# Patient Record
Sex: FEMALE | Race: WHITE | NOT HISPANIC OR LATINO
[De-identification: names, ages, dates, MRNs, and addresses within clinical notes are randomized per-mention and may not be internally consistent; named-entity substitution may affect disease eponyms.]

---

## 2018-02-05 ENCOUNTER — TRANSCRIPTION ENCOUNTER (OUTPATIENT)
Age: 83
End: 2018-02-05

## 2018-02-05 ENCOUNTER — EMERGENCY (EMERGENCY)
Facility: HOSPITAL | Age: 83
LOS: 1 days | Discharge: HOME | End: 2018-02-05
Attending: EMERGENCY MEDICINE | Admitting: EMERGENCY MEDICINE
Payer: MEDICARE

## 2018-02-05 VITALS
SYSTOLIC BLOOD PRESSURE: 138 MMHG | WEIGHT: 130.07 LBS | TEMPERATURE: 98 F | DIASTOLIC BLOOD PRESSURE: 68 MMHG | HEART RATE: 65 BPM | OXYGEN SATURATION: 100 % | RESPIRATION RATE: 16 BRPM

## 2018-02-05 PROCEDURE — 70450 CT HEAD/BRAIN W/O DYE: CPT

## 2018-02-05 PROCEDURE — 71111 X-RAY EXAM RIBS/CHEST4/> VWS: CPT | Mod: 26

## 2018-02-05 PROCEDURE — 71111 X-RAY EXAM RIBS/CHEST4/> VWS: CPT

## 2018-02-05 PROCEDURE — 99284 EMERGENCY DEPT VISIT MOD MDM: CPT

## 2018-02-05 PROCEDURE — 70450 CT HEAD/BRAIN W/O DYE: CPT | Mod: 26

## 2018-02-05 PROCEDURE — 99284 EMERGENCY DEPT VISIT MOD MDM: CPT | Mod: 25

## 2018-02-05 RX ORDER — ACETAMINOPHEN 500 MG
650 TABLET ORAL ONCE
Qty: 0 | Refills: 0 | Status: COMPLETED | OUTPATIENT
Start: 2018-02-05 | End: 2018-02-05

## 2018-02-05 RX ADMIN — Medication 650 MILLIGRAM(S): at 16:53

## 2018-02-05 NOTE — ED PROVIDER NOTE - CHPI ED SYMPTOMS NEG
no bruising/no tingling/no weakness/no fever/no abrasion/no back pain/no numbness/no deformity/no difficulty bearing weight/no stiffness

## 2018-02-05 NOTE — ED ADULT NURSE NOTE - PMH
DM (diabetes mellitus)    GERD (gastroesophageal reflux disease)    HLD (hyperlipidemia)    HTN (hypertension)

## 2018-02-05 NOTE — ED PROVIDER NOTE - PROGRESS NOTE DETAILS
Ct and x ray reviewed. results reviewed with pt and son at bedside. consulted melanie BEARDEN, to evaluate pt for home care, consulted PT for evaluation. pt seen by , jennifer navarro and pt. advised for home care for 2 weeks.  set up home care. pt advised to follow up with pmd. All questions answered and concerns addressed. pt verbalized understanding and agreement with plan and dx. pt advised to follow up with PMD. pt advised to return to ed for worsening symptoms including fever, cp, sob. will dc.

## 2018-02-05 NOTE — ED PROVIDER NOTE - OBJECTIVE STATEMENT
pt is a 86yo female with pmhx of htn, hld, dm BIB son, Marcio, c/o fall x yesterday. pt reports she fell yesterday and has right sided breast pain. pt reports she does not remember falling but reports she fell in the bathroom. pt son reports pt has been unsteady lately and reports he found the shower curtain down in the bathroom. he reports pt called him and advised she fell and has pain yesterday. son reports pt has been having short term memory loss as of lately and has been unsteady. he reports pt lives alone without assistance, him and his brother visit daily.    PMD: jo ann franks

## 2018-02-05 NOTE — ED PROVIDER NOTE - SKIN, MLM
Skin normal color for race, warm, dry and intact. No evidence of rash.+area of ecchymosis right mid-humerus appears old.

## 2018-02-05 NOTE — ED ADULT NURSE NOTE - OBJECTIVE STATEMENT
Pt is 85y F, came to ED s/p fall, no deformity denies loc, advised on plan of care, verbalized understanding.

## 2018-02-05 NOTE — PHYSICAL THERAPY INITIAL EVALUATION ADULT - ADDITIONAL COMMENTS
Son at bedside gives information due to patient c/o rib pain. Patient lives in a private home with 3 steps to enter. Son states he lives less than 2 miles away; so does his brother. Son states patient ambulates short distances around the home.

## 2018-05-13 ENCOUNTER — INPATIENT (INPATIENT)
Facility: HOSPITAL | Age: 83
LOS: 3 days | Discharge: NURSING, REHAB OR LONG-TERM CARE | DRG: 638 | End: 2018-05-17
Attending: FAMILY MEDICINE | Admitting: FAMILY MEDICINE
Payer: MEDICARE

## 2018-05-13 VITALS
WEIGHT: 154.98 LBS | HEIGHT: 62 IN | OXYGEN SATURATION: 98 % | HEART RATE: 58 BPM | SYSTOLIC BLOOD PRESSURE: 155 MMHG | TEMPERATURE: 97 F | RESPIRATION RATE: 18 BRPM | DIASTOLIC BLOOD PRESSURE: 84 MMHG

## 2018-05-13 DIAGNOSIS — R55 SYNCOPE AND COLLAPSE: ICD-10-CM

## 2018-05-13 DIAGNOSIS — S99.919A UNSPECIFIED INJURY OF UNSPECIFIED ANKLE, INITIAL ENCOUNTER: Chronic | ICD-10-CM

## 2018-05-13 DIAGNOSIS — Z90.49 ACQUIRED ABSENCE OF OTHER SPECIFIED PARTS OF DIGESTIVE TRACT: Chronic | ICD-10-CM

## 2018-05-13 DIAGNOSIS — R94.31 ABNORMAL ELECTROCARDIOGRAM [ECG] [EKG]: ICD-10-CM

## 2018-05-13 DIAGNOSIS — K21.9 GASTRO-ESOPHAGEAL REFLUX DISEASE WITHOUT ESOPHAGITIS: ICD-10-CM

## 2018-05-13 DIAGNOSIS — E78.5 HYPERLIPIDEMIA, UNSPECIFIED: ICD-10-CM

## 2018-05-13 DIAGNOSIS — E11.9 TYPE 2 DIABETES MELLITUS WITHOUT COMPLICATIONS: ICD-10-CM

## 2018-05-13 DIAGNOSIS — Z29.9 ENCOUNTER FOR PROPHYLACTIC MEASURES, UNSPECIFIED: ICD-10-CM

## 2018-05-13 DIAGNOSIS — I10 ESSENTIAL (PRIMARY) HYPERTENSION: ICD-10-CM

## 2018-05-13 LAB
BASOPHILS # BLD AUTO: 0.1 K/UL — SIGNIFICANT CHANGE UP (ref 0–0.2)
BASOPHILS NFR BLD AUTO: 1.2 % — SIGNIFICANT CHANGE UP (ref 0–2)
CK MB BLD-MCNC: 1.8 % — SIGNIFICANT CHANGE UP (ref 0–3.5)
CK MB BLD-MCNC: <1.2 % — SIGNIFICANT CHANGE UP (ref 0–3.5)
CK MB CFR SERPL CALC: 0.6 NG/ML — SIGNIFICANT CHANGE UP (ref 0–3.6)
CK MB CFR SERPL CALC: <0.5 NG/ML — SIGNIFICANT CHANGE UP (ref 0–3.6)
CK SERPL-CCNC: 34 U/L — SIGNIFICANT CHANGE UP (ref 26–192)
CK SERPL-CCNC: 41 U/L — SIGNIFICANT CHANGE UP (ref 26–192)
EOSINOPHIL # BLD AUTO: 0.2 K/UL — SIGNIFICANT CHANGE UP (ref 0–0.5)
EOSINOPHIL NFR BLD AUTO: 2.3 % — SIGNIFICANT CHANGE UP (ref 0–6)
HCT VFR BLD CALC: 35.9 % — SIGNIFICANT CHANGE UP (ref 34.5–45)
HGB BLD-MCNC: 12.1 G/DL — SIGNIFICANT CHANGE UP (ref 11.5–15.5)
LACTATE SERPL-SCNC: 1.8 MMOL/L — SIGNIFICANT CHANGE UP (ref 0.7–2)
LYMPHOCYTES # BLD AUTO: 1.5 K/UL — SIGNIFICANT CHANGE UP (ref 1–3.3)
LYMPHOCYTES # BLD AUTO: 17.4 % — SIGNIFICANT CHANGE UP (ref 13–44)
MCHC RBC-ENTMCNC: 28.6 PG — SIGNIFICANT CHANGE UP (ref 27–34)
MCHC RBC-ENTMCNC: 33.7 GM/DL — SIGNIFICANT CHANGE UP (ref 32–36)
MCV RBC AUTO: 85 FL — SIGNIFICANT CHANGE UP (ref 80–100)
MONOCYTES # BLD AUTO: 0.6 K/UL — SIGNIFICANT CHANGE UP (ref 0–0.9)
MONOCYTES NFR BLD AUTO: 6.5 % — SIGNIFICANT CHANGE UP (ref 1–9)
NEUTROPHILS # BLD AUTO: 6.3 K/UL — SIGNIFICANT CHANGE UP (ref 1.8–7.4)
NEUTROPHILS NFR BLD AUTO: 72.5 % — SIGNIFICANT CHANGE UP (ref 43–77)
PLATELET # BLD AUTO: 166 K/UL — SIGNIFICANT CHANGE UP (ref 150–400)
RBC # BLD: 4.23 M/UL — SIGNIFICANT CHANGE UP (ref 3.8–5.2)
RBC # FLD: 13.5 % — SIGNIFICANT CHANGE UP (ref 10.3–14.5)
TROPONIN I SERPL-MCNC: <.015 NG/ML — SIGNIFICANT CHANGE UP (ref 0.01–0.04)
TROPONIN I SERPL-MCNC: <.015 NG/ML — SIGNIFICANT CHANGE UP (ref 0.01–0.04)
WBC # BLD: 8.7 K/UL — SIGNIFICANT CHANGE UP (ref 3.8–10.5)
WBC # FLD AUTO: 8.7 K/UL — SIGNIFICANT CHANGE UP (ref 3.8–10.5)

## 2018-05-13 PROCEDURE — 99223 1ST HOSP IP/OBS HIGH 75: CPT

## 2018-05-13 PROCEDURE — 99285 EMERGENCY DEPT VISIT HI MDM: CPT

## 2018-05-13 PROCEDURE — 70450 CT HEAD/BRAIN W/O DYE: CPT | Mod: 26

## 2018-05-13 PROCEDURE — 99223 1ST HOSP IP/OBS HIGH 75: CPT | Mod: AI,GC

## 2018-05-13 PROCEDURE — 93010 ELECTROCARDIOGRAM REPORT: CPT

## 2018-05-13 PROCEDURE — 71045 X-RAY EXAM CHEST 1 VIEW: CPT | Mod: 26

## 2018-05-13 RX ORDER — CARVEDILOL PHOSPHATE 80 MG/1
6.25 CAPSULE, EXTENDED RELEASE ORAL
Qty: 0 | Refills: 0 | COMMUNITY

## 2018-05-13 RX ORDER — DONEPEZIL HYDROCHLORIDE 10 MG/1
1 TABLET, FILM COATED ORAL
Qty: 0 | Refills: 0 | COMMUNITY

## 2018-05-13 RX ORDER — DEXTROSE 50 % IN WATER 50 %
15 SYRINGE (ML) INTRAVENOUS ONCE
Qty: 0 | Refills: 0 | Status: DISCONTINUED | OUTPATIENT
Start: 2018-05-13 | End: 2018-05-17

## 2018-05-13 RX ORDER — ENOXAPARIN SODIUM 100 MG/ML
40 INJECTION SUBCUTANEOUS DAILY
Qty: 0 | Refills: 0 | Status: DISCONTINUED | OUTPATIENT
Start: 2018-05-13 | End: 2018-05-17

## 2018-05-13 RX ORDER — CARVEDILOL PHOSPHATE 80 MG/1
1 CAPSULE, EXTENDED RELEASE ORAL
Qty: 0 | Refills: 0 | COMMUNITY

## 2018-05-13 RX ORDER — VALSARTAN 80 MG/1
160 TABLET ORAL DAILY
Qty: 0 | Refills: 0 | Status: DISCONTINUED | OUTPATIENT
Start: 2018-05-13 | End: 2018-05-14

## 2018-05-13 RX ORDER — CARVEDILOL PHOSPHATE 80 MG/1
6.25 CAPSULE, EXTENDED RELEASE ORAL EVERY 12 HOURS
Qty: 0 | Refills: 0 | Status: DISCONTINUED | OUTPATIENT
Start: 2018-05-13 | End: 2018-05-17

## 2018-05-13 RX ORDER — PANTOPRAZOLE SODIUM 20 MG/1
40 TABLET, DELAYED RELEASE ORAL
Qty: 0 | Refills: 0 | Status: DISCONTINUED | OUTPATIENT
Start: 2018-05-13 | End: 2018-05-17

## 2018-05-13 RX ORDER — SODIUM CHLORIDE 9 MG/ML
1000 INJECTION, SOLUTION INTRAVENOUS
Qty: 0 | Refills: 0 | Status: DISCONTINUED | OUTPATIENT
Start: 2018-05-13 | End: 2018-05-17

## 2018-05-13 RX ORDER — ASPIRIN/CALCIUM CARB/MAGNESIUM 324 MG
81 TABLET ORAL DAILY
Qty: 0 | Refills: 0 | Status: DISCONTINUED | OUTPATIENT
Start: 2018-05-13 | End: 2018-05-17

## 2018-05-13 RX ORDER — SODIUM CHLORIDE 9 MG/ML
1000 INJECTION, SOLUTION INTRAVENOUS
Qty: 0 | Refills: 0 | Status: DISCONTINUED | OUTPATIENT
Start: 2018-05-13 | End: 2018-05-13

## 2018-05-13 RX ORDER — DEXTROSE 50 % IN WATER 50 %
25 SYRINGE (ML) INTRAVENOUS ONCE
Qty: 0 | Refills: 0 | Status: DISCONTINUED | OUTPATIENT
Start: 2018-05-13 | End: 2018-05-17

## 2018-05-13 RX ORDER — INSULIN LISPRO 100/ML
VIAL (ML) SUBCUTANEOUS
Qty: 0 | Refills: 0 | Status: DISCONTINUED | OUTPATIENT
Start: 2018-05-13 | End: 2018-05-17

## 2018-05-13 RX ORDER — DEXTROSE 50 % IN WATER 50 %
12.5 SYRINGE (ML) INTRAVENOUS ONCE
Qty: 0 | Refills: 0 | Status: DISCONTINUED | OUTPATIENT
Start: 2018-05-13 | End: 2018-05-17

## 2018-05-13 RX ORDER — ATORVASTATIN CALCIUM 80 MG/1
10 TABLET, FILM COATED ORAL AT BEDTIME
Qty: 0 | Refills: 0 | Status: DISCONTINUED | OUTPATIENT
Start: 2018-05-13 | End: 2018-05-17

## 2018-05-13 RX ORDER — GLUCAGON INJECTION, SOLUTION 0.5 MG/.1ML
1 INJECTION, SOLUTION SUBCUTANEOUS ONCE
Qty: 0 | Refills: 0 | Status: DISCONTINUED | OUTPATIENT
Start: 2018-05-13 | End: 2018-05-17

## 2018-05-13 RX ORDER — SODIUM CHLORIDE 9 MG/ML
3 INJECTION INTRAMUSCULAR; INTRAVENOUS; SUBCUTANEOUS ONCE
Qty: 0 | Refills: 0 | Status: COMPLETED | OUTPATIENT
Start: 2018-05-13 | End: 2018-05-13

## 2018-05-13 RX ADMIN — ENOXAPARIN SODIUM 40 MILLIGRAM(S): 100 INJECTION SUBCUTANEOUS at 22:52

## 2018-05-13 RX ADMIN — SODIUM CHLORIDE 200 MILLILITER(S): 9 INJECTION, SOLUTION INTRAVENOUS at 13:57

## 2018-05-13 RX ADMIN — ATORVASTATIN CALCIUM 10 MILLIGRAM(S): 80 TABLET, FILM COATED ORAL at 22:51

## 2018-05-13 RX ADMIN — SODIUM CHLORIDE 3 MILLILITER(S): 9 INJECTION INTRAMUSCULAR; INTRAVENOUS; SUBCUTANEOUS at 13:23

## 2018-05-13 RX ADMIN — CARVEDILOL PHOSPHATE 6.25 MILLIGRAM(S): 80 CAPSULE, EXTENDED RELEASE ORAL at 18:39

## 2018-05-13 NOTE — H&P ADULT - NSHPPHYSICALEXAM_GEN_ALL_CORE
Physical Exam:  General: Well developed, elderly, NAD  HEENT: NCAT, PERRLA, EOMI, b/l moist mucous membranes   Neck: Supple, nontender  Neurology: A&Ox3, nonfocal, CN II-XII grossly intact, pt able to move all 4 extremities  Respiratory: CTA B/L, No W/R/R  CV: RRR, +S1/S2, no murmurs, rubs or gallops  Abdominal: Soft, NT, ND +BSx4  Extremities: No LE edema, + peripheral pulses  MSK: no joint erythema or warmth, no joint swelling   Skin: warm, dry, normal color, no rash Physical Exam:  General: Well developed, elderly, NAD  HEENT: NCAT, PERRLA, EOMI, b/l moist mucous membranes   Neck: Supple, nontender  Neurology: A&Ox3, nonfocal, CN II-XII grossly intact, pt able to move all 4 extremities  Respiratory: CTA B/L, No W/R/R  CV: , +S1/S2, yes murmurs, sinus alise  Abdominal: Soft, NT, ND +BSx4  Extremities: No LE edema, + peripheral pulses  MSK: no joint erythema or warmth, no joint swelling   Skin: warm, dry, normal color, no rash , no open wound

## 2018-05-13 NOTE — ED PROVIDER NOTE - OBJECTIVE STATEMENT
86 yo F pw had episode at home where she became lightheaded, near syncopal. Family knew she had taken her DM meds but didn't eat. Pt was given OJ and cake and pt then woke up and came back to nl . Pt never co cp/sob/palp. No fever/chills/recent illness. no numb/ting/focal weak. No agg/allev factors. No recent illness. no other co. Pt is now asymptomatic. no acute co.  This is pts 3rd event in past 2 months, family states she lives alone and believes she may need placement in NH.

## 2018-05-13 NOTE — ED PROVIDER NOTE - CHPI ED SYMPTOMS NEG
no chills/no cough/no shortness of breath/no vomiting/no decreased eating/drinking/no diarrhea/no fever/no headache/no rash/no abdominal pain

## 2018-05-13 NOTE — ED PROVIDER NOTE - CARE PLAN
Principal Discharge DX:	Near syncope  Secondary Diagnosis:	Hypoglycemia  Secondary Diagnosis:	Abnormal EKG

## 2018-05-13 NOTE — H&P ADULT - ATTENDING COMMENTS
pt seen and examine see above - pt with recurrent syncopal episode -  syncope  work up - echo , carotid doppler , cardiac monitor  , ct head without contrast  neg also / last ct head as per family was neg  2wk ago ,  t wave flattening in ekg change from prior ekg - troponin neg  no sign of acute ischemia this time  continue asa , statin . pt is full code .  neuro dr hampton / cardio dr redmond  family aware with plan .

## 2018-05-13 NOTE — ED ADULT NURSE NOTE - OBJECTIVE STATEMENT
This a 84 y/o female received via EMS c/o low blood per family. Per family patient had an  at home where she became lightheaded, near syncopal. Family knew she had taken medication but she did not eat. Pt was given orange juice, cake and became normal . Denies any nausea, vomiting, fever, chills, diarrhea.  Respiration even unlabored lung field clear bilaterally. plan of care explained to patient will monitor support and safety provided.

## 2018-05-13 NOTE — CONSULT NOTE ADULT - SUBJECTIVE AND OBJECTIVE BOX
[facility]         Sarahi Meraz, Akil, [place], [place], Zack Allen        [phone] (office)    CHIEF COMPLAINT: Patient is a 85y old  Female who presents with a chief complaint of     HPI: 85f htn, chol, dm, gerd.  Some degree of progressive dementia, but continues to live alone. Sedentary at baseline but without sxs of angina or hf by report of her family at the bedside.  She has had several recent episodes of decreased responsiveness, including 2 in the past two weeks.  Typically, she will be sitting and will suddenly become unarousable, such that it takes some effort to wake her and she is often not herself after.  Her family believes that she is not conscious during these events.  She has been evaluated in the ER in the past for this.  On one occasion, she was felt dehydrated.  On another, she was felt to have a uti.  Today she had an episode and she was felt to possibly be hypoglycemic.  She was given something to eat/drink.  EMS found her mildly hyperglycemic.  Given these recurrent events, as well as a progressive decline in her functional status, she presents to the er for evaluation.    She has been found to have an abnormal ekg.     The patient has not memory of the event.    PAST MEDICAL & SURGICAL HISTORY:  DM (diabetes mellitus)  HLD (hyperlipidemia)  HTN (hypertension)  GERD (gastroesophageal reflux disease)  lazaro  breast bx  knee surgery    SOCIAL HISTORY: No active tobacco, alcohol or illicit drug use    FAMILY HISTORY: n/c      Outpatient medications: omeprazole 20, januvia 100, coreg 6.25 bid, lovastatin 40, aricept 5, glimepiride/metf 2.5/500 bid, asa 81, vals 160    MEDICATIONS  (STANDING):  dextrose 5% + sodium chloride 0.9%. 1000 milliLiter(s) (200 mL/Hr) IV Continuous <Continuous>    MEDICATIONS  (PRN):      Allergies    sulfa drugs (Unknown)    Intolerances        REVIEW OF SYSTEMS: unable    VITAL SIGNS:   Vital Signs Last 24 Hrs  T(C): 36.3 (13 May 2018 12:34), Max: 36.3 (13 May 2018 12:34)  T(F): 97.4 (13 May 2018 12:34), Max: 97.4 (13 May 2018 12:34)  HR: 58 (13 May 2018 12:34) (58 - 58)  BP: 155/84 (13 May 2018 12:34) (155/84 - 155/84)  BP(mean): --  RR: 18 (13 May 2018 12:34) (18 - 18)  SpO2: 98% (13 May 2018 12:34) (98% - 98%)    I&O's Summary      PHYSICAL EXAM:    Constitutional: NAD, awake and alert, well-developed  Eyes:  EOMI, no oral cyanosis, conjunctivae clear, anicteric.  Pulmonary: Non-labored, breath sounds are clear bilaterally, no wheezing, rales or rhonchi  Cardiovascular:  regular S1 and S2. 1-2/6 sys murmur.  No rubs, gallops or clicks  Gastrointestinal: Bowel Sounds present, soft, nontender.   Lymph: No peripheral edema.   Neurological: Alert, strength and sensitivity are grossly intact  Skin: No obvious lesions/rashes.   Psych:  Mood & affect appropriate . confused    LABS: All Labs Reviewed:                        12.1   8.7   )-----------( 166      ( 13 May 2018 13:27 )             35.9     13 May 2018 13:27    141    |  106    |  26     ----------------------------<  192    3.6     |  25     |  1.20     Ca    8.8        13 May 2018 13:27    TPro  6.7    /  Alb  3.3    /  TBili  0.8    /  DBili  x      /  AST  16     /  ALT  11     /  AlkPhos  34     13 May 2018 13:27      CARDIAC MARKERS ( 13 May 2018 13:27 )  <.015 ng/mL / x     / 41 U/L / x     / <0.5 ng/mL      Blood Culture:         RADIOLOGY:    EKG: nsr, nonspec twa

## 2018-05-13 NOTE — ED ADULT TRIAGE NOTE - CHIEF COMPLAINT QUOTE
as per ems "patient was about to eat lunch and became lethargic, family gave patient orange juice and cake and the patient went back to baseline". patient is diabetic. patient is at neuro baseline alert and oriented to person and place. BS as per EMS Is 234. negative slurred speech, facial droop, arm/leg drift.

## 2018-05-13 NOTE — ED ADULT NURSE NOTE - CHPI ED SYMPTOMS NEG
no decreased eating/drinking/no chills/no loss of consciousness/no fever/no headache/no dizziness/no nausea/no back pain

## 2018-05-13 NOTE — CONSULT NOTE ADULT - ASSESSMENT
85f htn, chol, dm, gerd.  Some degree of progressive dementia, but continues to live alone. Sedentary at baseline but without sxs of angina or hf by report of her family at the bedside.  She has had several recent episodes of decreased responsiveness, including 2 in the past two weeks.  Typically, she will be sitting and will suddenly become unarousable, such that it takes some effort to wake her and she is often not herself after.  Her family believes that she is not conscious during these events.  She has been evaluated in the ER in the past for this.  On one occasion, she was felt dehydrated.  On another, she was felt to have a uti.  Today she had an episode and she was felt to possibly be hypoglycemic.  She was given something to eat/drink.  EMS found her mildly hyperglycemic.  Given these recurrent events, as well as a progressive decline in her functional status, she presents to the er for evaluation.    She has been found to have an abnormal ekg.     The patient has not memory of the event.    -there is no evidence of acute ischemia.  -there is no evidence of significant arrhythmia.  -there is no evidence for meaningful  volume overload.  -is unclear what is causing these events  -cannot fully exclude an arrhythmic event  -abnormal ekg also needs to be explained, though the abnormalities are nonspecific  -suggest echo  -suggest tele monitoring  -cont bp meds, though if remains hypertensive these may need to be adjusted  -cont asa  -cont statin  -dm management per med  -overall functional and cognitive decline, may require more services or placement regardless of the outcome of the evaluation  -DVT prophylaxis  -monitor electrolytes, keep k>4, Mg>2   -will follow

## 2018-05-13 NOTE — H&P ADULT - ASSESSMENT
86 y/o F with PMH of DM, GERD, HLD, HTN, presents to ED with syncope, admitted for evaluation of syncope 86 y/o F with PMH of DM, GERD, HLD, HTN, presents to ED with syncope, admitted for evaluation of  recurrent syncope

## 2018-05-13 NOTE — ED PROVIDER NOTE - ENMT, MLM
Airway patent, Nasal mucosa clear. Mouth with normal mucosa. Throat has no vesicles, no oropharyngeal exudates and uvula is midline. Neck supple. no meningeal signs. MM Moist. Non-toxic, well appearing.

## 2018-05-13 NOTE — H&P ADULT - NSHPSOCIALHISTORY_GEN_ALL_CORE
Social History/Preventive Medicine:    Marital Status:   Occupation: Retired  Lives with: alone, but has HHA 5d/week for 2-3 hours at a time  Ambulates at home: walker    Substance Use:  Tobacco Usage:  Denies  Alcohol Usage: Denies  Illicit Drug Usage: Denies    Advanced Directives: Full Code

## 2018-05-13 NOTE — H&P ADULT - NSHPREVIEWOFSYSTEMS_GEN_ALL_CORE
Constitutional: denies fever, chills, diaphoresis   HEENT: denies blurry vision  Respiratory: denies SOB, cough, wheezing  Cardiovascular: denies CP, palpitations, edema  Gastrointestinal: denies nausea, vomiting, diarrhea, constipation, abdominal pain, melena, hematochezia   Genitourinary: denies dysuria, frequency, urgency, hematuria   Skin/Breast: denies rash  Musculoskeletal: denies joint swelling, muscle weakness  Neurologic: denies headache, weakness, dizziness, paresthesias, numbness/tingling  Psychiatric: denies feeling anxious, depressed, suicidal, homicidal thoughts  Hematology/Oncology: denies bruising, tender or enlarged lymph nodes   ROS negative except as noted above Constitutional: denies fever, chills, diaphoresis   HEENT: denies blurry vision  Respiratory: denies SOB, cough, wheezing  Cardiovascular: denies CP, palpitations, edema  Gastrointestinal: denies nausea, vomiting, diarrhea, constipation, abdominal pain, melena, hematochezia   Genitourinary: denies dysuria, frequency, urgency, hematuria   Skin/Breast: denies rash  Musculoskeletal: denies joint swelling, muscle weakness  Neurologic: denies headache, weakness, dizziness, paresthesias, numbness/tingling    ROS negative except as noted above

## 2018-05-13 NOTE — H&P ADULT - PROBLEM SELECTOR PLAN 1
- Admit to tele  - EKG sinus alise @59 with non-specific ST and T wave changes (T wave flattening noted compared to prior)  - - Admit to tele  - EKG sinus alise @59 with non-specific ST and T wave changes (T wave flattening noted compared to prior)  -echo , cardiac monitor , ct head

## 2018-05-13 NOTE — ED PROVIDER NOTE - PROGRESS NOTE DETAILS
Pt seen by Dr Austin, agree with admit, will follow. Dw Dr Lai, will see pt to admit. Pt doing well, no acute co or changes.

## 2018-05-13 NOTE — H&P ADULT - HISTORY OF PRESENT ILLNESS
84 y/o F with PMH of DM, GERD, HLD, HTN, presents to ED with syncope. Pt and daughter-in-law at bedside note that she has had 2 prior episodes of syncope in past month, for which she has been seen at [place] and previous workup was negative. Daughter-in-law notes that each episode has only lasted 1-2 mins and pt has no confusion after. Denies any falls. Daughter-in-law witnessed today's episode while pt was resting in a chair. Pt states she feels fine and denies HA, dizziness/lightheadedness, CP, SOB, N/V/C/D, hematochezia/melena, dysuria, hematuria.     In the ED pt VS were 97.4F, HR 58, /84, RR 18, SpO2 98% on RA.  Labs significant for CBC WNL, BUN 26, Glucose 192, GFR 41, Cardiac enzymes x1 WNL. CXR negative for active disease. EKG sinus alise @59 with non-specific ST and T wave changes (T wave flattening noted compared to prior EKG) 84 y/o F with PMH of DM, GERD, HLD, HTN, presents to ED with syncope. Pt and daughter-in-law at bedside note that she has had 2 prior episodes of syncope in past month, for which she has been seen at [place] and previous workup was negative. Daughter-in-law notes that each episode has only lasted 1-2 mins. Denies any falls. Daughter-in-law witnessed today's episode while pt was resting in a chair. Daughter felt pt may be hypoglycemic.  She was given something to eat/drink.  EMS found her mildly hyperglycemic. Pt states she feels fine and denies HA, dizziness/lightheadedness, CP, SOB, N/V/C/D, hematochezia/melena, dysuria, hematuria.     In the ED pt VS were 97.4F, HR 58, /84, RR 18, SpO2 98% on RA.  Labs significant for CBC WNL, BUN 26, Glucose 192, GFR 41, Cardiac enzymes x1 WNL. CXR negative for active disease. EKG sinus alise @59 with non-specific ST and T wave changes (T wave flattening noted compared to prior). Blood/urine cx ordered. Cardio Dr. Goetz consulted, saw pt in ED. 86 y/o F with PMH of DM type2 , GERD, HLD, HTN, presents to ED with syncope. Pt and daughter-in-law at bedside note that she has had 2 prior episodes of syncope in past month, for which she has been seen at [place] and previous workup was negative last ed visit may 1st . Daughter-in-law notes that each episode has only lasted 1-2 mins. Denies any falls. Daughter-in-law witnessed today's episode while pt was resting in a chair. Daughter felt pt may be hypoglycemic.  She was given something to eat/drink.  EMS found her mildly hyperglycemic. Pt states she feels fine and denies HA, dizziness/lightheadedness, CP, SOB, N/V/C/D, hematochezia/melena, dysuria, hematuria.     In the ED pt VS were 97.4F, HR 58, /84, RR 18, SpO2 98% on RA.  Labs significant for CBC WNL, BUN 26, Glucose 192, GFR 41, Cardiac enzymes x1 WNL. CXR negative for active disease. EKG sinus alise @59 with non-specific ST and T wave changes (T wave flattening noted compared to prior). Blood/urine cx ordered. Cardio Dr. Goetz consulted, saw pt in ED.

## 2018-05-14 ENCOUNTER — TRANSCRIPTION ENCOUNTER (OUTPATIENT)
Age: 83
End: 2018-05-14

## 2018-05-14 LAB
ANION GAP SERPL CALC-SCNC: 9 MMOL/L — SIGNIFICANT CHANGE UP (ref 5–17)
BASOPHILS # BLD AUTO: 0.1 K/UL — SIGNIFICANT CHANGE UP (ref 0–0.2)
BASOPHILS NFR BLD AUTO: 1.3 % — SIGNIFICANT CHANGE UP (ref 0–2)
BUN SERPL-MCNC: 22 MG/DL — SIGNIFICANT CHANGE UP (ref 7–23)
CALCIUM SERPL-MCNC: 9 MG/DL — SIGNIFICANT CHANGE UP (ref 8.5–10.1)
CHLORIDE SERPL-SCNC: 106 MMOL/L — SIGNIFICANT CHANGE UP (ref 96–108)
CK MB BLD-MCNC: 1.7 % — SIGNIFICANT CHANGE UP (ref 0–3.5)
CK MB CFR SERPL CALC: 0.6 NG/ML — SIGNIFICANT CHANGE UP (ref 0–3.6)
CK SERPL-CCNC: 36 U/L — SIGNIFICANT CHANGE UP (ref 26–192)
CO2 SERPL-SCNC: 29 MMOL/L — SIGNIFICANT CHANGE UP (ref 22–31)
CREAT SERPL-MCNC: 0.96 MG/DL — SIGNIFICANT CHANGE UP (ref 0.5–1.3)
EOSINOPHIL # BLD AUTO: 0.2 K/UL — SIGNIFICANT CHANGE UP (ref 0–0.5)
EOSINOPHIL NFR BLD AUTO: 3.2 % — SIGNIFICANT CHANGE UP (ref 0–6)
GLUCOSE SERPL-MCNC: 81 MG/DL — SIGNIFICANT CHANGE UP (ref 70–99)
HBA1C BLD-MCNC: 5.4 % — SIGNIFICANT CHANGE UP (ref 4–5.6)
HCT VFR BLD CALC: 34.8 % — SIGNIFICANT CHANGE UP (ref 34.5–45)
HGB BLD-MCNC: 11.9 G/DL — SIGNIFICANT CHANGE UP (ref 11.5–15.5)
LYMPHOCYTES # BLD AUTO: 2.5 K/UL — SIGNIFICANT CHANGE UP (ref 1–3.3)
LYMPHOCYTES # BLD AUTO: 34.7 % — SIGNIFICANT CHANGE UP (ref 13–44)
MAGNESIUM SERPL-MCNC: 1.9 MG/DL — SIGNIFICANT CHANGE UP (ref 1.6–2.6)
MCHC RBC-ENTMCNC: 28.8 PG — SIGNIFICANT CHANGE UP (ref 27–34)
MCHC RBC-ENTMCNC: 34.1 GM/DL — SIGNIFICANT CHANGE UP (ref 32–36)
MCV RBC AUTO: 84.7 FL — SIGNIFICANT CHANGE UP (ref 80–100)
MONOCYTES # BLD AUTO: 0.5 K/UL — SIGNIFICANT CHANGE UP (ref 0–0.9)
MONOCYTES NFR BLD AUTO: 7.2 % — SIGNIFICANT CHANGE UP (ref 1–9)
NEUTROPHILS # BLD AUTO: 3.9 K/UL — SIGNIFICANT CHANGE UP (ref 1.8–7.4)
NEUTROPHILS NFR BLD AUTO: 53.6 % — SIGNIFICANT CHANGE UP (ref 43–77)
PLATELET # BLD AUTO: 160 K/UL — SIGNIFICANT CHANGE UP (ref 150–400)
POTASSIUM SERPL-MCNC: 3.7 MMOL/L — SIGNIFICANT CHANGE UP (ref 3.5–5.3)
POTASSIUM SERPL-SCNC: 3.7 MMOL/L — SIGNIFICANT CHANGE UP (ref 3.5–5.3)
RBC # BLD: 4.11 M/UL — SIGNIFICANT CHANGE UP (ref 3.8–5.2)
RBC # FLD: 13 % — SIGNIFICANT CHANGE UP (ref 10.3–14.5)
SODIUM SERPL-SCNC: 144 MMOL/L — SIGNIFICANT CHANGE UP (ref 135–145)
TROPONIN I SERPL-MCNC: <.015 NG/ML — SIGNIFICANT CHANGE UP (ref 0.01–0.04)
TSH SERPL-MCNC: 0.57 UIU/ML — SIGNIFICANT CHANGE UP (ref 0.36–3.74)
WBC # BLD: 7.2 K/UL — SIGNIFICANT CHANGE UP (ref 3.8–10.5)
WBC # FLD AUTO: 7.2 K/UL — SIGNIFICANT CHANGE UP (ref 3.8–10.5)

## 2018-05-14 PROCEDURE — 99233 SBSQ HOSP IP/OBS HIGH 50: CPT

## 2018-05-14 PROCEDURE — 93306 TTE W/DOPPLER COMPLETE: CPT | Mod: 26

## 2018-05-14 PROCEDURE — 93880 EXTRACRANIAL BILAT STUDY: CPT | Mod: 26

## 2018-05-14 PROCEDURE — 99232 SBSQ HOSP IP/OBS MODERATE 35: CPT

## 2018-05-14 RX ORDER — VALSARTAN 80 MG/1
320 TABLET ORAL DAILY
Qty: 0 | Refills: 0 | Status: DISCONTINUED | OUTPATIENT
Start: 2018-05-14 | End: 2018-05-17

## 2018-05-14 RX ADMIN — Medication 650 MILLIGRAM(S): at 23:00

## 2018-05-14 RX ADMIN — VALSARTAN 320 MILLIGRAM(S): 80 TABLET ORAL at 17:13

## 2018-05-14 RX ADMIN — CARVEDILOL PHOSPHATE 6.25 MILLIGRAM(S): 80 CAPSULE, EXTENDED RELEASE ORAL at 05:44

## 2018-05-14 RX ADMIN — PANTOPRAZOLE SODIUM 40 MILLIGRAM(S): 20 TABLET, DELAYED RELEASE ORAL at 05:44

## 2018-05-14 RX ADMIN — ENOXAPARIN SODIUM 40 MILLIGRAM(S): 100 INJECTION SUBCUTANEOUS at 12:09

## 2018-05-14 RX ADMIN — VALSARTAN 160 MILLIGRAM(S): 80 TABLET ORAL at 05:44

## 2018-05-14 RX ADMIN — CARVEDILOL PHOSPHATE 6.25 MILLIGRAM(S): 80 CAPSULE, EXTENDED RELEASE ORAL at 17:18

## 2018-05-14 RX ADMIN — Medication 81 MILLIGRAM(S): at 12:09

## 2018-05-14 RX ADMIN — ATORVASTATIN CALCIUM 10 MILLIGRAM(S): 80 TABLET, FILM COATED ORAL at 23:05

## 2018-05-14 NOTE — PHYSICAL THERAPY INITIAL EVALUATION ADULT - PATIENT/FAMILY/SIGNIFICANT OTHER GOALS STATEMENT, PT EVAL
To go home Son wants her to get better and eventually plans to have pt go back home /c more assistance

## 2018-05-14 NOTE — PHYSICAL THERAPY INITIAL EVALUATION ADULT - FOLLOWS COMMANDS/ANSWERS QUESTIONS, REHAB EVAL
able to follow single-step instructions/some [place]/100% of the time 100% of the time/some [place] and confusion/able to follow single-step instructions

## 2018-05-14 NOTE — PROGRESS NOTE ADULT - ASSESSMENT
85f htn, chol, dm, gerd.  Some degree of progressive dementia, but continues to live alone. Sedentary at baseline but without sxs of angina or hf by report of her family at the bedside.  She has had several recent episodes of decreased responsiveness, including 2 in the past two weeks.  Typically, she will be sitting and will suddenly become unarousable, such that it takes some effort to wake her and she is often not herself after.  Her family believes that she is not conscious during these events.  She has been evaluated in the ER in the past for this.  On one occasion, she was felt dehydrated.  On another, she was felt to have a uti.  Today she had an episode and she was felt to possibly be hypoglycemic.  She was given something to eat/drink.  EMS found her mildly hyperglycemic.  Given these recurrent events, as well as a progressive decline in her functional status, she presents to the er for evaluation.    She has been found to have an abnormal ekg.     The patient has not memory of the event.    -there is no evidence of acute ischemia.  -there is no evidence of significant arrhythmia SB on tele overnight with no events  -there is no evidence for meaningful  volume overload.  -is unclear what is causing these events  -cannot fully exclude an arrhythmic event  -abnormal ekg also needs to be explained, though the abnormalities are nonspecific  -suggest echo  -cont tele monitoring for 24 hours then may d/c if no events  -cont Valsartan 160mg -180 would check orthostatics if negative would consider increasing BP tolerates.    -cont Coreg 6.25 mg BID SB-SR per tele and flowsheet  -cont asa  -cont statin  -dm management per med  -overall functional and cognitive decline, may require more services or placement regardless of the outcome of the evaluation  -DVT prophylaxis  -monitor electrolytes, keep k>4, Mg>2   -will follow    Krysten Hernandez NP-C  Cardiology 85f htn, chol, dm, gerd.  Some degree of progressive dementia, but continues to live alone. Sedentary at baseline but without sxs of angina or hf by report of her family at the bedside.  She has had several recent episodes of decreased responsiveness, including 2 in the past two weeks.  Typically, she will be sitting and will suddenly become unarousable, such that it takes some effort to wake her and she is often not herself after.  Her family believes that she is not conscious during these events.  She has been evaluated in the ER in the past for this.  On one occasion, she was felt dehydrated.  On another, she was felt to have a uti.  Today she had an episode and she was felt to possibly be hypoglycemic.  She was given something to eat/drink.  EMS found her mildly hyperglycemic.  Given these recurrent events, as well as a progressive decline in her functional status, she presents to the er for evaluation.    She has been found to have an abnormal ekg.     The patient has not memory of the event.    -there is no evidence of acute ischemia.  -there is no evidence of significant arrhythmia. SB on tele overnight with no events. Would ambulate and monitor for chronotropy  -there is no evidence for meaningful  volume overload.  -is unclear what is causing these events  -cannot fully exclude an arrhythmic event though so far no events  -abnormal ekg also needs to be explained, though the abnormalities are nonspecific  -suggest echo  -cont tele monitoring    -cont Valsartan 160mg -180. If orthostatics negative I would increase Valsartan to 320mg Qday.   -cont Coreg 6.25 mg BID SB-SR per tele and flowsheet  -cont asa  -cont statin  -dm management per med  -overall functional and cognitive decline, may require more services or placement regardless of the outcome of the evaluation  -DVT prophylaxis  -monitor electrolytes, keep k>4, Mg>2   -will follow    Krysten Hernandez NP-C  Cardiology

## 2018-05-14 NOTE — PHYSICAL THERAPY INITIAL EVALUATION ADULT - ADDITIONAL COMMENTS
Son at bedside gives information due to patient c/o rib pain. Patient lives in a private home with 3 steps to enter. Son states he lives less than 2 miles away; so does his brother. Son states patient ambulates short distances around the home. Son at bedside gives information due to patient history of confusion. Patient lives in a private home with 3 steps to enter. Pt does not negotiate steps inside. Pt has an aide for 1-2 hours for 3 days/wk. Son states he lives less than 2 miles away; so does his brother. Son states patient ambulates short distances around the home /c rolling walker. Per son pt has been not eating and drinking well for months. Pt has a rolling walker and SBQC (cane) and family visits during daytime. Pt is home alone part of the day.

## 2018-05-14 NOTE — DISCHARGE NOTE ADULT - CARE PLAN
Principal Discharge DX:	Near syncope  Goal:	resolution  Assessment and plan of treatment:	You had a fall at home. You were evaluated by a Cardiologist and Neurologist during your stay. There was no Neurological cause found. There was no structural defect in your heart that could have caused this episode. However your blood pressure was uncontrolled which could have contributed to the symptoms  Secondary Diagnosis:	HTN (hypertension)  Goal:	stable  Assessment and plan of treatment:	Your blood pressure was uncontrolled.  We increased your Valsartan to 320mg every day  We added Amlodipine 5mg every day  Continue Coreg 6.5mg twice a day  Strictly monitor blood pressure. Adhere to a low salt diet  Follow up with your cardiologist in 1 week  Secondary Diagnosis:	AVE (acute kidney injury)  Goal:	resolution  Assessment and plan of treatment:	Your Kidney function during your stay was mildly reduced. It came back to baseline upon your discharge.  Continue to drink enough fluids during the day to avoid dehydration.  Follow up with your primary doctor in 1 week  Secondary Diagnosis:	DM (diabetes mellitus)  Goal:	stable  Assessment and plan of treatment:	Your HbA1C was found to be 5.4  Continue home medication of Januvia and Metformin/Glyburide  Consider discontinuing use of Glyburide as it can lead to low blood sugars. Follow up with your primary doctor in 1 week to discuss this change  Secondary Diagnosis:	GERD (gastroesophageal reflux disease)  Goal:	stable  Assessment and plan of treatment:	continue omeprazole as needed  Secondary Diagnosis:	HLD (hyperlipidemia)  Goal:	Stable  Assessment and plan of treatment:	continue statin Principal Discharge DX:	Near syncope  Goal:	resolution  Assessment and plan of treatment:	You had an episode of near syncope. You were evaluated by a Cardiologist and Neurologist during your stay. There was no Neurological cause found. There was no structural defect in your heart that could have caused this episode. Your blood sugar levels could have contributed to this episode.  Secondary Diagnosis:	HTN (hypertension)  Goal:	stable  Assessment and plan of treatment:	Your blood pressure was uncontrolled.  We increased your Valsartan to 320mg every day  We added Amlodipine 5mg every day  Continue Coreg 6.5mg twice a day  Strictly monitor blood pressure. Adhere to a low salt diet  Follow up with your cardiologist in 1 week  Secondary Diagnosis:	AVE (acute kidney injury)  Goal:	resolution  Assessment and plan of treatment:	Your Kidney function during your stay was mildly reduced. It came back to baseline upon your discharge.  Continue to drink enough fluids during the day to avoid dehydration.  Follow up with your primary doctor in 1 week  Secondary Diagnosis:	DM (diabetes mellitus)  Goal:	stable  Assessment and plan of treatment:	Your HbA1C was found to be 5.4  We stopped your Januvia and Glyburide/Metformin  Follow up with your primary doctor in 1 week. Continue to monitor Blood glucose levels outpatient  Secondary Diagnosis:	GERD (gastroesophageal reflux disease)  Goal:	stable  Assessment and plan of treatment:	continue omeprazole as needed  Secondary Diagnosis:	HLD (hyperlipidemia)  Goal:	Stable  Assessment and plan of treatment:	continue statin Principal Discharge DX:	Near syncope  Goal:	resolution  Assessment and plan of treatment:	You had an episode of near syncope. You were evaluated by a Cardiologist and Neurologist during your stay. There was no Neurological cause found. There was no structural defect in your heart that could have caused this episode. Your blood sugar levels could have contributed to this episode.  Secondary Diagnosis:	HTN (hypertension)  Goal:	stable  Assessment and plan of treatment:	Your blood pressure was uncontrolled.  We increased your Valsartan to 320mg every day  We added Amlodipine 5mg every day  Continue Coreg 6.5mg twice a day  Strictly monitor blood pressure. Adhere to a low salt diet  Follow up with your cardiologist in 1 week  Secondary Diagnosis:	AVE (acute kidney injury)  Goal:	resolution  Assessment and plan of treatment:	Your Kidney function during your stay was mildly reduced. It came back to baseline upon your discharge.  Continue to drink enough fluids during the day to avoid dehydration.  Follow up with your primary doctor in 1 week  Secondary Diagnosis:	DM (diabetes mellitus)  Goal:	stable  Assessment and plan of treatment:	Your HbA1C was found to be 5.4  We discontinued your Glyburide/Metformin combination pill. We started you on 500mg metformin daily and decreased Januvia to 50mg daily.  Follow up with your primary doctor in 1 week. Continue to monitor Blood glucose levels outpatient  Secondary Diagnosis:	GERD (gastroesophageal reflux disease)  Goal:	stable  Assessment and plan of treatment:	continue omeprazole as needed  Secondary Diagnosis:	HLD (hyperlipidemia)  Goal:	Stable  Assessment and plan of treatment:	continue statin

## 2018-05-14 NOTE — DISCHARGE NOTE ADULT - CARE PROVIDER_API CALL
Tramaine Robles (DO), Family Medicine  [address] [place] [address]  Phone: [phone]  Fax: [phone] Tramaine Robles (), Family Medicine  [address] [place] [address]  Phone: [phone]  Fax: [phone]    Ralph Austin), Cardiovascular Disease  [address] [place] [address]  Phone: [phone]  Fax: [phone]

## 2018-05-14 NOTE — PHYSICAL THERAPY INITIAL EVALUATION ADULT - IMPAIRED TRANSFERS: SIT/STAND, REHAB EVAL
impaired balance/cognition/narrow base of support/decreased strength/impaired postural control/impaired motor control/impaired coordination

## 2018-05-14 NOTE — PHYSICAL THERAPY INITIAL EVALUATION ADULT - PERTINENT HX OF CURRENT PROBLEM, REHAB EVAL
Patient s/p fall in bathroom at home. As per H&P:"86 y/o F with PMH of DM type2 , GERD, HLD, HTN, presents to ED with syncope. Pt and daughter-in-law at bedside note that she has had 2 prior episodes of syncope in past month, for which she has been seen at [facility] and previous workup was negative last ed visit may 1st . Daughter-in-law notes that each episode has only lasted 1-2 mins. Denies any falls. Daughter-in-law witnessed today's episode while pt was resting in a chair."

## 2018-05-14 NOTE — DISCHARGE NOTE ADULT - ADDITIONAL INSTRUCTIONS
-Please follow up with your primary doctor within one week.  -Please follow up with Cardiologist outpatient in 1 week  -Patient and family to set up follow up appointments.  -Continue taking your medications as directed above.  -If symptoms persist/worsen, please call your PMD or return to the ED. -Please follow up with your primary doctor within one week.  -Please follow up with Cardiologist outpatient in 1 week  -Patient and family to set up follow up appointments.  -Continue taking your medications as directed above.  -If symptoms persist/worsen, please call your PMD or return to the ED.  -Please carry 2 candies with you and your glucometer to prevent hypoglycemic episodes.

## 2018-05-14 NOTE — PHYSICAL THERAPY INITIAL EVALUATION ADULT - TRANSFER TRAINING, PT EVAL
2 to 3 Sessions Sit to Stand Independent 3 to 5 Sessions: Sit to Stand /c supervision and rolling walker

## 2018-05-14 NOTE — PHYSICAL THERAPY INITIAL EVALUATION ADULT - IMPAIRMENTS CONTRIBUTING TO GAIT DEVIATIONS, PT EVAL
decreased strength/impaired balance/pain impaired postural control/impaired balance/cognition/narrow base of support/pain/decreased strength/decreased flexibility/impaired coordination

## 2018-05-14 NOTE — PHYSICAL THERAPY INITIAL EVALUATION ADULT - GENERAL OBSERVATIONS, REHAB EVAL
Patient received semifowler in bed, in NAD. Son present at bedside. Patient received semifowler in bed, in NAD. Son present at bedside. Pt is A&Ox2-3 and agreeable with PT eval.

## 2018-05-14 NOTE — DISCHARGE NOTE ADULT - NS AS DC STROKE ED MATERIALS
Call 911 for Stroke/Diabetes is a risk factor for Strokes/Stroke Education Booklet/Prescribed Medications/Need for Followup After Discharge/Stroke Warning Signs and Symptoms/Risk Factors for Stroke Diabetes is a risk factor for Strokes

## 2018-05-14 NOTE — DISCHARGE NOTE ADULT - SECONDARY DIAGNOSIS.
AVE (acute kidney injury) DM (diabetes mellitus) GERD (gastroesophageal reflux disease) HLD (hyperlipidemia) HTN (hypertension)

## 2018-05-14 NOTE — DISCHARGE NOTE ADULT - MEDICATION SUMMARY - MEDICATIONS TO CHANGE
I will SWITCH the dose or number of times a day I take the medications listed below when I get home from the hospital:    valsartan 160 mg oral tablet  -- 1 tab(s) by mouth once a day I will SWITCH the dose or number of times a day I take the medications listed below when I get home from the hospital:    Januvia  -- 100 milligram(s) by mouth once a day    valsartan 160 mg oral tablet  -- 1 tab(s) by mouth once a day

## 2018-05-14 NOTE — PROGRESS NOTE ADULT - ASSESSMENT
84 y/o F with PMH of DM, GERD, HLD, HTN, presents to ED with syncope, admitted for evaluation of recurrent syncope evaluate for cardiogenic vs neurologic cause

## 2018-05-14 NOTE — PROGRESS NOTE ADULT - PROBLEM SELECTOR PLAN 4
-continue to monitor  -orthostatics show increase in BP  -will increase valsartan to 320mg qd and continue coreg 6.5mg bid with hold parameters - chronic, stable  - continue to monitor  -orthostatics show increase in BP  -will increase valsartan to 320mg qd and continue coreg 6.5mg bid with hold parameters

## 2018-05-14 NOTE — PHYSICAL THERAPY INITIAL EVALUATION ADULT - BALANCE DISTURBANCE, IDENTIFIED IMPAIRMENT CONTRIBUTE, REHAB EVAL
pain/decreased strength decreased strength/impaired coordination/impaired postural control/impaired motor control

## 2018-05-14 NOTE — PHYSICAL THERAPY INITIAL EVALUATION ADULT - GAIT TRAINING, PT EVAL
2 to 3 Sessions Ambulate 100ft with AD Independent 3 to 5 Sessions: Ambulate /c rolling walker >100' /c CG Ax1

## 2018-05-14 NOTE — PHYSICAL THERAPY INITIAL EVALUATION ADULT - PLANNED THERAPY INTERVENTIONS, PT EVAL
transfer training/bed mobility training/strengthening/gait training/stairs transfer training/bed mobility training/balance training/gait training

## 2018-05-14 NOTE — PHYSICAL THERAPY INITIAL EVALUATION ADULT - IMPAIRMENTS FOUND, PT EVAL
gait, locomotion, and balance gait, locomotion, and balance/posture/muscle strength/cognitive impairment/aerobic capacity/endurance/Stairs/arousal, attention, and cognition/poor safety awareness

## 2018-05-14 NOTE — DISCHARGE NOTE ADULT - HOSPITAL COURSE
84 y/o F with PMH of DM type2 , GERD, HLD, HTN, presented to ED with syncope. Pt and daughter-in-law at bedside note that she has had 2 prior episodes of syncope in past month, for which she has been seen at [facility] and previous workup was negative last ed visit may 1st . Daughter-in-law noted that each episode has only lasted 1-2 mins. Denied any falls. Daughter-in-law witnessed episode while pt was resting in a chair. Daughter felt pt may be hypoglycemic. She was given something to eat/drink.  EMS found her mildly hyperglycemic.   In the ED pt VS were 97.4F, HR 58, /84, RR 18, SpO2 98% on RA.  Labs significant for CBC WNL, BUN 26, Glucose 192, GFR 41, Cardiac enzymes x1 WNL. CXR negative for active disease. EKG sinus alise @59 with non-specific ST and T wave changes (T wave flattening noted compared to prior). Blood/urine cx ordered. Cardio Dr. Goetz consulted, saw pt in ED. EKG changes normalized and ACS was ruled out. ECHO was done which showed normal left ventricular systolic function. Right heart suboptimally visualized. Grade 1 diastolic dysfunction. Pt was also seen by Neurologist, CT head was negative, TSH, B12 and folate wnl. Pts BP was uncontrolled during the stay and orthostatics showed an increase in blood pressure. Valsartan increased to 320mg qd and Amlodipine 5mg added. BP was better controlled. Pts HbA1C was 5.4, her blood sugars remained stable through the stay. Pt also had a mild elevation in her Creatinine which returned to baseline after fluid hydration, increase likely 2/2 to decreased oral intake. Of note, pt has mild cognitive impairment, AOx2. PT evaluated pt for need for rehab.  Patient was medically optimized and improved clinically throughout hospital course. Patient seen and examined on day of discharge.    Vital Signs Last 24 Hrs  T(C): 37 (17 May 2018 08:00), Max: 37.1 (16 May 2018 16:26)  T(F): 98.6 (17 May 2018 08:00), Max: 98.8 (16 May 2018 16:26)  HR: 61 (17 May 2018 08:00) (57 - 69)  BP: 154/85 (17 May 2018 08:00) (126/92 - 168/75)  BP(mean): --  RR: 18 (17 May 2018 08:00) (17 - 18)  SpO2: 98% (17 May 2018 08:00) (92% - 98%)    Physical Exam:  General: Well developed, well nourished, NAD, elderly female with mild cognitive impairment  HEENT: NCAT, PERRLA, EOMI bl, moist mucous membranes   Neck: Supple, nontender, no mass  Neurology: A&Ox2, nonfocal, CN II-XII grossly intact, sensation intact, no gait abnormalities   Respiratory: CTA B/L, No W/R/R  CV: RRR, +S1/S2, no murmurs, rubs or gallops  Abdominal: Soft, NT, ND +BSx4  Extremities: No C/C/E, + peripheral pulses  MSK: Normal ROM, no joint erythema or warmth, no joint swelling   Skin: warm, dry, R forearm bruise    Patient is medically stable and cleared for discharge to rehab facility with outpatient follow up. 86 y/o F with PMH of DM type2 , GERD, HLD, HTN, presented to ED with syncope. Pt and daughter-in-law at bedside note that she has had 2 prior episodes of syncope in past month, for which she has been seen at [facility] and previous workup was negative last ed visit may 1st . Daughter-in-law noted that each episode has only lasted 1-2 mins. Denied any falls. Daughter-in-law witnessed episode while pt was resting in a chair. Daughter felt pt may be hypoglycemic. She was given something to eat/drink.  EMS found her mildly hyperglycemic.   In the ED pt VS were 97.4F, HR 58, /84, RR 18, SpO2 98% on RA.  Labs significant for CBC WNL, BUN 26, Glucose 192, GFR 41, Cardiac enzymes x1 WNL. CXR negative for active disease. EKG sinus alise @59 with non-specific ST and T wave changes (T wave flattening noted compared to prior). Blood/urine cx ordered. Cardio Dr. Goetz consulted, saw pt in ED. EKG changes normalized and ACS was ruled out. ECHO was done which showed normal left ventricular systolic function. Right heart suboptimally visualized. Grade 1 diastolic dysfunction. Pt was also seen by Neurologist, CT head was negative, TSH, B12 and folate wnl. Pts BP was uncontrolled during the stay and orthostatics showed an increase in blood pressure. Valsartan increased to 320mg qd and Amlodipine 5mg added. BP was better controlled. Pts HbA1C was 5.4, her blood sugars remained stable through the stay. Discontinued Januvia/Glyburide and Metformin as this could have contributed to patient presyncopal episode of weakness. Pt also had a mild elevation in her Creatinine which returned to baseline after fluid hydration, increase likely 2/2 to decreased oral intake. Of note, pt has mild cognitive impairment, AOx2. PT evaluated pt for need for rehab.  Patient was medically optimized and improved clinically throughout hospital course. Patient seen and examined on day of discharge.    Vital Signs Last 24 Hrs  T(C): 37 (17 May 2018 08:00), Max: 37.1 (16 May 2018 16:26)  T(F): 98.6 (17 May 2018 08:00), Max: 98.8 (16 May 2018 16:26)  HR: 61 (17 May 2018 08:00) (57 - 69)  BP: 154/85 (17 May 2018 08:00) (126/92 - 168/75)  BP(mean): --  RR: 18 (17 May 2018 08:00) (17 - 18)  SpO2: 98% (17 May 2018 08:00) (92% - 98%)    Physical Exam:  General: Well developed, well nourished, NAD, elderly female with mild cognitive impairment  HEENT: NCAT, PERRLA, EOMI bl, moist mucous membranes   Neck: Supple, nontender, no mass  Neurology: A&Ox2, nonfocal, CN II-XII grossly intact, sensation intact, no gait abnormalities   Respiratory: CTA B/L, No W/R/R  CV: RRR, +S1/S2, no murmurs, rubs or gallops  Abdominal: Soft, NT, ND +BSx4  Extremities: No C/C/E, + peripheral pulses  MSK: Normal ROM, no joint erythema or warmth, no joint swelling   Skin: warm, dry, R forearm bruise    Patient is medically stable and cleared for discharge to rehab facility with outpatient follow up. 86 y/o F with PMH of DM type2 , GERD, HLD, HTN, presented to ED with syncope. Pt and daughter-in-law at bedside note that she has had 2 prior episodes of syncope in past month, for which she has been seen at [facility] and previous workup was negative last ed visit may 1st . Daughter-in-law noted that each episode has only lasted 1-2 mins. Denied any falls. Daughter-in-law witnessed episode while pt was resting in a chair. Daughter felt pt may be hypoglycemic. She was given something to eat/drink.  EMS found her mildly hyperglycemic.   In the ED pt VS were 97.4F, HR 58, /84, RR 18, SpO2 98% on RA.  Labs significant for CBC WNL, BUN 26, Glucose 192, GFR 41, Cardiac enzymes x1 WNL. CXR negative for active disease. EKG sinus alise @59 with non-specific ST and T wave changes (T wave flattening noted compared to prior). Blood/urine cx ordered. Cardio Dr. Goetz consulted, saw pt in ED. EKG changes normalized and ACS was ruled out. ECHO was done which showed normal left ventricular systolic function. Right heart suboptimally visualized. Grade 1 diastolic dysfunction. Pt was also seen by Neurologist, CT head was negative, TSH, B12 and folate wnl. Pts BP was uncontrolled during the stay and orthostatics showed an increase in blood pressure. Valsartan increased to 320mg qd and Amlodipine 5mg added. BP was better controlled. Pts HbA1C was 5.4, her blood sugars remained stable through the stay. Discontinued Januvia/Glyburide and Metformin as this could have contributed to patient presyncopal episode of weakness. Pt also had a mild elevation in her Creatinine which returned to baseline after fluid hydration, increase likely 2/2 to decreased oral intake. Of note, pt has mild cognitive impairment, AOx2. PT evaluated pt for need for rehab.  Patient was medically improved clinically throughout hospital course. Patient seen and examined on day of discharge.    Vital Signs Last 24 Hrs  T(C): 37 (17 May 2018 08:00), Max: 37.1 (16 May 2018 16:26)  T(F): 98.6 (17 May 2018 08:00), Max: 98.8 (16 May 2018 16:26)  HR: 61 (17 May 2018 08:00) (57 - 69)  BP: 154/85 (17 May 2018 08:00) (126/92 - 168/75)  BP(mean): --  RR: 18 (17 May 2018 08:00) (17 - 18)  SpO2: 98% (17 May 2018 08:00) (92% - 98%)    Physical Exam:  General: Well developed, well nourished, NAD, elderly female with mild cognitive impairment  HEENT: NCAT, PERRLA, EOMI bl, moist mucous membranes   Neck: Supple, nontender, no mass  Neurology: A&Ox2, nonfocal, CN II-XII grossly intact, sensation intact, no gait abnormalities   Respiratory: CTA B/L, No W/R/R  CV: RRR, +S1/S2, no murmurs, rubs or gallops  Abdominal: Soft, NT, ND +BSx4  Extremities: No C/C/E, + peripheral pulses  MSK: Normal ROM, no joint erythema or warmth, no joint swelling   Skin: warm, dry, R forearm bruise    Patient is medically stable for discharge to rehab facility with outpatient follow up. 86 y/o F with PMH of DM type2 , GERD, HLD, HTN, presented to ED with syncope. Pt and daughter-in-law at bedside note that she has had 2 prior episodes of syncope in past month, for which she has been seen at [facility] and previous workup was negative last ed visit may 1st . Daughter-in-law noted that each episode has only lasted 1-2 mins. Denied any falls. Daughter-in-law witnessed episode while pt was resting in a chair. Daughter felt pt may be hypoglycemic. She was given something to eat/drink.  EMS found her mildly hyperglycemic.   In the ED pt VS were 97.4F, HR 58, /84, RR 18, SpO2 98% on RA.  Labs significant for CBC WNL, BUN 26, Glucose 192, GFR 41, Cardiac enzymes x1 WNL. CXR negative for active disease. EKG sinus alise @59 with non-specific ST and T wave changes (T wave flattening noted compared to prior). Blood/urine cx ordered. Cardio Dr. Goetz consulted, saw pt in ED. EKG changes normalized and ACS was ruled out. ECHO was done which showed normal left ventricular systolic function. Right heart suboptimally visualized. Grade 1 diastolic dysfunction. Pt was also seen by Neurologist, CT head was negative, TSH, B12 and folate wnl. Pts BP was uncontrolled during the stay and orthostatics showed an increase in blood pressure. Valsartan increased to 320mg qd and Amlodipine 5mg added. BP was better controlled. Pts HbA1C was 5.4, her blood sugars remained stable through the stay. Pt seen and evaluated by Endocrinologist. Januvia decreased to 50mg daily and discontinued Glyburide/Metformin as this could have contributed to patient presyncopal episode of weakness. To continue metformin 500mg daily. Pt also had a mild elevation in her Creatinine which returned to baseline after fluid hydration, increase likely 2/2 to decreased oral intake. Of note, pt has mild cognitive impairment, AOx2. PT evaluated pt for need for rehab.  Patient was medically improved clinically throughout hospital course. Patient seen and examined on day of discharge.    Vital Signs Last 24 Hrs  T(C): 37 (17 May 2018 08:00), Max: 37.1 (16 May 2018 16:26)  T(F): 98.6 (17 May 2018 08:00), Max: 98.8 (16 May 2018 16:26)  HR: 61 (17 May 2018 08:00) (57 - 69)  BP: 154/85 (17 May 2018 08:00) (126/92 - 168/75)  BP(mean): --  RR: 18 (17 May 2018 08:00) (17 - 18)  SpO2: 98% (17 May 2018 08:00) (92% - 98%)    Physical Exam:  General: Well developed, well nourished, NAD, elderly female with mild cognitive impairment  HEENT: NCAT, PERRLA, EOMI bl, moist mucous membranes   Neck: Supple, nontender, no mass  Neurology: A&Ox2, nonfocal, CN II-XII grossly intact, sensation intact, no gait abnormalities   Respiratory: CTA B/L, No W/R/R  CV: RRR, +S1/S2, no murmurs, rubs or gallops  Abdominal: Soft, NT, ND +BSx4  Extremities: No C/C/E, + peripheral pulses  MSK: Normal ROM, no joint erythema or warmth, no joint swelling   Skin: warm, dry, R forearm bruise    Patient is medically stable for discharge to rehab facility with outpatient follow up. 86 y/o F with PMH of DM type2 , GERD, HLD, HTN, presented to ED with syncope. Pt and daughter-in-law at bedside note that she has had 2 prior episodes of syncope in past month, for which she has been seen at [facility] and previous workup was negative last ed visit may 1st . Daughter-in-law noted that each episode has only lasted 1-2 mins. Denied any falls. Daughter-in-law witnessed episode while pt was resting in a chair. Daughter felt pt may be hypoglycemic. She was given something to eat/drink.  EMS found her mildly hyperglycemic.   In the ED pt VS were 97.4F, HR 58, /84, RR 18, SpO2 98% on RA.  Labs significant for CBC WNL, BUN 26, Glucose 192, GFR 41, Cardiac enzymes x1 WNL. CXR negative for active disease. EKG sinus laise @59 with non-specific ST and T wave changes (T wave flattening noted compared to prior). Blood/urine cx ordered. Cardio Dr. Goetz consulted, saw pt in ED. EKG changes normalized and ACS was ruled out. ECHO was done which showed normal left ventricular systolic function. Right heart suboptimally visualized. Grade 1 diastolic dysfunction. Pt was also seen by Neurologist, CT head was negative, TSH, B12 and folate wnl. Pts BP was uncontrolled during the stay and orthostatics showed an increase in blood pressure. Valsartan increased to 320mg qd and Amlodipine 5mg added. BP was better controlled. Pts HbA1C was 5.4, her blood sugars remained stable through the stay. Pt seen and evaluated by Endocrinologist. Januvia decreased to 50mg daily and discontinued Glyburide/Metformin as this could have contributed to patient presyncopal episode of weakness. To continue metformin 500mg daily. Pt also had a mild elevation in her Creatinine which returned to baseline after fluid hydration, increase likely 2/2 to decreased oral intake. Of note, pt has mild cognitive impairment, AOx2. PT evaluated pt for need for rehab.  Patient was medically improved clinically throughout hospital course. Patient seen and examined on day of discharge.    Vital Signs Last 24 Hrs  T(C): 37 (17 May 2018 08:00), Max: 37.1 (16 May 2018 16:26)  T(F): 98.6 (17 May 2018 08:00), Max: 98.8 (16 May 2018 16:26)  HR: 61 (17 May 2018 08:00) (57 - 69)  BP: 154/85 (17 May 2018 08:00) (126/92 - 168/75)  BP(mean): --  RR: 18 (17 May 2018 08:00) (17 - 18)  SpO2: 98% (17 May 2018 08:00) (92% - 98%)    Physical Exam:  General: Well developed, well nourished, NAD, elderly female with mild cognitive impairment  HEENT: NCAT, PERRLA, EOMI bl, moist mucous membranes   Neck: Supple, nontender, no mass  Neurology: A&Ox2, nonfocal, CN II-XII grossly intact, sensation intact, no gait abnormalities   Respiratory: CTA B/L, No W/R/R  CV: RRR, +S1/S2, no murmurs, rubs or gallops  Abdominal: Soft, NT, ND +BSx4  Extremities: No C/C/E, + peripheral pulses  MSK: Normal ROM, no joint erythema or warmth, no joint swelling   Skin: warm, dry, R forearm bruise    Patient is medically stable for discharge to rehab facility with outpatient follow up.    Time spent: 65 minutes  PMD office made aware of discharge

## 2018-05-14 NOTE — DISCHARGE NOTE ADULT - MEDICATION SUMMARY - MEDICATIONS TO STOP TAKING
I will STOP taking the medications listed below when I get home from the hospital:  None I will STOP taking the medications listed below when I get home from the hospital:    glyburide-metformin 2.5 mg-500 mg oral tablet  -- 1 tab(s) by mouth 2 times a day    Januvia  -- 100 milligram(s) by mouth once a day I will STOP taking the medications listed below when I get home from the hospital:    glyburide-metformin 2.5 mg-500 mg oral tablet  -- 1 tab(s) by mouth 2 times a day

## 2018-05-14 NOTE — PROGRESS NOTE ADULT - SUBJECTIVE AND OBJECTIVE BOX
Patient is a 85y old  Female who presents with a chief complaint of syncope (13 May 2018 16:23)      INTERVAL HPI/OVERNIGHT EVENTS: Pt seen and examined at bedside, in NAD. Denies CP, SOB, palpitations, abdominal pain, N/V/D, urinary complaints. No overnight events.    MEDICATIONS  (STANDING):  aspirin enteric coated 81 milliGRAM(s) Oral daily  atorvastatin 10 milliGRAM(s) Oral at bedtime  carvedilol 6.25 milliGRAM(s) Oral every 12 hours  dextrose 5%. 1000 milliLiter(s) (50 mL/Hr) IV Continuous <Continuous>  dextrose 50% Injectable 12.5 Gram(s) IV Push once  dextrose 50% Injectable 25 Gram(s) IV Push once  dextrose 50% Injectable 25 Gram(s) IV Push once  enoxaparin Injectable 40 milliGRAM(s) SubCutaneous daily  insulin lispro (HumaLOG) corrective regimen sliding scale   SubCutaneous three times a day before meals  pantoprazole    Tablet 40 milliGRAM(s) Oral before breakfast  valsartan 160 milliGRAM(s) Oral daily    MEDICATIONS  (PRN):  dextrose 40% Gel 15 Gram(s) Oral once PRN Blood Glucose LESS THAN 70 milliGRAM(s)/deciliter  glucagon  Injectable 1 milliGRAM(s) IntraMuscular once PRN Glucose LESS THAN 70 milligrams/deciliter      Allergies    sulfa drugs (Unknown)    Intolerances        REVIEW OF SYSTEMS: Pt is not a good historian however denies below symptoms on questioning  CONSTITUTIONAL: No fever or chills  HEENT:  No headache, no sore throat  RESPIRATORY: No cough, wheezing, or shortness of breath  CARDIOVASCULAR: No chest pain, palpitations, or leg swelling  GASTROINTESTINAL: No nausea, vomiting, or diarrhea  GENITOURINARY: No dysuria, frequency, or hematuria  NEUROLOGICAL: no focal weakness or dizziness  SKIN:  No rashes or lesions   MUSCULOSKELETAL: no myalgias   PSYCHIATRIC: No depression or anxiety  ROS Unable to obtain due to - [x ] Dementia  [ ] Lethargy  REST OF REVIEW Of SYSTEM - [ ] Normal     Vital Signs Last 24 Hrs  T(C): 36.4 (14 May 2018 15:55), Max: 37.1 (13 May 2018 17:55)  T(F): 97.6 (14 May 2018 15:55), Max: 98.7 (13 May 2018 17:55)  HR: 69 (14 May 2018 15:55) (56 - 69)  BP: 159/75 (14 May 2018 13:02) (153/84 - 184/83)  BP(mean): --  RR: 16 (14 May 2018 15:55) (16 - 19)  SpO2: 97% (14 May 2018 15:55) (96% - 99%)    PHYSICAL EXAM:  Physical Exam:  General: Well developed, well nourished, NAD  HEENT: normocephalic, atraumatic, PERRLA, extraocular muscles intact bilaterally, moist mucous membranes   Neck: Supple, nontender, no mass  Neurology: A&Ox2, moves all extremities x4, nonfocal, CN II-XII grossly intact, sensation intact  Respiratory: clear to auscultation B/L, No wheezes, rales, ronchi  CV: RRR, +S1/S2, no murmurs, rubs or gallops  Abdominal: Soft, nontender, nondistended +BSx4  Extremities: No cyanosis/clubbing/edema, + peripheral pulses  MSK: Normal ROM, no joint erythema or warmth, no joint swelling   Skin: warm, dry, normal color, no rash or abnormal lesions    LABS:                        11.9   7.2   )-----------( 160      ( 14 May 2018 07:24 )             34.8     CBC Full  -  ( 14 May 2018 07:24 )  WBC Count : 7.2 K/uL  Hemoglobin : 11.9 g/dL  Hematocrit : 34.8 %  Platelet Count - Automated : 160 K/uL  Mean Cell Volume : 84.7 fl  Mean Cell Hemoglobin : 28.8 pg  Mean Cell Hemoglobin Concentration : 34.1 gm/dL  Auto Neutrophil # : 3.9 K/uL  Auto Lymphocyte # : 2.5 K/uL  Auto Monocyte # : 0.5 K/uL  Auto Eosinophil # : 0.2 K/uL  Auto Basophil # : 0.1 K/uL  Auto Neutrophil % : 53.6 %  Auto Lymphocyte % : 34.7 %  Auto Monocyte % : 7.2 %  Auto Eosinophil % : 3.2 %  Auto Basophil % : 1.3 %    14 May 2018 07:24    144    |  106    |  22     ----------------------------<  81     3.7     |  29     |  0.96     Ca    9.0        14 May 2018 07:24  Mg     1.9       14 May 2018 07:24          CAPILLARY BLOOD GLUCOSE      POCT Blood Glucose.: 120 mg/dL (14 May 2018 11:32)  POCT Blood Glucose.: 120 mg/dL (14 May 2018 08:57)  POCT Blood Glucose.: 90 mg/dL (13 May 2018 22:53)  POCT Blood Glucose.: 71 mg/dL (13 May 2018 22:22)  POCT Blood Glucose.: 98 mg/dL (13 May 2018 18:26)          RADIOLOGY & ADDITIONAL TESTS:  < from: Xray Chest 1 View AP/PA (05.13.18 @ 13:36) >    EXAM:  XR CHEST AP OR PA 1V                            PROCEDURE DATE:  05/13/2018          INTERPRETATION:  Comparison study dated 2/5/2018    Clinical information: Hypoglycemia    Portable study, 1:36 PM        No evidence of infiltrate effusion or congestive failure. Heart size   within normal limits. Hilar regions mediastinal contours and bony thorax   are intact. Atherosclerotic changes aorta. Surgical clips present right   upper quadrant.    IMPRESSION: No active disease.                BARBARA MACE M.D.,ATTENDING RADIOLOGIST  This document has been electronically signed. May 13 2018  1:43PM                < end of copied text >    < from: CT Head No Cont (05.13.18 @ 16:16) >  EXAM:  CT BRAIN                            PROCEDURE DATE:  05/13/2018          INTERPRETATION:  Comparison study dated 2/5/2018    Clinical information: Altered mental status    Axial images obtained, coronal and sagittal images computer-generated.    Images demonstrate  atrophic changes. Periventricular white matter   hypoattenuation, microvascular ischemic change. There is no sign of   mass-effect midline shift epidural or subdural collection. There is no   sign of acute or recent hemorrhage. No unusual calcifications are noted.   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    The calvarium appears intact. Traces of mucosal thickening ethmoid   sinuses.    IMPRESSION: No evidence of acute intracranial pathology                BARBARA MACE M.D.,ATTENDING RADIOLOGIST  This document has been electronically signed. May 13 2018  4:21PM                < end of copied text >    Personally reviewed.     Consultant(s) Notes Reviewed:  [x] YES  [ ] NO    Care Discussed with [x] Consultants  [x] Patient  [ ] Family  [ ]      [ ] Other; RN  DVT ppx: Lovenox Patient is a 85y old  Female who presents with a chief complaint of syncope (13 May 2018 16:23)      INTERVAL HPI/OVERNIGHT EVENTS: Pt seen and examined at bedside, in NAD. Denies CP, SOB, palpitations, abdominal pain, N/V/D, urinary complaints. No overnight events.    MEDICATIONS  (STANDING):  aspirin enteric coated 81 milliGRAM(s) Oral daily  atorvastatin 10 milliGRAM(s) Oral at bedtime  carvedilol 6.25 milliGRAM(s) Oral every 12 hours  dextrose 5%. 1000 milliLiter(s) (50 mL/Hr) IV Continuous <Continuous>  dextrose 50% Injectable 12.5 Gram(s) IV Push once  dextrose 50% Injectable 25 Gram(s) IV Push once  dextrose 50% Injectable 25 Gram(s) IV Push once  enoxaparin Injectable 40 milliGRAM(s) SubCutaneous daily  insulin lispro (HumaLOG) corrective regimen sliding scale   SubCutaneous three times a day before meals  pantoprazole    Tablet 40 milliGRAM(s) Oral before breakfast  valsartan 160 milliGRAM(s) Oral daily    MEDICATIONS  (PRN):  dextrose 40% Gel 15 Gram(s) Oral once PRN Blood Glucose LESS THAN 70 milliGRAM(s)/deciliter  glucagon  Injectable 1 milliGRAM(s) IntraMuscular once PRN Glucose LESS THAN 70 milligrams/deciliter      Allergies    sulfa drugs (Unknown)    Intolerances        REVIEW OF SYSTEMS: Pt is not a good historian however denies below symptoms on questioning  CONSTITUTIONAL: No fever or chills  HEENT:  No headache, no sore throat  RESPIRATORY: No cough, wheezing, or shortness of breath  CARDIOVASCULAR: No chest pain, palpitations, or leg swelling  GASTROINTESTINAL: No nausea, vomiting, or diarrhea  GENITOURINARY: No dysuria, frequency, or hematuria  NEUROLOGICAL: no focal weakness or dizziness  SKIN:  No rashes or lesions   MUSCULOSKELETAL: no myalgias   PSYCHIATRIC: No depression or anxiety  ROS Unable to obtain due to - [x ] Dementia  [ ] Lethargy  REST OF REVIEW Of SYSTEM - [ ] Normal     Vital Signs Last 24 Hrs  T(C): 36.4 (14 May 2018 15:55), Max: 37.1 (13 May 2018 17:55)  T(F): 97.6 (14 May 2018 15:55), Max: 98.7 (13 May 2018 17:55)  HR: 69 (14 May 2018 15:55) (56 - 69)  BP: 159/75 (14 May 2018 13:02) (153/84 - 184/83)  BP(mean): --  RR: 16 (14 May 2018 15:55) (16 - 19)  SpO2: 97% (14 May 2018 15:55) (96% - 99%)    PHYSICAL EXAM:  Physical Exam:  General: Well developed, well nourished, NAD, elder  HEENT: normocephalic, atraumatic, PERRLA, extraocular muscles intact bilaterally, moist mucous membranes   Neck: Supple, nontender, no mass  Neurology: A&Ox2, moves all extremities x4, nonfocal, CN II-XII grossly intact, sensation intact  Respiratory: clear to auscultation B/L, No wheezes, rales, ronchi  CV: RRR, +S1/S2, no murmurs, rubs or gallops  Abdominal: Soft, nontender, nondistended +BSx4  Extremities: No cyanosis/clubbing/edema, + peripheral pulses  MSK: Normal ROM, no joint erythema or warmth, no joint swelling   Skin: warm, dry, normal color, no rash or abnormal lesions    LABS:                        11.9   7.2   )-----------( 160      ( 14 May 2018 07:24 )             34.8     CBC Full  -  ( 14 May 2018 07:24 )  WBC Count : 7.2 K/uL  Hemoglobin : 11.9 g/dL  Hematocrit : 34.8 %  Platelet Count - Automated : 160 K/uL  Mean Cell Volume : 84.7 fl  Mean Cell Hemoglobin : 28.8 pg  Mean Cell Hemoglobin Concentration : 34.1 gm/dL  Auto Neutrophil # : 3.9 K/uL  Auto Lymphocyte # : 2.5 K/uL  Auto Monocyte # : 0.5 K/uL  Auto Eosinophil # : 0.2 K/uL  Auto Basophil # : 0.1 K/uL  Auto Neutrophil % : 53.6 %  Auto Lymphocyte % : 34.7 %  Auto Monocyte % : 7.2 %  Auto Eosinophil % : 3.2 %  Auto Basophil % : 1.3 %    14 May 2018 07:24    144    |  106    |  22     ----------------------------<  81     3.7     |  29     |  0.96     Ca    9.0        14 May 2018 07:24  Mg     1.9       14 May 2018 07:24          CAPILLARY BLOOD GLUCOSE      POCT Blood Glucose.: 120 mg/dL (14 May 2018 11:32)  POCT Blood Glucose.: 120 mg/dL (14 May 2018 08:57)  POCT Blood Glucose.: 90 mg/dL (13 May 2018 22:53)  POCT Blood Glucose.: 71 mg/dL (13 May 2018 22:22)  POCT Blood Glucose.: 98 mg/dL (13 May 2018 18:26)          RADIOLOGY & ADDITIONAL TESTS:  < from: Xray Chest 1 View AP/PA (05.13.18 @ 13:36) >    EXAM:  XR CHEST AP OR PA 1V                            PROCEDURE DATE:  05/13/2018          INTERPRETATION:  Comparison study dated 2/5/2018    Clinical information: Hypoglycemia    Portable study, 1:36 PM        No evidence of infiltrate effusion or congestive failure. Heart size   within normal limits. Hilar regions mediastinal contours and bony thorax   are intact. Atherosclerotic changes aorta. Surgical clips present right   upper quadrant.    IMPRESSION: No active disease.                BARBARA MACE M.D.,ATTENDING RADIOLOGIST  This document has been electronically signed. May 13 2018  1:43PM                < end of copied text >    < from: CT Head No Cont (05.13.18 @ 16:16) >  EXAM:  CT BRAIN                            PROCEDURE DATE:  05/13/2018          INTERPRETATION:  Comparison study dated 2/5/2018    Clinical information: Altered mental status    Axial images obtained, coronal and sagittal images computer-generated.    Images demonstrate  atrophic changes. Periventricular white matter   hypoattenuation, microvascular ischemic change. There is no sign of   mass-effect midline shift epidural or subdural collection. There is no   sign of acute or recent hemorrhage. No unusual calcifications are noted.   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    The calvarium appears intact. Traces of mucosal thickening ethmoid   sinuses.    IMPRESSION: No evidence of acute intracranial pathology                BARBARA MACE M.D.,ATTENDING RADIOLOGIST  This document has been electronically signed. May 13 2018  4:21PM                < end of copied text >    Personally reviewed.     Consultant(s) Notes Reviewed:  [x] YES  [ ] NO    Care Discussed with [x] Consultants  [x] Patient  [ ] Family  [ ]      [ ] Other; RN  DVT ppx: Lovenox

## 2018-05-14 NOTE — PROGRESS NOTE ADULT - PROBLEM SELECTOR PLAN 1
- evaluate for cardiogenic vs neurogenic cause  - CT Head negative  - Neuro consulted, f/u TSH, B12, folate  - Cardio consulted, no events overnight on tele monitor  - continue tele monitoring  - f/u ECHO  - Orthostatics show increase in BP from sitting to standing position  - component of dementia pt is AOx2, waxing and waning mental status - acute, recurrent  - evaluate for cardiogenic vs neurogenic cause  - CT Head negative  - Neuro consulted, f/u TSH, B12, folate  - Cardio consulted, no events overnight on tele monitor  - continue tele monitoring  - f/u ECHO  - Orthostatics show increase in BP from sitting to standing position  - component of dementia pt is AOx2, waxing and waning mental status

## 2018-05-14 NOTE — PHYSICAL THERAPY INITIAL EVALUATION ADULT - LEVEL OF INDEPENDENCE: STAND/SIT, REHAB EVAL
supervision minimum assist (75% patients effort)/Tactile and verbal cues/moderate assist (50% patients effort)

## 2018-05-14 NOTE — DISCHARGE NOTE ADULT - PLAN OF CARE
resolution Your Kidney function during your stay was mildly reduced. It came back to baseline upon your discharge.  Continue to drink enough fluids during the day to avoid dehydration.  Follow up with your primary doctor in 1 week stable Your HbA1C was found to be 5.4  Continue home medication of Januvia and Metformin/Glyburide  Consider discontinuing use of Glyburide as it can lead to low blood sugars. Follow up with your primary doctor in 1 week to discuss this change continue omeprazole as needed Stable continue statin You had a fall at home. You were evaluated by a Cardiologist and Neurologist during your stay. There was no Neurological cause found. There was no structural defect in your heart that could have caused this episode. However your blood pressure was uncontrolled which could have contributed to the symptoms Your blood pressure was uncontrolled.  We increased your Valsartan to 320mg every day  We added Amlodipine 5mg every day  Continue Coreg 6.5mg twice a day  Strictly monitor blood pressure. Adhere to a low salt diet  Follow up with your cardiologist in 1 week You had an episode of near syncope. You were evaluated by a Cardiologist and Neurologist during your stay. There was no Neurological cause found. There was no structural defect in your heart that could have caused this episode. Your blood sugar levels could have contributed to this episode. Your HbA1C was found to be 5.4  We stopped your Januvia and Glyburide/Metformin  Follow up with your primary doctor in 1 week. Continue to monitor Blood glucose levels outpatient Your HbA1C was found to be 5.4  We discontinued your Glyburide/Metformin combination pill. We started you on 500mg metformin daily and decreased Januvia to 50mg daily.  Follow up with your primary doctor in 1 week. Continue to monitor Blood glucose levels outpatient

## 2018-05-14 NOTE — DISCHARGE NOTE ADULT - PATIENT PORTAL LINK FT
You can access the [facility], offered by [facility], by registering with the following website: [email or web address]

## 2018-05-14 NOTE — PHYSICAL THERAPY INITIAL EVALUATION ADULT - GAIT DEVIATIONS NOTED, PT EVAL
increased time in double stance/decreased step length/decreased lorrie decreased weight-shifting ability/decreased step length/decreased lorrie/increased time in double stance/decreased stride length

## 2018-05-14 NOTE — PROGRESS NOTE ADULT - SUBJECTIVE AND OBJECTIVE BOX
[facility] -- Sarahi Meraz, Akil, Norman, Alejandro Pulliam Savella  Office # [phone]      Follow Up:  abnormal ecg, syncope    Subjective/Observations: Pt seen and examined.  Resting comfortably in bed with no complaints of cp, sob, dizziness or palpitations.  No signs of orthopnea or PND.       REVIEW OF SYSTEMS: All other review of systems is negative unless indicated above    PAST MEDICAL & SURGICAL HISTORY:  DM (diabetes mellitus)  HLD (hyperlipidemia)  HTN (hypertension)  GERD (gastroesophageal reflux disease)  History of cholecystectomy  Ankle injury: s/p ankle surgery, pt doesn&#x27;t remember which ankle      MEDICATIONS  (STANDING):  aspirin enteric coated 81 milliGRAM(s) Oral daily  atorvastatin 10 milliGRAM(s) Oral at bedtime  carvedilol 6.25 milliGRAM(s) Oral every 12 hours  dextrose 5%. 1000 milliLiter(s) (50 mL/Hr) IV Continuous <Continuous>  dextrose 50% Injectable 12.5 Gram(s) IV Push once  dextrose 50% Injectable 25 Gram(s) IV Push once  dextrose 50% Injectable 25 Gram(s) IV Push once  enoxaparin Injectable 40 milliGRAM(s) SubCutaneous daily  insulin lispro (HumaLOG) corrective regimen sliding scale   SubCutaneous three times a day before meals  pantoprazole    Tablet 40 milliGRAM(s) Oral before breakfast  valsartan 160 milliGRAM(s) Oral daily    MEDICATIONS  (PRN):  dextrose 40% Gel 15 Gram(s) Oral once PRN Blood Glucose LESS THAN 70 milliGRAM(s)/deciliter  glucagon  Injectable 1 milliGRAM(s) IntraMuscular once PRN Glucose LESS THAN 70 milligrams/deciliter      Allergies    sulfa drugs (Unknown)    Intolerances            Vital Signs Last 24 Hrs  T(C): 36.9 (14 May 2018 08:00), Max: 37.1 (13 May 2018 17:55)  T(F): 98.4 (14 May 2018 08:00), Max: 98.7 (13 May 2018 17:55)  HR: 56 (14 May 2018 08:00) (56 - 68)  BP: 178/77 (14 May 2018 08:00) (155/71 - 184/83)  BP(mean): --  RR: 17 (14 May 2018 08:00) (17 - 19)  SpO2: 96% (14 May 2018 08:00) (96% - 99%)    I&O's Summary    13 May 2018 07:01  -  14 May 2018 07:00  --------------------------------------------------------  IN: 100 mL / OUT: 0 mL / NET: 100 mL      Weight (kg): 70.3 (05-13 @ 12:34)    PHYSICAL EXAM:  TELE: SB no events  Constitutional: NAD, awake and alert, well-developed  HEENT: Moist Mucous Membranes, Anicteric  Pulmonary: Non-labored, breath sounds are clear bilaterally, No wheezing, rales or rhonchi  Cardiovascular: Regular, S1 and S2, No murmurs, rubs, gallops or clicks  Gastrointestinal: Bowel Sounds present, soft, nontender.   Lymph: No peripheral edema. No lymphadenopathy.  Skin: No visible rashes or ulcers.  Psych:  Mood & affect appropriate    LABS: All Labs Reviewed:                        11.9   7.2   )-----------( 160      ( 14 May 2018 07:24 )             34.8                         12.1   8.7   )-----------( 166      ( 13 May 2018 13:27 )             35.9     14 May 2018 07:24    144    |  106    |  22     ----------------------------<  81     3.7     |  29     |  0.96   13 May 2018 13:27    141    |  106    |  26     ----------------------------<  192    3.6     |  25     |  1.20     Ca    9.0        14 May 2018 07:24  Ca    8.8        13 May 2018 13:27  Mg     1.9       14 May 2018 07:24    TPro  6.7    /  Alb  3.3    /  TBili  0.8    /  DBili  x      /  AST  16     /  ALT  11     /  AlkPhos  34     13 May 2018 13:27      CARDIAC MARKERS ( 14 May 2018 01:20 )  <.015 ng/mL / x     / 36 U/L / x     / 0.6 ng/mL  CARDIAC MARKERS ( 13 May 2018 19:28 )  <.015 ng/mL / x     / 34 U/L / x     / 0.6 ng/mL  CARDIAC MARKERS ( 13 May 2018 13:27 )  <.015 ng/mL / x     / 41 U/L / x     / <0.5 ng/mL  < from: CT Head No Cont (05.13.18 @ 16:16) >  EXAM:  CT BRAIN                            PROCEDURE DATE:  05/13/2018          INTERPRETATION:  Comparison study dated 2/5/2018    Clinical information: Altered mental status    Axial images obtained, coronal and sagittal images computer-generated.    Images demonstrate  atrophic changes. Periventricular white matter   hypoattenuation, microvascular ischemic change. There is no sign of   mass-effect midline shift epidural or subdural collection. There is no   sign of acute or recent hemorrhage. No unusual calcifications are noted.   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    The calvarium appears intact. Traces of mucosal thickening ethmoid   sinuses.    IMPRESSION: No evidence of acute intracranial pathology                BARBARA MACE M.D.,ATTENDING RADIOLOGIST  This document has been electronically signed. May 13 2018  4:21PM        < end of copied text >  < from: Xray Chest 1 View AP/PA (05.13.18 @ 13:36) >  EXAM:  XR CHEST AP OR PA 1V                            PROCEDURE DATE:  05/13/2018          INTERPRETATION:  Comparison study dated 2/5/2018    Clinical information: Hypoglycemia    Portable study, 1:36 PM        No evidence of infiltrate effusion or congestive failure. Heart size   within normal limits. Hilar regions mediastinal contours and bony thorax   are intact. Atherosclerotic changes aorta. Surgical clips present right   upper quadrant.    IMPRESSION: No active disease.                BARBARA MACE M.D.,ATTENDING RADIOLOGIST  This document has been electronically signed. May 13 2018  1:43PM                < end of copied text >

## 2018-05-14 NOTE — DISCHARGE NOTE ADULT - MEDICATION SUMMARY - MEDICATIONS TO TAKE
I will START or STAY ON the medications listed below when I get home from the hospital:    Aspir 81 oral delayed release tablet  -- 1 tab(s) by mouth once a day  -- Indication: For Prophylactic measure    valsartan 320 mg oral tablet  -- 1 tab(s) by mouth once a day  -- Indication: For HTN (hypertension)    glyburide-metformin 2.5 mg-500 mg oral tablet  -- 1 tab(s) by mouth 2 times a day  -- Indication: For DM (diabetes mellitus)    Januvia  -- 100 milligram(s) by mouth once a day  -- Indication: For DM (diabetes mellitus)    lovastatin 40 mg oral tablet  -- 1 tab(s) by mouth once a day  -- Indication: For HLD (hyperlipidemia)    carvedilol 6.25 mg oral tablet  -- 1 tab(s) by mouth 2 times a day  -- Indication: For HTN (hypertension)    amLODIPine 5 mg oral tablet  -- 1 tab(s) by mouth once a day  -- Indication: For HTN (hypertension)    omeprazole 20 mg oral delayed release capsule  -- 1 cap(s) by mouth once a day  -- Indication: For GERD (gastroesophageal reflux disease) I will START or STAY ON the medications listed below when I get home from the hospital:    Aspir 81 oral delayed release tablet  -- 1 tab(s) by mouth once a day  -- Indication: For Prophylactic measure    valsartan 320 mg oral tablet  -- 1 tab(s) by mouth once a day  -- Indication: For HTN (hypertension)    lovastatin 40 mg oral tablet  -- 1 tab(s) by mouth once a day  -- Indication: For HLD (hyperlipidemia)    carvedilol 6.25 mg oral tablet  -- 1 tab(s) by mouth 2 times a day  -- Indication: For HTN (hypertension)    amLODIPine 5 mg oral tablet  -- 1 tab(s) by mouth once a day  -- Indication: For HTN (hypertension)    omeprazole 20 mg oral delayed release capsule  -- 1 cap(s) by mouth once a day  -- Indication: For GERD (gastroesophageal reflux disease) I will START or STAY ON the medications listed below when I get home from the hospital:    Aspir 81 oral delayed release tablet  -- 1 tab(s) by mouth once a day  -- Indication: For Prophylactic measure    valsartan 320 mg oral tablet  -- 1 tab(s) by mouth once a day  -- Indication: For HTN (hypertension)    Januvia 50 mg oral tablet  -- 1 tab(s) by mouth once a day  -- Indication: For DM (diabetes mellitus)    metFORMIN 500 mg oral tablet  -- 1 tab(s) by mouth once a day   -- Check with your doctor before becoming pregnant.  Do not drink alcoholic beverages when taking this medication.  It is very important that you take or use this exactly as directed.  Do not skip doses or discontinue unless directed by your doctor.  Obtain medical advice before taking any non-prescription drugs as some may affect the action of this medication.  Take with food or milk.    -- Indication: For DM (diabetes mellitus)    lovastatin 40 mg oral tablet  -- 1 tab(s) by mouth once a day  -- Indication: For HLD (hyperlipidemia)    carvedilol 6.25 mg oral tablet  -- 1 tab(s) by mouth 2 times a day  -- Indication: For HTN (hypertension)    amLODIPine 5 mg oral tablet  -- 1 tab(s) by mouth once a day  -- Indication: For HTN (hypertension)    omeprazole 20 mg oral delayed release capsule  -- 1 cap(s) by mouth once a day  -- Indication: For GERD (gastroesophageal reflux disease)

## 2018-05-14 NOTE — PHYSICAL THERAPY INITIAL EVALUATION ADULT - MANUAL MUSCLE TESTING RESULTS, REHAB EVAL
pain; BUE/BLE grossly 3/5 t/o/grossly assessed due to BUE/BLE grossly > 3+/5 t/o/grossly assessed due to

## 2018-05-14 NOTE — PHYSICAL THERAPY INITIAL EVALUATION ADULT - RANGE OF MOTION EXAMINATION, REHAB EVAL
bilateral upper extremity ROM was WFL (within functional limits)/bilateral lower extremity ROM was WFL (within functional limits) bilateral upper extremity ROM was WFL (within functional limits)/bilateral lower extremity ROM was WFL (within functional limits)/deficits as listed below/some arthritic changes noted

## 2018-05-14 NOTE — DISCHARGE NOTE ADULT - CARE PROVIDERS DIRECT ADDRESSES
,DirectAddress_Unknown ,DirectAddress_Unknown,[email or web address]

## 2018-05-14 NOTE — PHYSICAL THERAPY INITIAL EVALUATION ADULT - TRANSFER SAFETY CONCERNS NOTED: SIT/STAND, REHAB EVAL
decreased safety awareness/decreased sequencing ability/decreased weight-shifting ability/decreased balance during turns/decreased proprioception/decreased step length/losing balance

## 2018-05-14 NOTE — PROGRESS NOTE ADULT - SUBJECTIVE AND OBJECTIVE BOX
Neurology Follow up note    NELLIE [id number]    HPI:  86 y/o F with PMH of DM type2 , GERD, HLD, HTN, presents to ED with syncope. Pt and daughter-in-law at bedside note that she has had 2 prior episodes of syncope in past month, for which she has been seen at [facility] and previous workup was negative last ed visit may 1st . Daughter-in-law notes that each episode has only lasted 1-2 mins. Denies any falls. Daughter-in-law witnessed today's episode while pt was resting in a chair. Daughter felt pt may be hypoglycemic.  She was given something to eat/drink.  EMS found her mildly hyperglycemic. Pt states she feels fine and denies HA, dizziness/lightheadedness, CP, SOB, N/V/C/D, hematochezia/melena, dysuria, hematuria.     In the ED pt VS were 97.4F, HR 58, /84, RR 18, SpO2 98% on RA.  Labs significant for CBC WNL, BUN 26, Glucose 192, GFR 41, Cardiac enzymes x1 WNL. CXR negative for active disease. EKG sinus alise @59 with non-specific ST and T wave changes (T wave flattening noted compared to prior). Blood/urine cx ordered. Cardio Dr. Goetz consulted, saw pt in ED. (13 May 2018 15:33)      Interval History - no new events.    Patient is seen, chart was reviewed and case was discussed with the treatment team.  Pt is not in any distress.   Lying on bed comfortably.   No events reported overnight.   No clinical seizure was reported.  Sitting on chair bed comfortably.    is at bedside.    Vital Signs Last 24 Hrs  T(C): 36.9 (14 May 2018 13:02), Max: 37.1 (13 May 2018 17:55)  T(F): 98.4 (14 May 2018 13:02), Max: 98.7 (13 May 2018 17:55)  HR: 60 (14 May 2018 13:02) (56 - 68)  BP: 159/75 (14 May 2018 13:02) (153/84 - 184/83)  BP(mean): --  RR: 17 (14 May 2018 13:02) (17 - 19)  SpO2: 99% (14 May 2018 13:02) (96% - 99%)            On Neurological Examination:    Mental Status - Pt is alert, awake, oriented X2 . Follows commands well and able to answer questions appropriately. Mood and affect  normal    Speech -  Normal.     Cranial Nerves - Pupils 2-3 mm left smaller right. extraocular eye movements intact. Pt has no visual field deficit.  Pt has no  facial asymmetry. Facial sensation is intact .Tongue - is in midline.    Muscle tone - is normal all over.     Motor Exam - 5/5 of UE.  LE 4/5    Sensory Exam -  Pt withdraws all extremities equally on stimulation. No asymmetry seen. No complaints of tingling, numbness.        coordination:    Finger to nose: normal.    Deep tendon Reflexes - 2 plus all over.         Neck Supple -  Yes.     MEDICATIONS    aspirin enteric coated 81 milliGRAM(s) Oral daily  atorvastatin 10 milliGRAM(s) Oral at bedtime  carvedilol 6.25 milliGRAM(s) Oral every 12 hours  dextrose 40% Gel 15 Gram(s) Oral once PRN  dextrose 5%. 1000 milliLiter(s) IV Continuous <Continuous>  dextrose 50% Injectable 12.5 Gram(s) IV Push once  dextrose 50% Injectable 25 Gram(s) IV Push once  dextrose 50% Injectable 25 Gram(s) IV Push once  enoxaparin Injectable 40 milliGRAM(s) SubCutaneous daily  glucagon  Injectable 1 milliGRAM(s) IntraMuscular once PRN  insulin lispro (HumaLOG) corrective regimen sliding scale   SubCutaneous three times a day before meals  pantoprazole    Tablet 40 milliGRAM(s) Oral before breakfast  valsartan 160 milliGRAM(s) Oral daily      Allergies    sulfa drugs (Unknown)    Intolerances        LABS:  CBC Full  -  ( 14 May 2018 07:24 )  WBC Count : 7.2 K/uL  Hemoglobin : 11.9 g/dL  Hematocrit : 34.8 %  Platelet Count - Automated : 160 K/uL  Mean Cell Volume : 84.7 fl  Mean Cell Hemoglobin : 28.8 pg  Mean Cell Hemoglobin Concentration : 34.1 gm/dL  Auto Neutrophil # : 3.9 K/uL  Auto Lymphocyte # : 2.5 K/uL  Auto Monocyte # : 0.5 K/uL  Auto Eosinophil # : 0.2 K/uL        05-14    144  |  106  |  22  ----------------------------<  81  3.7   |  29  |  0.96    Ca    9.0      14 May 2018 07:24  Mg     1.9     05-14    TPro  6.7  /  Alb  3.3  /  TBili  0.8  /  DBili  x   /  AST  16  /  ALT  11<L>  /  AlkPhos  34<L>  05-13    Hemoglobin A1C: Hemoglobin A1C, Whole Blood: 5.4 % (05-14 @ 11:15)      Vitamin B12     RADIOLOGY  < from: CT Head No Cont (05.13.18 @ 16:16) >    EXAM:  CT BRAIN                            PROCEDURE DATE:  05/13/2018          INTERPRETATION:  Comparison study dated 2/5/2018    Clinical information: Altered mental status    Axial images obtained, coronal and sagittal images computer-generated.    Images demonstrate  atrophic changes. Periventricular white matter   hypoattenuation, microvascular ischemic change. There is no sign of   mass-effect midline shift epidural or subdural collection. There is no   sign of acute or recent hemorrhage. No unusual calcifications are noted.   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    The calvarium appears intact. Traces of mucosal thickening ethmoid   sinuses.    IMPRESSION: No evidence of acute intracranial pathology                BARBARA MACE M.D.,ATTENDING RADIOLOGIST  This document has been electronically signed. May 13 2018  4:21PM                    ASSESSMENT AND PLAN:      SYNCOPE DOUBT TIA/SZ.  DEMENTIA?    WAITING FOR ECHO.  CONTINUE ASA /LIPITOR.   Physical therapy evaluation.  OOB to chair/ambulation with assistance onl  Pain is accessed and addressed.  Would continue to follow.

## 2018-05-15 LAB
ANION GAP SERPL CALC-SCNC: 8 MMOL/L — SIGNIFICANT CHANGE UP (ref 5–17)
BUN SERPL-MCNC: 21 MG/DL — SIGNIFICANT CHANGE UP (ref 7–23)
CALCIUM SERPL-MCNC: 9 MG/DL — SIGNIFICANT CHANGE UP (ref 8.5–10.1)
CHLORIDE SERPL-SCNC: 106 MMOL/L — SIGNIFICANT CHANGE UP (ref 96–108)
CO2 SERPL-SCNC: 29 MMOL/L — SIGNIFICANT CHANGE UP (ref 22–31)
CREAT SERPL-MCNC: 1 MG/DL — SIGNIFICANT CHANGE UP (ref 0.5–1.3)
GLUCOSE SERPL-MCNC: 106 MG/DL — HIGH (ref 70–99)
HCT VFR BLD CALC: 33.3 % — LOW (ref 34.5–45)
HGB BLD-MCNC: 11.1 G/DL — LOW (ref 11.5–15.5)
MCHC RBC-ENTMCNC: 28.2 PG — SIGNIFICANT CHANGE UP (ref 27–34)
MCHC RBC-ENTMCNC: 33.3 GM/DL — SIGNIFICANT CHANGE UP (ref 32–36)
MCV RBC AUTO: 84.7 FL — SIGNIFICANT CHANGE UP (ref 80–100)
PLATELET # BLD AUTO: 161 K/UL — SIGNIFICANT CHANGE UP (ref 150–400)
POTASSIUM SERPL-MCNC: 3.2 MMOL/L — LOW (ref 3.5–5.3)
POTASSIUM SERPL-SCNC: 3.2 MMOL/L — LOW (ref 3.5–5.3)
RBC # BLD: 3.93 M/UL — SIGNIFICANT CHANGE UP (ref 3.8–5.2)
RBC # FLD: 13.2 % — SIGNIFICANT CHANGE UP (ref 10.3–14.5)
SODIUM SERPL-SCNC: 143 MMOL/L — SIGNIFICANT CHANGE UP (ref 135–145)
WBC # BLD: 7.8 K/UL — SIGNIFICANT CHANGE UP (ref 3.8–10.5)
WBC # FLD AUTO: 7.8 K/UL — SIGNIFICANT CHANGE UP (ref 3.8–10.5)

## 2018-05-15 PROCEDURE — 99232 SBSQ HOSP IP/OBS MODERATE 35: CPT

## 2018-05-15 PROCEDURE — 99233 SBSQ HOSP IP/OBS HIGH 50: CPT

## 2018-05-15 RX ORDER — AMLODIPINE BESYLATE 2.5 MG/1
5 TABLET ORAL DAILY
Qty: 0 | Refills: 0 | Status: DISCONTINUED | OUTPATIENT
Start: 2018-05-15 | End: 2018-05-17

## 2018-05-15 RX ORDER — POTASSIUM CHLORIDE 20 MEQ
20 PACKET (EA) ORAL
Qty: 0 | Refills: 0 | Status: COMPLETED | OUTPATIENT
Start: 2018-05-15 | End: 2018-05-15

## 2018-05-15 RX ORDER — ACETAMINOPHEN 500 MG
650 TABLET ORAL ONCE
Qty: 0 | Refills: 0 | Status: COMPLETED | OUTPATIENT
Start: 2018-05-15 | End: 2018-05-15

## 2018-05-15 RX ADMIN — CARVEDILOL PHOSPHATE 6.25 MILLIGRAM(S): 80 CAPSULE, EXTENDED RELEASE ORAL at 05:54

## 2018-05-15 RX ADMIN — PANTOPRAZOLE SODIUM 40 MILLIGRAM(S): 20 TABLET, DELAYED RELEASE ORAL at 05:54

## 2018-05-15 RX ADMIN — Medication 20 MILLIEQUIVALENT(S): at 18:17

## 2018-05-15 RX ADMIN — Medication 20 MILLIEQUIVALENT(S): at 23:32

## 2018-05-15 RX ADMIN — ATORVASTATIN CALCIUM 10 MILLIGRAM(S): 80 TABLET, FILM COATED ORAL at 22:57

## 2018-05-15 RX ADMIN — Medication 2: at 13:23

## 2018-05-15 RX ADMIN — Medication 81 MILLIGRAM(S): at 13:23

## 2018-05-15 RX ADMIN — CARVEDILOL PHOSPHATE 6.25 MILLIGRAM(S): 80 CAPSULE, EXTENDED RELEASE ORAL at 18:15

## 2018-05-15 RX ADMIN — Medication 650 MILLIGRAM(S): at 00:17

## 2018-05-15 RX ADMIN — ENOXAPARIN SODIUM 40 MILLIGRAM(S): 100 INJECTION SUBCUTANEOUS at 13:23

## 2018-05-15 RX ADMIN — VALSARTAN 320 MILLIGRAM(S): 80 TABLET ORAL at 05:54

## 2018-05-15 NOTE — PROGRESS NOTE ADULT - ASSESSMENT
85f htn, chol, dm, gerd.  Some degree of progressive dementia, but continues to live alone. Sedentary at baseline but without sxs of angina or hf by report of her family at the bedside.  She has had several recent episodes of decreased responsiveness, including 2 in the past two weeks.  Typically, she will be sitting and will suddenly become unarousable, such that it takes some effort to wake her and she is often not herself after.  Her family believes that she is not conscious during these events.  She has been evaluated in the ER in the past for this.  On one occasion, she was felt dehydrated.  On another, she was felt to have a uti.  Today she had an episode and she was felt to possibly be hypoglycemic.  She was given something to eat/drink.  EMS found her mildly hyperglycemic.  Given these recurrent events, as well as a progressive decline in her functional status, she presents to the er for evaluation.    She has been found to have an abnormal ekg.     The patient has no memory of the event.    -there is no evidence of acute ischemia.  -there is no evidence of significant arrhythmia. SB on tele overnight with no events. Would ambulate and monitor for chronotropy  -there is no evidence for meaningful  volume overload.  -is unclear what is causing these events  -cannot fully exclude an arrhythmic event though so far no events on telemetry monitoring  -abnormal ekg also needs to be explained, though the abnormalities are nonspecific  - echocardiogram pending  -valsartan increased to 320 QD  -cont Coreg 6.25 mg BID  - BP remains uncontrolled.  Would add amlodipine 5 QD  -cont asa  -cont statin  -dm management per med  -overall functional and cognitive decline, may require more services or placement regardless of the outcome of the evaluation  -DVT prophylaxis  -monitor electrolytes, keep k>4, Mg>2   -will follow

## 2018-05-15 NOTE — PROGRESS NOTE ADULT - PROBLEM SELECTOR PLAN 1
- acute, recurrent  - evaluate for cardiogenic vs neurogenic cause  - CT Head negative  - Neuro consulted, TSH wnl, B12 wnl, folate wnl  - Cardio consulted, no events overnight on tele monitor  - continue tele monitoring  - ECHO shows Technically difficult and limited study secondary to patient's body   habitus. Grossly, there is normal left ventricular systolic function.   Right heart is suboptimally visualized. There is grade 1 diastolic   dysfunction.  - Orthostatics show increase in BP from sitting to standing position, will repeat orthostatics  - component of dementia pt is AOx2, waxing and waning mental status

## 2018-05-15 NOTE — PROGRESS NOTE ADULT - PROBLEM SELECTOR PLAN 4
- chronic, stable  - continue to monitor  -orthostatics show increase in BP  -continue valsartan to 320mg qd and continue coreg 6.5mg bid with hold parameters  -BP remains elevated, amlodipine 5mg qd added as per cardio - chronic, stable  - continue to monitor  -orthostatics show increase in BP equiv to orthostatic htn  -continue valsartan to 320mg qd and continue coreg 6.5mg bid with hold parameters  -BP remains elevated, amlodipine 5mg qd added as per cardio

## 2018-05-15 NOTE — PROGRESS NOTE ADULT - SUBJECTIVE AND OBJECTIVE BOX
Patient is a 85y old  Female who presents with a chief complaint of syncope (14 May 2018 19:43)      INTERVAL HPI/OVERNIGHT EVENTS: Pt seen and examined at bedside, in NAD. Denies CP, SOB, palpitations, abdominal pain, N/V/D, urinary complaints. No overnight events.      MEDICATIONS  (STANDING):  amLODIPine   Tablet 5 milliGRAM(s) Oral daily  aspirin enteric coated 81 milliGRAM(s) Oral daily  atorvastatin 10 milliGRAM(s) Oral at bedtime  carvedilol 6.25 milliGRAM(s) Oral every 12 hours  dextrose 5%. 1000 milliLiter(s) (50 mL/Hr) IV Continuous <Continuous>  dextrose 50% Injectable 12.5 Gram(s) IV Push once  dextrose 50% Injectable 25 Gram(s) IV Push once  dextrose 50% Injectable 25 Gram(s) IV Push once  enoxaparin Injectable 40 milliGRAM(s) SubCutaneous daily  insulin lispro (HumaLOG) corrective regimen sliding scale   SubCutaneous three times a day before meals  pantoprazole    Tablet 40 milliGRAM(s) Oral before breakfast  valsartan 320 milliGRAM(s) Oral daily    MEDICATIONS  (PRN):  dextrose 40% Gel 15 Gram(s) Oral once PRN Blood Glucose LESS THAN 70 milliGRAM(s)/deciliter  glucagon  Injectable 1 milliGRAM(s) IntraMuscular once PRN Glucose LESS THAN 70 milligrams/deciliter      Allergies    sulfa drugs (Unknown)    Intolerances        REVIEW OF SYSTEMS: Pt is not a good historian however denies below symptoms on questioning  CONSTITUTIONAL: No fever or chills  HEENT:  No headache, no sore throat  RESPIRATORY: No cough, wheezing, or shortness of breath  CARDIOVASCULAR: No chest pain, palpitations, or leg swelling  GASTROINTESTINAL: No nausea, vomiting, or diarrhea  GENITOURINARY: No dysuria, frequency, or hematuria  NEUROLOGICAL: no focal weakness or dizziness  SKIN:  No rashes or lesions   MUSCULOSKELETAL: no myalgias   PSYCHIATRIC: No depression or anxiety  ROS Unable to obtain due to - [x ] Dementia  [ ] Lethargy  REST OF REVIEW Of SYSTEM - [ ] Normal     Vital Signs Last 24 Hrs  T(C): 36.8 (15 May 2018 15:25), Max: 36.8 (14 May 2018 23:09)  T(F): 98.2 (15 May 2018 15:25), Max: 98.3 (14 May 2018 23:09)  HR: 62 (15 May 2018 15:25) (57 - 63)  BP: 128/73 (15 May 2018 15:25) (128/73 - 189/81)  BP(mean): --  RR: 17 (15 May 2018 15:25) (16 - 18)  SpO2: 97% (15 May 2018 15:25) (96% - 100%)    PHYSICAL EXAM:  Physical Exam:  General: Well developed, well nourished, NAD, elder  HEENT: normocephalic, atraumatic, PERRLA, extraocular muscles intact bilaterally, moist mucous membranes   Neck: Supple, nontender, no mass  Neurology: A&Ox2, moves all extremities x4, nonfocal, CN II-XII grossly intact, sensation intact  Respiratory: clear to auscultation B/L, No wheezes, rales, ronchi  CV: RRR, +S1/S2, no murmurs, rubs or gallops  Abdominal: Soft, nontender, nondistended +BSx4  Extremities: No cyanosis/clubbing/edema, + peripheral pulses  MSK: Normal ROM, no joint erythema or warmth, no joint swelling   Skin: warm, dry, normal color, no rash or abnormal lesions    LABS:                        11.1   7.8   )-----------( 161      ( 15 May 2018 07:15 )             33.3     CBC Full  -  ( 15 May 2018 07:15 )  WBC Count : 7.8 K/uL  Hemoglobin : 11.1 g/dL  Hematocrit : 33.3 %  Platelet Count - Automated : 161 K/uL  Mean Cell Volume : 84.7 fl  Mean Cell Hemoglobin : 28.2 pg  Mean Cell Hemoglobin Concentration : 33.3 gm/dL  Auto Neutrophil # : x  Auto Lymphocyte # : x  Auto Monocyte # : x  Auto Eosinophil # : x  Auto Basophil # : x  Auto Neutrophil % : x  Auto Lymphocyte % : x  Auto Monocyte % : x  Auto Eosinophil % : x  Auto Basophil % : x    15 May 2018 07:15    143    |  106    |  21     ----------------------------<  106    3.2     |  29     |  1.00     Ca    9.0        15 May 2018 07:15          CAPILLARY BLOOD GLUCOSE      POCT Blood Glucose.: 211 mg/dL (15 May 2018 11:42)  POCT Blood Glucose.: 111 mg/dL (15 May 2018 07:29)  POCT Blood Glucose.: 135 mg/dL (14 May 2018 21:23)  POCT Blood Glucose.: 119 mg/dL (14 May 2018 17:14)          RADIOLOGY & ADDITIONAL TESTS:  < from: Xray Chest 1 View AP/PA (05.13.18 @ 13:36) >  EXAM:  XR CHEST AP OR PA 1V                            PROCEDURE DATE:  05/13/2018          INTERPRETATION:  Comparison study dated 2/5/2018    Clinical information: Hypoglycemia    Portable study, 1:36 PM        No evidence of infiltrate effusion or congestive failure. Heart size   within normal limits. Hilar regions mediastinal contours and bony thorax   are intact. Atherosclerotic changes aorta. Surgical clips present right   upper quadrant.    IMPRESSION: No active disease.                BARBARA MACE M.D.,ATTENDING RADIOLOGIST  This document has been electronically signed. May 13 2018  1:43PM          < end of copied text >    Personally reviewed.     Consultant(s) Notes Reviewed:  [x] YES  [ ] NO    < from: TTE Echo Doppler w/o Cont (05.14.18 @ 20:31) >  CONCLUSIONS:  Technically difficult and limited study secondary to patient's body   habitus. Grossly, there is normal left ventricular systolic function.   Right heart is suboptimally visualized. There is grade 1 diastolic   dysfunction.                    JEREMI VILLA M.D., ATTENDING CARDIOLOGIST  This document has been electronically signed. May 15 2018  2:09PM    < end of copied text >  Care Discussed with [x] Consultants  [x] Patient  [ ] Family  [ ]      [ ] Other; RN Patient is a 85y old  Female who presents with a chief complaint of syncope (14 May 2018 19:43)      INTERVAL HPI/OVERNIGHT EVENTS: Pt seen and examined at bedside, in NAD. Denies CP, SOB, palpitations, abdominal pain, N/V/D, urinary complaints. No overnight events.      MEDICATIONS  (STANDING):  amLODIPine   Tablet 5 milliGRAM(s) Oral daily  aspirin enteric coated 81 milliGRAM(s) Oral daily  atorvastatin 10 milliGRAM(s) Oral at bedtime  carvedilol 6.25 milliGRAM(s) Oral every 12 hours  dextrose 5%. 1000 milliLiter(s) (50 mL/Hr) IV Continuous <Continuous>  dextrose 50% Injectable 12.5 Gram(s) IV Push once  dextrose 50% Injectable 25 Gram(s) IV Push once  dextrose 50% Injectable 25 Gram(s) IV Push once  enoxaparin Injectable 40 milliGRAM(s) SubCutaneous daily  insulin lispro (HumaLOG) corrective regimen sliding scale   SubCutaneous three times a day before meals  pantoprazole    Tablet 40 milliGRAM(s) Oral before breakfast  valsartan 320 milliGRAM(s) Oral daily    MEDICATIONS  (PRN):  dextrose 40% Gel 15 Gram(s) Oral once PRN Blood Glucose LESS THAN 70 milliGRAM(s)/deciliter  glucagon  Injectable 1 milliGRAM(s) IntraMuscular once PRN Glucose LESS THAN 70 milligrams/deciliter      Allergies    sulfa drugs (Unknown)    Intolerances        REVIEW OF SYSTEMS: Pt is not a good historian   ROS Unable to obtain due to - [x ] Dementia  [ ] Lethargy      Vital Signs Last 24 Hrs  T(C): 36.8 (15 May 2018 15:25), Max: 36.8 (14 May 2018 23:09)  T(F): 98.2 (15 May 2018 15:25), Max: 98.3 (14 May 2018 23:09)  HR: 62 (15 May 2018 15:25) (57 - 63)  BP: 128/73 (15 May 2018 15:25) (128/73 - 189/81)  BP(mean): --  RR: 17 (15 May 2018 15:25) (16 - 18)  SpO2: 97% (15 May 2018 15:25) (96% - 100%)    PHYSICAL EXAM:  Physical Exam:  General: Well developed, well nourished, NAD, elder  HEENT: normocephalic, atraumatic, PERRLA, extraocular muscles intact bilaterally, moist mucous membranes   Neck: Supple, nontender, no mass  Neurology: A&Ox2, moves all extremities x4, nonfocal, CN II-XII grossly intact, sensation intact  Respiratory: clear to auscultation B/L, No wheezes, rales, ronchi  CV: RRR, +S1/S2, no murmurs, rubs or gallops  Abdominal: Soft, nontender, nondistended +BSx4  Extremities: No cyanosis/clubbing/edema, + peripheral pulses  MSK: Normal ROM, no joint erythema or warmth, no joint swelling   Skin: warm, dry, normal color, no rash or abnormal lesions    LABS:                        11.1   7.8   )-----------( 161      ( 15 May 2018 07:15 )             33.3     CBC Full  -  ( 15 May 2018 07:15 )  WBC Count : 7.8 K/uL  Hemoglobin : 11.1 g/dL  Hematocrit : 33.3 %  Platelet Count - Automated : 161 K/uL  Mean Cell Volume : 84.7 fl  Mean Cell Hemoglobin : 28.2 pg  Mean Cell Hemoglobin Concentration : 33.3 gm/dL  Auto Neutrophil # : x  Auto Lymphocyte # : x  Auto Monocyte # : x  Auto Eosinophil # : x  Auto Basophil # : x  Auto Neutrophil % : x  Auto Lymphocyte % : x  Auto Monocyte % : x  Auto Eosinophil % : x  Auto Basophil % : x    15 May 2018 07:15    143    |  106    |  21     ----------------------------<  106    3.2     |  29     |  1.00     Ca    9.0        15 May 2018 07:15          CAPILLARY BLOOD GLUCOSE      POCT Blood Glucose.: 211 mg/dL (15 May 2018 11:42)  POCT Blood Glucose.: 111 mg/dL (15 May 2018 07:29)  POCT Blood Glucose.: 135 mg/dL (14 May 2018 21:23)  POCT Blood Glucose.: 119 mg/dL (14 May 2018 17:14)          RADIOLOGY & ADDITIONAL TESTS:  < from: Xray Chest 1 View AP/PA (05.13.18 @ 13:36) >  EXAM:  XR CHEST AP OR PA 1V                            PROCEDURE DATE:  05/13/2018          INTERPRETATION:  Comparison study dated 2/5/2018    Clinical information: Hypoglycemia    Portable study, 1:36 PM        No evidence of infiltrate effusion or congestive failure. Heart size   within normal limits. Hilar regions mediastinal contours and bony thorax   are intact. Atherosclerotic changes aorta. Surgical clips present right   upper quadrant.    IMPRESSION: No active disease.                BARBARA MACE M.D.,ATTENDING RADIOLOGIST  This document has been electronically signed. May 13 2018  1:43PM          < end of copied text >    Personally reviewed.     Consultant(s) Notes Reviewed:  [x] YES  [ ] NO    < from: TTE Echo Doppler w/o Cont (05.14.18 @ 20:31) >  CONCLUSIONS:  Technically difficult and limited study secondary to patient's body   habitus. Grossly, there is normal left ventricular systolic function.   Right heart is suboptimally visualized. There is grade 1 diastolic   dysfunction.                    JEREMI VILLA M.D., ATTENDING CARDIOLOGIST  This document has been electronically signed. May 15 2018  2:09PM    < end of copied text >  Care Discussed with [x] Consultants  [x] Patient  [ ] Family  [ ]      [ ] Other; RN

## 2018-05-15 NOTE — PROGRESS NOTE ADULT - SUBJECTIVE AND OBJECTIVE BOX
[facility] - Sarahi Meraz, Akil, Norman, Heraclio, Zack Allen  Office Number:  [phone]    Patient resting comfortably in bed in NAD.  Laying flat with no respiratory distress.  No complaints of chest pain, dyspnea, palpitations, PND, or orthopnea.  She is confused this AM.    Telemetry:  Sinus bradycardia.    MEDICATIONS  (STANDING):  aspirin enteric coated 81 milliGRAM(s) Oral daily  atorvastatin 10 milliGRAM(s) Oral at bedtime  carvedilol 6.25 milliGRAM(s) Oral every 12 hours  dextrose 5%. 1000 milliLiter(s) (50 mL/Hr) IV Continuous <Continuous>  dextrose 50% Injectable 12.5 Gram(s) IV Push once  dextrose 50% Injectable 25 Gram(s) IV Push once  dextrose 50% Injectable 25 Gram(s) IV Push once  enoxaparin Injectable 40 milliGRAM(s) SubCutaneous daily  insulin lispro (HumaLOG) corrective regimen sliding scale   SubCutaneous three times a day before meals  pantoprazole    Tablet 40 milliGRAM(s) Oral before breakfast  valsartan 320 milliGRAM(s) Oral daily    MEDICATIONS  (PRN):  dextrose 40% Gel 15 Gram(s) Oral once PRN Blood Glucose LESS THAN 70 milliGRAM(s)/deciliter  glucagon  Injectable 1 milliGRAM(s) IntraMuscular once PRN Glucose LESS THAN 70 milligrams/deciliter      Allergies    sulfa drugs (Unknown)        Vital Signs Last 24 Hrs  T(C): 36.6 (15 May 2018 07:27), Max: 36.9 (14 May 2018 08:00)  T(F): 97.9 (15 May 2018 07:27), Max: 98.4 (14 May 2018 08:00)  HR: 57 (15 May 2018 07:27) (56 - 69)  BP: 167/89 (15 May 2018 07:27) (153/84 - 189/81)  BP(mean): --  RR: 18 (15 May 2018 07:27) (16 - 18)  SpO2: 100% (15 May 2018 07:27) (96% - 100%)    I&O's Summary    14 May 2018 07:01  -  15 May 2018 07:00  --------------------------------------------------------  IN: 200 mL / OUT: 3 mL / NET: 197 mL        ON EXAM:    General: NAD, awake and alert, confused  HEENT: Mucous membranes are moist, anicteric  Lungs: Non-labored, breath sounds are clear bilaterally, No wheezing, rales or rhonchi  Cardiovascular: Regular, S1 and S2, 2/6 systolic murmur  Gastrointestinal: Bowel Sounds present, soft, nontender.   Lymph: No peripheral edema. No lymphadenopathy.  Skin: No rashes or ulcers  Psych:  Mood & affect appropriate    LABS: All Labs Reviewed:                        11.9   7.2   )-----------( 160      ( 14 May 2018 07:24 )             34.8                         12.1   8.7   )-----------( 166      ( 13 May 2018 13:27 )             35.9     14 May 2018 07:24    144    |  106    |  22     ----------------------------<  81     3.7     |  29     |  0.96   13 May 2018 13:27    141    |  106    |  26     ----------------------------<  192    3.6     |  25     |  1.20     Ca    9.0        14 May 2018 07:24  Ca    8.8        13 May 2018 13:27  Mg     1.9       14 May 2018 07:24    TPro  6.7    /  Alb  3.3    /  TBili  0.8    /  DBili  x      /  AST  16     /  ALT  11     /  AlkPhos  34     13 May 2018 13:27      CARDIAC MARKERS ( 14 May 2018 01:20 )  <.015 ng/mL / x     / 36 U/L / x     / 0.6 ng/mL  CARDIAC MARKERS ( 13 May 2018 19:28 )  <.015 ng/mL / x     / 34 U/L / x     / 0.6 ng/mL  CARDIAC MARKERS ( 13 May 2018 13:27 )  <.015 ng/mL / x     / 41 U/L / x     / <0.5 ng/mL      Blood Culture: Organism --  Gram Stain Blood -- Gram Stain --  Specimen Source .Blood Blood-Peripheral  Culture-Blood --        05-14 @ 07:24  TSH: 0.57

## 2018-05-15 NOTE — PROGRESS NOTE ADULT - SUBJECTIVE AND OBJECTIVE BOX
Neurology Follow up note    NELLIE [id number]    HPI:  86 y/o F with PMH of DM type2 , GERD, HLD, HTN, presents to ED with syncope. Pt and daughter-in-law at bedside note that she has had 2 prior episodes of syncope in past month, for which she has been seen at [facility] and previous workup was negative last ed visit may 1st . Daughter-in-law notes that each episode has only lasted 1-2 mins. Denies any falls. Daughter-in-law witnessed today's episode while pt was resting in a chair. Daughter felt pt may be hypoglycemic.  She was given something to eat/drink.  EMS found her mildly hyperglycemic. Pt states she feels fine and denies HA, dizziness/lightheadedness, CP, SOB, N/V/C/D, hematochezia/melena, dysuria, hematuria.     In the ED pt VS were 97.4F, HR 58, /84, RR 18, SpO2 98% on RA.  Labs significant for CBC WNL, BUN 26, Glucose 192, GFR 41, Cardiac enzymes x1 WNL. CXR negative for active disease. EKG sinus alise @59 with non-specific ST and T wave changes (T wave flattening noted compared to prior). Blood/urine cx ordered. Cardio Dr. Goetz consulted, saw pt in ED. (13 May 2018 15:33)      Interval History - no c/o.    Patient is seen, chart was reviewed and case was discussed with the treatment team.  Pt is not in any distress.   Lying on bed comfortably.   No events reported overnight.   No clinical seizure was reported.  Sitting on chair bed comfortably.    is at bedside.    Vital Signs Last 24 Hrs  T(C): 36.6 (15 May 2018 07:27), Max: 36.9 (14 May 2018 13:02)  T(F): 97.9 (15 May 2018 07:27), Max: 98.4 (14 May 2018 13:02)  HR: 57 (15 May 2018 07:27) (57 - 69)  BP: 167/89 (15 May 2018 07:27) (153/84 - 189/81)  BP(mean): --  RR: 18 (15 May 2018 07:27) (16 - 18)  SpO2: 100% (15 May 2018 07:27) (96% - 100%)            On Neurological Examination:    Mental Status - Pt is alert, awake, oriented X2 . Follows commands well and able to answer questions appropriately. Mood and affect  normal    Speech -  Normal.     Cranial Nerves - Pupils 2-3 mm left smaller right. extraocular eye movements intact. Pt has no visual field deficit.  Pt has no  facial asymmetry. Facial sensation is intact .Tongue - is in midline.    Muscle tone - is normal all over.     Motor Exam - 5/5 of UE.  LE 4/5    Sensory Exam -  Pt withdraws all extremities equally on stimulation. No asymmetry seen. No complaints of tingling, numbness.      coordination:    Finger to nose: normal.    Deep tendon Reflexes - 2 plus all over.         Neck Supple -  Yes.     MEDICATIONS    aspirin enteric coated 81 milliGRAM(s) Oral daily  atorvastatin 10 milliGRAM(s) Oral at bedtime  carvedilol 6.25 milliGRAM(s) Oral every 12 hours  dextrose 40% Gel 15 Gram(s) Oral once PRN  dextrose 5%. 1000 milliLiter(s) IV Continuous <Continuous>  dextrose 50% Injectable 12.5 Gram(s) IV Push once  dextrose 50% Injectable 25 Gram(s) IV Push once  dextrose 50% Injectable 25 Gram(s) IV Push once  enoxaparin Injectable 40 milliGRAM(s) SubCutaneous daily  glucagon  Injectable 1 milliGRAM(s) IntraMuscular once PRN  insulin lispro (HumaLOG) corrective regimen sliding scale   SubCutaneous three times a day before meals  pantoprazole    Tablet 40 milliGRAM(s) Oral before breakfast  valsartan 160 milliGRAM(s) Oral daily      Allergies    sulfa drugs (Unknown)    Intolerances        LABS:  CBC Full  -  ( 14 May 2018 07:24 )  WBC Count : 7.2 K/uL  Hemoglobin : 11.9 g/dL  Hematocrit : 34.8 %  Platelet Count - Automated : 160 K/uL  Mean Cell Volume : 84.7 fl  Mean Cell Hemoglobin : 28.8 pg  Mean Cell Hemoglobin Concentration : 34.1 gm/dL              TPro  6.7  /  Alb  3.3  /  TBili  0.8  /  DBili  x   /  AST  16  /  ALT  11<L>  /  AlkPhos  34<L>  05-13    Hemoglobin A1C: Hemoglobin A1C, Whole Blood: 5.4 % (05-14 @ 11:15)      Vitamin B12     RADIOLOGY  < from: CT Head No Cont (05.13.18 @ 16:16) >    EXAM:  CT BRAIN                            PROCEDURE DATE:  05/13/2018          INTERPRETATION:  Comparison study dated 2/5/2018    Clinical information: Altered mental status    Axial images obtained, coronal and sagittal images computer-generated.    Images demonstrate  atrophic changes. Periventricular white matter   hypoattenuation, microvascular ischemic change. There is no sign of   mass-effect midline shift epidural or subdural collection. There is no   sign of acute or recent hemorrhage. No unusual calcifications are noted.   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    The calvarium appears intact. Traces of mucosal thickening ethmoid   sinuses.    IMPRESSION: No evidence of acute intracranial pathology                BARBARA MACE M.D.,ATTENDING RADIOLOGIST  This document has been electronically signed. May 13 2018  4:21PM              ASSESSMENT AND PLAN:      SYNCOPE DOUBT TIA/SZ.  DEMENTIA.    CONTINUE ASA /LIPITOR.   Physical therapy evaluation.  OOB to chair/ambulation with assistance onl  Pain is accessed and addressed.  Would continue to follow.

## 2018-05-16 DIAGNOSIS — N17.9 ACUTE KIDNEY FAILURE, UNSPECIFIED: ICD-10-CM

## 2018-05-16 LAB
ANION GAP SERPL CALC-SCNC: 8 MMOL/L — SIGNIFICANT CHANGE UP (ref 5–17)
BUN SERPL-MCNC: 28 MG/DL — HIGH (ref 7–23)
CALCIUM SERPL-MCNC: 9.1 MG/DL — SIGNIFICANT CHANGE UP (ref 8.5–10.1)
CHLORIDE SERPL-SCNC: 108 MMOL/L — SIGNIFICANT CHANGE UP (ref 96–108)
CHOLEST SERPL-MCNC: 134 MG/DL — SIGNIFICANT CHANGE UP (ref 10–199)
CO2 SERPL-SCNC: 28 MMOL/L — SIGNIFICANT CHANGE UP (ref 22–31)
CREAT SERPL-MCNC: 1.5 MG/DL — HIGH (ref 0.5–1.3)
GLUCOSE SERPL-MCNC: 123 MG/DL — HIGH (ref 70–99)
HCT VFR BLD CALC: 35.3 % — SIGNIFICANT CHANGE UP (ref 34.5–45)
HDLC SERPL-MCNC: 43 MG/DL — SIGNIFICANT CHANGE UP (ref 40–125)
HGB BLD-MCNC: 11.6 G/DL — SIGNIFICANT CHANGE UP (ref 11.5–15.5)
LIPID PNL WITH DIRECT LDL SERPL: 54 MG/DL — SIGNIFICANT CHANGE UP
MCHC RBC-ENTMCNC: 27.9 PG — SIGNIFICANT CHANGE UP (ref 27–34)
MCHC RBC-ENTMCNC: 32.8 GM/DL — SIGNIFICANT CHANGE UP (ref 32–36)
MCV RBC AUTO: 85.1 FL — SIGNIFICANT CHANGE UP (ref 80–100)
PLATELET # BLD AUTO: 157 K/UL — SIGNIFICANT CHANGE UP (ref 150–400)
POTASSIUM SERPL-MCNC: 4.1 MMOL/L — SIGNIFICANT CHANGE UP (ref 3.5–5.3)
POTASSIUM SERPL-SCNC: 4.1 MMOL/L — SIGNIFICANT CHANGE UP (ref 3.5–5.3)
RBC # BLD: 4.15 M/UL — SIGNIFICANT CHANGE UP (ref 3.8–5.2)
RBC # FLD: 13.6 % — SIGNIFICANT CHANGE UP (ref 10.3–14.5)
SODIUM SERPL-SCNC: 144 MMOL/L — SIGNIFICANT CHANGE UP (ref 135–145)
TOTAL CHOLESTEROL/HDL RATIO MEASUREMENT: 3.1 RATIO — LOW (ref 3.3–7.1)
TRIGL SERPL-MCNC: 186 MG/DL — HIGH (ref 10–149)
WBC # BLD: 7.5 K/UL — SIGNIFICANT CHANGE UP (ref 3.8–10.5)
WBC # FLD AUTO: 7.5 K/UL — SIGNIFICANT CHANGE UP (ref 3.8–10.5)

## 2018-05-16 PROCEDURE — 99232 SBSQ HOSP IP/OBS MODERATE 35: CPT

## 2018-05-16 PROCEDURE — 99233 SBSQ HOSP IP/OBS HIGH 50: CPT | Mod: GC

## 2018-05-16 RX ORDER — SODIUM CHLORIDE 9 MG/ML
500 INJECTION INTRAMUSCULAR; INTRAVENOUS; SUBCUTANEOUS
Qty: 0 | Refills: 0 | Status: DISCONTINUED | OUTPATIENT
Start: 2018-05-16 | End: 2018-05-17

## 2018-05-16 RX ADMIN — ATORVASTATIN CALCIUM 10 MILLIGRAM(S): 80 TABLET, FILM COATED ORAL at 21:37

## 2018-05-16 RX ADMIN — SODIUM CHLORIDE 100 MILLILITER(S): 9 INJECTION INTRAMUSCULAR; INTRAVENOUS; SUBCUTANEOUS at 12:41

## 2018-05-16 RX ADMIN — Medication 2: at 12:40

## 2018-05-16 RX ADMIN — Medication 1: at 17:36

## 2018-05-16 RX ADMIN — VALSARTAN 320 MILLIGRAM(S): 80 TABLET ORAL at 05:16

## 2018-05-16 RX ADMIN — Medication 81 MILLIGRAM(S): at 11:40

## 2018-05-16 RX ADMIN — CARVEDILOL PHOSPHATE 6.25 MILLIGRAM(S): 80 CAPSULE, EXTENDED RELEASE ORAL at 18:10

## 2018-05-16 RX ADMIN — CARVEDILOL PHOSPHATE 6.25 MILLIGRAM(S): 80 CAPSULE, EXTENDED RELEASE ORAL at 05:17

## 2018-05-16 RX ADMIN — AMLODIPINE BESYLATE 5 MILLIGRAM(S): 2.5 TABLET ORAL at 05:17

## 2018-05-16 RX ADMIN — PANTOPRAZOLE SODIUM 40 MILLIGRAM(S): 20 TABLET, DELAYED RELEASE ORAL at 05:17

## 2018-05-16 RX ADMIN — ENOXAPARIN SODIUM 40 MILLIGRAM(S): 100 INJECTION SUBCUTANEOUS at 11:38

## 2018-05-16 NOTE — PROGRESS NOTE ADULT - PROBLEM SELECTOR PLAN 6
- chronic stable  - continue statin  - Lipid panel Cholesterol 134, , HDL 43, LDL 54 - chronic stable  - continue statin  - Lipid panel Cholesterol 134, , HDL 43, LDL 54, consdier sec agent for tg

## 2018-05-16 NOTE — PROGRESS NOTE ADULT - SUBJECTIVE AND OBJECTIVE BOX
HPI:  86 y/o F with PMH of DM type2 , GERD, HLD, HTN, presents to ED with syncope. Pt and daughter-in-law at bedside note that she has had 2 prior episodes of syncope in past month, for which she has been seen at [place] and previous workup was negative last ed visit may 1st . Daughter-in-law notes that each episode has only lasted 1-2 mins. Denies any falls. Daughter-in-law witnessed today's episode while pt was resting in a chair. Daughter felt pt may be hypoglycemic.  She was given something to eat/drink.  EMS found her mildly hyperglycemic. Pt states she feels fine and denies HA, dizziness/lightheadedness, CP, SOB, N/V/C/D, hematochezia/melena, dysuria, hematuria.     In the ED pt VS were 97.4F, HR 58, /84, RR 18, SpO2 98% on RA.  Labs significant for CBC WNL, BUN 26, Glucose 192, GFR 41, Cardiac enzymes x1 WNL. CXR negative for active disease. EKG sinus alise @59 with non-specific ST and T wave changes (T wave flattening noted compared to prior). Blood/urine cx ordered. Cardio Dr. Goetz consulted, saw pt in ED. (13 May 2018 15:33)      SUBJECTIVE: Pt. seen, examined and evaluated Pt. resting comfortably in bed in NAD, lying flat with no respiratory distress, no c/o chest pain, dyspnea, palpitations, PND, or orthopnea   Patient is a 85y old  Female who presents with a chief complaint of syncope (14 May 2018 19:43)          OBJECTIVE:  Review Of Systems:  Constitutional: [ ] Fever [ ] Chills [ ] Fatigue [ ] Weight change   HEENT: [ ] Blurred vision [ ] Eye Pain [ ] Headache [ ] Runny nose [ ] Sore Throat   Respiratory: [ ] Cough [ ] Wheezing [ ] Shortness of breath  Cardiovascular: [ ] Chest Pain [ ] Palpitations [ ] WILSON [ ] PND [ ] Orthopnea  Gastrointestinal: [ ] Abdominal Pain [ ] Diarrhea [ ] Constipation [ ] Hemorrhoids [ ] Nausea [ ] Vomiting  Genitourinary: [ ] Nocturia [ ] Dysuria [ ] Incontinence  Extremities: [ ] Swelling [ ] Joint Pain  Neurologic: [ ] Focal deficit [ ] Paresthesias [ ] Syncope  Lymphatic: [ ] Swelling [ ] Lymphadenopathy   Skin: [ ] Rash [ ] Ecchymoses [ ] Wounds [ ] Lesions  Psychiatry: [ ] Depression [ ] Suicidal/Homicidal Ideation [ ] Anxiety [ ] Sleep Disturbances  [ ] 10 point review of systems is otherwise negative except as mentioned above              [ ]Unable to obtain    Allergy: sulfa drugs (Unknown)      Medications:  MEDICATIONS  (STANDING):  amLODIPine   Tablet 5 milliGRAM(s) Oral daily  aspirin enteric coated 81 milliGRAM(s) Oral daily  atorvastatin 10 milliGRAM(s) Oral at bedtime  carvedilol 6.25 milliGRAM(s) Oral every 12 hours  dextrose 5%. 1000 milliLiter(s) (50 mL/Hr) IV Continuous <Continuous>  dextrose 50% Injectable 12.5 Gram(s) IV Push once  dextrose 50% Injectable 25 Gram(s) IV Push once  dextrose 50% Injectable 25 Gram(s) IV Push once  enoxaparin Injectable 40 milliGRAM(s) SubCutaneous daily  insulin lispro (HumaLOG) corrective regimen sliding scale   SubCutaneous three times a day before meals  pantoprazole    Tablet 40 milliGRAM(s) Oral before breakfast  sodium chloride 0.9%. 500 milliLiter(s) (100 mL/Hr) IV Continuous <Continuous>  valsartan 320 milliGRAM(s) Oral daily    MEDICATIONS  (PRN):  dextrose 40% Gel 15 Gram(s) Oral once PRN Blood Glucose LESS THAN 70 milliGRAM(s)/deciliter  glucagon  Injectable 1 milliGRAM(s) IntraMuscular once PRN Glucose LESS THAN 70 milligrams/deciliter      PMH/PSH/FH/SH: [ ] Unchanged    Vitals:  T(C): 36.6 (18 @ 12:35), Max: 36.9 (05-15-18 @ 23:26)  HR: 57 (18 @ 12:35) (57 - 66)  BP: 131/68 (18 @ 12:35) (121/76 - 151/71)  BP(mean): --  RR: 18 (18 @ 12:35) (17 - 18)  SpO2: 98% (18 @ 12:35) (95% - 98%)  Wt(kg): --  Daily     Daily Weight in k.2 (16 May 2018 04:20)  I&O's Summary      Labs:                        11.6   7.5   )-----------( 157      ( 16 May 2018 06:33 )             35.3         144  |  108  |  28<H>  ----------------------------<  123<H>  4.1   |  28  |  1.50<H>    Ca    9.1      16 May 2018 06:33              Total Cholesterol: 134  LDL: 54  HDL: 43  T            ECG:    Echo:  < from: TTE Echo Doppler w/o Cont (18 @ 20:31) >  FINDINGS  Left Ventricle: Concentric left ventricular hypertrophy. The endocardium   is not well-visualized. Grossly, there is normal left ventricular   systolic function.  AorticValve: Calcified trileaflet aortic valve. No aortic insufficiency.  Mitral Valve: Mitral annular calcification and calcified mitral leaflets.   Minimal mitral insufficiency.  Tricuspid Valve: Normal tricuspid valve. Minimal tricuspid insufficiency  Pulmonic Valve: Not well visualized. Minimal pulmonic insufficiency.  Left Atrium: Normal  Right Ventricle: Not well visualized.  Right Atrium: Not well visualized  Diastolic Function: Grade 1 diastolic dysfunction  Pericardium/Pleura: Normal pericardium with no pericardial effusion.      CONCLUSIONS:  Technically difficult and limited study secondary to patient's body   habitus. Grossly, there is normal left ventricular systolic function.   Right heart is suboptimally visualized. There is grade 1 diastolic   dysfunction.          Stress Testing:     Cath:    Imaging:    Interpretation of Telemetry: Overnight on telemetry SB 50's      Physical Exam:  Appearance: [ ] Normal  [ ] abnormal [ ] NAD   Eyes: [ ] PERRL [ ] EOMI  HEENT: [ ] Normal [ ] Abnormal oral mucosa [ ]NC/AT  Cardiovascular: [ ] S1 [ ] S2 [ ] RRR [ ] m/r/g [ ]edema [ ] JVP  Procedural Access Site: [ ]  hematoma [ ] tender to palpation [ ] 2+ pulse [ ] bruit [ ] Ecchymosis  Respiratory: [ ] Clear to auscultation bilaterally  Gastrointestinal: [ ] Soft [ ] tenderness[ ] distension [ ] BS  Musculoskeletal: [ ] clubbing [ ] joint deformity   Neurologic: [ ] Non-focal  Lymphatic: [ ] lymphadenopathy  Psychiatry: [ ] AAOx3  [ ] confused [ ] disoriented [ ] Mood & affect appropriate  Skin: [ ]  rashes [ ] ecchymoses [ ] cyanosis HPI:  84 y/o F with PMH of DM type2 , GERD, HLD, HTN, presents to ED with syncope. Pt and daughter-in-law at bedside note that she has had 2 prior episodes of syncope in past month, for which she has been seen at [facility] and previous workup was negative last ed visit may 1st . Daughter-in-law notes that each episode has only lasted 1-2 mins. Denies any falls. Daughter-in-law witnessed today's episode while pt was resting in a chair. Daughter felt pt may be hypoglycemic.  She was given something to eat/drink.  EMS found her mildly hyperglycemic. Pt states she feels fine and denies HA, dizziness/lightheadedness, CP, SOB, N/V/C/D, hematochezia/melena, dysuria, hematuria.     In the ED pt VS were 97.4F, HR 58, /84, RR 18, SpO2 98% on RA.  Labs significant for CBC WNL, BUN 26, Glucose 192, GFR 41, Cardiac enzymes x1 WNL. CXR negative for active disease. EKG sinus alise @59 with non-specific ST and T wave changes (T wave flattening noted compared to prior). Blood/urine cx ordered. Cardio Dr. Goetz consulted, saw pt in ED. (13 May 2018 15:33)      SUBJECTIVE: Pt. seen, examined and evaluated Pt. resting comfortably in chair in NAD, with no respiratory distress, no c/o chest pain, dyspnea, palpitations, PND, or orthopnea   Patient is a 85y old  Female who presents with a chief complaint of syncope (14 May 2018 19:43)          OBJECTIVE:  Review Of Systems:  Constitutional: [ ] Fever [ ] Chills [ ] Fatigue [ ] Weight change   HEENT: [ ] Blurred vision [ ] Eye Pain [ ] Headache [ ] Runny nose [ ] Sore Throat   Respiratory: [ ] Cough [ ] Wheezing [ ] Shortness of breath  Cardiovascular: [ ] Chest Pain [ ] Palpitations [ ] WILSON [ ] PND [ ] Orthopnea  Gastrointestinal: [ ] Abdominal Pain [ ] Diarrhea [ ] Constipation [ ] Hemorrhoids [ ] Nausea [ ] Vomiting  Genitourinary: [ ] Nocturia [ ] Dysuria [ ] Incontinence  Extremities: [ ] Swelling [ ] Joint Pain  Neurologic: [ ] Focal deficit [ ] Paresthesias [ ] Syncope  Lymphatic: [ ] Swelling [ ] Lymphadenopathy   Skin: [ ] Rash [ ] Ecchymoses [ ] Wounds [ ] Lesions  Psychiatry: [ ] Depression [ ] Suicidal/Homicidal Ideation [ ] Anxiety [ ] Sleep Disturbances  [X ] 10 point review of systems is otherwise negative except as mentioned above              [ ]Unable to obtain    Allergy: sulfa drugs (Unknown)      Medications:  MEDICATIONS  (STANDING):  amLODIPine   Tablet 5 milliGRAM(s) Oral daily  aspirin enteric coated 81 milliGRAM(s) Oral daily  atorvastatin 10 milliGRAM(s) Oral at bedtime  carvedilol 6.25 milliGRAM(s) Oral every 12 hours  dextrose 5%. 1000 milliLiter(s) (50 mL/Hr) IV Continuous <Continuous>  dextrose 50% Injectable 12.5 Gram(s) IV Push once  dextrose 50% Injectable 25 Gram(s) IV Push once  dextrose 50% Injectable 25 Gram(s) IV Push once  enoxaparin Injectable 40 milliGRAM(s) SubCutaneous daily  insulin lispro (HumaLOG) corrective regimen sliding scale   SubCutaneous three times a day before meals  pantoprazole    Tablet 40 milliGRAM(s) Oral before breakfast  sodium chloride 0.9%. 500 milliLiter(s) (100 mL/Hr) IV Continuous <Continuous>  valsartan 320 milliGRAM(s) Oral daily    MEDICATIONS  (PRN):  dextrose 40% Gel 15 Gram(s) Oral once PRN Blood Glucose LESS THAN 70 milliGRAM(s)/deciliter  glucagon  Injectable 1 milliGRAM(s) IntraMuscular once PRN Glucose LESS THAN 70 milligrams/deciliter      PMH/PSH/FH/SH: [ ] Unchanged    Vitals:  T(C): 36.6 (18 @ 12:35), Max: 36.9 (05-15-18 @ 23:26)  HR: 57 (18 @ 12:35) (57 - 66)  BP: 131/68 (18 @ 12:35) (121/76 - 151/71)  BP(mean): --  RR: 18 (18 @ 12:35) (17 - 18)  SpO2: 98% (18 @ 12:35) (95% - 98%)  Wt(kg): --  Daily     Daily Weight in k.2 (16 May 2018 04:20)  I&O's Summary      Labs:                        11.6   7.5   )-----------( 157      ( 16 May 2018 06:33 )             35.3         144  |  108  |  28<H>  ----------------------------<  123<H>  4.1   |  28  |  1.50<H>    Ca    9.1      16 May 2018 06:33        Total Cholesterol: 134  LDL: 54  HDL: 43  T            ECG:    Echo:  < from: TTE Echo Doppler w/o Cont (18 @ 20:31) >  FINDINGS  Left Ventricle: Concentric left ventricular hypertrophy. The endocardium   is not well-visualized. Grossly, there is normal left ventricular   systolic function.  AorticValve: Calcified trileaflet aortic valve. No aortic insufficiency.  Mitral Valve: Mitral annular calcification and calcified mitral leaflets.   Minimal mitral insufficiency.  Tricuspid Valve: Normal tricuspid valve. Minimal tricuspid insufficiency  Pulmonic Valve: Not well visualized. Minimal pulmonic insufficiency.  Left Atrium: Normal  Right Ventricle: Not well visualized.  Right Atrium: Not well visualized  Diastolic Function: Grade 1 diastolic dysfunction  Pericardium/Pleura: Normal pericardium with no pericardial effusion.      CONCLUSIONS:  Technically difficult and limited study secondary to patient's body   habitus. Grossly, there is normal left ventricular systolic function.   Right heart is suboptimally visualized. There is grade 1 diastolic   dysfunction.          Stress Testing:     Cath:    Imaging:    Interpretation of Telemetry: Overnight on telemetry SB 50's      Physical Exam:  Appearance: [ ] Normal  [ ] abnormal [X ] NAD   Eyes: [ ] PERRL [ ] EOMI  HEENT: [ ] Normal [ ] Abnormal oral mucosa [ ]NC/AT  Cardiovascular: [X ] S1 [X ] S2 [ ] RRR [ ] m/r/g [ ]edema [ ] JVP  Procedural Access Site: [ ]  hematoma [ ] tender to palpation [ ] 2+ pulse [ ] bruit [ ] Ecchymosis  Respiratory: [X ] Clear to auscultation bilaterally  Gastrointestinal: [ ] Soft [ ] tenderness[ ] distension [ ] BS  Musculoskeletal: [ ] clubbing [ ] joint deformity   Neurologic: [ ] Non-focal  Lymphatic: [ ] lymphadenopathy  Psychiatry: [X ] AAOx3  [ ] confused [ ] disoriented [ ] Mood & affect appropriate  Skin: [ ]  rashes [ ] ecchymoses [ ] cyanosis

## 2018-05-16 NOTE — PROGRESS NOTE ADULT - SUBJECTIVE AND OBJECTIVE BOX
Neurology Follow up note    NELLIE [id number]    HPI:  84 y/o F with PMH of DM type2 , GERD, HLD, HTN, presents to ED with syncope. Pt and daughter-in-law at bedside note that she has had 2 prior episodes of syncope in past month, for which she has been seen at [place] and previous workup was negative last ed visit may 1st . Daughter-in-law notes that each episode has only lasted 1-2 mins. Denies any falls. Daughter-in-law witnessed today's episode while pt was resting in a chair. Daughter felt pt may be hypoglycemic.  She was given something to eat/drink.  EMS found her mildly hyperglycemic. Pt states she feels fine and denies HA, dizziness/lightheadedness, CP, SOB, N/V/C/D, hematochezia/melena, dysuria, hematuria.     In the ED pt VS were 97.4F, HR 58, /84, RR 18, SpO2 98% on RA.  Labs significant for CBC WNL, BUN 26, Glucose 192, GFR 41, Cardiac enzymes x1 WNL. CXR negative for active disease. EKG sinus alise @59 with non-specific ST and T wave changes (T wave flattening noted compared to prior). Blood/urine cx ordered. Cardio Dr. Goetz consulted, saw pt in ED. (13 May 2018 15:33)      Interval History - no complaints.    Patient is seen, chart was reviewed and case was discussed with the treatment team.  Pt is not in any distress.   Lying on bed comfortably.   No events reported overnight.   No clinical seizure was reported.  Sitting on chair bed comfortably.    is at bedside.    Vital Signs Last 24 Hrs  T(C): 37.1 (16 May 2018 16:26), Max: 37.1 (16 May 2018 16:26)  T(F): 98.8 (16 May 2018 16:26), Max: 98.8 (16 May 2018 16:26)  HR: 60 (16 May 2018 16:26) (57 - 66)  BP: 162/95 (16 May 2018 16:26) (121/76 - 162/95)  BP(mean): --  RR: 17 (16 May 2018 16:26) (17 - 18)  SpO2: 98% (16 May 2018 16:26) (95% - 98%)          On Neurological Examination:    Mental Status - Pt is alert, awake, oriented X2 . "MONA CAO IS MY SON"    Speech -  Normal.     Cranial Nerves - Pupils 3 mm equal and reactive to light, extraocular eye movements intact. Pt has no visual field deficit.  Pt has no  facial asymmetry. Facial sensation is intact.Tongue - is in midline.    Muscle tone - is normal.    Motor Exam - 5/5 OF UE.  LE 4/5.    Sensory Exam  Pt withdraws all extremities equally on stimulation.        coordination:    Finger to nose: normal.      Deep tendon Reflexes - 2 plus all over.       Neck Supple -  Yes.     MEDICATIONS    amLODIPine   Tablet 5 milliGRAM(s) Oral daily  aspirin enteric coated 81 milliGRAM(s) Oral daily  atorvastatin 10 milliGRAM(s) Oral at bedtime  carvedilol 6.25 milliGRAM(s) Oral every 12 hours  dextrose 40% Gel 15 Gram(s) Oral once PRN  dextrose 5%. 1000 milliLiter(s) IV Continuous <Continuous>  dextrose 50% Injectable 12.5 Gram(s) IV Push once  dextrose 50% Injectable 25 Gram(s) IV Push once  dextrose 50% Injectable 25 Gram(s) IV Push once  enoxaparin Injectable 40 milliGRAM(s) SubCutaneous daily  glucagon  Injectable 1 milliGRAM(s) IntraMuscular once PRN  insulin lispro (HumaLOG) corrective regimen sliding scale   SubCutaneous three times a day before meals  pantoprazole    Tablet 40 milliGRAM(s) Oral before breakfast  sodium chloride 0.9%. 500 milliLiter(s) IV Continuous <Continuous>  valsartan 320 milliGRAM(s) Oral daily      Allergies    sulfa drugs (Unknown)    Intolerances        LABS:  CBC Full  -  ( 16 May 2018 06:33 )  WBC Count : 7.5 K/uL  Hemoglobin : 11.6 g/dL  Hematocrit : 35.3 %  Platelet Count - Automated : 157 K/uL  Mean Cell Volume : 85.1 fl  Mean Cell Hemoglobin : 27.9 pg  Mean Cell Hemoglobin Concentration : 32.8 gm/dL  Auto Neutrophil # : x  Auto Lymphocyte # : x        05-16    144  |  108  |  28<H>  ----------------------------<  123<H>  4.1   |  28  |  1.50<H>    Ca    9.1      16 May 2018 06:33      Hemoglobin A1C:   Lipid Panel 05-16 @ 08:05  Total Cholesterol, Serum 134  LDL 54  Triglycerides 186    Vitamin B12     RADIOLOGY    < from: CT Head No Cont (02.05.18 @ 14:21) >    EXAM:  CT BRAIN                            PROCEDURE DATE:  02/05/2018          INTERPRETATION:  .    CLINICAL INFORMATION: Status post fall. Trauma.    TECHNIQUE: Multiple axial CT images of the head were obtained without   contrast. Sagittal and coronal reconstructed images were acquired from   the source data.    COMPARISON: Prior CT examination of the head dated 3/21/2011.    FINDINGS: There is no acute intracranial hemorrhage, mass effect, shift   of the midline structures, herniation, extra-axial fluid collection, or   hydrocephalus.    There is diffuse cerebral volume loss with prominence of the sulci,   fissures, and cisternal spaces which is normal for the patient's age.   There is mild deep and periventricular white matter hypoattenuation   statistically compatible with microvascular changes given calcific   atherosclerotic disease of the intracranial arteries. There is   encephalomalacia and gliosis within the left frontal lobe related to   prior insult.    The paranasal sinuses and mastoid air cells are clear. The calvarium is   intact. There is evidence of bilateral cataract removal.    IMPRESSION: No acute intracranial hemorrhage, mass effect, or shift of   the midline structures.    Encephalomalacia and gliosis withinthe left frontal lobe related to   prior insult.    Microvascular disease.                SONA LISA M.D., ATTENDING RADIOLOGIST  This document has been electronically signed. Feb 5 2018  2:32PM    < from: US Duplex Carotid Arteries Complete, Bilateral (05.14.18 @ 16:33) >    EXAM:  US DPLX CAROTIDS COMPL BI                            PROCEDURE DATE:  05/14/2018          INTERPRETATION:      CLINICAL INFORMATION:  Syncope. History of hypertension.    PROCEDURE:  Using real time, Doppler, and color flow Doppler ultrasound,   the cervical carotid and vertebral arteries were evaluated.    RIGHT CAROTID DOPPLER ULTRASOUND FINDINGS:      There is plaque at the right carotid artery bifurcation.     The peak systolic common, and internal carotid artery velocities are 77   and 65 cm per second respectively.  This results in a peak systolic   internal to common carotid ratio of 0.8.    The external carotid artery is patent and there is normal direction of   flow within the vertebral artery.    LEFT CAROTID DOPPLER ULTRASOUND FINDINGS:      There is plaque at the left carotid artery bifurcation.      The peak systolic common, and internal carotid artery velocities are 75   and 65 cm per second respectively. This results in a peak systolic   internal to common carotid ratio of 0.9.    The external carotid artery is patent and there is normal direction of   flow within the vertebral artery.    IMPRESSION:      No sonographic evidence for hemodynamically significant carotid stenosis.                      JESSICA LAN M.D., ATTENDING RADIOLOGIST  This document has been electronically signed. May 14 2018  4:37PM                    ASSESSMENT AND PLAN:      SYNCOPE.  LIKELY DEMENTIA  WITH INTERMITTENT CONFUSION  HTN.    CONTINUE ANTIHYPERTENSIVE MEDS.  ON ASA/STATIN.  DEMENTIA WORK UP NEGATIVE.  Physical therapy evaluation.  OOB to chair/ambulation with assistance only.  Pain is accessed and addressed.  Would continue to follow.

## 2018-05-16 NOTE — PROGRESS NOTE ADULT - PROBLEM SELECTOR PLAN 3
-DM type 2 without complications  -HbA1C 5.4  -Diabetic diet  -ISS, routine accuchecks  -hypoglycemia protocol  -hold oral home meds Januvia and glipizide, metformin  - consider dc glipizide as it can lead to hypoglycemia
-DM type 2 without complications  -HbA1C 5.4  -Diabetic diet  -ISS, routine accuchecks  -hypoglycemia protocol  -hold oral home meds Januvia and glipizide, metformin  -consider dc glipizide as it can lead to hypoglycemia
-Nonspecific ST changes  -continue cardiac monitoring  -troponins neg  -ECHO results above  -continue ASA and statin

## 2018-05-16 NOTE — PROGRESS NOTE ADULT - PROBLEM SELECTOR PLAN 2
-Nonspecific ST changes  -continue cardiac monitoring  -troponins neg ,   -f/u echo   -continue ASA and statin
Baseline Cr 1, Cr 1.5 today, possible 2/2 decreased oral intake/dehydration  will give 500cc bolus  f/u bmp in am
-Nonspecific ST changes  -continue cardiac monitoring  -troponins neg  -ECHO results above  -continue ASA and statin

## 2018-05-16 NOTE — PROGRESS NOTE ADULT - ASSESSMENT
84 y/o F with PMH of DM, GERD, HLD, HTN, presents to ED with syncope, admitted for evaluation of recurrent syncope evaluate for cardiogenic vs neurologic cause, likely 2/2 uncontrolled HTN 84 y/o F with PMH of DM2, GERD, HLD, HTN, some mild cognitive impairment presents with lightheadedness and near sncope admitted for evaluation of recurrent syncope vs hypoglycemia sec to meds and physical deconditioning

## 2018-05-16 NOTE — PROGRESS NOTE ADULT - ASSESSMENT
85f htn, chol, dm, gerd.  Some degree of progressive dementia, but continues to live alone. Sedentary at baseline but without sxs of angina or hf by report of her family at the bedside.  She has had several recent episodes of decreased responsiveness, including 2 in the past two weeks.  Typically, she will be sitting and will suddenly become unarousable, such that it takes some effort to wake her and she is often not herself after.  Her family believes that she is not conscious during these events.  She has been evaluated in the ER in the past for this.  On one occasion, she was felt dehydrated.  On another, she was felt to have a uti.  Today she had an episode and she was felt to possibly be hypoglycemic.  She was given something to eat/drink.  EMS found her mildly hyperglycemic.  Given these recurrent events, as well as a progressive decline in her functional status, she presents to the er for evaluation.  She has been found to have an abnormal EKG    -there is no evidence of acute ischemia.  -there is no evidence of significant arrhythmia. Would ambulate and monitor for chronotropy  -there is no evidence for meaningful volume overload.  -is unclear what is causing these events  -cannot fully exclude an arrhythmic event though so far no events on telemetry monitoring  -abnormal ekg also needs to be explained, though the abnormalities are nonspecific  -Echocardiogram normal left ventricular systolic function right heart is suboptimally visualized. There is grade 1 diastolic dysfunction.  -valsartan increased to 320 QD  -cont Coreg 6.25 mg BID  -Amlodipine 5mg QD started yesterday -167 DBP 68-89  -cont asa  -cont statin  -dm management per med  -overall functional and cognitive decline, may require more services or placement regardless of the outcome of the evaluation  -DVT prophylaxis  -monitor electrolytes, keep k>4, Mg>2   -will continue to  follow 85f htn, chol, dm, gerd.  Some degree of progressive dementia, but continues to live alone. Sedentary at baseline but without sxs of angina or hf by report of her family at the bedside.  She has had several recent episodes of decreased responsiveness, including 2 in the past two weeks.  Typically, she will be sitting and will suddenly become unarousable, such that it takes some effort to wake her and she is often not herself after.  Her family believes that she is not conscious during these events.  She has been evaluated in the ER in the past for this.  On one occasion, she was felt dehydrated.  On another, she was felt to have a uti.  Today she had an episode and she was felt to poss6.25 mg bidibly be hypoglycemic.  She was given something to eat/drink.  EMS found her mildly hyperglycemic.  Given these recurrent events, as well as a progressive decline in her functional status, she presents to the er for evaluation.  She has been found to have an abnormal EKG    -there is no evidence of acute ischemia.  -there is no evidence of significant arrhythmia. Would ambulate and monitor for chronotropy  -there is no evidence for meaningful volume overload.  -is unclear what is causing these events  -cannot fully exclude an arrhythmic event though so far no events on telemetry monitoring  -abnormal ekg also needs to be explained, though the abnormalities are nonspecific  -Echocardiogram normal left ventricular systolic function right heart is suboptimally visualized. There is grade 1 diastolic dysfunction.  - ASA  - DVT prophylaxis  -valsartan increased to 320 QD  -cont Coreg 6.25 mg BID  -Amlodipine 5mg QD started yesterday -167 DBP 68-89  -cont asa  -cont statin  -dm management per med  -overall functional and cognitive decline, may require more services or placement regardless of the outcome of the evaluation  -DVT prophylaxis  -monitor electrolytes, keep k>4, Mg>2   -will continue to  follow

## 2018-05-16 NOTE — PROGRESS NOTE ADULT - PROBLEM SELECTOR PLAN 5
- chronic, stable  - continue to monitor  -orthostatics show increase in BP equiv to orthostatic htn  -continue valsartan to 320mg qd and continue coreg 6.5mg bid with hold parameters  -continue amlodipine 5mg qd added as per cardio  -BP better controlled today

## 2018-05-16 NOTE — PROGRESS NOTE ADULT - PROBLEM SELECTOR PLAN 1
- acute, recurrent  - evaluate for cardiogenic vs neurogenic cause  - CT Head negative  - Neuro consulted, TSH wnl, B12 wnl, folate wnl  - Cardio consulted, no events overnight on tele monitor  - continue tele monitoring  - ECHO shows Technically difficult and limited study secondary to patient's body   habitus. Grossly, there is normal left ventricular systolic function.   Right heart is suboptimally visualized. There is grade 1 diastolic   dysfunction.  - Orthostatics show increase in BP from sitting to standing position,  repeat orthostatics show increase as well but BP better controlled  - component of dementia pt is AOx2, waxing and waning mental status

## 2018-05-16 NOTE — PROGRESS NOTE ADULT - SUBJECTIVE AND OBJECTIVE BOX
Patient is a 85y old  Female who presents with a chief complaint of syncope (14 May 2018 19:43)      INTERVAL HPI/OVERNIGHT EVENTS: Pt seen and examined at bedside, in NAD. Denies CP, SOB, palpitations, abdominal pain, N/V/D, urinary complaints. No overnight events.    MEDICATIONS  (STANDING):  amLODIPine   Tablet 5 milliGRAM(s) Oral daily  aspirin enteric coated 81 milliGRAM(s) Oral daily  atorvastatin 10 milliGRAM(s) Oral at bedtime  carvedilol 6.25 milliGRAM(s) Oral every 12 hours  dextrose 5%. 1000 milliLiter(s) (50 mL/Hr) IV Continuous <Continuous>  dextrose 50% Injectable 12.5 Gram(s) IV Push once  dextrose 50% Injectable 25 Gram(s) IV Push once  dextrose 50% Injectable 25 Gram(s) IV Push once  enoxaparin Injectable 40 milliGRAM(s) SubCutaneous daily  insulin lispro (HumaLOG) corrective regimen sliding scale   SubCutaneous three times a day before meals  pantoprazole    Tablet 40 milliGRAM(s) Oral before breakfast  sodium chloride 0.9%. 500 milliLiter(s) (100 mL/Hr) IV Continuous <Continuous>  valsartan 320 milliGRAM(s) Oral daily    MEDICATIONS  (PRN):  dextrose 40% Gel 15 Gram(s) Oral once PRN Blood Glucose LESS THAN 70 milliGRAM(s)/deciliter  glucagon  Injectable 1 milliGRAM(s) IntraMuscular once PRN Glucose LESS THAN 70 milligrams/deciliter      Allergies    sulfa drugs (Unknown)    Intolerances        REVIEW OF SYSTEMS: Pt is not a good historian   ROS Unable to obtain due to - [x ] Dementia  [ ] Lethargy    Vital Signs Last 24 Hrs  T(C): 36.9 (16 May 2018 07:22), Max: 36.9 (15 May 2018 23:26)  T(F): 98.4 (16 May 2018 07:22), Max: 98.4 (15 May 2018 23:26)  HR: 59 (16 May 2018 07:22) (59 - 66)  BP: 145/69 (16 May 2018 07:22) (121/76 - 151/71)  BP(mean): --  RR: 18 (16 May 2018 07:22) (17 - 18)  SpO2: 95% (16 May 2018 07:22) (95% - 98%)    PHYSICAL EXAM:  Physical Exam:  General: Well developed, well nourished, NAD, elder  HEENT: normocephalic, atraumatic, PERRLA, extraocular muscles intact bilaterally, moist mucous membranes   Neck: Supple, nontender, no mass  Neurology: A&Ox2, moves all extremities x4, nonfocal, CN II-XII grossly intact, sensation intact  Respiratory: clear to auscultation B/L, No wheezes, rales, ronchi  CV: RRR, +S1/S2, no murmurs, rubs or gallops  Abdominal: Soft, nontender, nondistended +BSx4  Extremities: No cyanosis/clubbing/edema, + peripheral pulses, R Forearm bruise [id number]  MSK: Normal ROM, no joint erythema or warmth, no joint swelling   Skin: warm, dry, normal color, no rash or abnormal lesions    LABS:                        11.6   7.5   )-----------( 157      ( 16 May 2018 06:33 )             35.3     CBC Full  -  ( 16 May 2018 06:33 )  WBC Count : 7.5 K/uL  Hemoglobin : 11.6 g/dL  Hematocrit : 35.3 %  Platelet Count - Automated : 157 K/uL  Mean Cell Volume : 85.1 fl  Mean Cell Hemoglobin : 27.9 pg  Mean Cell Hemoglobin Concentration : 32.8 gm/dL  Auto Neutrophil # : x  Auto Lymphocyte # : x  Auto Monocyte # : x  Auto Eosinophil # : x  Auto Basophil # : x  Auto Neutrophil % : x  Auto Lymphocyte % : x  Auto Monocyte % : x  Auto Eosinophil % : x  Auto Basophil % : x    16 May 2018 06:33    144    |  108    |  28     ----------------------------<  123    4.1     |  28     |  1.50     Ca    9.1        16 May 2018 06:33          CAPILLARY BLOOD GLUCOSE      POCT Blood Glucose.: 205 mg/dL (16 May 2018 12:01)  POCT Blood Glucose.: 138 mg/dL (16 May 2018 07:41)  POCT Blood Glucose.: 156 mg/dL (15 May 2018 22:07)  POCT Blood Glucose.: 149 mg/dL (15 May 2018 16:56)          RADIOLOGY & ADDITIONAL TESTS:  < from: TTE Echo Doppler w/o Cont (05.14.18 @ 20:31) >  CONCLUSIONS:  Technically difficult and limited study secondary to patient's body   habitus. Grossly, there is normal left ventricular systolic function.   Right heart is suboptimally visualized. There is grade 1 diastolic   dysfunction.            < end of copied text >      Personally reviewed.     Consultant(s) Notes Reviewed:  [x] YES  [ ] NO    Care Discussed with [x] Consultants  [x] Patient  [ ] Family  [ ]      [ ] Other; RN Patient is a 85y old  Female who presents with a chief complaint of syncope (14 May 2018 19:43)      INTERVAL HPI/OVERNIGHT EVENTS: Pt seen and examined at bedside, in NAD. Denies CP, SOB, palpitations, abdominal pain, N/V/D, urinary complaints. No overnight events.    MEDICATIONS  (STANDING):  amLODIPine   Tablet 5 milliGRAM(s) Oral daily  aspirin enteric coated 81 milliGRAM(s) Oral daily  atorvastatin 10 milliGRAM(s) Oral at bedtime  carvedilol 6.25 milliGRAM(s) Oral every 12 hours  dextrose 5%. 1000 milliLiter(s) (50 mL/Hr) IV Continuous <Continuous>  dextrose 50% Injectable 12.5 Gram(s) IV Push once  dextrose 50% Injectable 25 Gram(s) IV Push once  dextrose 50% Injectable 25 Gram(s) IV Push once  enoxaparin Injectable 40 milliGRAM(s) SubCutaneous daily  insulin lispro (HumaLOG) corrective regimen sliding scale   SubCutaneous three times a day before meals  pantoprazole    Tablet 40 milliGRAM(s) Oral before breakfast  sodium chloride 0.9%. 500 milliLiter(s) (100 mL/Hr) IV Continuous <Continuous>  valsartan 320 milliGRAM(s) Oral daily    MEDICATIONS  (PRN):  dextrose 40% Gel 15 Gram(s) Oral once PRN Blood Glucose LESS THAN 70 milliGRAM(s)/deciliter  glucagon  Injectable 1 milliGRAM(s) IntraMuscular once PRN Glucose LESS THAN 70 milligrams/deciliter      Allergies    sulfa drugs (Unknown)    Intolerances        REVIEW OF SYSTEMS: Pt is not a good historian   ROS Unable to obtain due to - [x ] Dementia  [ ] Lethargy    Vital Signs Last 24 Hrs  T(C): 36.9 (16 May 2018 07:22), Max: 36.9 (15 May 2018 23:26)  T(F): 98.4 (16 May 2018 07:22), Max: 98.4 (15 May 2018 23:26)  HR: 59 (16 May 2018 07:22) (59 - 66)  BP: 145/69 (16 May 2018 07:22) (121/76 - 151/71)  BP(mean): --  RR: 18 (16 May 2018 07:22) (17 - 18)  SpO2: 95% (16 May 2018 07:22) (95% - 98%)    PHYSICAL EXAM:  Physical Exam:  General: Well developed, well nourished, NAD, elder, mild cognitive impairment  HEENT: normocephalic, atraumatic, PERRLA, extraocular muscles intact bilaterally, moist mucous membranes   Neck: Supple, nontender, no mass  Neurology: A&Ox2, moves all extremities x4, nonfocal, CN II-XII grossly intact, sensation intact  Respiratory: clear to auscultation B/L, No wheezes, rales, ronchi  CV: RRR, +S1/S2, no murmurs, rubs or gallops  Abdominal: Soft, nontender, nondistended +BSx4  Extremities: No cyanosis/clubbing/edema, + peripheral pulses, R Forearm bruise [id number]  MSK: Normal ROM, no joint erythema or warmth, no joint swelling   Skin: warm, dry, normal color, no rash or abnormal lesions    LABS:                        11.6   7.5   )-----------( 157      ( 16 May 2018 06:33 )             35.3     CBC Full  -  ( 16 May 2018 06:33 )  WBC Count : 7.5 K/uL  Hemoglobin : 11.6 g/dL  Hematocrit : 35.3 %  Platelet Count - Automated : 157 K/uL  Mean Cell Volume : 85.1 fl  Mean Cell Hemoglobin : 27.9 pg  Mean Cell Hemoglobin Concentration : 32.8 gm/dL  Auto Neutrophil # : x  Auto Lymphocyte # : x  Auto Monocyte # : x  Auto Eosinophil # : x  Auto Basophil # : x  Auto Neutrophil % : x  Auto Lymphocyte % : x  Auto Monocyte % : x  Auto Eosinophil % : x  Auto Basophil % : x    16 May 2018 06:33    144    |  108    |  28     ----------------------------<  123    4.1     |  28     |  1.50     Ca    9.1        16 May 2018 06:33          CAPILLARY BLOOD GLUCOSE      POCT Blood Glucose.: 205 mg/dL (16 May 2018 12:01)  POCT Blood Glucose.: 138 mg/dL (16 May 2018 07:41)  POCT Blood Glucose.: 156 mg/dL (15 May 2018 22:07)  POCT Blood Glucose.: 149 mg/dL (15 May 2018 16:56)          RADIOLOGY & ADDITIONAL TESTS:  < from: TTE Echo Doppler w/o Cont (05.14.18 @ 20:31) >  CONCLUSIONS:  Technically difficult and limited study secondary to patient's body   habitus. Grossly, there is normal left ventricular systolic function.   Right heart is suboptimally visualized. There is grade 1 diastolic   dysfunction.            < end of copied text >      Personally reviewed.     Consultant(s) Notes Reviewed:  [x] YES  [ ] NO    Care Discussed with [x] Consultants  [x] Patient  [ ] Family  [ ]      [ ] Other; RN

## 2018-05-16 NOTE — PROGRESS NOTE ADULT - PROBLEM SELECTOR PLAN 4
-DM type 2 without complications  -HbA1C 5.4  -Diabetic diet  -ISS, routine accuchecks  -hypoglycemia protocol  -hold oral home meds Januvia and glipizide, metformin  -consider dc glipizide as it can lead to hypoglycemia -DM type 2 without complications  -HbA1C 5.4  -Diabetic diet  -LD ISS, routine accuchecks  -hypoglycemia protocol  -hold oral home meds Januvia and glipizide, metformin  -consider dc glipizide as it can lead to hypoglycemia, apprec endocrine recs

## 2018-05-16 NOTE — PROGRESS NOTE ADULT - PROBLEM SELECTOR PLAN 7
DVT ppx: Lovenox  PT eval: [facility]
DVT ppx: Lovenox  PT eval: [facility]
chronic, stable  continue protonix

## 2018-05-17 VITALS
HEART RATE: 60 BPM | SYSTOLIC BLOOD PRESSURE: 185 MMHG | OXYGEN SATURATION: 100 % | DIASTOLIC BLOOD PRESSURE: 76 MMHG | RESPIRATION RATE: 18 BRPM | TEMPERATURE: 98 F

## 2018-05-17 LAB
ANION GAP SERPL CALC-SCNC: 7 MMOL/L — SIGNIFICANT CHANGE UP (ref 5–17)
BUN SERPL-MCNC: 29 MG/DL — HIGH (ref 7–23)
CALCIUM SERPL-MCNC: 8.8 MG/DL — SIGNIFICANT CHANGE UP (ref 8.5–10.1)
CHLORIDE SERPL-SCNC: 108 MMOL/L — SIGNIFICANT CHANGE UP (ref 96–108)
CO2 SERPL-SCNC: 28 MMOL/L — SIGNIFICANT CHANGE UP (ref 22–31)
CREAT SERPL-MCNC: 1.1 MG/DL — SIGNIFICANT CHANGE UP (ref 0.5–1.3)
GLUCOSE SERPL-MCNC: 125 MG/DL — HIGH (ref 70–99)
POTASSIUM SERPL-MCNC: 3.6 MMOL/L — SIGNIFICANT CHANGE UP (ref 3.5–5.3)
POTASSIUM SERPL-SCNC: 3.6 MMOL/L — SIGNIFICANT CHANGE UP (ref 3.5–5.3)
SODIUM SERPL-SCNC: 143 MMOL/L — SIGNIFICANT CHANGE UP (ref 135–145)

## 2018-05-17 PROCEDURE — 99232 SBSQ HOSP IP/OBS MODERATE 35: CPT

## 2018-05-17 PROCEDURE — 99239 HOSP IP/OBS DSCHRG MGMT >30: CPT

## 2018-05-17 RX ORDER — AMLODIPINE BESYLATE 2.5 MG/1
1 TABLET ORAL
Qty: 0 | Refills: 0 | COMMUNITY
Start: 2018-05-17

## 2018-05-17 RX ORDER — VALSARTAN 80 MG/1
1 TABLET ORAL
Qty: 0 | Refills: 0 | DISCHARGE
Start: 2018-05-17

## 2018-05-17 RX ORDER — SITAGLIPTIN 50 MG/1
1 TABLET, FILM COATED ORAL
Qty: 30 | Refills: 0 | OUTPATIENT
Start: 2018-05-17 | End: 2018-06-15

## 2018-05-17 RX ORDER — METFORMIN HYDROCHLORIDE 850 MG/1
1 TABLET ORAL
Qty: 30 | Refills: 0 | OUTPATIENT
Start: 2018-05-17 | End: 2018-06-15

## 2018-05-17 RX ORDER — VALSARTAN 80 MG/1
1 TABLET ORAL
Qty: 0 | Refills: 0 | COMMUNITY

## 2018-05-17 RX ORDER — VALSARTAN 80 MG/1
1 TABLET ORAL
Qty: 0 | Refills: 0 | COMMUNITY
Start: 2018-05-17

## 2018-05-17 RX ADMIN — CARVEDILOL PHOSPHATE 6.25 MILLIGRAM(S): 80 CAPSULE, EXTENDED RELEASE ORAL at 05:52

## 2018-05-17 RX ADMIN — Medication 3: at 12:40

## 2018-05-17 RX ADMIN — ENOXAPARIN SODIUM 40 MILLIGRAM(S): 100 INJECTION SUBCUTANEOUS at 12:40

## 2018-05-17 RX ADMIN — PANTOPRAZOLE SODIUM 40 MILLIGRAM(S): 20 TABLET, DELAYED RELEASE ORAL at 05:52

## 2018-05-17 RX ADMIN — AMLODIPINE BESYLATE 5 MILLIGRAM(S): 2.5 TABLET ORAL at 05:51

## 2018-05-17 RX ADMIN — VALSARTAN 320 MILLIGRAM(S): 80 TABLET ORAL at 05:51

## 2018-05-17 RX ADMIN — Medication 81 MILLIGRAM(S): at 12:40

## 2018-05-17 NOTE — CONSULT NOTE ADULT - SUBJECTIVE AND OBJECTIVE BOX
Patient is a 85y old  Female who presents with a chief complaint of syncope (14 May 2018 19:43)      Reason For Consult: dm2 uncontrolled    HPI:  84 y/o F with PMH of DM type2 , GERD, HLD, HTN, presents to ED with syncope. Pt and daughter-in-law at bedside note that she has had 2 prior episodes of syncope in past month, for which she has been seen at [facility] and previous workup was negative last ed visit may 1st . Daughter-in-law notes that each episode has only lasted 1-2 mins. Denies any falls. Daughter-in-law witnessed today's episode while pt was resting in a chair. Daughter felt pt may be hypoglycemic.  She was given something to eat/drink.  EMS found her mildly hyperglycemic. Pt states she feels fine and denies HA, dizziness/lightheadedness, CP, SOB, N/V/C/D, hematochezia/melena, dysuria, hematuria.     In the ED pt VS were 97.4F, HR 58, /84, RR 18, SpO2 98% on RA.  Labs significant for CBC WNL, BUN 26, Glucose 192, GFR 41, Cardiac enzymes x1 WNL. CXR negative for active disease. EKG sinus alise @59 with non-specific ST and T wave changes (T wave flattening noted compared to prior). Blood/urine cx ordered. Cardio Dr. Goetz consulted, saw pt in ED. (13 May 2018 15:33)      PAST MEDICAL & SURGICAL HISTORY:  DM (diabetes mellitus)  HLD (hyperlipidemia)  HTN (hypertension)  GERD (gastroesophageal reflux disease)  History of cholecystectomy  Ankle injury: s/p ankle surgery, pt doesn&#x27;t remember which ankle      FAMILY HISTORY:  No pertinent family history in first degree relatives        Social History:    MEDICATIONS  (STANDING):  amLODIPine   Tablet 5 milliGRAM(s) Oral daily  aspirin enteric coated 81 milliGRAM(s) Oral daily  atorvastatin 10 milliGRAM(s) Oral at bedtime  carvedilol 6.25 milliGRAM(s) Oral every 12 hours  dextrose 5%. 1000 milliLiter(s) (50 mL/Hr) IV Continuous <Continuous>  dextrose 50% Injectable 12.5 Gram(s) IV Push once  dextrose 50% Injectable 25 Gram(s) IV Push once  dextrose 50% Injectable 25 Gram(s) IV Push once  enoxaparin Injectable 40 milliGRAM(s) SubCutaneous daily  insulin lispro (HumaLOG) corrective regimen sliding scale   SubCutaneous three times a day before meals  pantoprazole    Tablet 40 milliGRAM(s) Oral before breakfast  valsartan 320 milliGRAM(s) Oral daily    MEDICATIONS  (PRN):  dextrose 40% Gel 15 Gram(s) Oral once PRN Blood Glucose LESS THAN 70 milliGRAM(s)/deciliter  glucagon  Injectable 1 milliGRAM(s) IntraMuscular once PRN Glucose LESS THAN 70 milligrams/deciliter        T(C): 37 (05-17-18 @ 08:00), Max: 37.1 (05-16-18 @ 16:26)  HR: 61 (05-17-18 @ 08:00) (57 - 69)  BP: 154/85 (05-17-18 @ 08:00) (126/92 - 168/75)  RR: 18 (05-17-18 @ 08:00) (17 - 18)  SpO2: 98% (05-17-18 @ 08:00) (92% - 98%)  Wt(kg): --    PHYSICAL EXAM:  GENERAL: NAD, well-groomed, well-developed  HEAD:  Atraumatic, Normocephalic  NECK: Supple, No JVD, Normal thyroid  CHEST/LUNG: Clear to percussion bilaterally; No rales, rhonchi, wheezing, or rubs  HEART: Regular rate and rhythm; No murmurs, rubs, or gallops  ABDOMEN: Soft, Nontender, Nondistended; Bowel sounds present  EXTREMITIES:  2+ Peripheral Pulses, No clubbing, cyanosis, or edema  SKIN: No rashes or lesions    CAPILLARY BLOOD GLUCOSE      POCT Blood Glucose.: 136 mg/dL (17 May 2018 07:47)  POCT Blood Glucose.: 161 mg/dL (16 May 2018 22:00)  POCT Blood Glucose.: 178 mg/dL (16 May 2018 17:06)  POCT Blood Glucose.: 205 mg/dL (16 May 2018 12:01)                            11.6   7.5   )-----------( 157      ( 16 May 2018 06:33 )             35.3       CMP:  05-17 @ 06:24  SGPT --  Albumin --   Alk Phos --   Anion Gap 7   SGOT --   Total Bili --   BUN 29   Calcium Total 8.8   CO2 28   Chloride 108   Creatinine 1.10   eGFR if AA 53   eGFR if non AA 46   Glucose 125   Potassium 3.6   Protein --   Sodium 143      Thyroid Function Tests:      Diabetes Tests:       Radiology:

## 2018-05-17 NOTE — PROGRESS NOTE ADULT - SUBJECTIVE AND OBJECTIVE BOX
Follow up: Syncope. Patient, examined and evaluated Patient comfortably in chair in NAD, with no respiratory distress, no c/o chest pain, dyspnea, palpitations, PND, or orthopnea    HPI:  84 y/o F with PMH of DM type2 , GERD, HLD, HTN, presents to ED with syncope. Pt and daughter-in-law at bedside note that she has had 2 prior episodes of syncope in past month, for which she has been seen at [facility] and previous workup was negative last ed visit may 1st . Daughter-in-law notes that each episode has only lasted 1-2 mins. Denies any falls. Daughter-in-law witnessed today's episode while pt was resting in a chair. Daughter felt pt may be hypoglycemic.  She was given something to eat/drink.  EMS found her mildly hyperglycemic. Pt states she feels fine and denies HA, dizziness/lightheadedness, CP, SOB, N/V/C/D, hematochezia/melena, dysuria, hematuria.     In the ED pt VS were 97.4F, HR 58, /84, RR 18, SpO2 98% on RA.  Labs significant for CBC WNL, BUN 26, Glucose 192, GFR 41, Cardiac enzymes x1 WNL. CXR negative for active disease. EKG sinus alise @59 with non-specific ST and T wave changes (T wave flattening noted compared to prior). Blood/urine cx ordered. Cardio Dr. Goetz consulted, saw pt in ED. (13 May 2018 15:33)    PAST MEDICAL & SURGICAL HISTORY:  DM (diabetes mellitus)  HLD (hyperlipidemia)  HTN (hypertension)  GERD (gastroesophageal reflux disease)  History of cholecystectomy  Ankle injury: s/p ankle surgery, pt doesn&#x27;t remember which ankle    MEDICATIONS  (STANDING):  amLODIPine   Tablet 5 milliGRAM(s) Oral daily  aspirin enteric coated 81 milliGRAM(s) Oral daily  atorvastatin 10 milliGRAM(s) Oral at bedtime  carvedilol 6.25 milliGRAM(s) Oral every 12 hours  dextrose 5%. 1000 milliLiter(s) (50 mL/Hr) IV Continuous <Continuous>  dextrose 50% Injectable 12.5 Gram(s) IV Push once  dextrose 50% Injectable 25 Gram(s) IV Push once  dextrose 50% Injectable 25 Gram(s) IV Push once  enoxaparin Injectable 40 milliGRAM(s) SubCutaneous daily  insulin lispro (HumaLOG) corrective regimen sliding scale   SubCutaneous three times a day before meals  pantoprazole    Tablet 40 milliGRAM(s) Oral before breakfast  valsartan 320 milliGRAM(s) Oral daily    MEDICATIONS  (PRN):  dextrose 40% Gel 15 Gram(s) Oral once PRN Blood Glucose LESS THAN 70 milliGRAM(s)/deciliter  glucagon  Injectable 1 milliGRAM(s) IntraMuscular once PRN Glucose LESS THAN 70 milligrams/deciliter    REVIEW OF SYSTEMS:    CONSTITUTIONAL: No weakness, fevers or chills  EYES: No visual changes, No diplopia  ENMT: No throat pain , No exudate  NECK: No pain or stiffness  RESPIRATORY: No cough, wheezing, hemoptysis; No shortness of breath  CARDIOVASCULAR: No chest pain or palpitations  GASTROINTESTINAL: No abdominal pain. No nausea, vomiting, or hematemesis; No diarrhea or constipation. No melena or hematochezia.  GENITOURINARY: No dysuria, frequency or hematuria  NEUROLOGICAL: No numbness or weakness  SKIN: No itching or rash  All other review of systems is negative unless indicated above    Vital Signs Last 24 Hrs  T(C): 37 (17 May 2018 08:00), Max: 37.1 (16 May 2018 16:26)  T(F): 98.6 (17 May 2018 08:00), Max: 98.8 (16 May 2018 16:26)  HR: 61 (17 May 2018 08:00) (57 - 69)  BP: 154/85 (17 May 2018 08:00) (126/92 - 168/75)  BP(mean): --  RR: 18 (17 May 2018 08:00) (17 - 18)  SpO2: 98% (17 May 2018 08:00) (92% - 98%)    I&O's Summary    16 May 2018 07:01  -  17 May 2018 07:00  --------------------------------------------------------  IN: 900 mL / OUT: 0 mL / NET: 900 mL    17 May 2018 07:01  -  17 May 2018 11:46  --------------------------------------------------------  IN: 200 mL / OUT: 0 mL / NET: 200 mL    PHYSICAL EXAM:    Constitutional: NAD, awake and alert, well-developed  Eyes:  EOMI,  Pupils round, no lesions  ENMT: no exudate or erythema  Pulmonary: Non-labored, breath sounds are clear bilaterally, No wheezing, rales or rhonchi  Cardiovascular: PMI not palpable non-displaced Regular S1 and S2, no murmurs, rubs, gallops or clicks  Gastrointestinal: Bowel Sounds present, soft, nontender.   Lymph: No peripheral edema. No cervical lymphadenopathy.  Neurological: Alert, no focal deficits  Skin: No rashes. Changes of chronic venous stasis. No cyanosis.  Psych:  Mood & affect appropriate.  TELE:  NSR                      11.6   7.5   )-----------( 157      ( 16 May 2018 06:33 )             35.3     CBC Full  -  ( 16 May 2018 06:33 )  WBC Count : 7.5 K/uL  Hemoglobin : 11.6 g/dL  Hematocrit : 35.3 %  Platelet Count - Automated : 157 K/uL  Mean Cell Volume : 85.1 fl  Mean Cell Hemoglobin : 27.9 pg  Mean Cell Hemoglobin Concentration : 32.8 gm/dL  Auto Neutrophil # : x  Auto Lymphocyte # : x  Auto Monocyte # : x  Auto Eosinophil # : x  Auto Basophil # : x  Auto Neutrophil % : x  Auto Lymphocyte % : x  Auto Monocyte % : x  Auto Eosinophil % : x  Auto Basophil % : x    05-17    143  |  108  |  29<H>  ----------------------------<  125<H>  3.6   |  28  |  1.10    Ca    8.8      17 May 2018 06:24    < from: 12 Lead ECG (05.13.18 @ 12:55) >  Ventricular Rate 59 BPM    Atrial Rate 59 BPM    P-R Interval 178 ms    QRS Duration 80 ms    Q-T Interval 462 ms    QTC Calculation(Bezet) 457 ms    P Axis 35 degrees    R Axis 36 degrees    T Axis 11 degrees    Diagnosis Line Sinus bradycardia  Nonspecific ST and T wave abnormality  Abnormal ECG  When compared with ECG of 21-MAR-2011 21:00,  No significant change was found    < end of copied text >  < from: TTE Echo Doppler w/o Cont (05.14.18 @ 20:31) >  PROCEDURE DATE:  05/14/2018        INTERPRETATION:  Ordering Physician: BENSON HAYWARD [id number]    Indication: Syncope  Technologist Initials: TE  Study Quality: Technically difficult   A completeechocardiographic study was performed utilizing standard   protocol including spectral and color Doppler in all echocardiographic   windows.    Height: 157 cm  Weight: 70 kg  BSA: 1.7 sq m  Blood Pressure: 188/96    MEASUREMENTS  IVS: 1.3cm  PWT: 1.3cm  LA: 3.8cm  AO: 2.4cm  LVIDd: 4.0cm  LVIDs: 2.5cm    RVSP: 42mmHg    FINDINGS  Left Ventricle: Concentric left ventricular hypertrophy. The endocardium   is not well-visualized. Grossly, there is normal left ventricular   systolic function.  AorticValve: Calcified trileaflet aortic valve. No aortic insufficiency.  Mitral Valve: Mitral annular calcification and calcified mitral leaflets.   Minimal mitral insufficiency.  Tricuspid Valve: Normal tricuspid valve. Minimal tricuspid insufficiency  Pulmonic Valve: Not well visualized. Minimal pulmonic insufficiency.  Left Atrium: Normal  Right Ventricle: Not well visualized.  Right Atrium: Not well visualized  Diastolic Function: Grade 1 diastolic dysfunction  Pericardium/Pleura: Normal pericardium with no pericardial effusion.    CONCLUSIONS:  Technically difficult and limited study secondary to patient's body   habitus. Grossly, there is normal left ventricular systolic function.   Right heart is suboptimally visualized. There is grade 1 diastolic   dysfunction.      < from: Xray Chest 1 View AP/PA (05.13.18 @ 13:36) >  EXAM:  XR CHEST AP OR PA 1V                            PROCEDURE DATE:  05/13/2018      INTERPRETATION:  Comparison study dated 2/5/2018    Clinical information: Hypoglycemia    Portable study, 1:36 PM    No evidence of infiltrate effusion or congestive failure. Heart size   within normal limits. Hilar regions mediastinal contours and bony thorax   are intact. Atherosclerotic changes aorta. Surgical clips present right   upper quadrant.    IMPRESSION: No active disease.

## 2018-05-17 NOTE — PROGRESS NOTE ADULT - PROVIDER SPECIALTY LIST ADULT
Cardiology
Hospitalist
Neurology
Cardiology
Hospitalist
Hospitalist

## 2018-05-17 NOTE — PROGRESS NOTE ADULT - ASSESSMENT
85f htn, chol, dm, gerd.  Some degree of progressive dementia, but continues to live alone. Sedentary at baseline but without sxs of angina or hf by report of her family at the bedside.  She has had several recent episodes of decreased responsiveness, including 2 in the past two weeks.  Typically, she will be sitting and will suddenly become unarousable, such that it takes some effort to wake her and she is often not herself after.  Her family believes that she is not conscious during these events.  She has been evaluated in the ER in the past for this.  On one occasion, she was felt dehydrated.  On another, she was felt to have a uti.  Today she had an episode and she was felt to poss6.25 mg bidibly be hypoglycemic.  She was given something to eat/drink.  EMS found her mildly hyperglycemic.  Given these recurrent events, as well as a progressive decline in her functional status, she presents to the er for evaluation.  5/13 She has been found to have an abnormal EKG    -there is no evidence of acute ischemia.  -there is no evidence of significant arrhythmia. Would ambulate and monitor for chronotropy  -there is no evidence for meaningful volume overload.  -is unclear what is causing these events  -cannot fully exclude an arrhythmic event though so far no events on telemetry monitoring  -abnormal ekg also needs to be explained, though the abnormalities are nonspecific  -Echocardiogram normal left ventricular systolic function right heart is suboptimally visualized. There is grade 1 diastolic dysfunction.  - ASA  - DVT prophylaxis  -valsartan increased to 320 QD  -cont Coreg 6.25 mg bp 152 - 154  hr 61 - 63  -Amlodipine 5mg QD started yesterday  - 154 DBP 84 - 87  -cont asa  -cont statin  -dm management per med  -overall functional and cognitive decline, may require more services or placement regardless of the outcome of the evaluation  -DVT prophylaxis  -monitor electrolytes, keep k>4, Mg>2   - discharge planning to rehab by primary team  -will continue to  follow 85f htn, chol, dm, gerd.  Some degree of progressive dementia, but continues to live alone. Sedentary at baseline but without sxs of angina or hf by report of her family at the bedside.  She has had several recent episodes of decreased responsiveness, including 2 in the past two weeks.  Typically, she will be sitting and will suddenly become unarousable, such that it takes some effort to wake her and she is often not herself after.  Her family believes that she is not conscious during these events.  She has been evaluated in the ER in the past for this.  On one occasion, she was felt dehydrated.  On another, she was felt to have a uti.  Today she had an episode and she was felt to poss6.25 mg bidibly be hypoglycemic.  She was given something to eat/drink.  EMS found her mildly hyperglycemic.  Given these recurrent events, as well as a progressive decline in her functional status, she presents to the er for evaluation.  5/13 She has been found to have an abnormal EKG    -there is no evidence of acute ischemia.  -there is no evidence of significant arrhythmia.  -there is no evidence for meaningful volume overload.  -is unclear what is causing these events  -cannot fully exclude an arrhythmic event though so far no events on telemetry monitoring, and it seems unlikely overall  -Echocardiogram normal left ventricular systolic function right heart is suboptimally visualized. There is grade 1 diastolic dysfunction.  - ASA  - DVT prophylaxis  -valsartan increased to 320 QD  -cont Coreg 6.25 mg bp 152 - 154  hr 61 - 63  -Amlodipine 5mg QD started yesterday  - 154 DBP 84 - 87  -cont asa  -cont statin  -dm management per med  -DVT prophylaxis  -monitor electrolytes, keep k>4, Mg>2   - discharge planning per primary team  -will follow

## 2018-05-17 NOTE — PROGRESS NOTE ADULT - ATTENDING COMMENTS
I saw and examined the patient personally. Spoke with above provider regarding this case. I reviewed the above findings completely.  I agree with the above history, physical, and plan which I have edited where appropriate.
The patient was personally seen and examined, in addition to being examined and evaluated by NP.  All elements of the note were edited where appropriate.
Chart reviewed    Patient seen and examined    Agree with plan as outlined above
84 y/o F with PMH of DM, GERD, HLD, HTN, presents to ED with syncope, admitted for evaluation of recurrent syncope evaluate for cardiogenic vs neurologic cause, orthostatic htn Plan: apprec cardio recs, cont antihtn agents, monitor on tele, once ortho vs wnl, dc to [facility] poss tomorrow
86 y/o F with PMH of DM, GERD, HLD, HTN, presents to ED with syncope, admitted for evaluation of recurrent syncope evaluate for cardiogenic vs neurologic cause Plan: follow up echo, apprec cardio and neuro recs, monitor clinical course
84 y/o F with PMH of DM2, GERD, HLD, HTN, some mild cognitive impairment presents with lightheadedness and near syncope event poss related to hypoglymia admitted for evaluation of recurrent syncope vs hypoglycemia sec to meds and physical deconditioning Plan: apprec PT eval pt likelu to EL, apprec endocrine recs on med adjustments for diabetes if nec, orthostatic htn resolving apprec cardio recs, monitor clinical course

## 2018-05-18 LAB
CULTURE RESULTS: SIGNIFICANT CHANGE UP
CULTURE RESULTS: SIGNIFICANT CHANGE UP
SPECIMEN SOURCE: SIGNIFICANT CHANGE UP
SPECIMEN SOURCE: SIGNIFICANT CHANGE UP

## 2018-05-23 PROCEDURE — 87040 BLOOD CULTURE FOR BACTERIA: CPT

## 2018-05-23 PROCEDURE — 85027 COMPLETE CBC AUTOMATED: CPT

## 2018-05-23 PROCEDURE — 83036 HEMOGLOBIN GLYCOSYLATED A1C: CPT

## 2018-05-23 PROCEDURE — 99285 EMERGENCY DEPT VISIT HI MDM: CPT | Mod: 25

## 2018-05-23 PROCEDURE — 82550 ASSAY OF CK (CPK): CPT

## 2018-05-23 PROCEDURE — 96372 THER/PROPH/DIAG INJ SC/IM: CPT | Mod: 59

## 2018-05-23 PROCEDURE — 82746 ASSAY OF FOLIC ACID SERUM: CPT

## 2018-05-23 PROCEDURE — 80061 LIPID PANEL: CPT

## 2018-05-23 PROCEDURE — 82553 CREATINE MB FRACTION: CPT

## 2018-05-23 PROCEDURE — 84484 ASSAY OF TROPONIN QUANT: CPT

## 2018-05-23 PROCEDURE — 93005 ELECTROCARDIOGRAM TRACING: CPT

## 2018-05-23 PROCEDURE — 97116 GAIT TRAINING THERAPY: CPT

## 2018-05-23 PROCEDURE — 70450 CT HEAD/BRAIN W/O DYE: CPT

## 2018-05-23 PROCEDURE — 83605 ASSAY OF LACTIC ACID: CPT

## 2018-05-23 PROCEDURE — 84443 ASSAY THYROID STIM HORMONE: CPT

## 2018-05-23 PROCEDURE — 93880 EXTRACRANIAL BILAT STUDY: CPT

## 2018-05-23 PROCEDURE — 97530 THERAPEUTIC ACTIVITIES: CPT

## 2018-05-23 PROCEDURE — 97162 PT EVAL MOD COMPLEX 30 MIN: CPT

## 2018-05-23 PROCEDURE — 83735 ASSAY OF MAGNESIUM: CPT

## 2018-05-23 PROCEDURE — 82607 VITAMIN B-12: CPT

## 2018-05-23 PROCEDURE — 71045 X-RAY EXAM CHEST 1 VIEW: CPT

## 2018-05-23 PROCEDURE — 93306 TTE W/DOPPLER COMPLETE: CPT

## 2018-05-23 PROCEDURE — 80053 COMPREHEN METABOLIC PANEL: CPT

## 2018-05-23 PROCEDURE — 82962 GLUCOSE BLOOD TEST: CPT

## 2018-05-23 PROCEDURE — 96374 THER/PROPH/DIAG INJ IV PUSH: CPT

## 2018-05-23 PROCEDURE — 80048 BASIC METABOLIC PNL TOTAL CA: CPT

## 2018-08-30 ENCOUNTER — EMERGENCY (EMERGENCY)
Facility: HOSPITAL | Age: 83
LOS: 1 days | Discharge: HOME | End: 2018-08-30
Attending: EMERGENCY MEDICINE
Payer: MEDICARE

## 2018-08-30 VITALS
OXYGEN SATURATION: 95 % | HEIGHT: 62 IN | DIASTOLIC BLOOD PRESSURE: 84 MMHG | RESPIRATION RATE: 16 BRPM | HEART RATE: 76 BPM | TEMPERATURE: 98 F | SYSTOLIC BLOOD PRESSURE: 132 MMHG | WEIGHT: 149.91 LBS

## 2018-08-30 VITALS
RESPIRATION RATE: 15 BRPM | HEART RATE: 74 BPM | SYSTOLIC BLOOD PRESSURE: 161 MMHG | TEMPERATURE: 98 F | OXYGEN SATURATION: 97 % | DIASTOLIC BLOOD PRESSURE: 90 MMHG

## 2018-08-30 DIAGNOSIS — Z90.49 ACQUIRED ABSENCE OF OTHER SPECIFIED PARTS OF DIGESTIVE TRACT: Chronic | ICD-10-CM

## 2018-08-30 DIAGNOSIS — S99.919A UNSPECIFIED INJURY OF UNSPECIFIED ANKLE, INITIAL ENCOUNTER: Chronic | ICD-10-CM

## 2018-08-30 LAB
ACETONE SERPL-MCNC: NEGATIVE — SIGNIFICANT CHANGE UP
ALBUMIN SERPL ELPH-MCNC: 3.2 G/DL — LOW (ref 3.3–5)
ALP SERPL-CCNC: 54 U/L — SIGNIFICANT CHANGE UP (ref 40–120)
ALT FLD-CCNC: 9 U/L — LOW (ref 12–78)
ANION GAP SERPL CALC-SCNC: 11 MMOL/L — SIGNIFICANT CHANGE UP (ref 5–17)
APPEARANCE UR: ABNORMAL
APTT BLD: 30.7 SEC — SIGNIFICANT CHANGE UP (ref 27.5–37.4)
AST SERPL-CCNC: 12 U/L — LOW (ref 15–37)
BASOPHILS # BLD AUTO: 0.04 K/UL — SIGNIFICANT CHANGE UP (ref 0–0.2)
BASOPHILS NFR BLD AUTO: 0.5 % — SIGNIFICANT CHANGE UP (ref 0–2)
BILIRUB SERPL-MCNC: 0.9 MG/DL — SIGNIFICANT CHANGE UP (ref 0.2–1.2)
BILIRUB UR-MCNC: NEGATIVE — SIGNIFICANT CHANGE UP
BUN SERPL-MCNC: 23 MG/DL — SIGNIFICANT CHANGE UP (ref 7–23)
CALCIUM SERPL-MCNC: 9.2 MG/DL — SIGNIFICANT CHANGE UP (ref 8.5–10.1)
CHLORIDE SERPL-SCNC: 102 MMOL/L — SIGNIFICANT CHANGE UP (ref 96–108)
CO2 SERPL-SCNC: 27 MMOL/L — SIGNIFICANT CHANGE UP (ref 22–31)
COLOR SPEC: YELLOW — SIGNIFICANT CHANGE UP
CREAT SERPL-MCNC: 1.2 MG/DL — SIGNIFICANT CHANGE UP (ref 0.5–1.3)
DIFF PNL FLD: ABNORMAL
EOSINOPHIL # BLD AUTO: 0.04 K/UL — SIGNIFICANT CHANGE UP (ref 0–0.5)
EOSINOPHIL NFR BLD AUTO: 0.5 % — SIGNIFICANT CHANGE UP (ref 0–6)
GLUCOSE SERPL-MCNC: 395 MG/DL — HIGH (ref 70–99)
GLUCOSE UR QL: 1000 MG/DL
HCT VFR BLD CALC: 37.5 % — SIGNIFICANT CHANGE UP (ref 34.5–45)
HGB BLD-MCNC: 12.8 G/DL — SIGNIFICANT CHANGE UP (ref 11.5–15.5)
IMM GRANULOCYTES NFR BLD AUTO: 0.5 % — SIGNIFICANT CHANGE UP (ref 0–1.5)
INR BLD: 1.06 RATIO — SIGNIFICANT CHANGE UP (ref 0.88–1.16)
KETONES UR-MCNC: NEGATIVE — SIGNIFICANT CHANGE UP
LEUKOCYTE ESTERASE UR-ACNC: ABNORMAL
LYMPHOCYTES # BLD AUTO: 1.22 K/UL — SIGNIFICANT CHANGE UP (ref 1–3.3)
LYMPHOCYTES # BLD AUTO: 16.3 % — SIGNIFICANT CHANGE UP (ref 13–44)
MCHC RBC-ENTMCNC: 28.1 PG — SIGNIFICANT CHANGE UP (ref 27–34)
MCHC RBC-ENTMCNC: 34.1 GM/DL — SIGNIFICANT CHANGE UP (ref 32–36)
MCV RBC AUTO: 82.2 FL — SIGNIFICANT CHANGE UP (ref 80–100)
MONOCYTES # BLD AUTO: 0.42 K/UL — SIGNIFICANT CHANGE UP (ref 0–0.9)
MONOCYTES NFR BLD AUTO: 5.6 % — SIGNIFICANT CHANGE UP (ref 2–14)
NEUTROPHILS # BLD AUTO: 5.71 K/UL — SIGNIFICANT CHANGE UP (ref 1.8–7.4)
NEUTROPHILS NFR BLD AUTO: 76.6 % — SIGNIFICANT CHANGE UP (ref 43–77)
NITRITE UR-MCNC: POSITIVE
PH UR: 5 — SIGNIFICANT CHANGE UP (ref 5–8)
PLATELET # BLD AUTO: 203 K/UL — SIGNIFICANT CHANGE UP (ref 150–400)
POTASSIUM SERPL-MCNC: 3.7 MMOL/L — SIGNIFICANT CHANGE UP (ref 3.5–5.3)
POTASSIUM SERPL-SCNC: 3.7 MMOL/L — SIGNIFICANT CHANGE UP (ref 3.5–5.3)
PROT SERPL-MCNC: 6.8 G/DL — SIGNIFICANT CHANGE UP (ref 6–8.3)
PROT UR-MCNC: NEGATIVE — SIGNIFICANT CHANGE UP
PROTHROM AB SERPL-ACNC: 11.6 SEC — SIGNIFICANT CHANGE UP (ref 9.8–12.7)
RBC # BLD: 4.56 M/UL — SIGNIFICANT CHANGE UP (ref 3.8–5.2)
RBC # FLD: 14.9 % — HIGH (ref 10.3–14.5)
SODIUM SERPL-SCNC: 140 MMOL/L — SIGNIFICANT CHANGE UP (ref 135–145)
SP GR SPEC: 1.01 — SIGNIFICANT CHANGE UP (ref 1.01–1.02)
UROBILINOGEN FLD QL: NEGATIVE — SIGNIFICANT CHANGE UP
WBC # BLD: 7.47 K/UL — SIGNIFICANT CHANGE UP (ref 3.8–10.5)
WBC # FLD AUTO: 7.47 K/UL — SIGNIFICANT CHANGE UP (ref 3.8–10.5)

## 2018-08-30 PROCEDURE — 85610 PROTHROMBIN TIME: CPT

## 2018-08-30 PROCEDURE — 71045 X-RAY EXAM CHEST 1 VIEW: CPT

## 2018-08-30 PROCEDURE — 85730 THROMBOPLASTIN TIME PARTIAL: CPT

## 2018-08-30 PROCEDURE — 80053 COMPREHEN METABOLIC PANEL: CPT

## 2018-08-30 PROCEDURE — 71045 X-RAY EXAM CHEST 1 VIEW: CPT | Mod: 26

## 2018-08-30 PROCEDURE — 99284 EMERGENCY DEPT VISIT MOD MDM: CPT | Mod: 25

## 2018-08-30 PROCEDURE — 82009 KETONE BODYS QUAL: CPT

## 2018-08-30 PROCEDURE — 87086 URINE CULTURE/COLONY COUNT: CPT

## 2018-08-30 PROCEDURE — 99285 EMERGENCY DEPT VISIT HI MDM: CPT

## 2018-08-30 PROCEDURE — 82962 GLUCOSE BLOOD TEST: CPT

## 2018-08-30 PROCEDURE — 70450 CT HEAD/BRAIN W/O DYE: CPT

## 2018-08-30 PROCEDURE — 70450 CT HEAD/BRAIN W/O DYE: CPT | Mod: 26

## 2018-08-30 PROCEDURE — 81001 URINALYSIS AUTO W/SCOPE: CPT

## 2018-08-30 PROCEDURE — 85027 COMPLETE CBC AUTOMATED: CPT

## 2018-08-30 RX ORDER — CEFUROXIME AXETIL 250 MG
1 TABLET ORAL
Qty: 20 | Refills: 0 | OUTPATIENT
Start: 2018-08-30 | End: 2018-09-08

## 2018-08-30 RX ORDER — SODIUM CHLORIDE 9 MG/ML
1000 INJECTION INTRAMUSCULAR; INTRAVENOUS; SUBCUTANEOUS ONCE
Qty: 0 | Refills: 0 | Status: COMPLETED | OUTPATIENT
Start: 2018-08-30 | End: 2018-08-30

## 2018-08-30 RX ADMIN — SODIUM CHLORIDE 1000 MILLILITER(S): 9 INJECTION INTRAMUSCULAR; INTRAVENOUS; SUBCUTANEOUS at 16:00

## 2018-08-30 RX ADMIN — SODIUM CHLORIDE 1000 MILLILITER(S): 9 INJECTION INTRAMUSCULAR; INTRAVENOUS; SUBCUTANEOUS at 15:10

## 2018-08-30 RX ADMIN — SODIUM CHLORIDE 1000 MILLILITER(S): 9 INJECTION INTRAMUSCULAR; INTRAVENOUS; SUBCUTANEOUS at 17:00

## 2018-08-30 RX ADMIN — SODIUM CHLORIDE 1000 MILLILITER(S): 9 INJECTION INTRAMUSCULAR; INTRAVENOUS; SUBCUTANEOUS at 14:09

## 2018-08-30 NOTE — ED PROVIDER NOTE - OBJECTIVE STATEMENT
85 y female , brought to ed by son,  PMH: DM (diabetes mellitus)  GERD (gastroesophageal reflux disease)  HLD (hyperlipidemia)  HTN (hypertension) 85 y female , brought to ed by son,  PMH: DM (diabetes mellitus)  GERD (gastroesophageal reflux disease)  HLD (hyperlipidemia)  HTN (hypertension), son states patient "has her good days and her bad days", lives alone, had aide that comes to see patient.  today her son states he went to see her, he noticed she was more "tired and out of it". son states no fever, patient does not eat much, no vomiting that he knows of .  patient denies pain, resting comfortably, alert.  PMD Dr Robles.

## 2018-08-30 NOTE — ED ADULT NURSE NOTE - NSIMPLEMENTINTERV_GEN_ALL_ED
Implemented All Fall with Harm Risk Interventions:  [place] to call system. Call bell, personal items and telephone within reach. Instruct patient to call for assistance. Room bathroom lighting operational. Non-slip footwear when patient is off stretcher. Physically safe environment: no spills, clutter or unnecessary equipment. Stretcher in lowest position, wheels locked, appropriate side rails in place. Provide visual cue, wrist band, yellow gown, etc. Monitor gait and stability. Monitor for mental status changes and reorient to person, place, and time. Review medications for side effects contributing to fall risk. Reinforce activity limits and safety measures with patient and family. Provide visual clues: red socks.

## 2018-08-30 NOTE — ED PROVIDER NOTE - ATTENDING CONTRIBUTION TO CARE
84 yo female hx of baseline confusion/dementia (does not remember year), here with sons at bedside c/o generalized weakness/fatigue/lethargy.  States sometimes she eats well and something she does not eat well.  No complaints of any pain.  No nausea/vomiting/diarrhea. Pt has aide at home as per sons. PMD Dr. Robles.    Gen: Alert and orientated x 2, NAD  Head/eyes: NC/AT, PERRL, EOMI, normal lids/conjunctiva, no scleral icterus  ENT: Bilateral TM WNL, normal hearing, patent oropharynx without erythema/exudate, uvula midline, no peritonsillar abscess, no tongue/uvula swelling  Neck: supple, no tenderness/meningismus/JVD, Trachea midline  Pulm: Bilateral clear BS, normal resp effort, no wheeze/stridor/retractions  CV: RRR, no M/R/G, +2 dist pulses (radial, pedal DP/PT, popliteal)  Abd: soft, NT/ND, +BS, no guarding/rebound tenderness  Musculoskeletal: no edema/erythema/cyanosis, FROM in all extremities, no C/T/L spine ttp  Skin: no rash, no vesicles, no petechaie, no ecchymosis, no swelling  Neuro: AAOx3, CN 2-12 intact, normal sensation, 5/5 motor strength in all extremities, normal gait, no dysmetria     Offered rehab, sons and patient declined, bloodwork WNL, IV fluids, glucose here 400, UA pending, CT head negative

## 2018-08-31 LAB
CULTURE RESULTS: SIGNIFICANT CHANGE UP
SPECIMEN SOURCE: SIGNIFICANT CHANGE UP

## 2018-09-20 ENCOUNTER — INPATIENT (INPATIENT)
Facility: HOSPITAL | Age: 83
LOS: 5 days | Discharge: OTHER INSTITUTION | DRG: 988 | End: 2018-09-26
Attending: HOSPITALIST | Admitting: HOSPITALIST
Payer: MEDICARE

## 2018-09-20 VITALS
SYSTOLIC BLOOD PRESSURE: 134 MMHG | RESPIRATION RATE: 14 BRPM | OXYGEN SATURATION: 97 % | HEART RATE: 62 BPM | WEIGHT: 134.92 LBS | DIASTOLIC BLOOD PRESSURE: 82 MMHG | HEIGHT: 64 IN | TEMPERATURE: 97 F

## 2018-09-20 DIAGNOSIS — R53.1 WEAKNESS: ICD-10-CM

## 2018-09-20 DIAGNOSIS — E78.5 HYPERLIPIDEMIA, UNSPECIFIED: ICD-10-CM

## 2018-09-20 DIAGNOSIS — S99.919A UNSPECIFIED INJURY OF UNSPECIFIED ANKLE, INITIAL ENCOUNTER: Chronic | ICD-10-CM

## 2018-09-20 DIAGNOSIS — K21.9 GASTRO-ESOPHAGEAL REFLUX DISEASE WITHOUT ESOPHAGITIS: ICD-10-CM

## 2018-09-20 DIAGNOSIS — E11.9 TYPE 2 DIABETES MELLITUS WITHOUT COMPLICATIONS: ICD-10-CM

## 2018-09-20 DIAGNOSIS — I10 ESSENTIAL (PRIMARY) HYPERTENSION: ICD-10-CM

## 2018-09-20 DIAGNOSIS — Z29.9 ENCOUNTER FOR PROPHYLACTIC MEASURES, UNSPECIFIED: ICD-10-CM

## 2018-09-20 DIAGNOSIS — R62.7 ADULT FAILURE TO THRIVE: ICD-10-CM

## 2018-09-20 DIAGNOSIS — F03.90 UNSPECIFIED DEMENTIA WITHOUT BEHAVIORAL DISTURBANCE: ICD-10-CM

## 2018-09-20 DIAGNOSIS — N39.0 URINARY TRACT INFECTION, SITE NOT SPECIFIED: ICD-10-CM

## 2018-09-20 DIAGNOSIS — Z90.49 ACQUIRED ABSENCE OF OTHER SPECIFIED PARTS OF DIGESTIVE TRACT: Chronic | ICD-10-CM

## 2018-09-20 LAB
ALBUMIN SERPL ELPH-MCNC: 2.5 G/DL — LOW (ref 3.3–5)
ALP SERPL-CCNC: 52 U/L — SIGNIFICANT CHANGE UP (ref 40–120)
ALT FLD-CCNC: 8 U/L — LOW (ref 12–78)
ANION GAP SERPL CALC-SCNC: 11 MMOL/L — SIGNIFICANT CHANGE UP (ref 5–17)
APPEARANCE UR: ABNORMAL
APTT BLD: 28.8 SEC — SIGNIFICANT CHANGE UP (ref 27.5–37.4)
AST SERPL-CCNC: 16 U/L — SIGNIFICANT CHANGE UP (ref 15–37)
BACTERIA # UR AUTO: ABNORMAL
BASOPHILS # BLD AUTO: 0.04 K/UL — SIGNIFICANT CHANGE UP (ref 0–0.2)
BASOPHILS NFR BLD AUTO: 0.5 % — SIGNIFICANT CHANGE UP (ref 0–2)
BILIRUB SERPL-MCNC: 0.9 MG/DL — SIGNIFICANT CHANGE UP (ref 0.2–1.2)
BILIRUB UR-MCNC: NEGATIVE — SIGNIFICANT CHANGE UP
BUN SERPL-MCNC: 18 MG/DL — SIGNIFICANT CHANGE UP (ref 7–23)
CALCIUM SERPL-MCNC: 8.7 MG/DL — SIGNIFICANT CHANGE UP (ref 8.5–10.1)
CHLORIDE SERPL-SCNC: 105 MMOL/L — SIGNIFICANT CHANGE UP (ref 96–108)
CO2 SERPL-SCNC: 27 MMOL/L — SIGNIFICANT CHANGE UP (ref 22–31)
COD CRY URNS QL: ABNORMAL
COLOR SPEC: YELLOW — SIGNIFICANT CHANGE UP
COMMENT - URINE: SIGNIFICANT CHANGE UP
CREAT SERPL-MCNC: 0.99 MG/DL — SIGNIFICANT CHANGE UP (ref 0.5–1.3)
DIFF PNL FLD: ABNORMAL
EOSINOPHIL # BLD AUTO: 0.2 K/UL — SIGNIFICANT CHANGE UP (ref 0–0.5)
EOSINOPHIL NFR BLD AUTO: 2.6 % — SIGNIFICANT CHANGE UP (ref 0–6)
EPI CELLS # UR: ABNORMAL
GLUCOSE SERPL-MCNC: 253 MG/DL — HIGH (ref 70–99)
GLUCOSE UR QL: NEGATIVE — SIGNIFICANT CHANGE UP
HCT VFR BLD CALC: 35.5 % — SIGNIFICANT CHANGE UP (ref 34.5–45)
HGB BLD-MCNC: 11.8 G/DL — SIGNIFICANT CHANGE UP (ref 11.5–15.5)
IMM GRANULOCYTES NFR BLD AUTO: 0.5 % — SIGNIFICANT CHANGE UP (ref 0–1.5)
INR BLD: 1.1 RATIO — SIGNIFICANT CHANGE UP (ref 0.88–1.16)
KETONES UR-MCNC: NEGATIVE — SIGNIFICANT CHANGE UP
LACTATE SERPL-SCNC: 1.8 MMOL/L — SIGNIFICANT CHANGE UP (ref 0.7–2)
LEUKOCYTE ESTERASE UR-ACNC: ABNORMAL
LIDOCAIN IGE QN: 135 U/L — SIGNIFICANT CHANGE UP (ref 73–393)
LYMPHOCYTES # BLD AUTO: 1.44 K/UL — SIGNIFICANT CHANGE UP (ref 1–3.3)
LYMPHOCYTES # BLD AUTO: 18.8 % — SIGNIFICANT CHANGE UP (ref 13–44)
MCHC RBC-ENTMCNC: 27.9 PG — SIGNIFICANT CHANGE UP (ref 27–34)
MCHC RBC-ENTMCNC: 33.2 GM/DL — SIGNIFICANT CHANGE UP (ref 32–36)
MCV RBC AUTO: 83.9 FL — SIGNIFICANT CHANGE UP (ref 80–100)
MONOCYTES # BLD AUTO: 0.6 K/UL — SIGNIFICANT CHANGE UP (ref 0–0.9)
MONOCYTES NFR BLD AUTO: 7.9 % — SIGNIFICANT CHANGE UP (ref 2–14)
NEUTROPHILS # BLD AUTO: 5.32 K/UL — SIGNIFICANT CHANGE UP (ref 1.8–7.4)
NEUTROPHILS NFR BLD AUTO: 69.7 % — SIGNIFICANT CHANGE UP (ref 43–77)
NITRITE UR-MCNC: POSITIVE
NRBC # BLD: 0 /100 WBCS — SIGNIFICANT CHANGE UP (ref 0–0)
PH UR: 5 — SIGNIFICANT CHANGE UP (ref 5–8)
PLATELET # BLD AUTO: 222 K/UL — SIGNIFICANT CHANGE UP (ref 150–400)
POTASSIUM SERPL-MCNC: 3.5 MMOL/L — SIGNIFICANT CHANGE UP (ref 3.5–5.3)
POTASSIUM SERPL-SCNC: 3.5 MMOL/L — SIGNIFICANT CHANGE UP (ref 3.5–5.3)
PROT SERPL-MCNC: 6.1 G/DL — SIGNIFICANT CHANGE UP (ref 6–8.3)
PROT UR-MCNC: NEGATIVE — SIGNIFICANT CHANGE UP
PROTHROM AB SERPL-ACNC: 12 SEC — SIGNIFICANT CHANGE UP (ref 9.8–12.7)
RBC # BLD: 4.23 M/UL — SIGNIFICANT CHANGE UP (ref 3.8–5.2)
RBC # FLD: 15.9 % — HIGH (ref 10.3–14.5)
RBC CASTS # UR COMP ASSIST: ABNORMAL /HPF (ref 0–4)
SODIUM SERPL-SCNC: 143 MMOL/L — SIGNIFICANT CHANGE UP (ref 135–145)
SP GR SPEC: 1.01 — SIGNIFICANT CHANGE UP (ref 1.01–1.02)
UROBILINOGEN FLD QL: NEGATIVE — SIGNIFICANT CHANGE UP
WBC # BLD: 7.64 K/UL — SIGNIFICANT CHANGE UP (ref 3.8–10.5)
WBC # FLD AUTO: 7.64 K/UL — SIGNIFICANT CHANGE UP (ref 3.8–10.5)
WBC UR QL: ABNORMAL

## 2018-09-20 PROCEDURE — 71045 X-RAY EXAM CHEST 1 VIEW: CPT | Mod: 26

## 2018-09-20 PROCEDURE — 70450 CT HEAD/BRAIN W/O DYE: CPT | Mod: 26

## 2018-09-20 PROCEDURE — 99222 1ST HOSP IP/OBS MODERATE 55: CPT | Mod: GC,AI

## 2018-09-20 PROCEDURE — 93010 ELECTROCARDIOGRAM REPORT: CPT

## 2018-09-20 PROCEDURE — 99285 EMERGENCY DEPT VISIT HI MDM: CPT

## 2018-09-20 RX ORDER — CEFTRIAXONE 500 MG/1
1 INJECTION, POWDER, FOR SOLUTION INTRAMUSCULAR; INTRAVENOUS EVERY 24 HOURS
Qty: 0 | Refills: 0 | Status: DISCONTINUED | OUTPATIENT
Start: 2018-09-21 | End: 2018-09-23

## 2018-09-20 RX ORDER — CEFTRIAXONE 500 MG/1
1 INJECTION, POWDER, FOR SOLUTION INTRAMUSCULAR; INTRAVENOUS ONCE
Qty: 0 | Refills: 0 | Status: COMPLETED | OUTPATIENT
Start: 2018-09-20 | End: 2018-09-20

## 2018-09-20 RX ORDER — PANTOPRAZOLE SODIUM 20 MG/1
40 TABLET, DELAYED RELEASE ORAL
Qty: 0 | Refills: 0 | Status: DISCONTINUED | OUTPATIENT
Start: 2018-09-20 | End: 2018-09-26

## 2018-09-20 RX ORDER — CHOLECALCIFEROL (VITAMIN D3) 125 MCG
2000 CAPSULE ORAL DAILY
Qty: 0 | Refills: 0 | Status: DISCONTINUED | OUTPATIENT
Start: 2018-09-20 | End: 2018-09-26

## 2018-09-20 RX ORDER — SODIUM CHLORIDE 9 MG/ML
3 INJECTION INTRAMUSCULAR; INTRAVENOUS; SUBCUTANEOUS ONCE
Qty: 0 | Refills: 0 | Status: COMPLETED | OUTPATIENT
Start: 2018-09-20 | End: 2018-09-20

## 2018-09-20 RX ORDER — LACTOBACILLUS ACIDOPHILUS 100MM CELL
1 CAPSULE ORAL
Qty: 0 | Refills: 0 | Status: DISCONTINUED | OUTPATIENT
Start: 2018-09-20 | End: 2018-09-26

## 2018-09-20 RX ORDER — DEXTROSE 50 % IN WATER 50 %
12.5 SYRINGE (ML) INTRAVENOUS ONCE
Qty: 0 | Refills: 0 | Status: DISCONTINUED | OUTPATIENT
Start: 2018-09-20 | End: 2018-09-26

## 2018-09-20 RX ORDER — SODIUM CHLORIDE 9 MG/ML
1000 INJECTION, SOLUTION INTRAVENOUS
Qty: 0 | Refills: 0 | Status: DISCONTINUED | OUTPATIENT
Start: 2018-09-20 | End: 2018-09-26

## 2018-09-20 RX ORDER — ENOXAPARIN SODIUM 100 MG/ML
40 INJECTION SUBCUTANEOUS EVERY 24 HOURS
Qty: 0 | Refills: 0 | Status: DISCONTINUED | OUTPATIENT
Start: 2018-09-20 | End: 2018-09-26

## 2018-09-20 RX ORDER — DONEPEZIL HYDROCHLORIDE 10 MG/1
5 TABLET, FILM COATED ORAL AT BEDTIME
Qty: 0 | Refills: 0 | Status: DISCONTINUED | OUTPATIENT
Start: 2018-09-20 | End: 2018-09-26

## 2018-09-20 RX ORDER — SODIUM CHLORIDE 9 MG/ML
1000 INJECTION INTRAMUSCULAR; INTRAVENOUS; SUBCUTANEOUS ONCE
Qty: 0 | Refills: 0 | Status: COMPLETED | OUTPATIENT
Start: 2018-09-20 | End: 2018-09-20

## 2018-09-20 RX ORDER — DEXTROSE 50 % IN WATER 50 %
25 SYRINGE (ML) INTRAVENOUS ONCE
Qty: 0 | Refills: 0 | Status: DISCONTINUED | OUTPATIENT
Start: 2018-09-20 | End: 2018-09-26

## 2018-09-20 RX ORDER — INSULIN LISPRO 100/ML
VIAL (ML) SUBCUTANEOUS
Qty: 0 | Refills: 0 | Status: DISCONTINUED | OUTPATIENT
Start: 2018-09-20 | End: 2018-09-26

## 2018-09-20 RX ORDER — DEXTROSE 50 % IN WATER 50 %
15 SYRINGE (ML) INTRAVENOUS ONCE
Qty: 0 | Refills: 0 | Status: DISCONTINUED | OUTPATIENT
Start: 2018-09-20 | End: 2018-09-26

## 2018-09-20 RX ORDER — ATORVASTATIN CALCIUM 80 MG/1
10 TABLET, FILM COATED ORAL AT BEDTIME
Qty: 0 | Refills: 0 | Status: DISCONTINUED | OUTPATIENT
Start: 2018-09-20 | End: 2018-09-26

## 2018-09-20 RX ORDER — PREGABALIN 225 MG/1
500 CAPSULE ORAL DAILY
Qty: 0 | Refills: 0 | Status: DISCONTINUED | OUTPATIENT
Start: 2018-09-20 | End: 2018-09-26

## 2018-09-20 RX ORDER — ASPIRIN/CALCIUM CARB/MAGNESIUM 324 MG
81 TABLET ORAL DAILY
Qty: 0 | Refills: 0 | Status: DISCONTINUED | OUTPATIENT
Start: 2018-09-20 | End: 2018-09-26

## 2018-09-20 RX ORDER — GLUCAGON INJECTION, SOLUTION 0.5 MG/.1ML
1 INJECTION, SOLUTION SUBCUTANEOUS ONCE
Qty: 0 | Refills: 0 | Status: DISCONTINUED | OUTPATIENT
Start: 2018-09-20 | End: 2018-09-26

## 2018-09-20 RX ORDER — SODIUM CHLORIDE 9 MG/ML
1000 INJECTION INTRAMUSCULAR; INTRAVENOUS; SUBCUTANEOUS
Qty: 0 | Refills: 0 | Status: DISCONTINUED | OUTPATIENT
Start: 2018-09-20 | End: 2018-09-21

## 2018-09-20 RX ORDER — CARVEDILOL PHOSPHATE 80 MG/1
6.25 CAPSULE, EXTENDED RELEASE ORAL EVERY 12 HOURS
Qty: 0 | Refills: 0 | Status: DISCONTINUED | OUTPATIENT
Start: 2018-09-20 | End: 2018-09-26

## 2018-09-20 RX ORDER — INFLUENZA VIRUS VACCINE 15; 15; 15; 15 UG/.5ML; UG/.5ML; UG/.5ML; UG/.5ML
0.5 SUSPENSION INTRAMUSCULAR ONCE
Qty: 0 | Refills: 0 | Status: COMPLETED | OUTPATIENT
Start: 2018-09-20 | End: 2018-09-26

## 2018-09-20 RX ORDER — FERROUS SULFATE 325(65) MG
325 TABLET ORAL DAILY
Qty: 0 | Refills: 0 | Status: DISCONTINUED | OUTPATIENT
Start: 2018-09-20 | End: 2018-09-26

## 2018-09-20 RX ADMIN — SODIUM CHLORIDE 1000 MILLILITER(S): 9 INJECTION INTRAMUSCULAR; INTRAVENOUS; SUBCUTANEOUS at 14:40

## 2018-09-20 RX ADMIN — Medication 1: at 23:05

## 2018-09-20 RX ADMIN — ATORVASTATIN CALCIUM 10 MILLIGRAM(S): 80 TABLET, FILM COATED ORAL at 22:13

## 2018-09-20 RX ADMIN — SODIUM CHLORIDE 3 MILLILITER(S): 9 INJECTION INTRAMUSCULAR; INTRAVENOUS; SUBCUTANEOUS at 13:46

## 2018-09-20 RX ADMIN — ENOXAPARIN SODIUM 40 MILLIGRAM(S): 100 INJECTION SUBCUTANEOUS at 22:13

## 2018-09-20 RX ADMIN — SODIUM CHLORIDE 1000 MILLILITER(S): 9 INJECTION INTRAMUSCULAR; INTRAVENOUS; SUBCUTANEOUS at 13:46

## 2018-09-20 RX ADMIN — CEFTRIAXONE 100 GRAM(S): 500 INJECTION, POWDER, FOR SOLUTION INTRAMUSCULAR; INTRAVENOUS at 16:40

## 2018-09-20 RX ADMIN — DONEPEZIL HYDROCHLORIDE 5 MILLIGRAM(S): 10 TABLET, FILM COATED ORAL at 22:11

## 2018-09-20 RX ADMIN — SODIUM CHLORIDE 75 MILLILITER(S): 9 INJECTION INTRAMUSCULAR; INTRAVENOUS; SUBCUTANEOUS at 17:00

## 2018-09-20 NOTE — H&P ADULT - ASSESSMENT
86 yo F with PMH of HTN, HLD, Diabetes Mellitus Type 2, GERD, Dementia, presented to the ED with complaints of generalized weakness with poor PO intake over the last few days. She has not taken her medications over the past 2 days. Admitted for failure to thrive, UTI.

## 2018-09-20 NOTE — H&P ADULT - PROBLEM SELECTOR PLAN 6
-Hold Januvia, Metformin.  -LDISS, Hypoglycemia Protocol, Jonas AC&HS.  -Diet: Consistent Carbs, no evening snack.  -Follow up HbA1c. -Hold Januvia, Metformin.  -LDISS, Hypoglycemia Protocol, Acccharlie AC&HS.  -Diet: Consistent Carbs, no evening snack.  -Follow up HbA1c in AM  -A1c on last admission 5.4, patient may not need sliding scale.

## 2018-09-20 NOTE — H&P ADULT - HISTORY OF PRESENT ILLNESS
84 yo F with PMH of HTN, HLD, Diabetes Mellitus Type 2, GERD, Dementia, presented to the ED with complaints of generalized weakness with poor PO intake over the last few days. She has not taken her medications over the past 2 days. She came to the ED with a similar presentation approximately 3 weeks ago. Per son, he has been trying to get additional help at home, but has been struggling of late by himself. Patient refuses meds and fights son when he tries to give them to her. He is unsure if she actually takes what has been given to her. Son also states he can only get her to eat if the food is sweet. She has been afraid to walk lately and has been progressively getting harder to care for. Patient is confused at baseline but per son when she eats and gets stronger she is "sharper". Her memory problems are mostly short term memory loss per son. Patient unable to provide further history 2/2 dementia. Of note son states patient recently being treated for UTI diagnosed on last trip to the ED.     In the ED, she was hemodynamically stable.   Labs significant for hyperglycemia (Glu 253) and decreased eGFR (52), otherwise, no anemia, no leukocytosis, normal lactate, normal electrolytes, normal liver function.  UA significant for small LE, positive nitrites, WBCs 6-10, many bacteria, trace blood, moderate epithelial cells, many calcium oxalate crystals.  CT Head No Contrast: No acute intracranial hemorrhage or acute territorial infarct. Severe ischemic small vessel white matter disease.  CXR: No active pulmonary disease. Hiatal hernia is again noted.  Received Rocephin 1gram IV x1, NS Bolus 1L x1. 86 yo F with PMH of HTN, HLD, Diabetes Mellitus Type 2, GERD, Dementia, presented to the ED with complaints of generalized weakness with poor PO intake over the last few days. She has not taken her medications over the past 2 days. She came to the ED with a similar presentation approximately 3 weeks ago. Per son, he has been trying to get additional help at home, but has been struggling of late by himself. Patient refuses meds and fights son when he tries to give them to her. He is unsure if she actually takes what has been given to her. Son also states he can only get her to eat if the food is sweet. She has been afraid to walk lately and has been progressively getting harder to care for. Patient is confused at baseline but per son when she eats and gets stronger she is "sharper". Her memory problems are mostly short term memory loss per son. Patient unable to provide further history 2/2 dementia. Of note son states patient recently treated for UTI diagnosed on last trip to the ED.     In the ED, she was hemodynamically stable.   Labs significant for hyperglycemia (Glu 253) and decreased eGFR (52), otherwise, no anemia, no leukocytosis, normal lactate, normal electrolytes, normal liver function.  UA significant for small LE, positive nitrites, WBCs 6-10, many bacteria, trace blood, moderate epithelial cells, many calcium oxalate crystals.  CT Head No Contrast: No acute intracranial hemorrhage or acute territorial infarct. Severe ischemic small vessel white matter disease.  CXR: No active pulmonary disease. Hiatal hernia is again noted.  Received Rocephin 1gram IV x1, NS Bolus 1L x1.

## 2018-09-20 NOTE — H&P ADULT - PROBLEM SELECTOR PLAN 1
-Admit to [facility]  -nutrition eval   -encouragement and assistance with meals  -social work eval   -PT eval  -maintenance fluids 75cc/hr for 12 hours -Admit to [facility]  -nutrition eval   -encouragement and assistance with meals  -social work eval   -PT eval  -maintenance fluids 75cc/hr for 12 hours  -case management consult

## 2018-09-20 NOTE — ED ADULT NURSE NOTE - NSIMPLEMENTINTERV_GEN_ALL_ED
Implemented All Fall Risk Interventions:  [place] to call system. Call bell, personal items and telephone within reach. Instruct patient to call for assistance. Room bathroom lighting operational. Non-slip footwear when patient is off stretcher. Physically safe environment: no spills, clutter or unnecessary equipment. Stretcher in lowest position, wheels locked, appropriate side rails in place. Provide visual cue, wrist band, yellow gown, etc. Monitor gait and stability. Monitor for mental status changes and reorient to person, place, and time. Review medications for side effects contributing to fall risk. Reinforce activity limits and safety measures with patient and family.

## 2018-09-20 NOTE — ED PROVIDER NOTE - PMH
Dementia    DM (diabetes mellitus)    GERD (gastroesophageal reflux disease)    HLD (hyperlipidemia)    HTN (hypertension)

## 2018-09-20 NOTE — H&P ADULT - NSHPSOCIALHISTORY_GEN_ALL_CORE
non smoker. does not drink alcohol. Lives alone and has son come in the AM to give her meds and attempt to feed her, has aid for a few hours per day who helps with showering and dressing patient. Other son comes at night to help get patient ready for bed. Patient has no HCP. Per son patient is full code. Ambulates with walker. non smoker. does not drink alcohol. Lives alone and has son come in the AM to give her meds and attempt to feed her, has aid for a few hours per day who helps with showering and dressing patient. Other son comes at night to help get patient ready for bed. Patient has no HCP. Per son patient is full code. Ambulates with walker but per son is afraid to walk lately.

## 2018-09-20 NOTE — H&P ADULT - PROBLEM SELECTOR PLAN 8
-Lovenox 40mg SubQ for DVT ppx.  IMPROVE VTE Individual Risk Assessment  RISK                                                                Points  [  ] Previous VTE                                                  3  [  ] Thrombophilia                                               2  [  ] Lower limb paralysis                                      2        (unable to hold up >15 seconds)    [  ] Current Cancer                                              2         (within 6 months)  [  ] Immobilization > 24 hrs                                1  [  ] ICU/CCU stay > 24 hours                              1  [ x ] Age > 60                                                      1  IMPROVE VTE Score 1    continue vitamin B12, iron, and vitamin d

## 2018-09-20 NOTE — H&P ADULT - ATTENDING COMMENTS
I personally conducted a physical examination of the patient. I personally gathered the patient's history. I edited the above listed findings which were prepared by the listed resident physician. I personally discussed the plan of care with the patient. The questions and concerns were addressed to the best of my ability. The patient is in agreement with the listed treatment plan.

## 2018-09-20 NOTE — ED PROVIDER NOTE - OBJECTIVE STATEMENT
84 yo F p/w gen weakness, not eating / drinking well over past few days. Pt won't take her meds x past ~ 2 days. Pt with similar sx, was seen in ed 3 weeks ago. no fall / recent trauma. No abd pain. no vomiting / diarrhea. No fever/chills. No recent illness. no other inj or co.  Pts son is trying to get help at home, pt not doing well at home by herself.

## 2018-09-20 NOTE — H&P ADULT - NSHPPHYSICALEXAM_GEN_ALL_CORE
T(C): 36.3 (09-20-18 @ 13:10), Max: 36.3 (09-20-18 @ 13:10)  HR: 62 (09-20-18 @ 13:10) (62 - 62)  BP: 134/82 (09-20-18 @ 13:10) (134/82 - 134/82)  RR: 14 (09-20-18 @ 13:10) (14 - 14)  SpO2: 97% (09-20-18 @ 13:10) (97% - 97%)  Vital Signs Last 24 Hrs  T(C): 36.3 (20 Sep 2018 13:10), Max: 36.3 (20 Sep 2018 13:10)  T(F): 97.4 (20 Sep 2018 13:10), Max: 97.4 (20 Sep 2018 13:10)  HR: 62 (20 Sep 2018 13:10) (62 - 62)  BP: 134/82 (20 Sep 2018 13:10) (134/82 - 134/82)  BP(mean): --  RR: 14 (20 Sep 2018 13:10) (14 - 14)  SpO2: 97% (20 Sep 2018 13:10) (97% - 97%)  GENERAL: patient appears well, no acute distress, appropriate, pleasant  EYES: sclera clear, no exudates  ENMT: oropharynx clear without erythema, no exudates, moist mucous membranes  NECK: supple, soft, no thyromegaly noted  LUNGS: good air entry bilaterally, clear to auscultation, symmetric breath sounds, no wheezing or rhonchi appreciated  HEART: soft S1/S2, regular rate and rhythm, no murmurs noted, no lower extremity edema  GASTROINTESTINAL: abdomen is soft, nontender, nondistended, normoactive bowel sounds, no palpable masses  INTEGUMENT: good skin turgor, bruise stage II beefy red pressure ulcer on back  MUSCULOSKELETAL: no clubbing or cyanosis, no obvious deformity  NEUROLOGIC: awake, alert, oriented x1, good muscle tone in 4 extremities, no obvious sensory deficits  PSYCHIATRIC: mood is good, affect is congruent, linear and logical thought process T(C): 36.3 (09-20-18 @ 13:10), Max: 36.3 (09-20-18 @ 13:10)  HR: 62 (09-20-18 @ 13:10) (62 - 62)  BP: 134/82 (09-20-18 @ 13:10) (134/82 - 134/82)  RR: 14 (09-20-18 @ 13:10) (14 - 14)  SpO2: 97% (09-20-18 @ 13:10) (97% - 97%)  Vital Signs Last 24 Hrs  T(C): 36.3 (20 Sep 2018 13:10), Max: 36.3 (20 Sep 2018 13:10)  T(F): 97.4 (20 Sep 2018 13:10), Max: 97.4 (20 Sep 2018 13:10)  HR: 62 (20 Sep 2018 13:10) (62 - 62)  BP: 134/82 (20 Sep 2018 13:10) (134/82 - 134/82)  BP(mean): --  RR: 14 (20 Sep 2018 13:10) (14 - 14)  SpO2: 97% (20 Sep 2018 13:10) (97% - 97%)  GENERAL: patient appears comfortable, pleasantly confused, no acute distress  EYES: sclera clear, no exudates  ENMT: oropharynx clear without erythema, no exudates, moist mucous membranes  NECK: supple, soft, no thyromegaly noted  LUNGS: good air entry bilaterally, clear to auscultation, symmetric breath sounds, no wheezing or rhonchi appreciated  HEART: soft S1/S2, regular rate and rhythm, no murmurs noted, no lower extremity edema  GASTROINTESTINAL: abdomen is soft, nontender, nondistended, normoactive bowel sounds, no palpable masses  INTEGUMENT: good skin turgor, pressure ulcer stage II beefy red pressure ulcer on back  MUSCULOSKELETAL: no clubbing or cyanosis, no obvious deformity  NEUROLOGIC: awake, alert, oriented x1, good muscle tone in 4 extremities, no obvious sensory deficits  PSYCHIATRIC: mood is good, affect is congruent, linear and logical thought process

## 2018-09-20 NOTE — ED ADULT NURSE NOTE - OBJECTIVE STATEMENT
received pt in bed #13b Pt alert confused following commands as per son over the last week pt walking less, eating less & not taking her medications. Pt seen by Dr glover Will monitor

## 2018-09-20 NOTE — ED PROVIDER NOTE - ENMT, MLM
Airway patent, Nasal mucosa clear. Mouth with normal mucosa. Throat has no vesicles, no oropharyngeal exudates and uvula is midline. MM Moist. neck supple.

## 2018-09-20 NOTE — H&P ADULT - PROBLEM SELECTOR PLAN 3
-Chronic, stable.  -continue donepazil  -continue paxil  -Follow up PT, Social Work recommendations.

## 2018-09-20 NOTE — ED PROVIDER NOTE - CARE PLAN
Principal Discharge DX:	Weakness  Secondary Diagnosis:	Urinary tract infection without hematuria, site unspecified

## 2018-09-20 NOTE — H&P ADULT - PROBLEM SELECTOR PLAN 2
-UA significant for small LE, positive nitrites, WBCs 6-10, many bacteria.  -Afebrile, no leukocytosis, normal lactate, normal BUN/Cr.  -Continue with Rocephin 1 gram IV qd.  -Continue with Tylenol prn fever.  -Follow up Urine culture.

## 2018-09-21 ENCOUNTER — TRANSCRIPTION ENCOUNTER (OUTPATIENT)
Age: 83
End: 2018-09-21

## 2018-09-21 LAB
ANION GAP SERPL CALC-SCNC: 11 MMOL/L — SIGNIFICANT CHANGE UP (ref 5–17)
BASOPHILS # BLD AUTO: 0.06 K/UL — SIGNIFICANT CHANGE UP (ref 0–0.2)
BASOPHILS NFR BLD AUTO: 0.7 % — SIGNIFICANT CHANGE UP (ref 0–2)
BUN SERPL-MCNC: 15 MG/DL — SIGNIFICANT CHANGE UP (ref 7–23)
CALCIUM SERPL-MCNC: 7.7 MG/DL — LOW (ref 8.5–10.1)
CHLORIDE SERPL-SCNC: 110 MMOL/L — HIGH (ref 96–108)
CO2 SERPL-SCNC: 25 MMOL/L — SIGNIFICANT CHANGE UP (ref 22–31)
CREAT SERPL-MCNC: 0.7 MG/DL — SIGNIFICANT CHANGE UP (ref 0.5–1.3)
EOSINOPHIL # BLD AUTO: 0.23 K/UL — SIGNIFICANT CHANGE UP (ref 0–0.5)
EOSINOPHIL NFR BLD AUTO: 2.7 % — SIGNIFICANT CHANGE UP (ref 0–6)
GLUCOSE SERPL-MCNC: 91 MG/DL — SIGNIFICANT CHANGE UP (ref 70–99)
HBA1C BLD-MCNC: 5.6 % — SIGNIFICANT CHANGE UP (ref 4–5.6)
HCT VFR BLD CALC: 30 % — LOW (ref 34.5–45)
HGB BLD-MCNC: 9.9 G/DL — LOW (ref 11.5–15.5)
IMM GRANULOCYTES NFR BLD AUTO: 0.4 % — SIGNIFICANT CHANGE UP (ref 0–1.5)
LYMPHOCYTES # BLD AUTO: 1.69 K/UL — SIGNIFICANT CHANGE UP (ref 1–3.3)
LYMPHOCYTES # BLD AUTO: 20 % — SIGNIFICANT CHANGE UP (ref 13–44)
MCHC RBC-ENTMCNC: 27.4 PG — SIGNIFICANT CHANGE UP (ref 27–34)
MCHC RBC-ENTMCNC: 33 GM/DL — SIGNIFICANT CHANGE UP (ref 32–36)
MCV RBC AUTO: 83.1 FL — SIGNIFICANT CHANGE UP (ref 80–100)
MONOCYTES # BLD AUTO: 0.63 K/UL — SIGNIFICANT CHANGE UP (ref 0–0.9)
MONOCYTES NFR BLD AUTO: 7.5 % — SIGNIFICANT CHANGE UP (ref 2–14)
NEUTROPHILS # BLD AUTO: 5.8 K/UL — SIGNIFICANT CHANGE UP (ref 1.8–7.4)
NEUTROPHILS NFR BLD AUTO: 68.7 % — SIGNIFICANT CHANGE UP (ref 43–77)
PLATELET # BLD AUTO: 191 K/UL — SIGNIFICANT CHANGE UP (ref 150–400)
POTASSIUM SERPL-MCNC: 3 MMOL/L — LOW (ref 3.5–5.3)
POTASSIUM SERPL-SCNC: 3 MMOL/L — LOW (ref 3.5–5.3)
RBC # BLD: 3.61 M/UL — LOW (ref 3.8–5.2)
RBC # FLD: 15.5 % — HIGH (ref 10.3–14.5)
SODIUM SERPL-SCNC: 146 MMOL/L — HIGH (ref 135–145)
WBC # BLD: 8.44 K/UL — SIGNIFICANT CHANGE UP (ref 3.8–10.5)
WBC # FLD AUTO: 8.44 K/UL — SIGNIFICANT CHANGE UP (ref 3.8–10.5)

## 2018-09-21 PROCEDURE — 99232 SBSQ HOSP IP/OBS MODERATE 35: CPT | Mod: GC

## 2018-09-21 RX ORDER — POTASSIUM CHLORIDE 20 MEQ
40 PACKET (EA) ORAL ONCE
Qty: 0 | Refills: 0 | Status: COMPLETED | OUTPATIENT
Start: 2018-09-21 | End: 2018-09-21

## 2018-09-21 RX ADMIN — PANTOPRAZOLE SODIUM 40 MILLIGRAM(S): 20 TABLET, DELAYED RELEASE ORAL at 06:14

## 2018-09-21 RX ADMIN — Medication 1 TABLET(S): at 08:04

## 2018-09-21 RX ADMIN — Medication 40 MILLIEQUIVALENT(S): at 11:55

## 2018-09-21 RX ADMIN — PREGABALIN 500 MICROGRAM(S): 225 CAPSULE ORAL at 11:57

## 2018-09-21 RX ADMIN — Medication 1 TABLET(S): at 17:47

## 2018-09-21 RX ADMIN — DONEPEZIL HYDROCHLORIDE 5 MILLIGRAM(S): 10 TABLET, FILM COATED ORAL at 22:35

## 2018-09-21 RX ADMIN — Medication 1: at 17:45

## 2018-09-21 RX ADMIN — Medication 1 TABLET(S): at 12:00

## 2018-09-21 RX ADMIN — CARVEDILOL PHOSPHATE 6.25 MILLIGRAM(S): 80 CAPSULE, EXTENDED RELEASE ORAL at 17:45

## 2018-09-21 RX ADMIN — ATORVASTATIN CALCIUM 10 MILLIGRAM(S): 80 TABLET, FILM COATED ORAL at 22:26

## 2018-09-21 RX ADMIN — Medication 1: at 22:35

## 2018-09-21 RX ADMIN — Medication 81 MILLIGRAM(S): at 11:56

## 2018-09-21 RX ADMIN — Medication 2000 UNIT(S): at 11:56

## 2018-09-21 RX ADMIN — Medication 10 MILLIGRAM(S): at 11:56

## 2018-09-21 RX ADMIN — CARVEDILOL PHOSPHATE 6.25 MILLIGRAM(S): 80 CAPSULE, EXTENDED RELEASE ORAL at 06:14

## 2018-09-21 RX ADMIN — CEFTRIAXONE 100 GRAM(S): 500 INJECTION, POWDER, FOR SOLUTION INTRAMUSCULAR; INTRAVENOUS at 16:33

## 2018-09-21 RX ADMIN — Medication 325 MILLIGRAM(S): at 11:58

## 2018-09-21 NOTE — PROGRESS NOTE ADULT - PROBLEM SELECTOR PLAN 1
-nutrition eval   -encouragement and assistance with meals  -social work eval   -PT eval  -maintenance fluids 75cc/hr   -case management consult -per nurse patient ate most of her meal this AM.   -f/u nutrition recs  -encouragement and assistance with meals  -f/u social work recs  -f/u PT recs  -case management consult -UA somewhat positive; pt is demented and difficult to gauge if she had urinary symptoms  -Afebrile, no leukocytosis, normal lactate, normal BUN/Cr.  -Continue with Rocephin for now and f/up UCx, consider short course of antibiotics given the reported change in pt's behavior which may be related to infection

## 2018-09-21 NOTE — DISCHARGE NOTE ADULT - MEDICATION SUMMARY - MEDICATIONS TO TAKE
I will START or STAY ON the medications listed below when I get home from the hospital:    aspirin 81 mg oral tablet, chewable  -- 1 tab(s) by mouth once a day  -- Indication: For HTN (hypertension)    valsartan 160 mg oral tablet  -- 1 tab(s) by mouth once a day  -- Indication: For HTN (hypertension)    PARoxetine 10 mg oral tablet  -- 1 tab(s) by mouth once a day  -- Indication: For Depression    glipizide-metformin 2.5 mg-500 mg oral tablet  -- 1 tab(s) by mouth 2 times a day  -- Indication: For Type 2 diabetes mellitus without complication, without long-term current use of insulin    lovastatin 40 mg oral tablet  -- 1 tab(s) by mouth once a day  -- Indication: For HLD (hyperlipidemia)    carvedilol 6.25 mg oral tablet  -- 1 tab(s) by mouth 2 times a day  -- Indication: For HTN (hypertension)    amLODIPine 5 mg oral tablet  -- 1 tab(s) by mouth once a day  -- Indication: For HTN (hypertension)    cephalexin 500 mg oral capsule  -- 1 cap(s) by mouth every 12 hours  -- Indication: For Abscess of back    donepezil 5 mg oral tablet  -- 1 tab(s) by mouth once a day (at bedtime)  -- Indication: For Dementia    Iron Chews 15 mg oral tablet, chewable  -- 1 tab(s) by mouth 2 times a day  -- Indication: For Anemia    omeprazole 20 mg oral delayed release capsule  -- 1 cap(s) by mouth once a day  -- Indication: For GERD (gastroesophageal reflux disease)    Vitamin D3 2000 intl units oral tablet  -- 1 tab(s) by mouth once a day  -- Indication: For Need for prophylactic measure    Vitamin B12 500 mcg oral tablet  -- 1 tab(s) by mouth once a day  -- Indication: For Anemia I will START or STAY ON the medications listed below when I get home from the hospital:    aspirin 81 mg oral tablet, chewable  -- 1 tab(s) by mouth once a day  -- Indication: For primary cardiac prevention    valsartan 160 mg oral tablet  -- 1 tab(s) by mouth once a day  -- Indication: For HTN (hypertension)    PARoxetine 10 mg oral tablet  -- 1 tab(s) by mouth once a day  -- Indication: For Depression    glipizide-metformin 2.5 mg-500 mg oral tablet  -- 1 tab(s) by mouth 2 times a day  -- Indication: For Type 2 diabetes mellitus without complication, without long-term current use of insulin    lovastatin 40 mg oral tablet  -- 1 tab(s) by mouth once a day  -- Indication: For HLD (hyperlipidemia)    carvedilol 6.25 mg oral tablet  -- 1 tab(s) by mouth 2 times a day  -- Indication: For HTN (hypertension)    amLODIPine 5 mg oral tablet  -- 1 tab(s) by mouth once a day  -- Indication: For HTN (hypertension)    cephalexin 500 mg oral capsule  -- 1 cap(s) by mouth every 12 hours x 7 days  -- Indication: For Abscess of back s/p incision and drainage    donepezil 5 mg oral tablet  -- 1 tab(s) by mouth once a day (at bedtime)  -- Indication: For Dementia    Iron Chews 15 mg oral tablet, chewable  -- 1 tab(s) by mouth 2 times a day  -- Indication: For Anemia    lactobacillus acidophilus oral capsule  -- 1 tab(s) by mouth 2 times a day  -- Indication: For probiotic while on antibiotics    omeprazole 20 mg oral delayed release capsule  -- 1 cap(s) by mouth once a day  -- Indication: For GERD (gastroesophageal reflux disease)    Vitamin D3 2000 intl units oral tablet  -- 1 tab(s) by mouth once a day  -- Indication: For supplement    Vitamin B12 500 mcg oral tablet  -- 1 tab(s) by mouth once a day  -- Indication: For Anemia

## 2018-09-21 NOTE — DIETITIAN INITIAL EVALUATION ADULT. - ORAL INTAKE PTA
Son reports decreased po intake x1 week PTA. States that his mother refuses to eat most meals/snacks he provides. States she is very "picky" and knows she has been dehydrated. Typical intake: 1 cup orange juice and 1/2 slice of pizza. Takes Vitamin D, B12, iron PTA.

## 2018-09-21 NOTE — DIETITIAN INITIAL EVALUATION ADULT. - NS AS NUTRI INTERV MEALS SNACK
Continue current diet of DASH/TLC with Consistent Carbohydrate (with snack). Consider liberalizing diet if pt persists with poor po intake. Pt preferences noted and will be provided within dietary restrictions.

## 2018-09-21 NOTE — PHYSICAL THERAPY INITIAL EVALUATION ADULT - PERTINENT HX OF CURRENT PROBLEM, REHAB EVAL
86 yo F with PMH of HTN, HLD, Diabetes Mellitus Type 2, GERD, Dementia, presented to the ED with complaints of generalized weakness with poor PO intake over the last few days. She has not taken her medications over the past 2 days. She came to the ED with a similar presentation approximately 3 weeks ago. Per son, he has been trying to get additional help at home, but has been struggling of late by himself.

## 2018-09-21 NOTE — PROGRESS NOTE ADULT - SUBJECTIVE AND OBJECTIVE BOX
cc: Patient is a 85y old  Female who presents with a chief complaint of failure to thrive in an adult  HPI:  84 yo F with PMH of HTN, HLD, Diabetes Mellitus Type 2, GERD, Dementia, presented to the ED with complaints of generalized weakness with poor PO intake over the last few days. She has not taken her medications over the past 2 days. She came to the ED with a similar presentation approximately 3 weeks ago. Per son, he has been trying to get additional help at home, but has been struggling of late by himself. Patient refuses meds and fights son when he tries to give them to her. He is unsure if she actually takes what has been given to her. Son also states he can only get her to eat if the food is sweet. She has been afraid to walk lately and has been progressively getting harder to care for. Patient is confused at baseline but per son when she eats and gets stronger she is "sharper". Her memory problems are mostly short term memory loss per son. Patient unable to provide further history 2/2 dementia. Of note son states patient recently treated for UTI diagnosed on last trip to the ED.     INTERVAL OVERNIGHT EVENTS:  Patient currently receiving Rocephin for UTI. Patient states she is feeling well this morning. Patient finished her breakfast this morning according to her nurse. No overnight events.    REVIEW OF SYSTEMS:    CONSTITUTIONAL: No weakness, fevers or chills  EYES/ENT: No visual changes, no throat pain   RESPIRATORY: No cough, wheezing, hemoptysis; No shortness of breath  CARDIOVASCULAR: No chest pain or palpitations  GASTROINTESTINAL: No abdominal, nausea, vomiting, or hematemesis; No diarrhea or constipation. No melena or hematochezia.  GENITOURINARY: No dysuria, frequency or hematuria  NEUROLOGICAL: No dizziness, numbness, or weakness  All other review of systems is negative unless indicated above.    VITAL SIGNS:  Vital Signs Last 24 Hrs  T(C): 36.8 (21 Sep 2018 04:39), Max: 36.8 (21 Sep 2018 04:39)  T(F): 98.2 (21 Sep 2018 04:39), Max: 98.2 (21 Sep 2018 04:39)  HR: 60 (21 Sep 2018 04:39) (60 - 70)  BP: 169/81 (21 Sep 2018 04:39) (128/78 - 169/81)  BP(mean): --  RR: 18 (21 Sep 2018 04:39) (14 - 18)  SpO2: 97% (21 Sep 2018 04:39) (97% - 98%)      PHYSICAL EXAM:     GENERAL: no acute distress  HEENT: NC/AT, EOMI, neck supple, MMM  RESPIRATORY: LCTAB/L, no rhonchi, rales, or wheezing  CARDIOVASCULAR: RRR, no murmurs, gallops, rubs  ABDOMINAL: soft, non-tender, non-distended, positive bowel sounds   EXTREMITIES: no clubbing, cyanosis, or edema  NEUROLOGICAL: alert and orient x1  MUSCULOSKELETAL: no gross joint deformity                          9.9    8.44  )-----------( 191      ( 21 Sep 2018 08:42 )             30.0     09-21    146<H>  |  110<H>  |  15  ----------------------------<  91  3.0<L>   |  25  |  0.70    Ca    7.7<L>      21 Sep 2018 08:42    TPro  6.1  /  Alb  2.5<L>  /  TBili  0.9  /  DBili  x   /  AST  16  /  ALT  8<L>  /  AlkPhos  52  09-20      CAPILLARY BLOOD GLUCOSE      POCT Blood Glucose.: 101 mg/dL (21 Sep 2018 08:14)  POCT Blood Glucose.: 99 mg/dL (21 Sep 2018 06:19)  POCT Blood Glucose.: 175 mg/dL (20 Sep 2018 23:00)      MEDICATIONS  (STANDING):  aspirin enteric coated 81 milliGRAM(s) Oral daily  atorvastatin 10 milliGRAM(s) Oral at bedtime  carvedilol 6.25 milliGRAM(s) Oral every 12 hours  cefTRIAXone   IVPB 1 Gram(s) IV Intermittent every 24 hours  cholecalciferol 2000 Unit(s) Oral daily  cyanocobalamin 500 MICROGram(s) Oral daily  dextrose 5%. 1000 milliLiter(s) (50 mL/Hr) IV Continuous <Continuous>  dextrose 50% Injectable 12.5 Gram(s) IV Push once  dextrose 50% Injectable 25 Gram(s) IV Push once  dextrose 50% Injectable 25 Gram(s) IV Push once  donepezil 5 milliGRAM(s) Oral at bedtime  enoxaparin Injectable 40 milliGRAM(s) SubCutaneous every 24 hours  ferrous    sulfate 325 milliGRAM(s) Oral daily  influenza   Vaccine 0.5 milliLiter(s) IntraMuscular once  insulin lispro (HumaLOG) corrective regimen sliding scale   SubCutaneous Before meals and at bedtime  lactobacillus acidophilus 1 Tablet(s) Oral three times a day with meals  pantoprazole    Tablet 40 milliGRAM(s) Oral before breakfast  PARoxetine 10 milliGRAM(s) Oral daily  potassium chloride    Tablet ER 40 milliEquivalent(s) Oral once  sodium chloride 0.9%. 1000 milliLiter(s) (75 mL/Hr) IV Continuous <Continuous> cc: Patient is a 85y old  Female who presents with a chief complaint of failure to thrive in an adult  HPI:  86 yo F with PMH of HTN, HLD, Diabetes Mellitus Type 2, GERD, Dementia, presented to the ED with complaints of generalized weakness with poor PO intake over the last few days. She has not taken her medications over the past 2 days. She came to the ED with a similar presentation approximately 3 weeks ago. Per son, he has been trying to get additional help at home, but has been struggling of late by himself. Patient refuses meds and fights son when he tries to give them to her. He is unsure if she actually takes what has been given to her. Son also states he can only get her to eat if the food is sweet. She has been afraid to walk lately and has been progressively getting harder to care for. Patient is confused at baseline but per son when she eats and gets stronger she is "sharper". Her memory problems are mostly short term memory loss per son. Patient unable to provide further history 2/2 dementia. Of note son states patient recently treated for UTI diagnosed on last trip to the ED.     INTERVAL OVERNIGHT EVENTS:  Patient currently receiving Rocephin for UTI. Patient states she is feeling well this morning. Patient finished her breakfast this morning according to her nurse. No overnight events.    REVIEW OF SYSTEMS:    CONSTITUTIONAL: No weakness, fevers or chills  EYES/ENT: No visual changes, no throat pain   RESPIRATORY: No cough, wheezing, hemoptysis; No shortness of breath  CARDIOVASCULAR: No chest pain or palpitations  GASTROINTESTINAL: No abdominal, nausea, vomiting, or hematemesis; No diarrhea or constipation. No melena or hematochezia.  GENITOURINARY: No dysuria, frequency or hematuria  NEUROLOGICAL: No dizziness, numbness, or weakness  All other review of systems is negative unless indicated above.    VITAL SIGNS:  Vital Signs Last 24 Hrs  T(C): 36.8 (21 Sep 2018 04:39), Max: 36.8 (21 Sep 2018 04:39)  T(F): 98.2 (21 Sep 2018 04:39), Max: 98.2 (21 Sep 2018 04:39)  HR: 60 (21 Sep 2018 04:39) (60 - 70)  BP: 169/81 (21 Sep 2018 04:39) (128/78 - 169/81)  BP(mean): --  RR: 18 (21 Sep 2018 04:39) (14 - 18)  SpO2: 97% (21 Sep 2018 04:39) (97% - 98%)      PHYSICAL EXAM:     GENERAL: no acute distress  HEENT: NC/AT, EOMI, neck supple, MMM  RESPIRATORY: LCTAB/L, no rhonchi, rales, or wheezing  CARDIOVASCULAR: RRR, +systolic murmur, no gallops, rubs  ABDOMINAL: soft, non-tender, non-distended, positive bowel sounds   EXTREMITIES: no clubbing, cyanosis, or edema  NEUROLOGICAL: alert and orient x1  MUSCULOSKELETAL: no gross joint deformity                          9.9    8.44  )-----------( 191      ( 21 Sep 2018 08:42 )             30.0     09-21    146<H>  |  110<H>  |  15  ----------------------------<  91  3.0<L>   |  25  |  0.70    Ca    7.7<L>      21 Sep 2018 08:42    TPro  6.1  /  Alb  2.5<L>  /  TBili  0.9  /  DBili  x   /  AST  16  /  ALT  8<L>  /  AlkPhos  52  09-20      CAPILLARY BLOOD GLUCOSE      POCT Blood Glucose.: 101 mg/dL (21 Sep 2018 08:14)  POCT Blood Glucose.: 99 mg/dL (21 Sep 2018 06:19)  POCT Blood Glucose.: 175 mg/dL (20 Sep 2018 23:00)      MEDICATIONS  (STANDING):  aspirin enteric coated 81 milliGRAM(s) Oral daily  atorvastatin 10 milliGRAM(s) Oral at bedtime  carvedilol 6.25 milliGRAM(s) Oral every 12 hours  cefTRIAXone   IVPB 1 Gram(s) IV Intermittent every 24 hours  cholecalciferol 2000 Unit(s) Oral daily  cyanocobalamin 500 MICROGram(s) Oral daily  dextrose 5%. 1000 milliLiter(s) (50 mL/Hr) IV Continuous <Continuous>  dextrose 50% Injectable 12.5 Gram(s) IV Push once  dextrose 50% Injectable 25 Gram(s) IV Push once  dextrose 50% Injectable 25 Gram(s) IV Push once  donepezil 5 milliGRAM(s) Oral at bedtime  enoxaparin Injectable 40 milliGRAM(s) SubCutaneous every 24 hours  ferrous    sulfate 325 milliGRAM(s) Oral daily  influenza   Vaccine 0.5 milliLiter(s) IntraMuscular once  insulin lispro (HumaLOG) corrective regimen sliding scale   SubCutaneous Before meals and at bedtime  lactobacillus acidophilus 1 Tablet(s) Oral three times a day with meals  pantoprazole    Tablet 40 milliGRAM(s) Oral before breakfast  PARoxetine 10 milliGRAM(s) Oral daily  potassium chloride    Tablet ER 40 milliEquivalent(s) Oral once  sodium chloride 0.9%. 1000 milliLiter(s) (75 mL/Hr) IV Continuous <Continuous> cc: Patient is a 85y old  Female who presents with a chief complaint of poor appetite, failure to thrive    HPI:  84 yo F with PMH of HTN, HLD, Diabetes Mellitus Type 2, GERD, Dementia, presented to the ED with complaints of generalized weakness with poor PO intake over the last few days. She has not taken her medications over the past 2 days. She came to the ED with a similar presentation approximately 3 weeks ago. Per son, he has been trying to get additional help at home, but has been struggling of late by himself. Patient refuses meds and fights son when he tries to give them to her. He is unsure if she actually takes what has been given to her. Son also states he can only get her to eat if the food is sweet. She has been afraid to walk lately and has been progressively getting harder to care for. Patient is confused at baseline but per son when she eats and gets stronger she is "sharper". Her memory problems are mostly short term memory loss per son. Patient unable to provide further history 2/2 dementia. Of note son states patient recently treated for UTI diagnosed on last trip to the ED.     INTERVAL OVERNIGHT EVENTS:  Patient currently receiving Rocephin for UTI. Patient states she is feeling well this morning. Patient finished her breakfast this morning according to her nurse. No overnight events.    REVIEW OF SYSTEMS:    CONSTITUTIONAL: No weakness, fevers or chills  EYES/ENT: No visual changes, no throat pain   RESPIRATORY: No cough, wheezing, hemoptysis; No shortness of breath  CARDIOVASCULAR: No chest pain or palpitations  GASTROINTESTINAL: No abdominal, nausea, vomiting, or hematemesis; No diarrhea or constipation. No melena or hematochezia.  GENITOURINARY: No dysuria, frequency or hematuria  NEUROLOGICAL: No dizziness, numbness, or weakness  All other review of systems is negative unless indicated above.    VITAL SIGNS:  Vital Signs Last 24 Hrs  T(C): 36.8 (21 Sep 2018 04:39), Max: 36.8 (21 Sep 2018 04:39)  T(F): 98.2 (21 Sep 2018 04:39), Max: 98.2 (21 Sep 2018 04:39)  HR: 60 (21 Sep 2018 04:39) (60 - 70)  BP: 169/81 (21 Sep 2018 04:39) (128/78 - 169/81)  BP(mean): --  RR: 18 (21 Sep 2018 04:39) (14 - 18)  SpO2: 97% (21 Sep 2018 04:39) (97% - 98%)      PHYSICAL EXAM:     GENERAL: no acute distress  HEENT: NC/AT, EOMI, neck supple, MMM  RESPIRATORY: LCTAB/L, no rhonchi, rales, or wheezing  CARDIOVASCULAR: RRR, S1, S2, +systolic murmur  ABDOMINAL: soft, non-tender, non-distended, positive bowel sounds   EXTREMITIES: no clubbing, cyanosis, or edema  NEUROLOGICAL: alert and orient x1 (self only)  MUSCULOSKELETAL: no gross joint deformity                          9.9    8.44  )-----------( 191      ( 21 Sep 2018 08:42 )             30.0     09-21    146<H>  |  110<H>  |  15  ----------------------------<  91  3.0<L>   |  25  |  0.70    Ca    7.7<L>      21 Sep 2018 08:42    TPro  6.1  /  Alb  2.5<L>  /  TBili  0.9  /  DBili  x   /  AST  16  /  ALT  8<L>  /  AlkPhos  52  09-20      CAPILLARY BLOOD GLUCOSE      POCT Blood Glucose.: 101 mg/dL (21 Sep 2018 08:14)  POCT Blood Glucose.: 99 mg/dL (21 Sep 2018 06:19)  POCT Blood Glucose.: 175 mg/dL (20 Sep 2018 23:00)      MEDICATIONS  (STANDING):  aspirin enteric coated 81 milliGRAM(s) Oral daily  atorvastatin 10 milliGRAM(s) Oral at bedtime  carvedilol 6.25 milliGRAM(s) Oral every 12 hours  cefTRIAXone   IVPB 1 Gram(s) IV Intermittent every 24 hours  cholecalciferol 2000 Unit(s) Oral daily  cyanocobalamin 500 MICROGram(s) Oral daily  dextrose 5%. 1000 milliLiter(s) (50 mL/Hr) IV Continuous <Continuous>  dextrose 50% Injectable 12.5 Gram(s) IV Push once  dextrose 50% Injectable 25 Gram(s) IV Push once  dextrose 50% Injectable 25 Gram(s) IV Push once  donepezil 5 milliGRAM(s) Oral at bedtime  enoxaparin Injectable 40 milliGRAM(s) SubCutaneous every 24 hours  ferrous    sulfate 325 milliGRAM(s) Oral daily  influenza   Vaccine 0.5 milliLiter(s) IntraMuscular once  insulin lispro (HumaLOG) corrective regimen sliding scale   SubCutaneous Before meals and at bedtime  lactobacillus acidophilus 1 Tablet(s) Oral three times a day with meals  pantoprazole    Tablet 40 milliGRAM(s) Oral before breakfast  PARoxetine 10 milliGRAM(s) Oral daily  potassium chloride    Tablet ER 40 milliEquivalent(s) Oral once  sodium chloride 0.9%. 1000 milliLiter(s) (75 mL/Hr) IV Continuous <Continuous>

## 2018-09-21 NOTE — DISCHARGE NOTE ADULT - ADDITIONAL INSTRUCTIONS
a1c 5.6%  Follow up appt: OSMAR Bass Oct 9th 10:40 am You are scheduled for a follow up appt with Dr. OSMAR Steinberg Oct 9th 10:40 am

## 2018-09-21 NOTE — DISCHARGE NOTE ADULT - PLAN OF CARE
Improved During your stay you developed better eating habits and ate regularly. Please continue to eat regular meals and visit your PCP within the next week or earlier should you begin to lose your appetite or feel weak. Resolved During your stay you were treated for a UTI with IV and oral antibiotics. Please follow up with your PCP within a week or earlier should you begin to feel pain with urination or notice you urinate more frequently than usual. Controlled During your stay your HbA1c was found to be 5.6. Please try to avoid foods with a high carbohydrate content and follow up with your PCP for management. During your stay an abscess in your back was drained. Please continue to have the dressings changed needed and follow up with wound care (contact information) below should you have any difficulties. Improving During your stay your blood pressures remained elevated requiring us to add norvasc 5mg daily to your home medications. Please continue to take the norvasc daily in addition to your normal blood pressure medications and follow up with your PCP within 1 week of discharge. Please take your home statin as prescribed. During your stay an abscess in your back was drained. Please continue to remove packing daily, then irrigate wound with saline, and repack with 1/4 inch Iodoform gauze and follow up with wound care (contact information) below should you have any difficulties.

## 2018-09-21 NOTE — GOALS OF CARE CONVERSATION - PERSONAL ADVANCE DIRECTIVE - CONVERSATION DETAILS
Spoke with pts.  son Marcio ,he states she does not have a HCP. He is her eldest child and pt is a  .He is her surrogate ,wants all done for her to keep her alive.

## 2018-09-21 NOTE — PROGRESS NOTE ADULT - PROBLEM SELECTOR PLAN 3
-Chronic, stable.  -continue donepazil  -continue paxil  -Follow up PT, Social Work recommendations. -Chronic, stable.  -continue donepezil  -continue paxil  -Follow up PT, Social Work recommendations.  -may be having progression of dementia that is causing change in behavior.

## 2018-09-21 NOTE — DISCHARGE NOTE ADULT - INSTRUCTIONS
low carbohydrate diet Low carbohydrate diet Low salt, low cholesterol, and limited carbohydrate diet

## 2018-09-21 NOTE — DIETITIAN INITIAL EVALUATION ADULT. - SOURCE
other (specify)/patient/family/significant other/Son at bedside to provide subjective information, reviewed pt's medical chart.

## 2018-09-21 NOTE — DIETITIAN INITIAL EVALUATION ADULT. - PROBLEM SELECTOR PLAN 6
-Hold Januvia, Metformin.  -LDISS, Hypoglycemia Protocol, Acccharlie AC&HS.  -Diet: Consistent Carbs, no evening snack.  -Follow up HbA1c in AM  -A1c on last admission 5.4, patient may not need sliding scale.

## 2018-09-21 NOTE — DISCHARGE NOTE ADULT - CARE PROVIDER_API CALL
Tramaine Robles (DO), Family Medicine  [address] [place] [address]  Phone: [phone]  Fax: [phone] Tramaine Robles (), Family Medicine  [address] [place] [address]  Phone: [phone]  Fax: [phone]    Sin Carreno), [facility]  [address]  [place] [address]  Phone: [phone]  Fax: [phone]

## 2018-09-21 NOTE — PROGRESS NOTE ADULT - PROBLEM SELECTOR PLAN 8
-Lovenox 40mg SubQ for DVT ppx.  IMPROVE VTE Individual Risk Assessment  RISK                                                                Points  [  ] Previous VTE                                                  3  [  ] Thrombophilia                                               2  [  ] Lower limb paralysis                                      2        (unable to hold up >15 seconds)    [  ] Current Cancer                                              2         (within 6 months)  [  ] Immobilization > 24 hrs                                1  [  ] ICU/CCU stay > 24 hours                              1  [ x ] Age > 60                                                      1  IMPROVE VTE Score 1    continue vitamin B12, iron, and vitamin d -Lovenox 40mg SubQ for DVT ppx.

## 2018-09-21 NOTE — PHYSICAL THERAPY INITIAL EVALUATION ADULT - ADDITIONAL COMMENTS
Son at bedside gives information due to patient history of confusion. Patient lives in a private home with 3 steps to enter. Pt does not negotiate steps inside. Pt has an aide for 1-2 hours for 3 days/wk. Son states he lives less than 2 miles away; so does his brother. Son states patient ambulates short distances around the home /c rolling walker. Per son pt has been not eating and drinking well for months. Pt has a rolling walker and SBQC (cane) and family visits during daytime. Pt is home alone part of the day.

## 2018-09-21 NOTE — DIETITIAN INITIAL EVALUATION ADULT. - ADHERENCE
No specialized diet followed PTA. Son states that he allows her to eat whatever she wants. Per chart, pt on metformin PTA. HgbA1c on this admission pending (previously 5.4%).

## 2018-09-21 NOTE — DIETITIAN INITIAL EVALUATION ADULT. - NS AS NUTRI INTERV MEDICAL AND FOOD SUPPLEMENTS
Commercial beverage/Add on Glucerna BID for additional kcal/protein given pt's poor appetite. Pt encouraged to consume oral nutrition supplement between meals. If HgbA1c WNL, consider switching to Ensure Enlive as pt accepting this supplement PTA.

## 2018-09-21 NOTE — DISCHARGE NOTE ADULT - HOSPITAL COURSE
86 yo F with PMH of HTN, HLD, Diabetes Mellitus Type 2, GERD, Dementia, presented to the ED with complaints of generalized weakness with poor PO intake over the last few days. Admitted for failure to thrive in and adult and UTI. Patient given rocephin for UTI. Nutrition services recommends__________. Social work recommends________. PT recommends__________. 84 yo F with PMH of HTN, HLD, Diabetes Mellitus Type 2, GERD, Dementia, presented to the ED with complaints of generalized weakness with poor PO intake over the last few days. Admitted for failure to thrive in and adult and UTI. Started on IV rocephin for UTI. Nutrition services recommends__________. Social work recommends________. PT recommends__________. Urine cx___________. Blood cx ____________ 86 yo F with PMH of HTN, HLD, Diabetes Mellitus Type 2, GERD, Dementia, presented to the ED on 9/20 with complaints of generalized weakness with poor PO intake and admitted for failure to thrive and UTI. Patient was started on IV rocephin for UTI and switched to cipro 500 mg PO BID. Urine cultures were positive for enterobacter, blood cultures NGTD. Patient developed an abscess in the thoracic back which required I&D by surgery with daily packings by wound care. Cultures from liquid collected grew: ____________. During hospital course patient experienced persistently high blood pressures ranging from the 160s to 190s as a result, norvasc was added to patient's home medication for blood pressure control. 86 yo F with PMH of HTN, HLD, Diabetes Mellitus Type 2, GERD, Dementia, presented to the ED on 9/20 with complaints of generalized weakness with poor PO intake and admitted for failure to thrive and UTI. Patient was started on IV rocephin for UTI and switched to cipro 500 mg PO BID. Urine cultures were positive for enterobacter, blood cultures NGTD. Patient developed an abscess in the thoracic back which required I&D by surgery with daily packings by wound care. Cultures from liquid collected grew gram + cocci in pairs and patient was started on keflex and recommended to continue as an outpatient for a total of 10 days. During hospital course patient experienced persistently high blood pressures ranging from the 160s to 190s as a result, norvasc was added to patient's home medication for blood pressure control and recommended to continue standing outpatient.    Patient improved clinically throughout hospital course. Patient seen and examined on day of discharge.    Vital Signs Last 24 Hrs  T(C): 36.7 (26 Sep 2018 04:46), Max: 37.1 (25 Sep 2018 21:28)  T(F): 98.1 (26 Sep 2018 04:46), Max: 98.7 (25 Sep 2018 21:28)  HR: 62 (26 Sep 2018 04:59) (60 - 62)  BP: 168/80 (26 Sep 2018 04:59) (158/74 - 188/76)  BP(mean): --  RR: 18 (26 Sep 2018 04:46) (18 - 18)  SpO2: 100% (26 Sep 2018 04:46) (96% - 100%)    Physical Exam:  General: NAD  HEENT: NCAT, EOMI  Neurology: A&Ox2, nonfocal  Respiratory: CTA B/L, No W/R/R  CV: RRR, +S1/S2, no murmurs, rubs or gallops  Abdominal: Soft, NT, ND +BSx4  Extremities: No C/C/E, + peripheral pulses  MSK: Normal ROM, no joint erythema or warmth, no joint swelling   Skin: Open, packed surgical wound of mid-back. No erythema surrounding site - skin remains clear, dry and intact.    Patient is medically stable for discharge to rehab with outpatient follow up. 86 yo F with PMH of HTN, HLD, Diabetes Mellitus Type 2, GERD, Dementia, presented to the ED on 9/20 with complaints of generalized weakness with poor PO intake and admitted for failure to thrive and UTI. Patient was started on IV rocephin for UTI and switched to cipro 500 mg PO BID. Urine cultures were positive for enterobacter, blood cultures NGTD. Patient developed an abscess in the thoracic back which required I&D by surgery with daily packings by wound care. Patient is recommended to remove packing daily, then irrigate wound with saline, and repack with 1/4 inch Iodoform gauze. Cultures from liquid collected grew gram + cocci in pairs and patient was started on keflex and recommended to continue as an outpatient for a total of 10 days. During hospital course patient experienced persistently high blood pressures ranging from the 160s to 190s as a result, norvasc was added to patient's home medication for blood pressure control and recommended to continue standing outpatient.    Patient improved clinically throughout hospital course. Patient seen and examined on day of discharge.    Vital Signs Last 24 Hrs  T(C): 36.7 (26 Sep 2018 04:46), Max: 37.1 (25 Sep 2018 21:28)  T(F): 98.1 (26 Sep 2018 04:46), Max: 98.7 (25 Sep 2018 21:28)  HR: 62 (26 Sep 2018 04:59) (60 - 62)  BP: 168/80 (26 Sep 2018 04:59) (158/74 - 188/76)  BP(mean): --  RR: 18 (26 Sep 2018 04:46) (18 - 18)  SpO2: 100% (26 Sep 2018 04:46) (96% - 100%)    Physical Exam:  General: NAD  HEENT: NCAT, EOMI  Neurology: A&Ox2, nonfocal  Respiratory: CTA B/L, No W/R/R  CV: RRR, +S1/S2, no murmurs, rubs or gallops  Abdominal: Soft, NT, ND +BSx4  Extremities: No C/C/E, + peripheral pulses  MSK: Normal ROM, no joint erythema or warmth, no joint swelling   Skin: Open, packed surgical wound of mid-back. No erythema surrounding site - skin remains clear, dry and intact.    Patient is medically stable for discharge to rehab with outpatient follow up. 86 yo F with PMH of HTN, HLD, Diabetes Mellitus Type 2, GERD, Dementia, presented to the ED on 9/20 with complaints of generalized weakness with poor PO intake and admitted for failure to thrive and UTI. Patient was started on IV rocephin for UTI and switched to cipro 500 mg PO BID. Urine cultures were positive for enterobacter, blood cultures negative. Patient had an abscess in the thoracic back which required I&D by surgery with daily packings by wound care. Patient is recommended to remove packing daily, then irrigate wound with saline, and repack with 1/4 inch Iodoform gauze. Cultures from liquid collected grew gram + cocci in pairs and patient was started on keflex and recommended to continue as an outpatient for a total of 7 days. During hospital course patient experienced persistently high blood pressures ranging from the 160s to 190s as a result, norvasc was added to patient's home medication for blood pressure control and recommended to continue standing outpatient.    Patient improved clinically throughout hospital course. Patient seen and examined on day of discharge.    Vital Signs Last 24 Hrs  T(C): 36.7 (26 Sep 2018 04:46), Max: 37.1 (25 Sep 2018 21:28)  T(F): 98.1 (26 Sep 2018 04:46), Max: 98.7 (25 Sep 2018 21:28)  HR: 62 (26 Sep 2018 04:59) (60 - 62)  BP: 168/80 (26 Sep 2018 04:59) (158/74 - 188/76)  BP(mean): --  RR: 18 (26 Sep 2018 04:46) (18 - 18)  SpO2: 100% (26 Sep 2018 04:46) (96% - 100%)    Physical Exam:  General: NAD  HEENT: NCAT, EOMI  Neurology: A&Ox2, nonfocal  Respiratory: CTA B/L, No W/R/R  CV: RRR, +S1/S2, no murmurs, rubs or gallops  Abdominal: Soft, NT, ND +BSx4  Extremities: No C/C/E, + peripheral pulses  MSK: Normal ROM, no joint erythema or warmth, no joint swelling   Skin: Open, packed surgical wound of mid-back. No erythema surrounding site - skin remains clear, dry and intact.    Patient is medically stable for discharge to rehab with outpatient follow up.    Completion of discharge in 35 minutes.

## 2018-09-21 NOTE — PROGRESS NOTE ADULT - PROBLEM SELECTOR PLAN 4
-Chronic, stable.  -Continue with Carvedilol with hold parameters. -Continue with Carvedilol with hold parameters.

## 2018-09-21 NOTE — PROGRESS NOTE ADULT - PROBLEM SELECTOR PLAN 6
-Hold Januvia, Metformin.  -LDISS, Hypoglycemia Protocol, Acccharlie AC&HS.  -Diet: Consistent Carbs, no evening snack.  -Follow up HbA1c   -A1c on last admission 5.4, patient may not need sliding scale. -Hold Jorge Luvia, Metformin.  -LDISS, Hypoglycemia Protocol, Jonas AC&HS.  -Diet: Consistent Carbs, no evening snack.  -A1c on last admission 5.4, patient may not need sliding scale pending results of A1c this AM -Hold Januvia, Metformin.  -c/w LDISS, Hypoglycemia Protocol, Jonas AC&HS.  -Diet: Consistent Carbs, no evening snack.  -A1c on last admission 5.4%, patient may not need sliding scale pending results of A1c and monitor FSG

## 2018-09-21 NOTE — PHYSICAL THERAPY INITIAL EVALUATION ADULT - RANGE OF MOTION EXAMINATION, REHAB EVAL
bilateral upper extremity ROM was WFL (within functional limits)/bilateral lower extremity ROM was WFL (within functional limits)/deficits as listed below/some arthritic changes noted

## 2018-09-21 NOTE — PROGRESS NOTE ADULT - ASSESSMENT
84 yo F with PMH of HTN, HLD, Diabetes Mellitus Type 2, GERD, Dementia, presented to the ED with complaints of generalized weakness with poor PO intake over the last few days. She has not taken her medications over the past 2 days. Admitted for failure to thrive, UTI. 86yo F with PMH of HTN, HLD, Diabetes Mellitus Type 2, GERD, Dementia, presented to the ED with complaints of generalized weakness with poor PO intake and refusal to take home meds over the last few days as per family, admitted with failure to thrive, and suspected UTI.

## 2018-09-21 NOTE — DISCHARGE NOTE ADULT - SECONDARY DIAGNOSIS.
Urinary tract infection without hematuria, site unspecified Type 2 diabetes mellitus without complication, without long-term current use of insulin HTN (hypertension) HLD (hyperlipidemia) GERD (gastroesophageal reflux disease) Cutaneous abscess of other site

## 2018-09-21 NOTE — PROGRESS NOTE ADULT - PROBLEM SELECTOR PLAN 2
-UA significant for small LE, positive nitrites, WBCs 6-10, many bacteria.  -Afebrile, no leukocytosis, normal lactate, normal BUN/Cr.  -Continue with Rocephin 1 gram IV qd.  -Continue with Tylenol prn fever.  -Follow up Urine culture. -per nurse patient ate most of her meal this AM.   -f/u nutrition recs  -encouragement and assistance with meals  -f/u social work recs  -f/u PT recs  -case management consult

## 2018-09-21 NOTE — DIETITIAN INITIAL EVALUATION ADULT. - OTHER INFO
Pt seen for nutrition assessment for education. Per chart, pt is 84 Y/O F with PMH of HTN, HLD, Type 2 DM, GERD, Dementia, who presented to the ED with generalized weakness/poor PO intake. Pt's son reports UBW of 135-139 pounds. Unable to quantify recent wt loss with confidence, stating she may have lost 8-10 pounds x1 year. Pt denies N/V/diarrhea/constipation, last BM PTA. Denies difficulties chewing/swallowing, wears partial dentures. NKFA.

## 2018-09-21 NOTE — DIETITIAN INITIAL EVALUATION ADULT. - PROBLEM SELECTOR PLAN 1
-Admit to [facility]  -nutrition eval   -encouragement and assistance with meals  -social work eval   -PT eval  -maintenance fluids 75cc/hr for 12 hours  -case management consult

## 2018-09-21 NOTE — DIETITIAN INITIAL EVALUATION ADULT. - PERTINENT LABORATORY DATA
Hgb 9.9L, Hct 30.0L, Na 146H, K 3.0L, Cl 110H, point of care testing BG 9/21:  mg/dl, 9/20: 175 mg/dl

## 2018-09-21 NOTE — DIETITIAN INITIAL EVALUATION ADULT. - PHYSICAL APPEARANCE
BMI 23.2 kg/m2 using current wt of 134.6 pounds. Nutrition focused physical exam conducted - signs of malnutrition [X]absent [ ]present. Pt noted with slightly protruding clavicle and mild muscle loss in temporal, interosseous, scapula region but overall physical exam did not suggest severe muscle/fat loss./other (specify)

## 2018-09-21 NOTE — DISCHARGE NOTE ADULT - CARE PLAN
Principal Discharge DX:	Failure to thrive in adult  Secondary Diagnosis:	Urinary tract infection without hematuria, site unspecified  Secondary Diagnosis:	Type 2 diabetes mellitus without complication, without long-term current use of insulin  Secondary Diagnosis:	HTN (hypertension)  Secondary Diagnosis:	HLD (hyperlipidemia)  Secondary Diagnosis:	GERD (gastroesophageal reflux disease) Principal Discharge DX:	Failure to thrive in adult  Goal:	Improved  Assessment and plan of treatment:	During your stay you developed better eating habits and ate regularly. Please continue to eat regular meals and visit your PCP within the next week or earlier should you begin to lose your appetite or feel weak.  Secondary Diagnosis:	Urinary tract infection without hematuria, site unspecified  Goal:	Resolved  Assessment and plan of treatment:	During your stay you were treated for a UTI with IV and oral antibiotics. Please follow up with your PCP within a week or earlier should you begin to feel pain with urination or notice you urinate more frequently than usual.  Secondary Diagnosis:	Type 2 diabetes mellitus without complication, without long-term current use of insulin  Goal:	Controlled  Assessment and plan of treatment:	During your stay your HbA1c was found to be 5.6. Please try to avoid foods with a high carbohydrate content and follow up with your PCP for management.  Secondary Diagnosis:	Cutaneous abscess of other site  Goal:	Resolved  Assessment and plan of treatment:	During your stay an abscess in your back was drained. Please continue to have the dressings changed needed and follow up with wound care (contact information) below should you have any difficulties.  Secondary Diagnosis:	HTN (hypertension)  Secondary Diagnosis:	HLD (hyperlipidemia) Principal Discharge DX:	Failure to thrive in adult  Goal:	Improved  Assessment and plan of treatment:	During your stay you developed better eating habits and ate regularly. Please continue to eat regular meals and visit your PCP within the next week or earlier should you begin to lose your appetite or feel weak.  Secondary Diagnosis:	Urinary tract infection without hematuria, site unspecified  Goal:	Resolved  Assessment and plan of treatment:	During your stay you were treated for a UTI with IV and oral antibiotics. Please follow up with your PCP within a week or earlier should you begin to feel pain with urination or notice you urinate more frequently than usual.  Secondary Diagnosis:	Type 2 diabetes mellitus without complication, without long-term current use of insulin  Goal:	Controlled  Assessment and plan of treatment:	During your stay your HbA1c was found to be 5.6. Please try to avoid foods with a high carbohydrate content and follow up with your PCP for management.  Secondary Diagnosis:	Cutaneous abscess of other site  Goal:	Resolved  Assessment and plan of treatment:	During your stay an abscess in your back was drained. Please continue to have the dressings changed needed and follow up with wound care (contact information) below should you have any difficulties.  Secondary Diagnosis:	HTN (hypertension)  Goal:	Improving  Assessment and plan of treatment:	During your stay your blood pressures remained elevated requiring us to add norvasc 5mg daily to your home medications. Please continue to take the norvasc daily in addition to your normal blood pressure medications and follow up with your PCP within 1 week of discharge.  Secondary Diagnosis:	HLD (hyperlipidemia)  Goal:	Controlled  Assessment and plan of treatment:	Please take your home statin as prescribed. Principal Discharge DX:	Failure to thrive in adult  Goal:	Improved  Assessment and plan of treatment:	During your stay you developed better eating habits and ate regularly. Please continue to eat regular meals and visit your PCP within the next week or earlier should you begin to lose your appetite or feel weak.  Secondary Diagnosis:	Urinary tract infection without hematuria, site unspecified  Goal:	Resolved  Assessment and plan of treatment:	During your stay you were treated for a UTI with IV and oral antibiotics. Please follow up with your PCP within a week or earlier should you begin to feel pain with urination or notice you urinate more frequently than usual.  Secondary Diagnosis:	Type 2 diabetes mellitus without complication, without long-term current use of insulin  Goal:	Controlled  Assessment and plan of treatment:	During your stay your HbA1c was found to be 5.6. Please try to avoid foods with a high carbohydrate content and follow up with your PCP for management.  Secondary Diagnosis:	Cutaneous abscess of other site  Goal:	Resolved  Assessment and plan of treatment:	During your stay an abscess in your back was drained. Please continue to remove packing daily, then irrigate wound with saline, and repack with 1/4 inch Iodoform gauze and follow up with wound care (contact information) below should you have any difficulties.  Secondary Diagnosis:	HTN (hypertension)  Goal:	Improving  Assessment and plan of treatment:	During your stay your blood pressures remained elevated requiring us to add norvasc 5mg daily to your home medications. Please continue to take the norvasc daily in addition to your normal blood pressure medications and follow up with your PCP within 1 week of discharge.  Secondary Diagnosis:	HLD (hyperlipidemia)  Goal:	Controlled  Assessment and plan of treatment:	Please take your home statin as prescribed.

## 2018-09-21 NOTE — DISCHARGE NOTE ADULT - PATIENT PORTAL LINK FT
You can access the [facility] Portal, offered by [facility], by registering with the following website: [email or web address]

## 2018-09-22 LAB
-  AMIKACIN: SIGNIFICANT CHANGE UP
-  AMOXICILLIN/CLAVULANIC ACID: SIGNIFICANT CHANGE UP
-  AMPICILLIN/SULBACTAM: SIGNIFICANT CHANGE UP
-  AMPICILLIN: SIGNIFICANT CHANGE UP
-  AZTREONAM: SIGNIFICANT CHANGE UP
-  CEFAZOLIN: SIGNIFICANT CHANGE UP
-  CEFEPIME: SIGNIFICANT CHANGE UP
-  CEFOXITIN: SIGNIFICANT CHANGE UP
-  CEFTRIAXONE: SIGNIFICANT CHANGE UP
-  CIPROFLOXACIN: SIGNIFICANT CHANGE UP
-  ERTAPENEM: SIGNIFICANT CHANGE UP
-  GENTAMICIN: SIGNIFICANT CHANGE UP
-  IMIPENEM: SIGNIFICANT CHANGE UP
-  LEVOFLOXACIN: SIGNIFICANT CHANGE UP
-  MEROPENEM: SIGNIFICANT CHANGE UP
-  NITROFURANTOIN: SIGNIFICANT CHANGE UP
-  PIPERACILLIN/TAZOBACTAM: SIGNIFICANT CHANGE UP
-  TIGECYCLINE: SIGNIFICANT CHANGE UP
-  TOBRAMYCIN: SIGNIFICANT CHANGE UP
-  TRIMETHOPRIM/SULFAMETHOXAZOLE: SIGNIFICANT CHANGE UP
ANION GAP SERPL CALC-SCNC: 10 MMOL/L — SIGNIFICANT CHANGE UP (ref 5–17)
BUN SERPL-MCNC: 15 MG/DL — SIGNIFICANT CHANGE UP (ref 7–23)
CALCIUM SERPL-MCNC: 8.4 MG/DL — LOW (ref 8.5–10.1)
CHLORIDE SERPL-SCNC: 108 MMOL/L — SIGNIFICANT CHANGE UP (ref 96–108)
CO2 SERPL-SCNC: 26 MMOL/L — SIGNIFICANT CHANGE UP (ref 22–31)
CREAT SERPL-MCNC: 0.81 MG/DL — SIGNIFICANT CHANGE UP (ref 0.5–1.3)
CULTURE RESULTS: SIGNIFICANT CHANGE UP
GLUCOSE SERPL-MCNC: 153 MG/DL — HIGH (ref 70–99)
HCT VFR BLD CALC: 32.4 % — LOW (ref 34.5–45)
HGB BLD-MCNC: 10.7 G/DL — LOW (ref 11.5–15.5)
MAGNESIUM SERPL-MCNC: 1.5 MG/DL — LOW (ref 1.6–2.6)
MCHC RBC-ENTMCNC: 27.4 PG — SIGNIFICANT CHANGE UP (ref 27–34)
MCHC RBC-ENTMCNC: 33 GM/DL — SIGNIFICANT CHANGE UP (ref 32–36)
MCV RBC AUTO: 83.1 FL — SIGNIFICANT CHANGE UP (ref 80–100)
METHOD TYPE: SIGNIFICANT CHANGE UP
NRBC # BLD: 0 /100 WBCS — SIGNIFICANT CHANGE UP (ref 0–0)
ORGANISM # SPEC MICROSCOPIC CNT: SIGNIFICANT CHANGE UP
ORGANISM # SPEC MICROSCOPIC CNT: SIGNIFICANT CHANGE UP
PHOSPHATE SERPL-MCNC: 2.8 MG/DL — SIGNIFICANT CHANGE UP (ref 2.5–4.5)
PLATELET # BLD AUTO: 206 K/UL — SIGNIFICANT CHANGE UP (ref 150–400)
POTASSIUM SERPL-MCNC: 3.6 MMOL/L — SIGNIFICANT CHANGE UP (ref 3.5–5.3)
POTASSIUM SERPL-SCNC: 3.6 MMOL/L — SIGNIFICANT CHANGE UP (ref 3.5–5.3)
RBC # BLD: 3.9 M/UL — SIGNIFICANT CHANGE UP (ref 3.8–5.2)
RBC # FLD: 15.6 % — HIGH (ref 10.3–14.5)
SODIUM SERPL-SCNC: 144 MMOL/L — SIGNIFICANT CHANGE UP (ref 135–145)
SPECIMEN SOURCE: SIGNIFICANT CHANGE UP
WBC # BLD: 8.41 K/UL — SIGNIFICANT CHANGE UP (ref 3.8–10.5)
WBC # FLD AUTO: 8.41 K/UL — SIGNIFICANT CHANGE UP (ref 3.8–10.5)

## 2018-09-22 PROCEDURE — 99232 SBSQ HOSP IP/OBS MODERATE 35: CPT

## 2018-09-22 RX ORDER — MAGNESIUM SULFATE 500 MG/ML
1 VIAL (ML) INJECTION ONCE
Qty: 0 | Refills: 0 | Status: COMPLETED | OUTPATIENT
Start: 2018-09-22 | End: 2018-09-22

## 2018-09-22 RX ADMIN — Medication 1 TABLET(S): at 17:43

## 2018-09-22 RX ADMIN — Medication 2: at 17:43

## 2018-09-22 RX ADMIN — Medication 1: at 09:04

## 2018-09-22 RX ADMIN — Medication 100 GRAM(S): at 14:39

## 2018-09-22 RX ADMIN — CARVEDILOL PHOSPHATE 6.25 MILLIGRAM(S): 80 CAPSULE, EXTENDED RELEASE ORAL at 17:43

## 2018-09-22 RX ADMIN — ENOXAPARIN SODIUM 40 MILLIGRAM(S): 100 INJECTION SUBCUTANEOUS at 05:26

## 2018-09-22 RX ADMIN — ATORVASTATIN CALCIUM 10 MILLIGRAM(S): 80 TABLET, FILM COATED ORAL at 21:19

## 2018-09-22 RX ADMIN — Medication 1: at 21:19

## 2018-09-22 RX ADMIN — Medication 1 TABLET(S): at 13:11

## 2018-09-22 RX ADMIN — CEFTRIAXONE 100 GRAM(S): 500 INJECTION, POWDER, FOR SOLUTION INTRAMUSCULAR; INTRAVENOUS at 17:43

## 2018-09-22 RX ADMIN — PANTOPRAZOLE SODIUM 40 MILLIGRAM(S): 20 TABLET, DELAYED RELEASE ORAL at 05:27

## 2018-09-22 RX ADMIN — DONEPEZIL HYDROCHLORIDE 5 MILLIGRAM(S): 10 TABLET, FILM COATED ORAL at 21:19

## 2018-09-22 RX ADMIN — Medication 81 MILLIGRAM(S): at 13:10

## 2018-09-22 RX ADMIN — Medication 10 MILLIGRAM(S): at 13:12

## 2018-09-22 RX ADMIN — Medication 2000 UNIT(S): at 13:11

## 2018-09-22 RX ADMIN — Medication 1: at 13:06

## 2018-09-22 RX ADMIN — PREGABALIN 500 MICROGRAM(S): 225 CAPSULE ORAL at 13:10

## 2018-09-22 RX ADMIN — CARVEDILOL PHOSPHATE 6.25 MILLIGRAM(S): 80 CAPSULE, EXTENDED RELEASE ORAL at 05:26

## 2018-09-22 RX ADMIN — Medication 325 MILLIGRAM(S): at 13:10

## 2018-09-22 RX ADMIN — Medication 1 TABLET(S): at 09:05

## 2018-09-22 NOTE — PROGRESS NOTE ADULT - SUBJECTIVE AND OBJECTIVE BOX
CHIEF COMPLAINT/INTERVAL HISTORY:  Pt. seen and evaluated for UTI and failure to thrive.  Pt. is in no distress.  AAO x 1 (place).  Tolerating IV antibiotics.  Currently eating lunch.     REVIEW OF SYSTEMS:  No fever, CP, SOB, or abdominal pain.     Vital Signs Last 24 Hrs  T(C): 36.6 (22 Sep 2018 04:42), Max: 37.2 (21 Sep 2018 20:23)  T(F): 97.8 (22 Sep 2018 04:42), Max: 99 (21 Sep 2018 20:23)  HR: 63 (22 Sep 2018 04:42) (61 - 63)  BP: 180/77 (22 Sep 2018 04:42) (155/85 - 180/77)  BP(mean): --  RR: 18 (22 Sep 2018 04:42) (18 - 18)  SpO2: 95% (22 Sep 2018 04:42) (95% - 98%)    PHYSICAL EXAM:  GENERAL: NAD  HEENT: EOMI, hearing normal, conjunctiva and sclera clear  Chest: CTA bilaterally, no wheezing  CV: S1S2, RRR,   GI: soft, +BS, NT/ND  Musculoskeletal: no edema  Psychiatric: affect nL, mood nL  Skin: warm and dry    LABS:                        10.7   8.41  )-----------( 206      ( 22 Sep 2018 08:12 )             32.4     -    144  |  108  |  15  ----------------------------<  153<H>  3.6   |  26  |  0.81    Ca    8.4<L>      22 Sep 2018 08:12  Phos  2.8       Mg     1.5         TPro  6.1  /  Alb  2.5<L>  /  TBili  0.9  /  DBili  x   /  AST  16  /  ALT  8<L>  /  AlkPhos  52  09-20    PT/INR - ( 20 Sep 2018 13:59 )   PT: 12.0 sec;   INR: 1.10 ratio         PTT - ( 20 Sep 2018 13:59 )  PTT:28.8 sec  Urinalysis Basic - ( 20 Sep 2018 14:06 )    Color: Yellow / Appearance: Slightly Turbid / S.010 / pH: x  Gluc: x / Ketone: Negative  / Bili: Negative / Urobili: Negative   Blood: x / Protein: Negative / Nitrite: Positive   Leuk Esterase: Small / RBC: 3-5 /HPF / WBC 6-10   Sq Epi: x / Non Sq Epi: Moderate / Bacteria: Many        Assessment and Plan:  -Failure to thrive:  Pt. with improved nutritional intake.    -UTI:  urine cx with Enterobacter, Blood cx NGTD.  Continue Ceftriaxone 1gm IV daily  -Dementia:  continue Aricept.  -HTN:  continue Coreg 6.25mg PO Q12h  -HLD: continue Lipitor 10mg PO QHS  -Type 2 DM:  continue humalog sliding scale  -GERD:  Continue Protonix 40mg PO daily  -VTE ppx:  Lovenox 40mg SQ daily

## 2018-09-23 ENCOUNTER — TRANSCRIPTION ENCOUNTER (OUTPATIENT)
Age: 83
End: 2018-09-23

## 2018-09-23 LAB
ANION GAP SERPL CALC-SCNC: 9 MMOL/L — SIGNIFICANT CHANGE UP (ref 5–17)
BUN SERPL-MCNC: 18 MG/DL — SIGNIFICANT CHANGE UP (ref 7–23)
CALCIUM SERPL-MCNC: 8.4 MG/DL — LOW (ref 8.5–10.1)
CHLORIDE SERPL-SCNC: 110 MMOL/L — HIGH (ref 96–108)
CO2 SERPL-SCNC: 25 MMOL/L — SIGNIFICANT CHANGE UP (ref 22–31)
CREAT SERPL-MCNC: 0.75 MG/DL — SIGNIFICANT CHANGE UP (ref 0.5–1.3)
GLUCOSE SERPL-MCNC: 140 MG/DL — HIGH (ref 70–99)
HCT VFR BLD CALC: 32.6 % — LOW (ref 34.5–45)
HGB BLD-MCNC: 10.7 G/DL — LOW (ref 11.5–15.5)
MAGNESIUM SERPL-MCNC: 1.8 MG/DL — SIGNIFICANT CHANGE UP (ref 1.6–2.6)
MCHC RBC-ENTMCNC: 27.1 PG — SIGNIFICANT CHANGE UP (ref 27–34)
MCHC RBC-ENTMCNC: 32.8 GM/DL — SIGNIFICANT CHANGE UP (ref 32–36)
MCV RBC AUTO: 82.5 FL — SIGNIFICANT CHANGE UP (ref 80–100)
NRBC # BLD: 0 /100 WBCS — SIGNIFICANT CHANGE UP (ref 0–0)
PLATELET # BLD AUTO: 220 K/UL — SIGNIFICANT CHANGE UP (ref 150–400)
POTASSIUM SERPL-MCNC: 3.7 MMOL/L — SIGNIFICANT CHANGE UP (ref 3.5–5.3)
POTASSIUM SERPL-SCNC: 3.7 MMOL/L — SIGNIFICANT CHANGE UP (ref 3.5–5.3)
RBC # BLD: 3.95 M/UL — SIGNIFICANT CHANGE UP (ref 3.8–5.2)
RBC # FLD: 15.3 % — HIGH (ref 10.3–14.5)
SODIUM SERPL-SCNC: 144 MMOL/L — SIGNIFICANT CHANGE UP (ref 135–145)
WBC # BLD: 6.68 K/UL — SIGNIFICANT CHANGE UP (ref 3.8–10.5)
WBC # FLD AUTO: 6.68 K/UL — SIGNIFICANT CHANGE UP (ref 3.8–10.5)

## 2018-09-23 PROCEDURE — 99232 SBSQ HOSP IP/OBS MODERATE 35: CPT

## 2018-09-23 RX ORDER — LISINOPRIL 2.5 MG/1
10 TABLET ORAL ONCE
Qty: 0 | Refills: 0 | Status: COMPLETED | OUTPATIENT
Start: 2018-09-23 | End: 2018-09-23

## 2018-09-23 RX ORDER — LISINOPRIL 2.5 MG/1
10 TABLET ORAL DAILY
Qty: 0 | Refills: 0 | Status: DISCONTINUED | OUTPATIENT
Start: 2018-09-24 | End: 2018-09-24

## 2018-09-23 RX ORDER — CIPROFLOXACIN LACTATE 400MG/40ML
500 VIAL (ML) INTRAVENOUS EVERY 12 HOURS
Qty: 0 | Refills: 0 | Status: DISCONTINUED | OUTPATIENT
Start: 2018-09-23 | End: 2018-09-24

## 2018-09-23 RX ORDER — HYDRALAZINE HCL 50 MG
10 TABLET ORAL ONCE
Qty: 0 | Refills: 0 | Status: COMPLETED | OUTPATIENT
Start: 2018-09-23 | End: 2018-09-23

## 2018-09-23 RX ADMIN — Medication 3: at 13:07

## 2018-09-23 RX ADMIN — Medication 10 MILLIGRAM(S): at 13:03

## 2018-09-23 RX ADMIN — ENOXAPARIN SODIUM 40 MILLIGRAM(S): 100 INJECTION SUBCUTANEOUS at 05:28

## 2018-09-23 RX ADMIN — ATORVASTATIN CALCIUM 10 MILLIGRAM(S): 80 TABLET, FILM COATED ORAL at 22:21

## 2018-09-23 RX ADMIN — Medication 325 MILLIGRAM(S): at 13:03

## 2018-09-23 RX ADMIN — PANTOPRAZOLE SODIUM 40 MILLIGRAM(S): 20 TABLET, DELAYED RELEASE ORAL at 05:30

## 2018-09-23 RX ADMIN — CARVEDILOL PHOSPHATE 6.25 MILLIGRAM(S): 80 CAPSULE, EXTENDED RELEASE ORAL at 05:29

## 2018-09-23 RX ADMIN — DONEPEZIL HYDROCHLORIDE 5 MILLIGRAM(S): 10 TABLET, FILM COATED ORAL at 22:20

## 2018-09-23 RX ADMIN — Medication 2000 UNIT(S): at 13:02

## 2018-09-23 RX ADMIN — Medication 1 TABLET(S): at 18:07

## 2018-09-23 RX ADMIN — LISINOPRIL 10 MILLIGRAM(S): 2.5 TABLET ORAL at 13:02

## 2018-09-23 RX ADMIN — Medication 81 MILLIGRAM(S): at 13:02

## 2018-09-23 RX ADMIN — Medication 1 TABLET(S): at 13:03

## 2018-09-23 RX ADMIN — CARVEDILOL PHOSPHATE 6.25 MILLIGRAM(S): 80 CAPSULE, EXTENDED RELEASE ORAL at 18:07

## 2018-09-23 RX ADMIN — Medication 1: at 18:16

## 2018-09-23 RX ADMIN — Medication 2: at 22:57

## 2018-09-23 RX ADMIN — Medication 10 MILLIGRAM(S): at 23:00

## 2018-09-23 RX ADMIN — PREGABALIN 500 MICROGRAM(S): 225 CAPSULE ORAL at 13:02

## 2018-09-23 RX ADMIN — Medication 500 MILLIGRAM(S): at 18:07

## 2018-09-23 RX ADMIN — Medication 1 TABLET(S): at 09:18

## 2018-09-23 NOTE — CHART NOTE - NSCHARTNOTEFT_GEN_A_CORE
S: 85/ F with hx of failure to thrive, dementia, HTN, HLD, DM2, GERD seen and examined at bedside for elevated blood pressures (180s/90) despite taking scheduled coreg and lisinopril. Patient NAD, denies any complaints at present. Denies any headaches, vision changes, dizziness, chest pain SOB.       ROS as per HPI    O:  Allergies    sulfa drugs (Unknown)    Intolerances        Vitals  Vital Signs Last 24 Hrs  T(C): 37.3 (23 Sep 2018 20:53), Max: 37.3 (23 Sep 2018 20:53)  T(F): 99.2 (23 Sep 2018 20:53), Max: 99.2 (23 Sep 2018 20:53)  HR: 57 (23 Sep 2018 23:10) (56 - 65)  BP: 166/70 (23 Sep 2018 23:10) (162/72 - 190/78)  BP(mean): --  RR: 17 (23 Sep 2018 20:53) (16 - 17)  SpO2: 97% (23 Sep 2018 20:53) (95% - 99%)      General: WN/WD NAD  Respiratory: CTA B/L, no wheezes, rhonchi, rales  CV: RRR, S1S2, no murmurs, rubs or gallops  Abdominal: Soft, NT, ND +BS,  Extremities: No edema, + peripheral pulses  Neurology: A&Ox2, nonfocal, HOLLIS x 4       LABS:                        10.7   6.68  )-----------( 220      ( 23 Sep 2018 08:42 )             32.6     09-23    144  |  110<H>  |  18  ----------------------------<  140<H>  3.7   |  25  |  0.75    Ca    8.4<L>      23 Sep 2018 08:42  Phos  2.8     09-22  Mg     1.8     09-23                RADIOLOGY & ADDITIONAL TESTS:    A/P: 85/F with hx of failure to thrive, UTI, HTN, HLD, DM2, GERD, and dementia evaluated for elevated blood pressures and found to have pressures 180s/90s.    -Administered 10mg hydralazine IV push over 5 min while patient was on cardiac monitor  -Remote tele monitoring for 1 hour after administration of hydralazine  -BP check q15min for 1 hour  -Afebrile, hemodynamically stable at present, continue to monitor

## 2018-09-23 NOTE — PROGRESS NOTE ADULT - SUBJECTIVE AND OBJECTIVE BOX
CHIEF COMPLAINT/INTERVAL HISTORY:  Pt. seen and evaluated for failure to thrive and UTI.  Pt. is in no distress.  Denies having any pain.  Tolerating IV antibiotics.      REVIEW OF SYSTEMS:  No fever, CP, SOB, or abdominal pain.     Vital Signs Last 24 Hrs  T(C): 36.5 (23 Sep 2018 04:41), Max: 37.6 (22 Sep 2018 14:04)  T(F): 97.7 (23 Sep 2018 04:41), Max: 99.7 (22 Sep 2018 14:04)  HR: 65 (23 Sep 2018 04:41) (61 - 65)  BP: 186/86 (23 Sep 2018 04:41) (135/71 - 186/86)  BP(mean): --  RR: 17 (23 Sep 2018 04:41) (17 - 17)  SpO2: 99% (23 Sep 2018 04:41) (97% - 99%)    PHYSICAL EXAM:  GENERAL: NAD  HEENT: EOMI, hearing normal, conjunctiva and sclera clear  Chest: CTA bilaterally, no wheezing  CV: S1S2, RRR,   GI: soft, +BS, NT/ND  Musculoskeletal: no edema  Psychiatric: affect nL, mood nL  Skin: warm and dry, 3cm collection along R. mid back region.    LABS:                        10.7   6.68  )-----------( 220      ( 23 Sep 2018 08:42 )             32.6     09-23    144  |  110<H>  |  18  ----------------------------<  140<H>  3.7   |  25  |  0.75    Ca    8.4<L>      23 Sep 2018 08:42  Phos  2.8     09-22  Mg     1.8     09-23      Assessment and Plan:  -Failure to thrive:  Pt. with improved nutritional intake.    -UTI:  urine cx with Enterobacter, Blood cx NGTD.  Switch antibiotics to Cipro 500mg PO BID.  -Abscess of the back:  Surgical consult  -Dementia:  continue Aricept.  -HTN:  continue Coreg 6.25mg PO Q12h.  Add lisinopril 10mg PO daily.  -HLD: continue Lipitor 10mg PO QHS  -Type 2 DM:  continue humalog sliding scale  -GERD:  Continue Protonix 40mg PO daily  -VTE ppx:  Lovenox 40mg SQ daily

## 2018-09-23 NOTE — PROVIDER CONTACT NOTE (OTHER) - ASSESSMENT
Pt has an elevated blood pressure of 181/51, pulse 57, temp 99.2, O2 sat 97% on room air. Blood pressure taken manually on right arm and reading was 185/80. Pt is asymptomatic

## 2018-09-23 NOTE — PROVIDER CONTACT NOTE (OTHER) - ACTION/TREATMENT ORDERED:
notified. No new orders at this time. Will continue to monitor.  notified. Repeat manual blood pressure in 1 hour and call MD. Will continue to monitor.

## 2018-09-24 DIAGNOSIS — L02.212 CUTANEOUS ABSCESS OF BACK [ANY PART, EXCEPT BUTTOCK]: ICD-10-CM

## 2018-09-24 LAB
ANION GAP SERPL CALC-SCNC: 8 MMOL/L — SIGNIFICANT CHANGE UP (ref 5–17)
BUN SERPL-MCNC: 20 MG/DL — SIGNIFICANT CHANGE UP (ref 7–23)
CALCIUM SERPL-MCNC: 8.6 MG/DL — SIGNIFICANT CHANGE UP (ref 8.5–10.1)
CHLORIDE SERPL-SCNC: 106 MMOL/L — SIGNIFICANT CHANGE UP (ref 96–108)
CO2 SERPL-SCNC: 28 MMOL/L — SIGNIFICANT CHANGE UP (ref 22–31)
CREAT SERPL-MCNC: 0.91 MG/DL — SIGNIFICANT CHANGE UP (ref 0.5–1.3)
GLUCOSE SERPL-MCNC: 157 MG/DL — HIGH (ref 70–99)
HCT VFR BLD CALC: 34 % — LOW (ref 34.5–45)
HGB BLD-MCNC: 11.1 G/DL — LOW (ref 11.5–15.5)
MAGNESIUM SERPL-MCNC: 1.7 MG/DL — SIGNIFICANT CHANGE UP (ref 1.6–2.6)
MCHC RBC-ENTMCNC: 27.2 PG — SIGNIFICANT CHANGE UP (ref 27–34)
MCHC RBC-ENTMCNC: 32.6 GM/DL — SIGNIFICANT CHANGE UP (ref 32–36)
MCV RBC AUTO: 83.3 FL — SIGNIFICANT CHANGE UP (ref 80–100)
NRBC # BLD: 0 /100 WBCS — SIGNIFICANT CHANGE UP (ref 0–0)
PLATELET # BLD AUTO: 217 K/UL — SIGNIFICANT CHANGE UP (ref 150–400)
POTASSIUM SERPL-MCNC: 3.5 MMOL/L — SIGNIFICANT CHANGE UP (ref 3.5–5.3)
POTASSIUM SERPL-SCNC: 3.5 MMOL/L — SIGNIFICANT CHANGE UP (ref 3.5–5.3)
RBC # BLD: 4.08 M/UL — SIGNIFICANT CHANGE UP (ref 3.8–5.2)
RBC # FLD: 15.7 % — HIGH (ref 10.3–14.5)
SODIUM SERPL-SCNC: 142 MMOL/L — SIGNIFICANT CHANGE UP (ref 135–145)
WBC # BLD: 6.92 K/UL — SIGNIFICANT CHANGE UP (ref 3.8–10.5)
WBC # FLD AUTO: 6.92 K/UL — SIGNIFICANT CHANGE UP (ref 3.8–10.5)

## 2018-09-24 PROCEDURE — 10060 I&D ABSCESS SIMPLE/SINGLE: CPT

## 2018-09-24 PROCEDURE — 99221 1ST HOSP IP/OBS SF/LOW 40: CPT | Mod: 25

## 2018-09-24 PROCEDURE — 99232 SBSQ HOSP IP/OBS MODERATE 35: CPT | Mod: GC

## 2018-09-24 RX ORDER — SODIUM,POTASSIUM PHOSPHATES 278-250MG
1 POWDER IN PACKET (EA) ORAL
Qty: 0 | Refills: 0 | Status: DISCONTINUED | OUTPATIENT
Start: 2018-09-24 | End: 2018-09-26

## 2018-09-24 RX ORDER — ONDANSETRON 8 MG/1
4 TABLET, FILM COATED ORAL ONCE
Qty: 0 | Refills: 0 | Status: COMPLETED | OUTPATIENT
Start: 2018-09-24 | End: 2018-09-24

## 2018-09-24 RX ORDER — LISINOPRIL 2.5 MG/1
40 TABLET ORAL DAILY
Qty: 0 | Refills: 0 | Status: DISCONTINUED | OUTPATIENT
Start: 2018-09-25 | End: 2018-09-25

## 2018-09-24 RX ORDER — LISINOPRIL 2.5 MG/1
10 TABLET ORAL ONCE
Qty: 0 | Refills: 0 | Status: COMPLETED | OUTPATIENT
Start: 2018-09-24 | End: 2018-09-24

## 2018-09-24 RX ADMIN — PANTOPRAZOLE SODIUM 40 MILLIGRAM(S): 20 TABLET, DELAYED RELEASE ORAL at 05:37

## 2018-09-24 RX ADMIN — Medication 1: at 17:57

## 2018-09-24 RX ADMIN — Medication 1: at 23:14

## 2018-09-24 RX ADMIN — Medication 10 MILLIGRAM(S): at 12:07

## 2018-09-24 RX ADMIN — ATORVASTATIN CALCIUM 10 MILLIGRAM(S): 80 TABLET, FILM COATED ORAL at 23:14

## 2018-09-24 RX ADMIN — DONEPEZIL HYDROCHLORIDE 5 MILLIGRAM(S): 10 TABLET, FILM COATED ORAL at 21:10

## 2018-09-24 RX ADMIN — Medication 81 MILLIGRAM(S): at 12:08

## 2018-09-24 RX ADMIN — ENOXAPARIN SODIUM 40 MILLIGRAM(S): 100 INJECTION SUBCUTANEOUS at 05:43

## 2018-09-24 RX ADMIN — Medication 1 TABLET(S): at 17:57

## 2018-09-24 RX ADMIN — LISINOPRIL 10 MILLIGRAM(S): 2.5 TABLET ORAL at 12:06

## 2018-09-24 RX ADMIN — Medication 1 TABLET(S): at 17:58

## 2018-09-24 RX ADMIN — Medication 1 TABLET(S): at 12:09

## 2018-09-24 RX ADMIN — Medication 1: at 12:09

## 2018-09-24 RX ADMIN — Medication 325 MILLIGRAM(S): at 12:09

## 2018-09-24 RX ADMIN — CARVEDILOL PHOSPHATE 6.25 MILLIGRAM(S): 80 CAPSULE, EXTENDED RELEASE ORAL at 17:57

## 2018-09-24 RX ADMIN — ONDANSETRON 4 MILLIGRAM(S): 8 TABLET, FILM COATED ORAL at 09:40

## 2018-09-24 RX ADMIN — Medication 2000 UNIT(S): at 12:08

## 2018-09-24 RX ADMIN — Medication 1 TABLET(S): at 21:10

## 2018-09-24 RX ADMIN — PREGABALIN 500 MICROGRAM(S): 225 CAPSULE ORAL at 12:07

## 2018-09-24 RX ADMIN — Medication 500 MILLIGRAM(S): at 05:37

## 2018-09-24 RX ADMIN — Medication 500 MILLIGRAM(S): at 17:57

## 2018-09-24 RX ADMIN — LISINOPRIL 10 MILLIGRAM(S): 2.5 TABLET ORAL at 05:37

## 2018-09-24 NOTE — CONSULT NOTE ADULT - SUBJECTIVE AND OBJECTIVE BOX
HPI:  86 yo F with PMH of HTN, HLD, Diabetes Mellitus Type 2, GERD, Dementia, presented to the ED with complaints of generalized weakness with poor PO intake over the last few days. She has not taken her medications over the past 2 days. She came to the ED with a similar presentation approximately 3 weeks ago. Per son, he has been trying to get additional help at home, but has been struggling of late by himself. Patient refuses meds and fights son when he tries to give them to her. He is unsure if she actually takes what has been given to her. Son also states he can only get her to eat if the food is sweet. She has been afraid to walk lately and has been progressively getting harder to care for. Patient is confused at baseline but per son when she eats and gets stronger she is "sharper". Her memory problems are mostly short term memory loss per son. Patient unable to provide further history 2/2 dementia. Of note son states patient recently treated for UTI diagnosed on last trip to the ED.     In the ED, she was hemodynamically stable.   Labs significant for hyperglycemia (Glu 253) and decreased eGFR (52), otherwise, no anemia, no leukocytosis, normal lactate, normal electrolytes, normal liver function.  UA significant for small LE, positive nitrites, WBCs 6-10, many bacteria, trace blood, moderate epithelial cells, many calcium oxalate crystals.  CT Head No Contrast: No acute intracranial hemorrhage or acute territorial infarct. Severe ischemic small vessel white matter disease.  CXR: No active pulmonary disease. Hiatal hernia is again noted.  Received Rocephin 1gram IV x1, NS Bolus 1L x1. (20 Sep 2018 16:19)      PAST MEDICAL & SURGICAL HISTORY:  Dementia  DM (diabetes mellitus)  HLD (hyperlipidemia)  HTN (hypertension)  GERD (gastroesophageal reflux disease)  History of cholecystectomy  Ankle injury: s/p ankle surgery, pt doesn&#x27;t remember which ankle      MEDICATIONS  (STANDING):  aspirin enteric coated 81 milliGRAM(s) Oral daily  atorvastatin 10 milliGRAM(s) Oral at bedtime  carvedilol 6.25 milliGRAM(s) Oral every 12 hours  cholecalciferol 2000 Unit(s) Oral daily  ciprofloxacin     Tablet 500 milliGRAM(s) Oral every 12 hours  cyanocobalamin 500 MICROGram(s) Oral daily  dextrose 5%. 1000 milliLiter(s) (50 mL/Hr) IV Continuous <Continuous>  dextrose 50% Injectable 12.5 Gram(s) IV Push once  dextrose 50% Injectable 25 Gram(s) IV Push once  dextrose 50% Injectable 25 Gram(s) IV Push once  donepezil 5 milliGRAM(s) Oral at bedtime  enoxaparin Injectable 40 milliGRAM(s) SubCutaneous every 24 hours  ferrous    sulfate 325 milliGRAM(s) Oral daily  influenza   Vaccine 0.5 milliLiter(s) IntraMuscular once  insulin lispro (HumaLOG) corrective regimen sliding scale   SubCutaneous Before meals and at bedtime  lactobacillus acidophilus 1 Tablet(s) Oral three times a day with meals  pantoprazole    Tablet 40 milliGRAM(s) Oral before breakfast  PARoxetine 10 milliGRAM(s) Oral daily    MEDICATIONS  (PRN):  dextrose 40% Gel 15 Gram(s) Oral once PRN Blood Glucose LESS THAN 70 milliGRAM(s)/deciliter  glucagon  Injectable 1 milliGRAM(s) IntraMuscular once PRN Glucose LESS THAN 70 milligrams/deciliter      Allergies    sulfa drugs (Unknown)    Intolerances        SOCIAL HISTORY:    FAMILY HISTORY:  No pertinent family history in first degree relatives      Vital Signs Last 24 Hrs  T(C): 37.2 (24 Sep 2018 13:24), Max: 37.3 (23 Sep 2018 20:53)  T(F): 98.9 (24 Sep 2018 13:24), Max: 99.2 (23 Sep 2018 20:53)  HR: 66 (24 Sep 2018 13:24) (57 - 137)  BP: 165/75 (24 Sep 2018 13:24) (98/72 - 190/78)  BP(mean): --  RR: 18 (24 Sep 2018 13:24) (17 - 18)  SpO2: 95% (24 Sep 2018 13:24) (95% - 98%)    LABS:                        11.1   6.92  )-----------( 217      ( 24 Sep 2018 07:59 )             34.0     09-24    142  |  106  |  20  ----------------------------<  157<H>  3.5   |  28  |  0.91    Ca    8.6      24 Sep 2018 07:59  Phos  2.4     09-24  Mg     1.7     09-24            RADIOLOGY & ADDITIONAL STUDIES:

## 2018-09-24 NOTE — PROGRESS NOTE ADULT - PROBLEM SELECTOR PLAN 4
- Urine culture positive with Enterobacter.  - Blood cultures NGTD.    - Continue with Cipro 500mg PO BID.

## 2018-09-24 NOTE — CONSULT NOTE ADULT - ASSESSMENT
3 cm abscess with purulent drainage on left mid back.  Indurated with fluctuance and minimal cellulitis.  Incision and drainage offered.  Consent obtained from the patient and her son.    Bedside Incision and Drainage performed.  See dictation.  Minimal purulent drainage expressed from wound.  Cultures taken.  Bluntly dissected to break up underlying loculations.  Irrigated with NS and packed with 1/4 inch iodoform.  Dry 4x4 dressing and paper tape.    Change packing daily.  Consult wound care for dressing changes. 3 cm abscess with purulent drainage on right mid back.  Indurated with fluctuance and minimal cellulitis.  Incision and drainage offered.  Consent obtained from the patient and her son.    Bedside Incision and Drainage performed.  See dictation.  Minimal purulent drainage expressed from wound.  Cultures taken.  Bluntly dissected to break up underlying loculations.  Irrigated with NS and packed with 1/4 inch iodoform.  Dry 4x4 dressing and paper tape.    Change packing daily.  Consult wound care for dressing changes.

## 2018-09-24 NOTE — PROGRESS NOTE ADULT - PROBLEM SELECTOR PLAN 9
- Phosphorus level low at 2.4 - repleted, will continue to monitor daily.  - Continue Lovenox 40mg SQ daily

## 2018-09-24 NOTE — PROGRESS NOTE ADULT - ASSESSMENT
86yo F with PMH of HTN, HLD, Diabetes Mellitus Type 2, GERD, Dementia, presented to the ED with complaints of generalized weakness with poor PO intake and refusal to take home meds over the last few days as per family, admitted with failure to thrive, and suspected UTI.

## 2018-09-24 NOTE — PROGRESS NOTE ADULT - PROBLEM SELECTOR PLAN 1
- Patient given stat dose of lisinopril 10mg (received lisinopril 10mg this morning).  - Increase daily dose of lisinopril from 10mg to 20mg daily starting tomorrow.  - Continue coreg 6.25mg PO BID.

## 2018-09-24 NOTE — PROGRESS NOTE ADULT - SUBJECTIVE AND OBJECTIVE BOX
HPI:  86 yo F with PMH of HTN, HLD, Diabetes Mellitus Type 2, GERD, Dementia, presented to the ED with a chief complaint of generalized weakness with poor PO intake over the last few days.    INTERVAL/OVERNIGHT EVENTS: Overnight patient's blood pressure clay to the 180s/90s requiring administration of 10mg hydralazine IV push. Patient's BP trended downwards but remained elevated this morning to 168/75 with an episode of 5 beats of NS SVT. Patient denies feeling light headed, experiencing chest pain or palpitations. Endorses weakness and nausea.    REVIEW OF SYSTEMS:    CONSTITUTIONAL: + weakness, no fevers or chills  RESPIRATORY: No cough, wheezing, hemoptysis; No shortness of breath  CARDIOVASCULAR: No chest pain or palpitations  GASTROINTESTINAL: No abdominal pain, + nausea, no vomiting, or hematemesis; No diarrhea or constipation. GENITOURINARY: No dysuria, frequency or hematuria  All other review of systems is negative unless indicated above.    T(C): 37.2 (09-24-18 @ 13:24), Max: 37.3 (09-23-18 @ 20:53)  HR: 66 (09-24-18 @ 13:24) (57 - 137)  BP: 165/75 (09-24-18 @ 13:24) (98/72 - 190/78)  RR: 18 (09-24-18 @ 13:24) (17 - 18)  SpO2: 95% (09-24-18 @ 13:24) (95% - 98%)    PHYSICAL EXAM:     GENERAL: Frail, no acute distress  HEENT: NC/AT, EOMI  RESPIRATORY: LCTAB/L, no rhonchi, rales, or wheezing  CARDIOVASCULAR: RRR, no murmurs, gallops, rubs  ABDOMINAL: soft, non-tender, non-distended, positive bowel sounds   EXTREMITIES: no clubbing, cyanosis, or edema  NEUROLOGICAL: alert and oriented x 2, non-focal  SKIN: + indurated abscess approximately 4cm in the middle right back                          11.1   6.92  )-----------( 217      ( 24 Sep 2018 07:59 )             34.0     09-24    142  |  106  |  20  ----------------------------<  157<H>  3.5   |  28  |  0.91    Ca    8.6      24 Sep 2018 07:59  Phos  2.4     09-24  Mg     1.7     09-24        CAPILLARY BLOOD GLUCOSE      POCT Blood Glucose.: 183 mg/dL (24 Sep 2018 12:01)  POCT Blood Glucose.: 150 mg/dL (24 Sep 2018 08:09)  POCT Blood Glucose.: 220 mg/dL (23 Sep 2018 22:43)  POCT Blood Glucose.: 154 mg/dL (23 Sep 2018 18:04)      MEDICATIONS  (STANDING):  aspirin enteric coated 81 milliGRAM(s) Oral daily  atorvastatin 10 milliGRAM(s) Oral at bedtime  carvedilol 6.25 milliGRAM(s) Oral every 12 hours  cholecalciferol 2000 Unit(s) Oral daily  ciprofloxacin     Tablet 500 milliGRAM(s) Oral every 12 hours  cyanocobalamin 500 MICROGram(s) Oral daily  dextrose 5%. 1000 milliLiter(s) (50 mL/Hr) IV Continuous <Continuous>  dextrose 50% Injectable 12.5 Gram(s) IV Push once  dextrose 50% Injectable 25 Gram(s) IV Push once  dextrose 50% Injectable 25 Gram(s) IV Push once  donepezil 5 milliGRAM(s) Oral at bedtime  enoxaparin Injectable 40 milliGRAM(s) SubCutaneous every 24 hours  ferrous    sulfate 325 milliGRAM(s) Oral daily  influenza   Vaccine 0.5 milliLiter(s) IntraMuscular once  insulin lispro (HumaLOG) corrective regimen sliding scale   SubCutaneous Before meals and at bedtime  lactobacillus acidophilus 1 Tablet(s) Oral three times a day with meals  pantoprazole    Tablet 40 milliGRAM(s) Oral before breakfast  PARoxetine 10 milliGRAM(s) Oral daily  potassium acid phosphate/sodium acid phosphate tablet (K-PHOS No. 2) 1 Tablet(s) Oral four times a day with meals

## 2018-09-24 NOTE — CONSULT NOTE ADULT - SKIN COMMENTS
3cm abscess on left mid back with induration and fluctuance. 3cm abscess on right mid back with induration and fluctuance.

## 2018-09-24 NOTE — PROGRESS NOTE ADULT - PROBLEM SELECTOR PLAN 3
- Bedside I&D performed by surgical team  - Wound care consulted for dressing changes, packing requires daily changes.  - F/u cultures from purulent drainage.

## 2018-09-25 DIAGNOSIS — L02.818 CUTANEOUS ABSCESS OF OTHER SITES: ICD-10-CM

## 2018-09-25 LAB
ANION GAP SERPL CALC-SCNC: 6 MMOL/L — SIGNIFICANT CHANGE UP (ref 5–17)
BUN SERPL-MCNC: 20 MG/DL — SIGNIFICANT CHANGE UP (ref 7–23)
CALCIUM SERPL-MCNC: 8.8 MG/DL — SIGNIFICANT CHANGE UP (ref 8.5–10.1)
CHLORIDE SERPL-SCNC: 106 MMOL/L — SIGNIFICANT CHANGE UP (ref 96–108)
CO2 SERPL-SCNC: 31 MMOL/L — SIGNIFICANT CHANGE UP (ref 22–31)
CREAT SERPL-MCNC: 1 MG/DL — SIGNIFICANT CHANGE UP (ref 0.5–1.3)
CULTURE RESULTS: SIGNIFICANT CHANGE UP
CULTURE RESULTS: SIGNIFICANT CHANGE UP
GLUCOSE SERPL-MCNC: 192 MG/DL — HIGH (ref 70–99)
HCT VFR BLD CALC: 33.8 % — LOW (ref 34.5–45)
HGB BLD-MCNC: 11 G/DL — LOW (ref 11.5–15.5)
MAGNESIUM SERPL-MCNC: 1.8 MG/DL — SIGNIFICANT CHANGE UP (ref 1.6–2.6)
MCHC RBC-ENTMCNC: 27.3 PG — SIGNIFICANT CHANGE UP (ref 27–34)
MCHC RBC-ENTMCNC: 32.5 GM/DL — SIGNIFICANT CHANGE UP (ref 32–36)
MCV RBC AUTO: 83.9 FL — SIGNIFICANT CHANGE UP (ref 80–100)
NRBC # BLD: 0 /100 WBCS — SIGNIFICANT CHANGE UP (ref 0–0)
PHOSPHATE SERPL-MCNC: 3.9 MG/DL — SIGNIFICANT CHANGE UP (ref 2.5–4.5)
PLATELET # BLD AUTO: 233 K/UL — SIGNIFICANT CHANGE UP (ref 150–400)
POTASSIUM SERPL-MCNC: 4.2 MMOL/L — SIGNIFICANT CHANGE UP (ref 3.5–5.3)
POTASSIUM SERPL-SCNC: 4.2 MMOL/L — SIGNIFICANT CHANGE UP (ref 3.5–5.3)
RBC # BLD: 4.03 M/UL — SIGNIFICANT CHANGE UP (ref 3.8–5.2)
RBC # FLD: 15.5 % — HIGH (ref 10.3–14.5)
SODIUM SERPL-SCNC: 143 MMOL/L — SIGNIFICANT CHANGE UP (ref 135–145)
SPECIMEN SOURCE: SIGNIFICANT CHANGE UP
SPECIMEN SOURCE: SIGNIFICANT CHANGE UP
WBC # BLD: 6.89 K/UL — SIGNIFICANT CHANGE UP (ref 3.8–10.5)
WBC # FLD AUTO: 6.89 K/UL — SIGNIFICANT CHANGE UP (ref 3.8–10.5)

## 2018-09-25 PROCEDURE — 99024 POSTOP FOLLOW-UP VISIT: CPT

## 2018-09-25 PROCEDURE — 99232 SBSQ HOSP IP/OBS MODERATE 35: CPT | Mod: GC

## 2018-09-25 RX ORDER — AMLODIPINE BESYLATE 2.5 MG/1
5 TABLET ORAL ONCE
Qty: 0 | Refills: 0 | Status: COMPLETED | OUTPATIENT
Start: 2018-09-25 | End: 2018-09-25

## 2018-09-25 RX ORDER — VALSARTAN 80 MG/1
160 TABLET ORAL DAILY
Qty: 0 | Refills: 0 | Status: DISCONTINUED | OUTPATIENT
Start: 2018-09-26 | End: 2018-09-26

## 2018-09-25 RX ADMIN — Medication 325 MILLIGRAM(S): at 13:27

## 2018-09-25 RX ADMIN — Medication 2000 UNIT(S): at 13:26

## 2018-09-25 RX ADMIN — Medication 1: at 21:44

## 2018-09-25 RX ADMIN — PREGABALIN 500 MICROGRAM(S): 225 CAPSULE ORAL at 13:26

## 2018-09-25 RX ADMIN — AMLODIPINE BESYLATE 5 MILLIGRAM(S): 2.5 TABLET ORAL at 19:10

## 2018-09-25 RX ADMIN — ENOXAPARIN SODIUM 40 MILLIGRAM(S): 100 INJECTION SUBCUTANEOUS at 05:13

## 2018-09-25 RX ADMIN — Medication 1 TABLET(S): at 19:10

## 2018-09-25 RX ADMIN — DONEPEZIL HYDROCHLORIDE 5 MILLIGRAM(S): 10 TABLET, FILM COATED ORAL at 21:43

## 2018-09-25 RX ADMIN — ATORVASTATIN CALCIUM 10 MILLIGRAM(S): 80 TABLET, FILM COATED ORAL at 21:44

## 2018-09-25 RX ADMIN — Medication 81 MILLIGRAM(S): at 13:26

## 2018-09-25 RX ADMIN — Medication 10 MILLIGRAM(S): at 13:25

## 2018-09-25 RX ADMIN — Medication 1 TABLET(S): at 10:31

## 2018-09-25 RX ADMIN — CARVEDILOL PHOSPHATE 6.25 MILLIGRAM(S): 80 CAPSULE, EXTENDED RELEASE ORAL at 06:50

## 2018-09-25 RX ADMIN — PANTOPRAZOLE SODIUM 40 MILLIGRAM(S): 20 TABLET, DELAYED RELEASE ORAL at 05:06

## 2018-09-25 RX ADMIN — CARVEDILOL PHOSPHATE 6.25 MILLIGRAM(S): 80 CAPSULE, EXTENDED RELEASE ORAL at 19:10

## 2018-09-25 RX ADMIN — LISINOPRIL 40 MILLIGRAM(S): 2.5 TABLET ORAL at 05:06

## 2018-09-25 RX ADMIN — Medication 1: at 17:32

## 2018-09-25 RX ADMIN — Medication 1 TABLET(S): at 13:26

## 2018-09-25 RX ADMIN — Medication 1 TABLET(S): at 21:44

## 2018-09-25 RX ADMIN — Medication 1: at 12:01

## 2018-09-25 RX ADMIN — Medication 1 TABLET(S): at 13:25

## 2018-09-25 NOTE — PROGRESS NOTE ADULT - PROBLEM SELECTOR PROBLEM 8
Need for prophylactic measure
GERD (gastroesophageal reflux disease)
GERD (gastroesophageal reflux disease)

## 2018-09-25 NOTE — CONSULT NOTE ADULT - SUBJECTIVE AND OBJECTIVE BOX
Chief Complaint: Abscess of back    HPI: The patient was found to have a fluctuant abscess of her midback, and an incision and drainage procedure was performed by Dr. Benitez. Wound Care was called to provide post - procedure wound care orders.    PAST MEDICAL & SURGICAL HISTORY:  Dementia  DM (diabetes mellitus)  HLD (hyperlipidemia)  HTN (hypertension)  GERD (gastroesophageal reflux disease)  History of cholecystectomy  Ankle injury: s/p ankle surgery, pt doesn&#x27;t remember which ankle      Allergies    sulfa drugs (Unknown)    Intolerances        MEDICATIONS  (STANDING):  aspirin enteric coated 81 milliGRAM(s) Oral daily  atorvastatin 10 milliGRAM(s) Oral at bedtime  carvedilol 6.25 milliGRAM(s) Oral every 12 hours  cholecalciferol 2000 Unit(s) Oral daily  cyanocobalamin 500 MICROGram(s) Oral daily  dextrose 5%. 1000 milliLiter(s) (50 mL/Hr) IV Continuous <Continuous>  dextrose 50% Injectable 12.5 Gram(s) IV Push once  dextrose 50% Injectable 25 Gram(s) IV Push once  dextrose 50% Injectable 25 Gram(s) IV Push once  donepezil 5 milliGRAM(s) Oral at bedtime  enoxaparin Injectable 40 milliGRAM(s) SubCutaneous every 24 hours  ferrous    sulfate 325 milliGRAM(s) Oral daily  influenza   Vaccine 0.5 milliLiter(s) IntraMuscular once  insulin lispro (HumaLOG) corrective regimen sliding scale   SubCutaneous Before meals and at bedtime  lactobacillus acidophilus 1 Tablet(s) Oral three times a day with meals  lisinopril 40 milliGRAM(s) Oral daily  pantoprazole    Tablet 40 milliGRAM(s) Oral before breakfast  PARoxetine 10 milliGRAM(s) Oral daily  potassium acid phosphate/sodium acid phosphate tablet (K-PHOS No. 2) 1 Tablet(s) Oral four times a day with meals    MEDICATIONS  (PRN):  dextrose 40% Gel 15 Gram(s) Oral once PRN Blood Glucose LESS THAN 70 milliGRAM(s)/deciliter  glucagon  Injectable 1 milliGRAM(s) IntraMuscular once PRN Glucose LESS THAN 70 milligrams/deciliter      FAMILY HISTORY:  No pertinent family history in first degree relatives      ROS:  CONSTITUTIONAL: No fever, weight loss, or fatigue  NECK: No pain or stiffness  RESPIRATORY: No cough, wheezing, chills or hemoptysis; No shortness of breath  CARDIOVASCULAR: Hypertension  GASTROINTESTINAL: GERD  GENITOURINARY: No dysuria, frequency, hematuria, or incontinence  NEUROLOGICAL: Dementia  SKIN: No itching, burning, rashes, or lesions   ENDOCRINE:Diabetes mellitus  MUSCULOSKELETAL: No joint pain or swelling; No muscle, back, or extremity pain  HEME/LYMPH: No easy bruising, or bleeding gums    PHYSICAL EXAM-      Vital Signs Last 24 Hrs  T(C): 36.8 (25 Sep 2018 05:03), Max: 37.2 (24 Sep 2018 13:24)  T(F): 98.2 (25 Sep 2018 05:03), Max: 99 (24 Sep 2018 20:44)  HR: 65 (25 Sep 2018 05:03) (64 - 69)  BP: 170/95 (25 Sep 2018 05:03) (165/75 - 190/80)  BP(mean): --  RR: 18 (25 Sep 2018 05:03) (18 - 18)  SpO2: 99% (25 Sep 2018 05:03) (95% - 99%)      Skin: Open, packed surgical wound of mid-back. Packing removed. No pus draining. No odor detected. No cellulitis and no induration present. Periwound skin clean and intact.                             11.1   6.92  )-----------( 217      ( 24 Sep 2018 07:59 )             34.0     09-24    142  |  106  |  20  ----------------------------<  157<H>  3.5   |  28  |  0.91    Ca    8.6      24 Sep 2018 07:59  Phos  2.4     09-24  Mg     1.7     09-24        Radiology

## 2018-09-25 NOTE — PROGRESS NOTE ADULT - PROBLEM SELECTOR PROBLEM 6
Type 2 diabetes mellitus without complication, without long-term current use of insulin
HLD (hyperlipidemia)
HLD (hyperlipidemia)

## 2018-09-25 NOTE — PROGRESS NOTE ADULT - PROBLEM SELECTOR PLAN 4
- Urine culture positive with Enterobacter.  - Blood cultures NGTD.    - Continue with Cipro 500mg PO BID. - Urine culture positive with Enterobacter.  - Blood cultures NGTD.    - Completed course of antibiotics.

## 2018-09-25 NOTE — PROGRESS NOTE ADULT - PROBLEM SELECTOR PLAN 1
- Given persistent high blood pressures, give one time dose of 5mg norvasc.   - Switch lisinopril from 40mg daily to 10 mg given addition of new blood pressure agent.  - Continue coreg 6.25mg PO BID. - Given persistent high blood pressures, give one time dose of 5mg norvasc.   - Switch to patient's home medication of valsartan 160mg PO daily  - Continue coreg 6.25mg PO BID.

## 2018-09-25 NOTE — PROGRESS NOTE ADULT - PROBLEM SELECTOR PLAN 3
- Bedside I&D performed by surgical team yesterday  - Wound care recs for dressing changes appreciated, packing requires daily changes.  - F/u cultures from purulent drainage.

## 2018-09-25 NOTE — PROGRESS NOTE ADULT - PROBLEM SELECTOR PLAN 7
-Chronic, stable.  -Continue with PPI.
- HbA1c of 5.6   - Continue insulin low dose coverage scale.
- HbA1c of 5.6   - Continue insulin low dose coverage scale.

## 2018-09-25 NOTE — PROGRESS NOTE ADULT - ATTENDING COMMENTS
Pt. seen and evaluated for failure to thrive and UTI.  Pt. is in no distress.  tolerating oral antibiotics.  Had bedside I&D of back abscess today.  Physical examination as above.      Plan:  -Failure to thrive:  Pt. with improved nutritional intake.    -UTI:  urine cx with Enterobacter, Blood cx NGTD.  Continue Cipro 500mg PO BID (last day of antibiotic).  -Abscess of the back:  s/p I&D by surgery.  Check abscess  culture.  wound care consult.  -Dementia:  continue Aricept.  -HTN:  continue Coreg 6.25mg PO Q12h and lisinopril 20mg PO daily.  -HLD: continue Lipitor 10mg PO QHS  -Type 2 DM:  continue humalog sliding scale  -GERD:  Continue Protonix 40mg PO daily  -VTE ppx:  Lovenox 40mg SQ daily
Pt. seen and evaluated for failure to thrive and UTI.  Pt. is in no distress.  Has been eating most of her meals.  Has remained afebrile.  Physical examination as above.      Plan:  -Failure to thrive:  Pt. with improved nutritional intake.  Eating most of her meals.  -UTI:  urine cx with Enterobacter, Blood cx NGTD.  Completed course of antibiotics.  -Abscess of the back:  s/p I&D by surgery.  Check abscess culture.  wound care consult appreciated.  -Dementia:  continue Aricept.  -HTN:  continue Coreg 6.25mg PO Q12h.  Will restart patient's home medication Valsartan 160mg PO daily  -HLD: continue Lipitor 10mg PO QHS  -Type 2 DM:  continue humalog sliding scale  -GERD:  Continue Protonix 40mg PO daily  -VTE ppx:  Lovenox 40mg SQ daily
Pt seen + examined. Case discussed with intern/resident. Agree with assessment and plan above (edited) with following addendum:  Time spent: 30min. More than 50% of the visit was spent counseling the patient/family on medical condition -- suspected UTI, FTT.    86yo F with PMH of HTN, HLD, Diabetes Mellitus Type 2, GERD, Dementia, presented to the ED with complaints of generalized weakness with poor PO intake and refusal to take home meds over the last few days as per family, admitted with failure to thrive, and suspected UTI.   -difficult to gauge if pt has true UTI or asymptomatic bacteriuria as she has advanced dementia and cannot reliably report symptoms   -Continue with Rocephin for now and f/up UCx, consider short course of antibiotics given the reported change in pt's behavior which may be related to infection  -PT rec [facility] -- social work to discuss with family regarding placement  -pt's po intake improving as per nursing aides

## 2018-09-25 NOTE — CONSULT NOTE ADULT - PROBLEM SELECTOR RECOMMENDATION 9
Remove packing daily, then irrigate wound with saline, and repack with 1/4 inch Iodoform gauze. Patient may be followed at wound care center after discharge if desired.

## 2018-09-25 NOTE — PROGRESS NOTE ADULT - ASSESSMENT
S/P I&D.  Cultures pending  Continue local wound care.  Daily iodoform packing changes.  Further wound care and management by Wound care team.  Reconsult as needed.

## 2018-09-25 NOTE — PROGRESS NOTE ADULT - REASON FOR ADMISSION
failure to thrive in an adult
Failure to thrive
failure to thrive in an adult

## 2018-09-25 NOTE — PROGRESS NOTE ADULT - SUBJECTIVE AND OBJECTIVE BOX
HPI:  84 yo F with PMH of HTN, HLD, Diabetes Mellitus Type 2, GERD, Dementia, presented to the ED with a chief complaint of generalized weakness with poor PO intake over the last few days.    INTERVAL/OVERNIGHT EVENTS: No significant events overnight. Patient endorses eating dinner last night, currently denies any back pain near area of I&D. Denies any SOB, chest pain, nausea/vomiting.    REVIEW OF SYSTEMS:    CONSTITUTIONAL: No weakness, fevers or chills  RESPIRATORY: No cough, wheezing, hemoptysis; No shortness of breath  CARDIOVASCULAR: No chest pain or palpitations  GASTROINTESTINAL: No abdominal pain, nausea, vomiting, or hematemesis; No diarrhea or constipation. No melena or hematochezia.  GENITOURINARY: No dysuria, frequency or hematuria  NEUROLOGICAL: No dizziness, numbness, or weakness  SKIN: No itching, burning, rashes, or lesions   All other review of systems is negative unless indicated above.    T(C): 36.9 (09-25-18 @ 14:04), Max: 37.2 (09-24-18 @ 20:44)  HR: 61 (09-25-18 @ 14:04) (61 - 69)  BP: 167/83 (09-25-18 @ 14:04) (166/88 - 190/80)  RR: 18 (09-25-18 @ 14:04) (18 - 18)  SpO2: 100% (09-25-18 @ 14:04) (97% - 100%)    PHYSICAL EXAM:     GENERAL: no acute distress  HEENT: NC/AT, EOMI  RESPIRATORY: LCTAB/L, no rhonchi, rales, or wheezing  CARDIOVASCULAR: RRR, no murmurs, gallops, rubs  ABDOMINAL: soft, non-tender, non-distended, positive bowel sounds   EXTREMITIES: no clubbing, cyanosis, or edema  NEUROLOGICAL: alert and oriented x 2, non-focal  SKIN: Open, packed surgical wound of mid-back. No erythema surrounding site - skin remains clear, dry and intact.                          11.0   6.89  )-----------( 233      ( 25 Sep 2018 10:44 )             33.8     09-25    143  |  106  |  20  ----------------------------<  192<H>  4.2   |  31  |  1.00    Ca    8.8      25 Sep 2018 10:44  Phos  3.9     09-25  Mg     1.8     09-25        CAPILLARY BLOOD GLUCOSE      POCT Blood Glucose.: 197 mg/dL (25 Sep 2018 11:49)  POCT Blood Glucose.: 154 mg/dL (25 Sep 2018 08:19)  POCT Blood Glucose.: 177 mg/dL (24 Sep 2018 23:11)  POCT Blood Glucose.: 182 mg/dL (24 Sep 2018 17:37)      MEDICATIONS  (STANDING):  amLODIPine   Tablet 5 milliGRAM(s) Oral once  aspirin enteric coated 81 milliGRAM(s) Oral daily  atorvastatin 10 milliGRAM(s) Oral at bedtime  carvedilol 6.25 milliGRAM(s) Oral every 12 hours  cholecalciferol 2000 Unit(s) Oral daily  cyanocobalamin 500 MICROGram(s) Oral daily  dextrose 5%. 1000 milliLiter(s) (50 mL/Hr) IV Continuous <Continuous>  dextrose 50% Injectable 12.5 Gram(s) IV Push once  dextrose 50% Injectable 25 Gram(s) IV Push once  dextrose 50% Injectable 25 Gram(s) IV Push once  donepezil 5 milliGRAM(s) Oral at bedtime  enoxaparin Injectable 40 milliGRAM(s) SubCutaneous every 24 hours  ferrous    sulfate 325 milliGRAM(s) Oral daily  influenza   Vaccine 0.5 milliLiter(s) IntraMuscular once  insulin lispro (HumaLOG) corrective regimen sliding scale   SubCutaneous Before meals and at bedtime  lactobacillus acidophilus 1 Tablet(s) Oral three times a day with meals  pantoprazole    Tablet 40 milliGRAM(s) Oral before breakfast  PARoxetine 10 milliGRAM(s) Oral daily  potassium acid phosphate/sodium acid phosphate tablet (K-PHOS No. 2) 1 Tablet(s) Oral four times a day with meals

## 2018-09-25 NOTE — PROGRESS NOTE ADULT - PROBLEM SELECTOR PROBLEM 7
GERD (gastroesophageal reflux disease)
Type 2 diabetes mellitus without complication, without long-term current use of insulin
Type 2 diabetes mellitus without complication, without long-term current use of insulin

## 2018-09-25 NOTE — PROGRESS NOTE ADULT - PROBLEM SELECTOR PROBLEM 4
HTN (hypertension)
Urinary tract infection without hematuria, site unspecified
Urinary tract infection without hematuria, site unspecified

## 2018-09-25 NOTE — PROGRESS NOTE ADULT - PROBLEM SELECTOR PLAN 5
-Chronic, stable.  -Continue with ASA, statin.  -DASH diet
- Patient A&O x 2 - continue aricept.
- Patient A&O x 2 - continue aricept.

## 2018-09-25 NOTE — PROGRESS NOTE ADULT - SUBJECTIVE AND OBJECTIVE BOX
s/p Incision and drainage of right mid back abscess.   Seen earlier today by wound care team.    Dressings clean and dry.

## 2018-09-26 VITALS
TEMPERATURE: 98 F | SYSTOLIC BLOOD PRESSURE: 128 MMHG | OXYGEN SATURATION: 96 % | RESPIRATION RATE: 18 BRPM | DIASTOLIC BLOOD PRESSURE: 71 MMHG | HEART RATE: 64 BPM

## 2018-09-26 PROCEDURE — 99239 HOSP IP/OBS DSCHRG MGMT >30: CPT

## 2018-09-26 RX ORDER — AMLODIPINE BESYLATE 2.5 MG/1
5 TABLET ORAL DAILY
Qty: 0 | Refills: 0 | Status: DISCONTINUED | OUTPATIENT
Start: 2018-09-26 | End: 2018-09-26

## 2018-09-26 RX ORDER — ASPIRIN/CALCIUM CARB/MAGNESIUM 324 MG
81 TABLET ORAL DAILY
Qty: 0 | Refills: 0 | Status: DISCONTINUED | OUTPATIENT
Start: 2018-09-26 | End: 2018-09-26

## 2018-09-26 RX ORDER — AMLODIPINE BESYLATE 2.5 MG/1
1 TABLET ORAL
Qty: 0 | Refills: 0 | DISCHARGE
Start: 2018-09-26

## 2018-09-26 RX ORDER — AMLODIPINE BESYLATE 2.5 MG/1
0 TABLET ORAL
Qty: 0 | Refills: 0 | DISCHARGE
Start: 2018-09-26

## 2018-09-26 RX ORDER — LACTOBACILLUS ACIDOPHILUS 100MM CELL
1 CAPSULE ORAL
Qty: 0 | Refills: 0 | DISCHARGE
Start: 2018-09-26

## 2018-09-26 RX ORDER — ASPIRIN/CALCIUM CARB/MAGNESIUM 324 MG
1 TABLET ORAL
Qty: 0 | Refills: 0 | DISCHARGE
Start: 2018-09-26

## 2018-09-26 RX ORDER — CEPHALEXIN 500 MG
1 CAPSULE ORAL
Qty: 0 | Refills: 0 | COMMUNITY
Start: 2018-09-26 | End: 2018-10-02

## 2018-09-26 RX ORDER — ASPIRIN/CALCIUM CARB/MAGNESIUM 324 MG
1 TABLET ORAL
Qty: 0 | Refills: 0 | COMMUNITY

## 2018-09-26 RX ORDER — CEPHALEXIN 500 MG
1 CAPSULE ORAL
Qty: 0 | Refills: 0 | DISCHARGE
Start: 2018-09-26 | End: 2018-10-02

## 2018-09-26 RX ORDER — CEPHALEXIN 500 MG
500 CAPSULE ORAL EVERY 12 HOURS
Qty: 0 | Refills: 0 | Status: DISCONTINUED | OUTPATIENT
Start: 2018-09-26 | End: 2018-09-26

## 2018-09-26 RX ADMIN — Medication 81 MILLIGRAM(S): at 12:42

## 2018-09-26 RX ADMIN — PREGABALIN 500 MICROGRAM(S): 225 CAPSULE ORAL at 12:41

## 2018-09-26 RX ADMIN — VALSARTAN 160 MILLIGRAM(S): 80 TABLET ORAL at 05:01

## 2018-09-26 RX ADMIN — CARVEDILOL PHOSPHATE 6.25 MILLIGRAM(S): 80 CAPSULE, EXTENDED RELEASE ORAL at 05:01

## 2018-09-26 RX ADMIN — Medication 325 MILLIGRAM(S): at 12:40

## 2018-09-26 RX ADMIN — Medication 2000 UNIT(S): at 12:40

## 2018-09-26 RX ADMIN — Medication 3: at 12:43

## 2018-09-26 RX ADMIN — ENOXAPARIN SODIUM 40 MILLIGRAM(S): 100 INJECTION SUBCUTANEOUS at 06:01

## 2018-09-26 RX ADMIN — Medication 1 TABLET(S): at 12:41

## 2018-09-26 RX ADMIN — Medication 10 MILLIGRAM(S): at 12:40

## 2018-09-26 RX ADMIN — Medication 1 TABLET(S): at 08:59

## 2018-09-26 RX ADMIN — INFLUENZA VIRUS VACCINE 0.5 MILLILITER(S): 15; 15; 15; 15 SUSPENSION INTRAMUSCULAR at 14:56

## 2018-09-26 RX ADMIN — PANTOPRAZOLE SODIUM 40 MILLIGRAM(S): 20 TABLET, DELAYED RELEASE ORAL at 05:00

## 2018-09-26 RX ADMIN — AMLODIPINE BESYLATE 5 MILLIGRAM(S): 2.5 TABLET ORAL at 12:42

## 2018-09-26 NOTE — CHART NOTE - NSCHARTNOTEFT_GEN_A_CORE
Assessment: 84 y/o female adm with weakness; failure to thrive. PMH HTN, high lipids, Type 2 DM, GERD, dementia. Pt sleeping at time of visit. Pt noted to be confused.  As per EMR, pt's appetite has been fair to good. Pt consuming % of meals after tray set up. Fecal incontinence noted. HgbA1c 5.6     Factors impacting intake: [ x] none [ ] nausea  [ ] vomiting [ ] diarrhea [ ] constipation  [ ]chewing problems [ ] swallowing issues  [ ] other:     Diet Presciption: Diet, Consistent Carbohydrate w/Evening Snack:   DASH/TLC {Sodium & Cholesterol Restricted} (09-20-18 @ 16:43)    Intake: % of meals.     Current Weight: 9/26 123.8# adm 134.6# ? accurate wts  % Weight Change    Pertinent Medications: MEDICATIONS  (STANDING):  amLODIPine   Tablet 5 milliGRAM(s) Oral daily  aspirin  chewable 81 milliGRAM(s) Oral daily  atorvastatin 10 milliGRAM(s) Oral at bedtime  carvedilol 6.25 milliGRAM(s) Oral every 12 hours  cephalexin 500 milliGRAM(s) Oral every 12 hours  cholecalciferol 2000 Unit(s) Oral daily  cyanocobalamin 500 MICROGram(s) Oral daily  dextrose 5%. 1000 milliLiter(s) (50 mL/Hr) IV Continuous <Continuous>  dextrose 50% Injectable 12.5 Gram(s) IV Push once  dextrose 50% Injectable 25 Gram(s) IV Push once  dextrose 50% Injectable 25 Gram(s) IV Push once  donepezil 5 milliGRAM(s) Oral at bedtime  enoxaparin Injectable 40 milliGRAM(s) SubCutaneous every 24 hours  ferrous    sulfate 325 milliGRAM(s) Oral daily  influenza   Vaccine 0.5 milliLiter(s) IntraMuscular once  insulin lispro (HumaLOG) corrective regimen sliding scale   SubCutaneous Before meals and at bedtime  lactobacillus acidophilus 1 Tablet(s) Oral three times a day with meals  pantoprazole    Tablet 40 milliGRAM(s) Oral before breakfast  PARoxetine 10 milliGRAM(s) Oral daily  potassium acid phosphate/sodium acid phosphate tablet (K-PHOS No. 2) 1 Tablet(s) Oral four times a day with meals  valsartan 160 milliGRAM(s) Oral daily    MEDICATIONS  (PRN):  dextrose 40% Gel 15 Gram(s) Oral once PRN Blood Glucose LESS THAN 70 milliGRAM(s)/deciliter  glucagon  Injectable 1 milliGRAM(s) IntraMuscular once PRN Glucose LESS THAN 70 milligrams/deciliter    Pertinent Labs: 09-25 Na143 mmol/L Glu 192 mg/dL<H> K+ 4.2 mmol/L Cr  1.00 mg/dL BUN 20 mg/dL 09-25 Phos 3.9 mg/dL 09-20 Alb 2.5 g/dL<L> 09-21 [id number] 5.6 %     CAPILLARY BLOOD GLUCOSE      POCT Blood Glucose.: 279 mg/dL (26 Sep 2018 11:49)  POCT Blood Glucose.: 149 mg/dL (26 Sep 2018 08:07)  POCT Blood Glucose.: 185 mg/dL (25 Sep 2018 21:24)  POCT Blood Glucose.: 192 mg/dL (25 Sep 2018 17:17)    Skin: no breakdowns noted.     Estimated Needs:   [ x] no change since previous assessment  [ ] recalculated:     Previous Nutrition Diagnosis:   [ ] Inadequate Energy Intake [x ]Inadequate Oral Intake [ ] Excessive Energy Intake   [ ] Underweight [ ] Increased Nutrient Needs [ ] Overweight/Obesity   [ ] Altered GI Function [ ] Unintended Weight Loss [ ] Food & Nutrition Related Knowledge Deficit [ ] Malnutrition     Nutrition Diagnosis is [ ] ongoing  [ x] resolved [ ] not applicable     New Nutrition Diagnosis: [x ] not applicable   [x] wt loss    Interventions:   Recommend  [ ] Change Diet To:  [ ] Nutrition Supplement  [ ] Nutrition Support  [x ] Other: Continue current diet order. Will rx adding alo Ensure Enlive (HgbA1c 5.6); po intake appears to be fair to good. ? wt loss noted.     Monitoring and Evaluation:   [x ] PO intake [ x ] Tolerance to diet prescription [ x ] weights [ x ] labs[ x ] follow up per protocol  [ ] other:

## 2018-09-29 LAB
CULTURE RESULTS: SIGNIFICANT CHANGE UP
SPECIMEN SOURCE: SIGNIFICANT CHANGE UP

## 2018-10-16 ENCOUNTER — CHART COPY (OUTPATIENT)
Age: 83
End: 2018-10-16

## 2018-10-24 PROCEDURE — 93005 ELECTROCARDIOGRAM TRACING: CPT

## 2018-10-24 PROCEDURE — 87205 SMEAR GRAM STAIN: CPT

## 2018-10-24 PROCEDURE — 85730 THROMBOPLASTIN TIME PARTIAL: CPT

## 2018-10-24 PROCEDURE — 97162 PT EVAL MOD COMPLEX 30 MIN: CPT

## 2018-10-24 PROCEDURE — 97530 THERAPEUTIC ACTIVITIES: CPT

## 2018-10-24 PROCEDURE — 80053 COMPREHEN METABOLIC PANEL: CPT

## 2018-10-24 PROCEDURE — 70450 CT HEAD/BRAIN W/O DYE: CPT

## 2018-10-24 PROCEDURE — 83605 ASSAY OF LACTIC ACID: CPT

## 2018-10-24 PROCEDURE — 83690 ASSAY OF LIPASE: CPT

## 2018-10-24 PROCEDURE — 80048 BASIC METABOLIC PNL TOTAL CA: CPT

## 2018-10-24 PROCEDURE — 85027 COMPLETE CBC AUTOMATED: CPT

## 2018-10-24 PROCEDURE — 99285 EMERGENCY DEPT VISIT HI MDM: CPT | Mod: 25

## 2018-10-24 PROCEDURE — 84100 ASSAY OF PHOSPHORUS: CPT

## 2018-10-24 PROCEDURE — 87040 BLOOD CULTURE FOR BACTERIA: CPT

## 2018-10-24 PROCEDURE — 83036 HEMOGLOBIN GLYCOSYLATED A1C: CPT

## 2018-10-24 PROCEDURE — 71045 X-RAY EXAM CHEST 1 VIEW: CPT

## 2018-10-24 PROCEDURE — 97116 GAIT TRAINING THERAPY: CPT

## 2018-10-24 PROCEDURE — 87186 SC STD MICRODIL/AGAR DIL: CPT

## 2018-10-24 PROCEDURE — 90686 IIV4 VACC NO PRSV 0.5 ML IM: CPT

## 2018-10-24 PROCEDURE — 83735 ASSAY OF MAGNESIUM: CPT

## 2018-10-24 PROCEDURE — 87086 URINE CULTURE/COLONY COUNT: CPT

## 2018-10-24 PROCEDURE — 87070 CULTURE OTHR SPECIMN AEROBIC: CPT

## 2018-10-24 PROCEDURE — 85610 PROTHROMBIN TIME: CPT

## 2018-10-24 PROCEDURE — 81001 URINALYSIS AUTO W/SCOPE: CPT

## 2018-10-24 PROCEDURE — 82962 GLUCOSE BLOOD TEST: CPT

## 2019-02-12 NOTE — H&P ADULT - PMH
Dementia    DM (diabetes mellitus)    GERD (gastroesophageal reflux disease)    HLD (hyperlipidemia)    HTN (hypertension) Home

## 2019-12-02 NOTE — PHYSICAL THERAPY INITIAL EVALUATION ADULT - PATIENT PROFILE REVIEW, REHAB EVAL
Refill approved for 1 month.  Lucy Pugh needs to be seen for an appointment before further refills are given.     yes

## 2019-12-18 NOTE — PHYSICAL THERAPY INITIAL EVALUATION ADULT - GENERAL OBSERVATIONS, REHAB EVAL
Patient resting with no complaints of pain. Walked in the [place] with PT without much fatigue. Appetite is good-no nausea. Bowel  movements have slowly tapered down-although he is still having more than 10 in a 24 hour period.    Patient received semifowler in bed, in NAD. Son present at bedside.

## 2020-08-29 ENCOUNTER — INPATIENT (INPATIENT)
Facility: HOSPITAL | Age: 85
LOS: 8 days | Discharge: HOME | DRG: 638 | End: 2020-09-07
Attending: INTERNAL MEDICINE | Admitting: HOSPITALIST
Payer: MEDICARE

## 2020-08-29 VITALS
RESPIRATION RATE: 16 BRPM | TEMPERATURE: 98 F | DIASTOLIC BLOOD PRESSURE: 65 MMHG | HEART RATE: 72 BPM | SYSTOLIC BLOOD PRESSURE: 114 MMHG | HEIGHT: 65 IN | WEIGHT: 89.95 LBS | OXYGEN SATURATION: 99 %

## 2020-08-29 DIAGNOSIS — Z90.49 ACQUIRED ABSENCE OF OTHER SPECIFIED PARTS OF DIGESTIVE TRACT: Chronic | ICD-10-CM

## 2020-08-29 DIAGNOSIS — S99.919A UNSPECIFIED INJURY OF UNSPECIFIED ANKLE, INITIAL ENCOUNTER: Chronic | ICD-10-CM

## 2020-08-29 LAB
ALBUMIN SERPL ELPH-MCNC: 2.9 G/DL — LOW (ref 3.3–5)
ALP SERPL-CCNC: 45 U/L — SIGNIFICANT CHANGE UP (ref 40–120)
ALT FLD-CCNC: 15 U/L — SIGNIFICANT CHANGE UP (ref 12–78)
ANION GAP SERPL CALC-SCNC: 4 MMOL/L — LOW (ref 5–17)
AST SERPL-CCNC: 12 U/L — LOW (ref 15–37)
BASOPHILS # BLD AUTO: 0.04 K/UL — SIGNIFICANT CHANGE UP (ref 0–0.2)
BASOPHILS NFR BLD AUTO: 0.7 % — SIGNIFICANT CHANGE UP (ref 0–2)
BILIRUB SERPL-MCNC: 0.2 MG/DL — SIGNIFICANT CHANGE UP (ref 0.2–1.2)
BUN SERPL-MCNC: 20 MG/DL — SIGNIFICANT CHANGE UP (ref 7–23)
CALCIUM SERPL-MCNC: 8.8 MG/DL — SIGNIFICANT CHANGE UP (ref 8.5–10.1)
CHLORIDE SERPL-SCNC: 106 MMOL/L — SIGNIFICANT CHANGE UP (ref 96–108)
CO2 SERPL-SCNC: 32 MMOL/L — HIGH (ref 22–31)
CREAT SERPL-MCNC: 0.89 MG/DL — SIGNIFICANT CHANGE UP (ref 0.5–1.3)
EOSINOPHIL # BLD AUTO: 0.09 K/UL — SIGNIFICANT CHANGE UP (ref 0–0.5)
EOSINOPHIL NFR BLD AUTO: 1.5 % — SIGNIFICANT CHANGE UP (ref 0–6)
ERYTHROCYTE [SEDIMENTATION RATE] IN BLOOD: 28 MM/HR — HIGH (ref 0–20)
GLUCOSE SERPL-MCNC: 153 MG/DL — HIGH (ref 70–99)
HCT VFR BLD CALC: 33 % — LOW (ref 34.5–45)
HGB BLD-MCNC: 10.5 G/DL — LOW (ref 11.5–15.5)
IMM GRANULOCYTES NFR BLD AUTO: 0.5 % — SIGNIFICANT CHANGE UP (ref 0–1.5)
LACTATE SERPL-SCNC: 3.4 MMOL/L — HIGH (ref 0.7–2)
LYMPHOCYTES # BLD AUTO: 1.61 K/UL — SIGNIFICANT CHANGE UP (ref 1–3.3)
LYMPHOCYTES # BLD AUTO: 26.3 % — SIGNIFICANT CHANGE UP (ref 13–44)
MCHC RBC-ENTMCNC: 27.1 PG — SIGNIFICANT CHANGE UP (ref 27–34)
MCHC RBC-ENTMCNC: 31.8 GM/DL — LOW (ref 32–36)
MCV RBC AUTO: 85.1 FL — SIGNIFICANT CHANGE UP (ref 80–100)
MONOCYTES # BLD AUTO: 0.49 K/UL — SIGNIFICANT CHANGE UP (ref 0–0.9)
MONOCYTES NFR BLD AUTO: 8 % — SIGNIFICANT CHANGE UP (ref 2–14)
NEUTROPHILS # BLD AUTO: 3.86 K/UL — SIGNIFICANT CHANGE UP (ref 1.8–7.4)
NEUTROPHILS NFR BLD AUTO: 63 % — SIGNIFICANT CHANGE UP (ref 43–77)
NRBC # BLD: 0 /100 WBCS — SIGNIFICANT CHANGE UP (ref 0–0)
PLATELET # BLD AUTO: 248 K/UL — SIGNIFICANT CHANGE UP (ref 150–400)
POTASSIUM SERPL-MCNC: 4.3 MMOL/L — SIGNIFICANT CHANGE UP (ref 3.5–5.3)
POTASSIUM SERPL-SCNC: 4.3 MMOL/L — SIGNIFICANT CHANGE UP (ref 3.5–5.3)
PROT SERPL-MCNC: 6.7 G/DL — SIGNIFICANT CHANGE UP (ref 6–8.3)
RBC # BLD: 3.88 M/UL — SIGNIFICANT CHANGE UP (ref 3.8–5.2)
RBC # FLD: 14.8 % — HIGH (ref 10.3–14.5)
SODIUM SERPL-SCNC: 142 MMOL/L — SIGNIFICANT CHANGE UP (ref 135–145)
WBC # BLD: 6.12 K/UL — SIGNIFICANT CHANGE UP (ref 3.8–10.5)
WBC # FLD AUTO: 6.12 K/UL — SIGNIFICANT CHANGE UP (ref 3.8–10.5)

## 2020-08-29 PROCEDURE — 99285 EMERGENCY DEPT VISIT HI MDM: CPT

## 2020-08-29 PROCEDURE — 73620 X-RAY EXAM OF FOOT: CPT | Mod: 26,LT

## 2020-08-29 PROCEDURE — 71045 X-RAY EXAM CHEST 1 VIEW: CPT | Mod: 26

## 2020-08-29 RX ORDER — IRON,CARBONYL 45 MG
1 TABLET ORAL
Qty: 0 | Refills: 0 | DISCHARGE

## 2020-08-29 RX ORDER — GLIPIZIDE/METFORMIN HCL 2.5-500 MG
1 TABLET ORAL
Qty: 0 | Refills: 0 | DISCHARGE

## 2020-08-29 RX ORDER — SODIUM CHLORIDE 9 MG/ML
1000 INJECTION INTRAMUSCULAR; INTRAVENOUS; SUBCUTANEOUS ONCE
Refills: 0 | Status: COMPLETED | OUTPATIENT
Start: 2020-08-29 | End: 2020-08-29

## 2020-08-29 RX ORDER — PIPERACILLIN AND TAZOBACTAM 4; .5 G/20ML; G/20ML
3.38 INJECTION, POWDER, LYOPHILIZED, FOR SOLUTION INTRAVENOUS ONCE
Refills: 0 | Status: COMPLETED | OUTPATIENT
Start: 2020-08-29 | End: 2020-08-29

## 2020-08-29 RX ORDER — VANCOMYCIN HCL 1 G
1000 VIAL (EA) INTRAVENOUS ONCE
Refills: 0 | Status: COMPLETED | OUTPATIENT
Start: 2020-08-29 | End: 2020-08-30

## 2020-08-29 RX ADMIN — SODIUM CHLORIDE 2000 MILLILITER(S): 9 INJECTION INTRAMUSCULAR; INTRAVENOUS; SUBCUTANEOUS at 23:55

## 2020-08-29 RX ADMIN — PIPERACILLIN AND TAZOBACTAM 200 GRAM(S): 4; .5 INJECTION, POWDER, LYOPHILIZED, FOR SOLUTION INTRAVENOUS at 23:55

## 2020-08-29 NOTE — ED ADULT NURSE NOTE - OBJECTIVE STATEMENT
pt BIBA from home accompanied by her son.  son reports he was notified by her HHA that pt has a wound on her left bunion.  pt is contracted, the wound on her left bunion is a pressure ulcer from her resting her foot on her right thigh.

## 2020-08-30 DIAGNOSIS — D50.9 IRON DEFICIENCY ANEMIA, UNSPECIFIED: ICD-10-CM

## 2020-08-30 DIAGNOSIS — E11.9 TYPE 2 DIABETES MELLITUS WITHOUT COMPLICATIONS: ICD-10-CM

## 2020-08-30 DIAGNOSIS — T14.90XA INJURY, UNSPECIFIED, INITIAL ENCOUNTER: ICD-10-CM

## 2020-08-30 DIAGNOSIS — Z29.9 ENCOUNTER FOR PROPHYLACTIC MEASURES, UNSPECIFIED: ICD-10-CM

## 2020-08-30 DIAGNOSIS — I10 ESSENTIAL (PRIMARY) HYPERTENSION: ICD-10-CM

## 2020-08-30 DIAGNOSIS — E11.621 TYPE 2 DIABETES MELLITUS WITH FOOT ULCER: ICD-10-CM

## 2020-08-30 DIAGNOSIS — F03.90 UNSPECIFIED DEMENTIA WITHOUT BEHAVIORAL DISTURBANCE: ICD-10-CM

## 2020-08-30 DIAGNOSIS — K21.9 GASTRO-ESOPHAGEAL REFLUX DISEASE WITHOUT ESOPHAGITIS: ICD-10-CM

## 2020-08-30 DIAGNOSIS — E78.5 HYPERLIPIDEMIA, UNSPECIFIED: ICD-10-CM

## 2020-08-30 LAB
ANION GAP SERPL CALC-SCNC: 4 MMOL/L — LOW (ref 5–17)
APPEARANCE UR: CLEAR — SIGNIFICANT CHANGE UP
BACTERIA # UR AUTO: ABNORMAL
BASOPHILS # BLD AUTO: 0.03 K/UL — SIGNIFICANT CHANGE UP (ref 0–0.2)
BASOPHILS NFR BLD AUTO: 0.5 % — SIGNIFICANT CHANGE UP (ref 0–2)
BILIRUB UR-MCNC: NEGATIVE — SIGNIFICANT CHANGE UP
BUN SERPL-MCNC: 19 MG/DL — SIGNIFICANT CHANGE UP (ref 7–23)
CALCIUM SERPL-MCNC: 8.8 MG/DL — SIGNIFICANT CHANGE UP (ref 8.5–10.1)
CHLORIDE SERPL-SCNC: 109 MMOL/L — HIGH (ref 96–108)
CO2 SERPL-SCNC: 31 MMOL/L — SIGNIFICANT CHANGE UP (ref 22–31)
COLOR SPEC: YELLOW — SIGNIFICANT CHANGE UP
CREAT SERPL-MCNC: 1 MG/DL — SIGNIFICANT CHANGE UP (ref 0.5–1.3)
CRP SERPL-MCNC: 0.2 MG/DL — SIGNIFICANT CHANGE UP (ref 0–0.4)
DIFF PNL FLD: NEGATIVE — SIGNIFICANT CHANGE UP
EOSINOPHIL # BLD AUTO: 0.12 K/UL — SIGNIFICANT CHANGE UP (ref 0–0.5)
EOSINOPHIL NFR BLD AUTO: 1.9 % — SIGNIFICANT CHANGE UP (ref 0–6)
EPI CELLS # UR: SIGNIFICANT CHANGE UP
GLUCOSE SERPL-MCNC: 159 MG/DL — HIGH (ref 70–99)
GLUCOSE UR QL: NEGATIVE — SIGNIFICANT CHANGE UP
HCT VFR BLD CALC: 30.1 % — LOW (ref 34.5–45)
HGB BLD-MCNC: 9.5 G/DL — LOW (ref 11.5–15.5)
IMM GRANULOCYTES NFR BLD AUTO: 0.3 % — SIGNIFICANT CHANGE UP (ref 0–1.5)
KETONES UR-MCNC: NEGATIVE — SIGNIFICANT CHANGE UP
LACTATE SERPL-SCNC: 2.2 MMOL/L — HIGH (ref 0.7–2)
LEUKOCYTE ESTERASE UR-ACNC: ABNORMAL
LYMPHOCYTES # BLD AUTO: 1.36 K/UL — SIGNIFICANT CHANGE UP (ref 1–3.3)
LYMPHOCYTES # BLD AUTO: 21.3 % — SIGNIFICANT CHANGE UP (ref 13–44)
MCHC RBC-ENTMCNC: 27.2 PG — SIGNIFICANT CHANGE UP (ref 27–34)
MCHC RBC-ENTMCNC: 31.6 GM/DL — LOW (ref 32–36)
MCV RBC AUTO: 86.2 FL — SIGNIFICANT CHANGE UP (ref 80–100)
MONOCYTES # BLD AUTO: 0.53 K/UL — SIGNIFICANT CHANGE UP (ref 0–0.9)
MONOCYTES NFR BLD AUTO: 8.3 % — SIGNIFICANT CHANGE UP (ref 2–14)
NEUTROPHILS # BLD AUTO: 4.34 K/UL — SIGNIFICANT CHANGE UP (ref 1.8–7.4)
NEUTROPHILS NFR BLD AUTO: 67.7 % — SIGNIFICANT CHANGE UP (ref 43–77)
NITRITE UR-MCNC: POSITIVE
NRBC # BLD: 0 /100 WBCS — SIGNIFICANT CHANGE UP (ref 0–0)
PH UR: 7 — SIGNIFICANT CHANGE UP (ref 5–8)
PLATELET # BLD AUTO: 232 K/UL — SIGNIFICANT CHANGE UP (ref 150–400)
POTASSIUM SERPL-MCNC: 3.4 MMOL/L — LOW (ref 3.5–5.3)
POTASSIUM SERPL-SCNC: 3.4 MMOL/L — LOW (ref 3.5–5.3)
PROT UR-MCNC: 15
RBC # BLD: 3.49 M/UL — LOW (ref 3.8–5.2)
RBC # FLD: 14.7 % — HIGH (ref 10.3–14.5)
RBC CASTS # UR COMP ASSIST: SIGNIFICANT CHANGE UP /HPF (ref 0–4)
SARS-COV-2 IGG SERPL QL IA: NEGATIVE — SIGNIFICANT CHANGE UP
SARS-COV-2 IGM SERPL IA-ACNC: 0.08 INDEX — SIGNIFICANT CHANGE UP
SARS-COV-2 RNA SPEC QL NAA+PROBE: SIGNIFICANT CHANGE UP
SODIUM SERPL-SCNC: 144 MMOL/L — SIGNIFICANT CHANGE UP (ref 135–145)
SP GR SPEC: 1 — LOW (ref 1.01–1.02)
UROBILINOGEN FLD QL: NEGATIVE — SIGNIFICANT CHANGE UP
WBC # BLD: 6.4 K/UL — SIGNIFICANT CHANGE UP (ref 3.8–10.5)
WBC # FLD AUTO: 6.4 K/UL — SIGNIFICANT CHANGE UP (ref 3.8–10.5)
WBC UR QL: ABNORMAL

## 2020-08-30 PROCEDURE — 99223 1ST HOSP IP/OBS HIGH 75: CPT | Mod: GC

## 2020-08-30 PROCEDURE — 99232 SBSQ HOSP IP/OBS MODERATE 35: CPT | Mod: GC

## 2020-08-30 RX ORDER — POTASSIUM CHLORIDE 20 MEQ
40 PACKET (EA) ORAL ONCE
Refills: 0 | Status: COMPLETED | OUTPATIENT
Start: 2020-08-30 | End: 2020-08-30

## 2020-08-30 RX ORDER — VANCOMYCIN HCL 1 G
750 VIAL (EA) INTRAVENOUS EVERY 24 HOURS
Refills: 0 | Status: DISCONTINUED | OUTPATIENT
Start: 2020-08-31 | End: 2020-09-01

## 2020-08-30 RX ORDER — ASPIRIN/CALCIUM CARB/MAGNESIUM 324 MG
81 TABLET ORAL DAILY
Refills: 0 | Status: DISCONTINUED | OUTPATIENT
Start: 2020-08-30 | End: 2020-09-07

## 2020-08-30 RX ORDER — ASCORBIC ACID 60 MG
500 TABLET,CHEWABLE ORAL
Refills: 0 | Status: CANCELLED | OUTPATIENT
Start: 2020-08-30 | End: 2020-09-07

## 2020-08-30 RX ORDER — ATORVASTATIN CALCIUM 80 MG/1
10 TABLET, FILM COATED ORAL AT BEDTIME
Refills: 0 | Status: DISCONTINUED | OUTPATIENT
Start: 2020-08-30 | End: 2020-09-07

## 2020-08-30 RX ORDER — LOSARTAN POTASSIUM 100 MG/1
100 TABLET, FILM COATED ORAL DAILY
Refills: 0 | Status: DISCONTINUED | OUTPATIENT
Start: 2020-08-30 | End: 2020-09-07

## 2020-08-30 RX ORDER — INSULIN LISPRO 100/ML
VIAL (ML) SUBCUTANEOUS
Refills: 0 | Status: DISCONTINUED | OUTPATIENT
Start: 2020-08-30 | End: 2020-09-07

## 2020-08-30 RX ORDER — INSULIN LISPRO 100/ML
VIAL (ML) SUBCUTANEOUS AT BEDTIME
Refills: 0 | Status: DISCONTINUED | OUTPATIENT
Start: 2020-08-30 | End: 2020-09-07

## 2020-08-30 RX ORDER — GLUCAGON INJECTION, SOLUTION 0.5 MG/.1ML
1 INJECTION, SOLUTION SUBCUTANEOUS ONCE
Refills: 0 | Status: DISCONTINUED | OUTPATIENT
Start: 2020-08-30 | End: 2020-09-07

## 2020-08-30 RX ORDER — DEXTROSE 50 % IN WATER 50 %
25 SYRINGE (ML) INTRAVENOUS ONCE
Refills: 0 | Status: DISCONTINUED | OUTPATIENT
Start: 2020-08-30 | End: 2020-09-07

## 2020-08-30 RX ORDER — SODIUM CHLORIDE 9 MG/ML
1000 INJECTION, SOLUTION INTRAVENOUS
Refills: 0 | Status: DISCONTINUED | OUTPATIENT
Start: 2020-08-30 | End: 2020-09-07

## 2020-08-30 RX ORDER — PIPERACILLIN AND TAZOBACTAM 4; .5 G/20ML; G/20ML
3.38 INJECTION, POWDER, LYOPHILIZED, FOR SOLUTION INTRAVENOUS EVERY 8 HOURS
Refills: 0 | Status: DISCONTINUED | OUTPATIENT
Start: 2020-08-30 | End: 2020-09-01

## 2020-08-30 RX ORDER — FERROUS SULFATE 325(65) MG
325 TABLET ORAL DAILY
Refills: 0 | Status: DISCONTINUED | OUTPATIENT
Start: 2020-08-30 | End: 2020-09-07

## 2020-08-30 RX ORDER — PANTOPRAZOLE SODIUM 20 MG/1
20 TABLET, DELAYED RELEASE ORAL
Refills: 0 | Status: DISCONTINUED | OUTPATIENT
Start: 2020-08-30 | End: 2020-09-07

## 2020-08-30 RX ORDER — DONEPEZIL HYDROCHLORIDE 10 MG/1
5 TABLET, FILM COATED ORAL AT BEDTIME
Refills: 0 | Status: DISCONTINUED | OUTPATIENT
Start: 2020-08-30 | End: 2020-09-07

## 2020-08-30 RX ORDER — MULTIVIT-MIN/FERROUS GLUCONATE 9 MG/15 ML
1 LIQUID (ML) ORAL DAILY
Refills: 0 | Status: CANCELLED | OUTPATIENT
Start: 2020-08-30 | End: 2020-09-07

## 2020-08-30 RX ORDER — HEPARIN SODIUM 5000 [USP'U]/ML
5000 INJECTION INTRAVENOUS; SUBCUTANEOUS EVERY 8 HOURS
Refills: 0 | Status: DISCONTINUED | OUTPATIENT
Start: 2020-08-30 | End: 2020-09-07

## 2020-08-30 RX ORDER — VANCOMYCIN HCL 1 G
1000 VIAL (EA) INTRAVENOUS EVERY 12 HOURS
Refills: 0 | Status: DISCONTINUED | OUTPATIENT
Start: 2020-08-30 | End: 2020-08-30

## 2020-08-30 RX ORDER — CARVEDILOL PHOSPHATE 80 MG/1
6.25 CAPSULE, EXTENDED RELEASE ORAL EVERY 12 HOURS
Refills: 0 | Status: DISCONTINUED | OUTPATIENT
Start: 2020-08-30 | End: 2020-09-07

## 2020-08-30 RX ORDER — LACTOBACILLUS ACIDOPHILUS 100MM CELL
1 CAPSULE ORAL DAILY
Refills: 0 | Status: DISCONTINUED | OUTPATIENT
Start: 2020-08-30 | End: 2020-09-07

## 2020-08-30 RX ORDER — AMLODIPINE BESYLATE 2.5 MG/1
10 TABLET ORAL DAILY
Refills: 0 | Status: DISCONTINUED | OUTPATIENT
Start: 2020-08-30 | End: 2020-09-07

## 2020-08-30 RX ORDER — DEXTROSE 50 % IN WATER 50 %
15 SYRINGE (ML) INTRAVENOUS ONCE
Refills: 0 | Status: DISCONTINUED | OUTPATIENT
Start: 2020-08-30 | End: 2020-09-07

## 2020-08-30 RX ORDER — DEXTROSE 50 % IN WATER 50 %
12.5 SYRINGE (ML) INTRAVENOUS ONCE
Refills: 0 | Status: DISCONTINUED | OUTPATIENT
Start: 2020-08-30 | End: 2020-09-07

## 2020-08-30 RX ORDER — METFORMIN HYDROCHLORIDE 850 MG/1
500 TABLET ORAL
Refills: 0 | Status: DISCONTINUED | OUTPATIENT
Start: 2020-08-30 | End: 2020-08-30

## 2020-08-30 RX ADMIN — Medication 81 MILLIGRAM(S): at 12:11

## 2020-08-30 RX ADMIN — Medication 1 TABLET(S): at 12:11

## 2020-08-30 RX ADMIN — CARVEDILOL PHOSPHATE 6.25 MILLIGRAM(S): 80 CAPSULE, EXTENDED RELEASE ORAL at 05:14

## 2020-08-30 RX ADMIN — SODIUM CHLORIDE 1000 MILLILITER(S): 9 INJECTION INTRAMUSCULAR; INTRAVENOUS; SUBCUTANEOUS at 00:55

## 2020-08-30 RX ADMIN — PIPERACILLIN AND TAZOBACTAM 25 GRAM(S): 4; .5 INJECTION, POWDER, LYOPHILIZED, FOR SOLUTION INTRAVENOUS at 13:19

## 2020-08-30 RX ADMIN — Medication 325 MILLIGRAM(S): at 12:11

## 2020-08-30 RX ADMIN — ATORVASTATIN CALCIUM 10 MILLIGRAM(S): 80 TABLET, FILM COATED ORAL at 22:08

## 2020-08-30 RX ADMIN — HEPARIN SODIUM 5000 UNIT(S): 5000 INJECTION INTRAVENOUS; SUBCUTANEOUS at 05:14

## 2020-08-30 RX ADMIN — PIPERACILLIN AND TAZOBACTAM 25 GRAM(S): 4; .5 INJECTION, POWDER, LYOPHILIZED, FOR SOLUTION INTRAVENOUS at 22:08

## 2020-08-30 RX ADMIN — METFORMIN HYDROCHLORIDE 500 MILLIGRAM(S): 850 TABLET ORAL at 05:14

## 2020-08-30 RX ADMIN — LOSARTAN POTASSIUM 100 MILLIGRAM(S): 100 TABLET, FILM COATED ORAL at 05:14

## 2020-08-30 RX ADMIN — HEPARIN SODIUM 5000 UNIT(S): 5000 INJECTION INTRAVENOUS; SUBCUTANEOUS at 13:19

## 2020-08-30 RX ADMIN — AMLODIPINE BESYLATE 10 MILLIGRAM(S): 2.5 TABLET ORAL at 05:14

## 2020-08-30 RX ADMIN — Medication 40 MILLIEQUIVALENT(S): at 10:36

## 2020-08-30 RX ADMIN — Medication 1: at 08:46

## 2020-08-30 RX ADMIN — PIPERACILLIN AND TAZOBACTAM 25 GRAM(S): 4; .5 INJECTION, POWDER, LYOPHILIZED, FOR SOLUTION INTRAVENOUS at 06:23

## 2020-08-30 RX ADMIN — Medication 1: at 17:31

## 2020-08-30 RX ADMIN — Medication 2: at 12:10

## 2020-08-30 RX ADMIN — Medication 20 MILLIGRAM(S): at 12:11

## 2020-08-30 RX ADMIN — DONEPEZIL HYDROCHLORIDE 5 MILLIGRAM(S): 10 TABLET, FILM COATED ORAL at 22:08

## 2020-08-30 RX ADMIN — PANTOPRAZOLE SODIUM 20 MILLIGRAM(S): 20 TABLET, DELAYED RELEASE ORAL at 05:14

## 2020-08-30 RX ADMIN — PIPERACILLIN AND TAZOBACTAM 3.38 GRAM(S): 4; .5 INJECTION, POWDER, LYOPHILIZED, FOR SOLUTION INTRAVENOUS at 00:25

## 2020-08-30 RX ADMIN — HEPARIN SODIUM 5000 UNIT(S): 5000 INJECTION INTRAVENOUS; SUBCUTANEOUS at 22:09

## 2020-08-30 RX ADMIN — Medication 250 MILLIGRAM(S): at 01:35

## 2020-08-30 NOTE — PROGRESS NOTE ADULT - SUBJECTIVE AND OBJECTIVE BOX
Patient is a 87y old  Female who presents with a chief complaint of     INTERVAL HPI/OVERNIGHT EVENTS: Pt seen and examined at bedside. Pt states slept well overnight with no acute complaints. Pleasantly confused, repeatedly asks for provider's name. Pt denies HA, dizziness, CP, SOB, cough, palpitations, abd pain, n/v/d, fevers, chills.     MEDICATIONS  (STANDING):  amLODIPine   Tablet 10 milliGRAM(s) Oral daily  aspirin  chewable 81 milliGRAM(s) Oral daily  atorvastatin 10 milliGRAM(s) Oral at bedtime  carvedilol 6.25 milliGRAM(s) Oral every 12 hours  dextrose 5%. 1000 milliLiter(s) (50 mL/Hr) IV Continuous <Continuous>  dextrose 50% Injectable 12.5 Gram(s) IV Push once  dextrose 50% Injectable 25 Gram(s) IV Push once  dextrose 50% Injectable 25 Gram(s) IV Push once  donepezil 5 milliGRAM(s) Oral at bedtime  ferrous    sulfate 325 milliGRAM(s) Oral daily  heparin   Injectable 5000 Unit(s) SubCutaneous every 8 hours  insulin lispro (HumaLOG) corrective regimen sliding scale   SubCutaneous three times a day before meals  insulin lispro (HumaLOG) corrective regimen sliding scale   SubCutaneous at bedtime  lactobacillus acidophilus 1 Tablet(s) Oral daily  losartan 100 milliGRAM(s) Oral daily  pantoprazole    Tablet 20 milliGRAM(s) Oral before breakfast  PARoxetine 20 milliGRAM(s) Oral daily  piperacillin/tazobactam IVPB.. 3.375 Gram(s) IV Intermittent every 8 hours    MEDICATIONS  (PRN):  dextrose 40% Gel 15 Gram(s) Oral once PRN Blood Glucose LESS THAN 70 milliGRAM(s)/deciliter  glucagon  Injectable 1 milliGRAM(s) IntraMuscular once PRN Glucose LESS THAN 70 milligrams/deciliter      Allergies    sulfa drugs (Unknown)    Intolerances        REVIEW OF SYSTEMS:  CONSTITUTIONAL: No fever or chills  HEENT:  No headache, no sore throat  RESPIRATORY: No cough, wheezing, or shortness of breath  CARDIOVASCULAR: No chest pain, palpitations, or leg swelling  GASTROINTESTINAL: No abd pain, nausea, vomiting, or diarrhea  GENITOURINARY: No dysuria, frequency, or hematuria  NEUROLOGICAL: no focal weakness or dizziness  MUSCULOSKELETAL: no myalgias     Vital Signs Last 24 Hrs  T(C): 36.9 (30 Aug 2020 04:10), Max: 36.9 (30 Aug 2020 04:10)  T(F): 98.4 (30 Aug 2020 04:10), Max: 98.4 (30 Aug 2020 04:10)  HR: 71 (30 Aug 2020 04:10) (71 - 72)  BP: 147/66 (30 Aug 2020 04:10) (114/65 - 147/66)  BP(mean): --  RR: 18 (30 Aug 2020 04:10) (16 - 18)  SpO2: 93% (30 Aug 2020 04:10) (93% - 99%)    PHYSICAL EXAM:  GENERAL: elderly confused F in NAD  HEENT:  EOMI, moist mucous membranes  CHEST/LUNG:  CTA b/l, no rales, wheezes, or rhonchi  HEART:  RRR, S1, S2  ABDOMEN:  BS+, soft, nontender, nondistended  EXTREMITIES: contracted LLE overlying RLE in flexed position. b/l SCDs in place. LLE foot in bandaged dressing. Poor nail hygiene noted b/l feet.  NERVOUS SYSTEM: Awake and alert    LABS:                        9.5    6.40  )-----------( 232      ( 30 Aug 2020 09:09 )             30.1     CBC Full  -  ( 30 Aug 2020 09:09 )  WBC Count : 6.40 K/uL  Hemoglobin : 9.5 g/dL  Hematocrit : 30.1 %  Platelet Count - Automated : 232 K/uL  Mean Cell Volume : 86.2 fl  Mean Cell Hemoglobin : 27.2 pg  Mean Cell Hemoglobin Concentration : 31.6 gm/dL  Auto Neutrophil # : 4.34 K/uL  Auto Lymphocyte # : 1.36 K/uL  Auto Monocyte # : 0.53 K/uL  Auto Eosinophil # : 0.12 K/uL  Auto Basophil # : 0.03 K/uL  Auto Neutrophil % : 67.7 %  Auto Lymphocyte % : 21.3 %  Auto Monocyte % : 8.3 %  Auto Eosinophil % : 1.9 %  Auto Basophil % : 0.5 %    30 Aug 2020 09:09    144    |  109    |  19     ----------------------------<  159    3.4     |  31     |  1.00     Ca    8.8        30 Aug 2020 09:09    TPro  6.7    /  Alb  2.9    /  TBili  0.2    /  DBili  x      /  AST  12     /  ALT  15     /  AlkPhos  45     29 Aug 2020 22:33      Urinalysis Basic - ( 30 Aug 2020 03:15 )    Color: Yellow / Appearance: Clear / S.005 / pH: x  Gluc: x / Ketone: Negative  / Bili: Negative / Urobili: Negative   Blood: x / Protein: 15 / Nitrite: Positive   Leuk Esterase: Trace / RBC: 0-2 /HPF / WBC 26-50   Sq Epi: x / Non Sq Epi: Few / Bacteria: Many      CAPILLARY BLOOD GLUCOSE      POCT Blood Glucose.: 194 mg/dL (30 Aug 2020 08:07)          RADIOLOGY & ADDITIONAL TESTS:    Personally reviewed.     Consultant(s) Notes Reviewed:  [x] YES  [ ] NO    Care Discussed with [x] Consultants  [x] Patient  [ ] Family  [ ]      [ ] Other; RN  DVT ppx

## 2020-08-30 NOTE — H&P ADULT - PROBLEM SELECTOR PLAN 9
vte ppx heparin 5000 q 8   depression: cont paroxetine   IMPROVE VTE Individual Risk Assessment        RISK                                                          Points  [  ] Previous VTE                                                3  [  ] Thrombophilia                                             2  [  ] Lower limb paralysis                                   2        (unable to hold up >15 seconds)    [  ] Current Cancer                                            2         (within 6 months)  [ x ] Immobilization > 24 hrs                              1  [  ] ICU/CCU stay > 24 hours                            1  [ x ] Age > 60                                                    1  IMPROVE VTE Score _____2____

## 2020-08-30 NOTE — DIETITIAN INITIAL EVALUATION ADULT. - PROBLEM SELECTOR PLAN 1
L diabetic foot ulcer under 1st metatarsal, pustular material, does nto appear to probe to bone however must r/o OM, elevated lactate 3.4 repeat 2.2   s/p vanc and zosyn, 1L NS   cont vanc and zosyn, f/u vanc trough   minimally elevated ESR, f/u CRP   f/u blood cx x2, urine culture    consult podiatry Dr. Danielle

## 2020-08-30 NOTE — PHARMACOTHERAPY INTERVENTION NOTE - COMMENTS
Dr. Dawkins made aware of vancomycin pip/tazo combination (increased risk of renal dysfx).  He will look into streamlining therapy when able, e.g. after 24 hrs.

## 2020-08-30 NOTE — H&P ADULT - PROBLEM SELECTOR PLAN 8
vte ppx heparin 5000 q 8     IMPROVE VTE Individual Risk Assessment        RISK                                                          Points  [  ] Previous VTE                                                3  [  ] Thrombophilia                                             2  [  ] Lower limb paralysis                                   2        (unable to hold up >15 seconds)    [  ] Current Cancer                                            2         (within 6 months)  [ x ] Immobilization > 24 hrs                              1  [  ] ICU/CCU stay > 24 hours                            1  [ x ] Age > 60                                                    1  IMPROVE VTE Score _____2____ continue home omeprazole

## 2020-08-30 NOTE — H&P ADULT - NSICDXPASTSURGICALHX_GEN_ALL_CORE_FT
PAST SURGICAL HISTORY:  Ankle injury s/p ankle surgery, pt doesn't remember which ankle    History of cholecystectomy

## 2020-08-30 NOTE — DIETITIAN INITIAL EVALUATION ADULT. - PROBLEM SELECTOR PLAN 2
stage 1 sacral wound, skin tear on R thigh 2/2 LLE contracture with erythema and edema on medical aspect of L foot  turn in bed, reposition q 4  keep towel between abrasion and LLE   wound care nurse consulted    consult wound care MD tomorrow AM (spoke with on call physician, no consult line, advised to call in AM)

## 2020-08-30 NOTE — H&P ADULT - NSICDXFAMILYHX_GEN_ALL_CORE_FT
FAMILY HISTORY:  No pertinent family history in first degree relatives FAMILY HISTORY:  FH: colon cancer, sister  FH: type 2 diabetes, brother

## 2020-08-30 NOTE — H&P ADULT - PROBLEM SELECTOR PLAN 2
stage 1 sacral wound, skin tear on R thigh 2/2 LLE contracture with erythema and edema on medical aspect of L foot  turn in bed, reposition q 4  keep towel between abrasion and LLE    consult wound care MD stage 1 sacral wound, skin tear on R thigh 2/2 LLE contracture with erythema and edema on medical aspect of L foot  turn in bed, reposition q 4  keep towel between abrasion and LLE   wound care nurse consulted    consult wound care MD tomorrow AM (spoke with on call physician, no consult line, advised to call in AM)

## 2020-08-30 NOTE — CHART NOTE - NSCHARTNOTEFT_GEN_A_CORE
Upon Nutritional Assessment by the Registered Dietitian your patient was determined to meet criteria / has evidence of the following diagnosis/diagnoses:          [ ]  Mild Protein Calorie Malnutrition        [x ]  Moderate Protein Calorie Malnutrition        [ ] Severe Protein Calorie Malnutrition        [ ] Unspecified Protein Calorie Malnutrition        [ ] Underweight / BMI <19        [ ] Morbid Obesity / BMI > 40      Findings as based on:  •  Comprehensive nutrition assessment and consultation  •  Calorie counts (nutrient intake analysis)  •  Food acceptance and intake status from observations by staff  •  Follow up  •  Patient education  •  Intervention secondary to interdisciplinary rounds  •   concerns    chronic moderate malnutrition  related to insufficient energy intake  as evidenced by 26% wt loss over past 2 years along with moderate muscle depletion noted to temporal, clavicle and shoulder regions.     Treatment:    The following diet has been recommended:  Rx adding   Glucerna TID  Prosource 30 cc BID  MVI daily  Vit C 500 mg BID     PROVIDER Section:     By signing this assessment you are acknowledging and agree with the diagnosis/diagnoses assigned by the Registered Dietitian    Comments:

## 2020-08-30 NOTE — PROGRESS NOTE ADULT - PROBLEM SELECTOR PLAN 1
L diabetic foot ulcer under 1st metatarsal, pustular material, per admitter, does not appear to probe to bone.   - rpt lactate 2.2, xray L foot neg for overt osteolysis or f/x  s/p vanc and zosyn, 1L NS   cont vanc and zosyn, f/u vanc trough   minimally elevated ESR, f/u CRP   f/u blood cx x2, urine culture    attempted consult podiatry services, unable to reach over weekend. Will advise AM team to attempt consult.

## 2020-08-30 NOTE — H&P ADULT - NSHPREVIEWOFSYSTEMS_GEN_ALL_CORE
CONSTITUTIONAL: denies fever, chills, fatigue, weakness  HEENT: denies blurred visions, sore throat  SKIN: denies new lesions, rash  CARDIOVASCULAR: denies chest pain, chest pressure, palpitations  RESPIRATORY: denies shortness of breath, sputum production  GASTROINTESTINAL: denies nausea, vomiting, diarrhea, abdominal pain  GENITOURINARY: denies dysuria, discharge  NEUROLOGICAL: denies numbness, headache, focal weakness  MUSCULOSKELETAL: denies new joint pain, muscle aches  HEMATOLOGIC: denies gross bleeding, bruising  LYMPHATICS: denies enlarged lymph nodes, extremity swelling  PSYCHIATRIC: denies recent changes in anxiety, depression  ENDOCRINOLOGIC: denies sweating, cold or heat intolerance CONSTITUTIONAL: denies fever, chills  SKIN: ulcer on right thigh, ulcer under L foot  CARDIOVASCULAR: denies chest pain, palpitations  RESPIRATORY: denies shortness of breath, sputum production  GASTROINTESTINAL: denies nausea, vomiting, abdominal pain  GENITOURINARY: denies dysuria  NEUROLOGICAL: denies numbness, headache  MUSCULOSKELETAL: admits to b/l knee pain, denies new joint pain, muscle aches  HEMATOLOGIC: denies gross bleeding, bruising Constitutional Symptoms: No weakness, fevers, chills, weight loss  Eyes: No visual changes, headache, eye pain, double vision  Ears, Nose, Mouth, Throat: No runny nose, sinus pain, ear pain, tinnitus, sore throat, dysphagia, odynophagia  Cardiovascular: No chest pain, palpitations, edema  Respiratory: No cough, wheezing, hemoptysis, shortness of breath  Gastrointestinal: No abdominal pain, dysphagia, anorexia, nausea/vomiting, diarrhea/constipation, hematemesis, BRBPR, melena  Genitourinary: No dysuria, frequency, hematuria  Musculoskeletal: +B/L knee pain, no joint swelling  Skin: No pruritus, +ulcer on right thigh, ulcer under L foot  Neurologic:  No seizures, headache, paraesthesia, numbness, limb weakness    Positives and pertinent negatives noted and all other systems negative.

## 2020-08-30 NOTE — H&P ADULT - PROBLEM SELECTOR PLAN 3
baseline bedbound, at baseline knows her name, can identify her family, forgets their names, eats well (regular solid food)  cont donepezil   cont paroxetine hold home metformin  start low dose ISS, fingersticks, hypoglycemia protocol   f/u A1c

## 2020-08-30 NOTE — H&P ADULT - ASSESSMENT
84 yo F with PMH of HTN, HLD, Diabetes Mellitus Type 2 not on home insulin, GERD, Dementia, admit for L diabetic foot ulcer under 1st metatarsal, also noted to have sacral wound stage 1, R thigh skin tear 2/2 LLE contracture.

## 2020-08-30 NOTE — H&P ADULT - PROBLEM SELECTOR PLAN 4
Hgb 10.5, likely 2/2 irone def anemia  f/u ferritin, TIBC, iron panel   cont home iron Hgb 10.5, likely 2/2 iron deficiency anemia    cont home iron  f/u ferritin, TIBC, iron panel baseline bedbound, at baseline knows her name, can identify her family, forgets their names, eats well (regular solid food)  cont donepezil   cont paroxetine

## 2020-08-30 NOTE — ED PROVIDER NOTE - OBJECTIVE STATEMENT
Pt is a 88 yo female who lives at home with daily nursing care.  pt with contrated left lower extremity pressing on right thigh and with left great toe ulcer worsening. pt with no fever or pus.  per son, unable to get patient to md or wound care and getting worse.      Pmd: jason franks

## 2020-08-30 NOTE — H&P ADULT - PROBLEM SELECTOR PLAN 1
L diabetic foot ulcer under 1st metatarsal, pustular material, does nto appear to probe to bone however must r/o OM, elevated lactate 3.4   s/p vanc and zosyn, 1L NS   f/u lactate 4 AM   cont vanc and zosyn, f/u vanc trough   minimally elevated ESR, f/u CRP   f/u blood cx x2, urine culture    consult podiatry L diabetic foot ulcer under 1st metatarsal, pustular material, does nto appear to probe to bone however must r/o OM, elevated lactate 3.4 repeat 2.2   s/p vanc and zosyn, 1L NS   cont vanc and zosyn, f/u vanc trough   minimally elevated ESR, f/u CRP   f/u blood cx x2, urine culture    consult podiatry Dr. Danielle

## 2020-08-30 NOTE — DIETITIAN INITIAL EVALUATION ADULT. - OTHER INFO
86 y/o female adm with Type 2 DM with foot ulcer. PMH dementia, DM, HLD, HTN, GERD. Pt sitting up in bed at time of visit. Pt confused, thinks she still is working as a  [place]. Pt answers some questions. Spoke with RN. Pt ate very well this am, ate 2 full breakfasts. As per H&P, pt's son states that pt eats very well at home, regular food. Pt lives with a 24 hr aide. As per previous hospital adm from 9/2018, pt's wt was 134.6#. A decrease of ~ 35# noted. Moderate wasting noted to temporal, clavicle and shoulder regions. Last BM 8/30 Multiple pressure ulcers noted. Will rx adding MVI, vit C, prosource and Glucerna ( made aware).

## 2020-08-30 NOTE — H&P ADULT - NSHPPHYSICALEXAM_GEN_ALL_CORE
Vital Signs Last 24 Hrs  T(C): 36.8 (29 Aug 2020 23:59), Max: 36.8 (29 Aug 2020 23:59)  T(F): 98.2 (29 Aug 2020 23:59), Max: 98.2 (29 Aug 2020 23:59)  HR: 71 (29 Aug 2020 23:59) (71 - 72)  BP: 114/69 (29 Aug 2020 23:59) (114/65 - 114/69)  BP(mean): --  RR: 16 (29 Aug 2020 23:59) (16 - 16)  SpO2: 96% (29 Aug 2020 23:59) (96% - 99%)    Physical Exam:  General: elderly, thin, LE bony protrusion with muscle wasting, RLE contracture, No Acute Distress  HEENT: Normocephallic Atraumatic, PERRLA, EOMI bl, moist mucous membranes   Neck: Supple, nontender  Neurology: A&Ox1 (oriented to name), +4/4 muscle strength b/l UEs and  +3/5 RLE, no focal neurological deficits: CNII-XII intact  Respiratory: Clear To Auscultation B/L, No Wheezes, rhonchi or rales  CV: Regular Rate and Rhythm, +S1/S2, no murmurs, rubs or gallops  Abdominal: Soft, Non-Tender, Non-Distended +Bowel Soundsx4  Extremities: L foot ulcer under 1st metatarsal 1 inch x 1 inch no TTP, bone not visible, pustular material however contained, surrounding erythema, erythema and edema also on medical arch of L foot, + peripheral pulses: cap refill <2 secs LE bilaterally  Skin: erythematous sacral wound stage 1, skin tear R thigh epidermis Vital Signs Last 24 Hrs  T(C): 36.8 (29 Aug 2020 23:59), Max: 36.8 (29 Aug 2020 23:59)  T(F): 98.2 (29 Aug 2020 23:59), Max: 98.2 (29 Aug 2020 23:59)  HR: 71 (29 Aug 2020 23:59) (71 - 72)  BP: 114/69 (29 Aug 2020 23:59) (114/65 - 114/69)  BP(mean): --  RR: 16 (29 Aug 2020 23:59) (16 - 16)  SpO2: 96% (29 Aug 2020 23:59) (96% - 99%)    Physical Exam:  General: elderly, thin, LE bony protrusion with muscle wasting, RLE contracture, No Acute Distress  Ears, Nose, Mouth, and Throat: normal external ears and nose, normal hearing, moist oral mucosa  Eyes: normal conjunctiva, EOMI, PERRL  Neck: Supple, nontender, no JVD  Neurology: +4/4 muscle strength b/l UEs and  +3/5 RLE, no focal neurological deficits: CNII-XII intact  Respiratory: Clear To Auscultation B/L, No Wheezes, rhonchi or rales, normal respiratory effort  CV: Regular Rate and Rhythm, +S1/S2, no murmurs, rubs or gallops, no edema  Abdominal: Soft, Non-Tender, Non-Distended +Bowel Soundsx4, no hernia  Extremities: no clubbing, cyanosis, L foot ulcer under 1st metatarsal 1 inch x 1 inch no TTP, bone not visible, pustular material however contained, surrounding erythema, erythema and edema also on medical arch of L foot, + peripheral pulses: cap refill <2 secs LE bilaterally  Skin: warm, erythematous sacral wound stage 1, skin tear R thigh epidermis  Psychiatric: A&Ox1 (oriented to name), calm, no agitation, anxiety

## 2020-08-30 NOTE — PROGRESS NOTE ADULT - PROBLEM SELECTOR PLAN 2
stage 1 sacral wound, skin tear on R thigh 2/2 LLE contracture with erythema and edema on medical aspect of L foot  turn in bed, reposition q4h  keep towel between abrasion and LLE   wound care nurse consulted

## 2020-08-30 NOTE — PROGRESS NOTE ADULT - ASSESSMENT
86 yo F with PMH of HTN, HLD, Diabetes Mellitus Type 2 not on home insulin, GERD, Dementia, admitted for L diabetic foot ulcer under 1st metatarsal, also noted to have sacral wound stage 1, R thigh skin tear 2/2 LLE contracture.

## 2020-08-30 NOTE — H&P ADULT - PROBLEM SELECTOR PLAN 5
hold home metformin  start low dose ISS, fingersticks, hypoglycemia protocol   f/u A1c Hgb 10.5, likely 2/2 iron deficiency anemia    cont home iron  f/u ferritin, TIBC, iron panel

## 2020-08-30 NOTE — H&P ADULT - HISTORY OF PRESENT ILLNESS
86 yo F with PMH of HTN, HLD, Diabetes Mellitus Type 2 not on home insulin, GERD, Dementia, presented to the ED with complaints of ____      ED vitals afebrile, HR 72, /65, 99% on RA. Pt received zosyn 3.375 x1, vanc IV x1, 1L NS bolus. Significant labs ESR 28, Hgb 10.5, lactate 3.4. EKG ______ 84 yo F with PMH of HTN, HLD, Diabetes Mellitus Type 2 not on home insulin, GERD, Dementia, brought to ED by son for wound on left foot. History taken from son at bedside. Son says pt has become bedbound x 4-5 months, prior to that was in bed most of the day, gradual decline. Son also noted pt has a sore on her right thigh where her left foot usually rests due to LLE contracture. Of note pt has baseline dementia, knows her name, can identify her family, forgets their names. Pt baseline is bedbound, lives alone however has 24 hour aids, eats well (regular food). As per son, no recent decrease in appetitie, sick contacts, COVID 19 exposure. Pt denies fever, chills, SOB, CP, abd pain.     ED vitals afebrile, HR 72, /65, 99% on RA. Pt received zosyn 3.375 x1, vanc IV x1, 1L NS bolus. Significant labs ESR 28, Hgb 10.5, lactate 3.4. CXR pending. X ray AP  lateral L foot pending. *****Patient unable to provide a subjective history due to underlying dementia, history obtained from medical records, ER, and son*****  84 yo F with PMH of HTN, HLD, Diabetes Mellitus Type 2 not on home insulin, GERD, Dementia, brought to ED by son for wound on left foot. History taken from son at bedside. Son says pt has become bedbound x 4-5 months, prior to that was in bed most of the day, gradual decline. Son also noted pt has a sore on her right thigh where her left foot usually rests due to LLE contracture. Of note pt has baseline dementia, knows her name, can identify her family, forgets their names. Pt baseline is bedbound, lives alone however has 24 hour aids, eats well (regular food). As per son, no recent decrease in appetitie, sick contacts, COVID 19 exposure. Pt denies fever, chills, SOB, CP, abd pain.     ED vitals afebrile, HR 72, /65, 99% on RA. Pt received zosyn 3.375 x1, vanc IV x1, 1L NS bolus. Significant labs ESR 28, Hgb 10.5, lactate 3.4. CXR pending. X ray AP  lateral L foot pending.

## 2020-08-30 NOTE — ED PROVIDER NOTE - SKIN, MLM
Skin normal color for race, warm, dry , left great toe over medial mcp with stage 2 ulcer, no cellulits,  round area of redness 3cm2 over right anterior thigh

## 2020-08-30 NOTE — PROGRESS NOTE ADULT - PROBLEM SELECTOR PLAN 4
baseline bedbound, at baseline knows her name, can identify her family, forgets their names, eats well (regular solid food)  cont donepezil   cont paroxetine

## 2020-08-30 NOTE — H&P ADULT - NSHPSOCIALHISTORY_GEN_ALL_CORE
lives at home alone with 24 hour aids, bedbound, incontinent, needs assistance with bed commode, sponge baths etc   not a current smoker, had smoked 3 cigarettes 1x a month, no etoh use, denies illicit drug use  full code for now, must update M[facility] with HCP Marcio

## 2020-08-30 NOTE — H&P ADULT - NSICDXPASTMEDICALHX_GEN_ALL_CORE_FT
PAST MEDICAL HISTORY:  Dementia     DM (diabetes mellitus)     GERD (gastroesophageal reflux disease)     HLD (hyperlipidemia)     HTN (hypertension)

## 2020-08-30 NOTE — ED PROVIDER NOTE - CLINICAL SUMMARY MEDICAL DECISION MAKING FREE TEXT BOX
demented pt with diabetic foot ulcer, elevated lactate, unable to be driven to wound care, check labs, iv abx, ivf, admit med, wound consult in am. pan culture

## 2020-08-31 DIAGNOSIS — E11.621 TYPE 2 DIABETES MELLITUS WITH FOOT ULCER: ICD-10-CM

## 2020-08-31 LAB
A1C WITH ESTIMATED AVERAGE GLUCOSE RESULT: 6.2 % — HIGH (ref 4–5.6)
ANION GAP SERPL CALC-SCNC: 6 MMOL/L — SIGNIFICANT CHANGE UP (ref 5–17)
BASOPHILS # BLD AUTO: 0.04 K/UL — SIGNIFICANT CHANGE UP (ref 0–0.2)
BASOPHILS NFR BLD AUTO: 0.5 % — SIGNIFICANT CHANGE UP (ref 0–2)
BUN SERPL-MCNC: 26 MG/DL — HIGH (ref 7–23)
CALCIUM SERPL-MCNC: 8.5 MG/DL — SIGNIFICANT CHANGE UP (ref 8.5–10.1)
CHLORIDE SERPL-SCNC: 110 MMOL/L — HIGH (ref 96–108)
CO2 SERPL-SCNC: 29 MMOL/L — SIGNIFICANT CHANGE UP (ref 22–31)
CREAT SERPL-MCNC: 0.9 MG/DL — SIGNIFICANT CHANGE UP (ref 0.5–1.3)
EOSINOPHIL # BLD AUTO: 0.1 K/UL — SIGNIFICANT CHANGE UP (ref 0–0.5)
EOSINOPHIL NFR BLD AUTO: 1.3 % — SIGNIFICANT CHANGE UP (ref 0–6)
ESTIMATED AVERAGE GLUCOSE: 131 MG/DL — HIGH (ref 68–114)
FERRITIN SERPL-MCNC: 40 NG/ML — SIGNIFICANT CHANGE UP (ref 15–150)
GLUCOSE SERPL-MCNC: 120 MG/DL — HIGH (ref 70–99)
HCT VFR BLD CALC: 30.5 % — LOW (ref 34.5–45)
HGB BLD-MCNC: 9.8 G/DL — LOW (ref 11.5–15.5)
IMM GRANULOCYTES NFR BLD AUTO: 0.5 % — SIGNIFICANT CHANGE UP (ref 0–1.5)
IRON SATN MFR SERPL: 17 % — SIGNIFICANT CHANGE UP (ref 14–50)
IRON SATN MFR SERPL: 38 UG/DL — SIGNIFICANT CHANGE UP (ref 30–160)
LYMPHOCYTES # BLD AUTO: 1.46 K/UL — SIGNIFICANT CHANGE UP (ref 1–3.3)
LYMPHOCYTES # BLD AUTO: 18.3 % — SIGNIFICANT CHANGE UP (ref 13–44)
MCHC RBC-ENTMCNC: 27.1 PG — SIGNIFICANT CHANGE UP (ref 27–34)
MCHC RBC-ENTMCNC: 32.1 GM/DL — SIGNIFICANT CHANGE UP (ref 32–36)
MCV RBC AUTO: 84.5 FL — SIGNIFICANT CHANGE UP (ref 80–100)
MONOCYTES # BLD AUTO: 0.54 K/UL — SIGNIFICANT CHANGE UP (ref 0–0.9)
MONOCYTES NFR BLD AUTO: 6.8 % — SIGNIFICANT CHANGE UP (ref 2–14)
NEUTROPHILS # BLD AUTO: 5.78 K/UL — SIGNIFICANT CHANGE UP (ref 1.8–7.4)
NEUTROPHILS NFR BLD AUTO: 72.6 % — SIGNIFICANT CHANGE UP (ref 43–77)
NRBC # BLD: 0 /100 WBCS — SIGNIFICANT CHANGE UP (ref 0–0)
PLATELET # BLD AUTO: 238 K/UL — SIGNIFICANT CHANGE UP (ref 150–400)
POTASSIUM SERPL-MCNC: 3.7 MMOL/L — SIGNIFICANT CHANGE UP (ref 3.5–5.3)
POTASSIUM SERPL-SCNC: 3.7 MMOL/L — SIGNIFICANT CHANGE UP (ref 3.5–5.3)
RBC # BLD: 3.61 M/UL — LOW (ref 3.8–5.2)
RBC # FLD: 14.7 % — HIGH (ref 10.3–14.5)
SODIUM SERPL-SCNC: 145 MMOL/L — SIGNIFICANT CHANGE UP (ref 135–145)
TIBC SERPL-MCNC: 231 UG/DL — SIGNIFICANT CHANGE UP (ref 220–430)
UIBC SERPL-MCNC: 193 UG/DL — SIGNIFICANT CHANGE UP (ref 110–370)
WBC # BLD: 7.96 K/UL — SIGNIFICANT CHANGE UP (ref 3.8–10.5)
WBC # FLD AUTO: 7.96 K/UL — SIGNIFICANT CHANGE UP (ref 3.8–10.5)

## 2020-08-31 PROCEDURE — 99232 SBSQ HOSP IP/OBS MODERATE 35: CPT

## 2020-08-31 PROCEDURE — 99232 SBSQ HOSP IP/OBS MODERATE 35: CPT | Mod: GC

## 2020-08-31 RX ADMIN — ATORVASTATIN CALCIUM 10 MILLIGRAM(S): 80 TABLET, FILM COATED ORAL at 21:54

## 2020-08-31 RX ADMIN — Medication 2: at 12:13

## 2020-08-31 RX ADMIN — PANTOPRAZOLE SODIUM 20 MILLIGRAM(S): 20 TABLET, DELAYED RELEASE ORAL at 06:18

## 2020-08-31 RX ADMIN — Medication 325 MILLIGRAM(S): at 11:59

## 2020-08-31 RX ADMIN — PIPERACILLIN AND TAZOBACTAM 25 GRAM(S): 4; .5 INJECTION, POWDER, LYOPHILIZED, FOR SOLUTION INTRAVENOUS at 21:55

## 2020-08-31 RX ADMIN — PIPERACILLIN AND TAZOBACTAM 25 GRAM(S): 4; .5 INJECTION, POWDER, LYOPHILIZED, FOR SOLUTION INTRAVENOUS at 13:21

## 2020-08-31 RX ADMIN — HEPARIN SODIUM 5000 UNIT(S): 5000 INJECTION INTRAVENOUS; SUBCUTANEOUS at 13:21

## 2020-08-31 RX ADMIN — PIPERACILLIN AND TAZOBACTAM 25 GRAM(S): 4; .5 INJECTION, POWDER, LYOPHILIZED, FOR SOLUTION INTRAVENOUS at 05:06

## 2020-08-31 RX ADMIN — LOSARTAN POTASSIUM 100 MILLIGRAM(S): 100 TABLET, FILM COATED ORAL at 05:08

## 2020-08-31 RX ADMIN — Medication 81 MILLIGRAM(S): at 11:59

## 2020-08-31 RX ADMIN — CARVEDILOL PHOSPHATE 6.25 MILLIGRAM(S): 80 CAPSULE, EXTENDED RELEASE ORAL at 05:08

## 2020-08-31 RX ADMIN — Medication 1: at 17:38

## 2020-08-31 RX ADMIN — Medication 20 MILLIGRAM(S): at 11:59

## 2020-08-31 RX ADMIN — AMLODIPINE BESYLATE 10 MILLIGRAM(S): 2.5 TABLET ORAL at 05:08

## 2020-08-31 RX ADMIN — CARVEDILOL PHOSPHATE 6.25 MILLIGRAM(S): 80 CAPSULE, EXTENDED RELEASE ORAL at 17:40

## 2020-08-31 RX ADMIN — DONEPEZIL HYDROCHLORIDE 5 MILLIGRAM(S): 10 TABLET, FILM COATED ORAL at 21:54

## 2020-08-31 RX ADMIN — HEPARIN SODIUM 5000 UNIT(S): 5000 INJECTION INTRAVENOUS; SUBCUTANEOUS at 21:54

## 2020-08-31 RX ADMIN — Medication 250 MILLIGRAM(S): at 01:30

## 2020-08-31 RX ADMIN — HEPARIN SODIUM 5000 UNIT(S): 5000 INJECTION INTRAVENOUS; SUBCUTANEOUS at 05:08

## 2020-08-31 RX ADMIN — Medication 1 TABLET(S): at 11:59

## 2020-08-31 NOTE — PROGRESS NOTE ADULT - PROBLEM SELECTOR PLAN 5
Hgb 9.5, likely 2/2 iron deficiency anemia    cont home iron  f/u ferritin, TIBC, iron panel Hgb 9.5, likely 2/2 iron deficiency anemia    cont home iron  ferritin, TIBC, iron panel wnl

## 2020-08-31 NOTE — PROGRESS NOTE ADULT - PROBLEM SELECTOR PLAN 1
L diabetic foot ulcer under 1st metatarsal, pustular material, per admitter, does not appear to probe to bone.   - rpt lactate 2.2, xray L foot neg for overt osteolysis or f/x  s/p vanc and zosyn, 1L NS   cont vanc and zosyn, f/u vanc trough   minimally elevated ESR, f/u CRP   f/u blood cx x2, urine culture    attempted consult podiatry services, unable to reach over weekend. Will advise AM team to attempt consult. L diabetic foot ulcer under 1st metatarsal, pustular material, per admitter, does not appear to probe to bone.   - rpt lactate 2.2, xray L foot neg for overt osteolysis or f/x  s/p vanc and zosyn, 1L NS   cont vanc and zosyn, f/u vanc trough   minimally elevated ESR, f/u CRP   blood cx NGTD x2, urine culture prelim: klebsiella pneumonia   attempted consult podiatry services, unable to reach over weekend. Will advise AM team to attempt consult. L diabetic foot ulcer under 1st metatarsal, pustular material, per admitter, does not appear to probe to bone.   - rpt lactate 2.2, xray L foot neg for overt osteolysis or f/x  s/p vanc and zosyn, 1L NS   cont vanc and zosyn, f/u vanc trough   minimally elevated ESR, f/u CRP   blood cx NGTD x2, urine culture prelim: klebsiella pneumonia   - podiatry: pending official recs

## 2020-08-31 NOTE — CONSULT NOTE ADULT - PROBLEM SELECTOR RECOMMENDATION 9
Patient examined and evaluated at this time.  Discussed etiology of symptoms and all questions answered to satisfaction with patient.  Wound to the left foot is stable with no signs of infection   Applied aquacel and DSD to left foot wound.   Debrided nails 1-5 bilaterally using sterile nail nippers without any incident.   Will change dressing every other day.   F/u wound culture of left foot

## 2020-08-31 NOTE — PROGRESS NOTE ADULT - PROBLEM SELECTOR PLAN 2
stage 1 sacral wound, skin tear on R thigh 2/2 LLE contracture with erythema and edema on medical aspect of L foot  turn in bed, reposition q4h  keep towel between abrasion and LLE   wound care nurse consulted  fu wound culture

## 2020-08-31 NOTE — PROGRESS NOTE ADULT - PROBLEM SELECTOR PLAN 9
vte ppx heparin 5000 q 8   depression: cont paroxetine   IMPROVE VTE Individual Risk Assessment        RISK                                                          Points  [  ] Previous VTE                                                3  [  ] Thrombophilia                                             2  [  ] Lower limb paralysis                                   2        (unable to hold up >15 seconds)    [  ] Current Cancer                                            2         (within 6 months)  [ x ] Immobilization > 24 hrs                              1  [  ] ICU/CCU stay > 24 hours                            1  [ x ] Age > 60                                                    1  IMPROVE VTE Score _____2____ DVT ppx heparin 5000 q 8     IMPROVE VTE Individual Risk Assessment        RISK                                                          Points  [  ] Previous VTE                                                3  [  ] Thrombophilia                                             2  [  ] Lower limb paralysis                                   2        (unable to hold up >15 seconds)    [  ] Current Cancer                                            2         (within 6 months)  [ x ] Immobilization > 24 hrs                              1  [  ] ICU/CCU stay > 24 hours                            1  [ x ] Age > 60                                                    1  IMPROVE VTE Score _____2____    10 . Depression: continue Paroxetine

## 2020-08-31 NOTE — PROGRESS NOTE ADULT - SUBJECTIVE AND OBJECTIVE BOX
Patient is a 87y old  Female who presents with a chief complaint of Left foot wound (31 Aug 2020 12:28)      INTERVAL HPI/OVERNIGHT EVENTS: Patient seen and examined at bedside. No overnight events occurred. Patient has no complaints at this time. Denies fevers, chills, headache, lightheadedness, chest pain, dyspnea, abdominal pain, n/v/d/c.    MEDICATIONS  (STANDING):  amLODIPine   Tablet 10 milliGRAM(s) Oral daily  aspirin  chewable 81 milliGRAM(s) Oral daily  atorvastatin 10 milliGRAM(s) Oral at bedtime  carvedilol 6.25 milliGRAM(s) Oral every 12 hours  dextrose 5%. 1000 milliLiter(s) (50 mL/Hr) IV Continuous <Continuous>  dextrose 50% Injectable 12.5 Gram(s) IV Push once  dextrose 50% Injectable 25 Gram(s) IV Push once  dextrose 50% Injectable 25 Gram(s) IV Push once  donepezil 5 milliGRAM(s) Oral at bedtime  ferrous    sulfate 325 milliGRAM(s) Oral daily  heparin   Injectable 5000 Unit(s) SubCutaneous every 8 hours  insulin lispro (HumaLOG) corrective regimen sliding scale   SubCutaneous three times a day before meals  insulin lispro (HumaLOG) corrective regimen sliding scale   SubCutaneous at bedtime  lactobacillus acidophilus 1 Tablet(s) Oral daily  losartan 100 milliGRAM(s) Oral daily  pantoprazole    Tablet 20 milliGRAM(s) Oral before breakfast  PARoxetine 20 milliGRAM(s) Oral daily  piperacillin/tazobactam IVPB.. 3.375 Gram(s) IV Intermittent every 8 hours  vancomycin  IVPB 750 milliGRAM(s) IV Intermittent every 24 hours    MEDICATIONS  (PRN):  dextrose 40% Gel 15 Gram(s) Oral once PRN Blood Glucose LESS THAN 70 milliGRAM(s)/deciliter  glucagon  Injectable 1 milliGRAM(s) IntraMuscular once PRN Glucose LESS THAN 70 milligrams/deciliter      Allergies    sulfa drugs (Unknown)    Intolerances        REVIEW OF SYSTEMS:  CONSTITUTIONAL: No fever or chills  HEENT:  No headache, no sore throat  RESPIRATORY: No cough, wheezing, or shortness of breath  CARDIOVASCULAR: No chest pain, palpitations  GASTROINTESTINAL: No abd pain, nausea, vomiting, or diarrhea  GENITOURINARY: No dysuria, frequency, or hematuria  NEUROLOGICAL: no focal weakness or dizziness  MUSCULOSKELETAL: no myalgias     Vital Signs Last 24 Hrs  T(C): 36.9 (31 Aug 2020 12:44), Max: 37 (30 Aug 2020 20:28)  T(F): 98.4 (31 Aug 2020 12:44), Max: 98.6 (30 Aug 2020 20:28)  HR: 80 (31 Aug 2020 12:44) (76 - 85)  BP: 152/60 (31 Aug 2020 12:44) (133/73 - 152/60)  BP(mean): --  RR: 16 (31 Aug 2020 12:44) (16 - 18)  SpO2: 96% (31 Aug 2020 12:44) (94% - 99%)    PHYSICAL EXAM:  GENERAL: NAD  HEENT:  anicteric, moist mucous membranes  CHEST/LUNG:  CTA b/l, no rales, wheezes, or rhonchi  HEART:  RRR, S1, S2  ABDOMEN:  BS+, soft, nontender, nondistended  EXTREMITIES: no edema, cyanosis, or calf tenderness  NERVOUS SYSTEM: answers questions and follows commands appropriately    LABS:                        9.8    7.96  )-----------( 238      ( 31 Aug 2020 08:46 )             30.5     CBC Full  -  ( 31 Aug 2020 08:46 )  WBC Count : 7.96 K/uL  Hemoglobin : 9.8 g/dL  Hematocrit : 30.5 %  Platelet Count - Automated : 238 K/uL  Mean Cell Volume : 84.5 fl  Mean Cell Hemoglobin : 27.1 pg  Mean Cell Hemoglobin Concentration : 32.1 gm/dL  Auto Neutrophil # : 5.78 K/uL  Auto Lymphocyte # : 1.46 K/uL  Auto Monocyte # : 0.54 K/uL  Auto Eosinophil # : 0.10 K/uL  Auto Basophil # : 0.04 K/uL  Auto Neutrophil % : 72.6 %  Auto Lymphocyte % : 18.3 %  Auto Monocyte % : 6.8 %  Auto Eosinophil % : 1.3 %  Auto Basophil % : 0.5 %    31 Aug 2020 08:46    145    |  110    |  26     ----------------------------<  120    3.7     |  29     |  0.90     Ca    8.5        31 Aug 2020 08:46        Urinalysis Basic - ( 30 Aug 2020 03:15 )    Color: Yellow / Appearance: Clear / S.005 / pH: x  Gluc: x / Ketone: Negative  / Bili: Negative / Urobili: Negative   Blood: x / Protein: 15 / Nitrite: Positive   Leuk Esterase: Trace / RBC: 0-2 /HPF / WBC 26-50   Sq Epi: x / Non Sq Epi: Few / Bacteria: Many      CAPILLARY BLOOD GLUCOSE      POCT Blood Glucose.: 202 mg/dL (31 Aug 2020 12:01)  POCT Blood Glucose.: 128 mg/dL (31 Aug 2020 08:09)  POCT Blood Glucose.: 197 mg/dL (30 Aug 2020 22:02)  POCT Blood Glucose.: 170 mg/dL (30 Aug 2020 17:05)        Culture - Urine (collected 20 @ 11:14)  Source: .Urine Clean Catch (Midstream)  Preliminary Report (20 @ 09:42):    50,000 - 99,000 CFU/mL Klebsiella pneumoniae    Culture - Blood (collected 20 @ 05:22)  Source: .Blood Blood-Venous  Preliminary Report (20 @ 06:01):    No growth to date.    Culture - Blood (collected 20 @ 05:22)  Source: .Blood Blood-Venous  Preliminary Report (20 @ 06:01):    No growth to date.    Culture - Other (collected 20 @ 05:12)  Source: .Other left great toe  Preliminary Report (20 @ 07:32):    Numerous Streptococcus agalactiae (Group B) isolated    Group B streptococci are susceptible to ampicillin,    penicillin and cefazolin, but may be resistant to    erythromycin and clindamycin.    Recommendations for intrapartum prophylaxis for Group B    streptococci are penicillin or ampicillin.    Normal skin sally isolated        RADIOLOGY & ADDITIONAL TESTS:    Personally reviewed.     Consultant(s) Notes Reviewed:  [x] YES  [ ] NO Patient is a 87y old  Female who presents with a chief complaint of Left foot wound (31 Aug 2020 12:28)      INTERVAL HPI/OVERNIGHT EVENTS: Patient seen and examined at bedside. No overnight events occurred. Patient has no complaints at this time. Denies fevers, chills, headache, lightheadedness, chest pain, dyspnea, abdominal pain, n/v/d/c.    MEDICATIONS  (STANDING):  amLODIPine   Tablet 10 milliGRAM(s) Oral daily  aspirin  chewable 81 milliGRAM(s) Oral daily  atorvastatin 10 milliGRAM(s) Oral at bedtime  carvedilol 6.25 milliGRAM(s) Oral every 12 hours  dextrose 5%. 1000 milliLiter(s) (50 mL/Hr) IV Continuous <Continuous>  dextrose 50% Injectable 12.5 Gram(s) IV Push once  dextrose 50% Injectable 25 Gram(s) IV Push once  dextrose 50% Injectable 25 Gram(s) IV Push once  donepezil 5 milliGRAM(s) Oral at bedtime  ferrous    sulfate 325 milliGRAM(s) Oral daily  heparin   Injectable 5000 Unit(s) SubCutaneous every 8 hours  insulin lispro (HumaLOG) corrective regimen sliding scale   SubCutaneous three times a day before meals  insulin lispro (HumaLOG) corrective regimen sliding scale   SubCutaneous at bedtime  lactobacillus acidophilus 1 Tablet(s) Oral daily  losartan 100 milliGRAM(s) Oral daily  pantoprazole    Tablet 20 milliGRAM(s) Oral before breakfast  PARoxetine 20 milliGRAM(s) Oral daily  piperacillin/tazobactam IVPB.. 3.375 Gram(s) IV Intermittent every 8 hours  vancomycin  IVPB 750 milliGRAM(s) IV Intermittent every 24 hours    MEDICATIONS  (PRN):  dextrose 40% Gel 15 Gram(s) Oral once PRN Blood Glucose LESS THAN 70 milliGRAM(s)/deciliter  glucagon  Injectable 1 milliGRAM(s) IntraMuscular once PRN Glucose LESS THAN 70 milligrams/deciliter      Allergies    sulfa drugs (Unknown)    Intolerances        REVIEW OF SYSTEMS:  CONSTITUTIONAL: No fever or chills  HEENT:  No headache, no sore throat  RESPIRATORY: No cough, wheezing, or shortness of breath  CARDIOVASCULAR: No chest pain, palpitations  GASTROINTESTINAL: No abd pain, nausea, vomiting, or diarrhea  GENITOURINARY: No dysuria, frequency, or hematuria  NEUROLOGICAL: no focal weakness or dizziness  MUSCULOSKELETAL: no myalgias     Vital Signs Last 24 Hrs  T(C): 36.9 (31 Aug 2020 12:44), Max: 37 (30 Aug 2020 20:28)  T(F): 98.4 (31 Aug 2020 12:44), Max: 98.6 (30 Aug 2020 20:28)  HR: 80 (31 Aug 2020 12:44) (76 - 85)  BP: 152/60 (31 Aug 2020 12:44) (133/73 - 152/60)  BP(mean): --  RR: 16 (31 Aug 2020 12:44) (16 - 18)  SpO2: 96% (31 Aug 2020 12:44) (94% - 99%)    PHYSICAL EXAM:  GENERAL: NAD  HEENT:  anicteric, moist mucous membranes  CHEST/LUNG:  CTA b/l, no rales, wheezes, or rhonchi  HEART:  RRR, S1, S2  ABDOMEN:  BS+, soft, nontender, nondistended  EXTREMITIES: contracted LLE overlying RLE in flexed position. b/l SCDs in place. LLE foot in bandaged dressing.   NERVOUS SYSTEM: answers questions and follows commands appropriately    LABS:                        9.8    7.96  )-----------( 238      ( 31 Aug 2020 08:46 )             30.5     CBC Full  -  ( 31 Aug 2020 08:46 )  WBC Count : 7.96 K/uL  Hemoglobin : 9.8 g/dL  Hematocrit : 30.5 %  Platelet Count - Automated : 238 K/uL  Mean Cell Volume : 84.5 fl  Mean Cell Hemoglobin : 27.1 pg  Mean Cell Hemoglobin Concentration : 32.1 gm/dL  Auto Neutrophil # : 5.78 K/uL  Auto Lymphocyte # : 1.46 K/uL  Auto Monocyte # : 0.54 K/uL  Auto Eosinophil # : 0.10 K/uL  Auto Basophil # : 0.04 K/uL  Auto Neutrophil % : 72.6 %  Auto Lymphocyte % : 18.3 %  Auto Monocyte % : 6.8 %  Auto Eosinophil % : 1.3 %  Auto Basophil % : 0.5 %    31 Aug 2020 08:46    145    |  110    |  26     ----------------------------<  120    3.7     |  29     |  0.90     Ca    8.5        31 Aug 2020 08:46        Urinalysis Basic - ( 30 Aug 2020 03:15 )    Color: Yellow / Appearance: Clear / S.005 / pH: x  Gluc: x / Ketone: Negative  / Bili: Negative / Urobili: Negative   Blood: x / Protein: 15 / Nitrite: Positive   Leuk Esterase: Trace / RBC: 0-2 /HPF / WBC 26-50   Sq Epi: x / Non Sq Epi: Few / Bacteria: Many      CAPILLARY BLOOD GLUCOSE      POCT Blood Glucose.: 202 mg/dL (31 Aug 2020 12:01)  POCT Blood Glucose.: 128 mg/dL (31 Aug 2020 08:09)  POCT Blood Glucose.: 197 mg/dL (30 Aug 2020 22:02)  POCT Blood Glucose.: 170 mg/dL (30 Aug 2020 17:05)        Culture - Urine (collected 20 @ 11:14)  Source: .Urine Clean Catch (Midstream)  Preliminary Report (20 @ 09:42):    50,000 - 99,000 CFU/mL Klebsiella pneumoniae    Culture - Blood (collected 20 @ 05:22)  Source: .Blood Blood-Venous  Preliminary Report (20 @ 06:01):    No growth to date.    Culture - Blood (collected 20 @ 05:22)  Source: .Blood Blood-Venous  Preliminary Report (20 @ 06:01):    No growth to date.    Culture - Other (collected 20 @ 05:12)  Source: .Other left great toe  Preliminary Report (20 @ 07:32):    Numerous Streptococcus agalactiae (Group B) isolated    Group B streptococci are susceptible to ampicillin,    penicillin and cefazolin, but may be resistant to    erythromycin and clindamycin.    Recommendations for intrapartum prophylaxis for Group B    streptococci are penicillin or ampicillin.    Normal skin sally isolated        RADIOLOGY & ADDITIONAL TESTS:    Personally reviewed.     Consultant(s) Notes Reviewed:  [x] YES  [ ] NO

## 2020-08-31 NOTE — CONSULT NOTE ADULT - SUBJECTIVE AND OBJECTIVE BOX
HPI: From admission   *****Patient unable to provide a subjective history due to underlying dementia, history obtained from medical records, ER, and son*****  84 yo F with PMH of HTN, HLD, Diabetes Mellitus Type 2 not on home insulin, GERD, Dementia, brought to ED by son for wound on left foot. History taken from son at bedside. Son says pt has become bedbound x 4-5 months, prior to that was in bed most of the day, gradual decline. Son also noted pt has a sore on her right thigh where her left foot usually rests due to LLE contracture. Of note pt has baseline dementia, knows her name, can identify her family, forgets their names. Pt baseline is bedbound, lives alone however has 24 hour aids, eats well (regular food). As per son, no recent decrease in appetitie, sick contacts, COVID 19 exposure. Pt denies fever, chills, SOB, CP, abd pain.     ED vitals afebrile, HR 72, /65, 99% on RA. Pt received zosyn 3.375 x1, vanc IV x1, 1L NS bolus. Significant labs ESR 28, Hgb 10.5, lactate 3.4. CXR pending. X ray AP  lateral L foot pending. (30 Aug 2020 00:19)          Patient is an 87y year old Female seen at [facility] 3WES 354 W1 for left foot wound and elongated nails. Patient was resting in bed comfortably and was in NAD. Patient was awake but was unable to provide any history regarding the left foot wound.       REVIEW OF SYSTEMS    PAST MEDICAL & SURGICAL HISTORY:  Dementia  DM (diabetes mellitus)  HLD (hyperlipidemia)  HTN (hypertension)  GERD (gastroesophageal reflux disease)  History of cholecystectomy  Ankle injury: s/p ankle surgery, pt doesn&#x27;t remember which ankle      Allergies    sulfa drugs (Unknown)    Intolerances        MEDICATIONS  (STANDING):  amLODIPine   Tablet 10 milliGRAM(s) Oral daily  aspirin  chewable 81 milliGRAM(s) Oral daily  atorvastatin 10 milliGRAM(s) Oral at bedtime  carvedilol 6.25 milliGRAM(s) Oral every 12 hours  dextrose 5%. 1000 milliLiter(s) (50 mL/Hr) IV Continuous <Continuous>  dextrose 50% Injectable 12.5 Gram(s) IV Push once  dextrose 50% Injectable 25 Gram(s) IV Push once  dextrose 50% Injectable 25 Gram(s) IV Push once  donepezil 5 milliGRAM(s) Oral at bedtime  ferrous    sulfate 325 milliGRAM(s) Oral daily  heparin   Injectable 5000 Unit(s) SubCutaneous every 8 hours  insulin lispro (HumaLOG) corrective regimen sliding scale   SubCutaneous three times a day before meals  insulin lispro (HumaLOG) corrective regimen sliding scale   SubCutaneous at bedtime  lactobacillus acidophilus 1 Tablet(s) Oral daily  losartan 100 milliGRAM(s) Oral daily  pantoprazole    Tablet 20 milliGRAM(s) Oral before breakfast  PARoxetine 20 milliGRAM(s) Oral daily  piperacillin/tazobactam IVPB.. 3.375 Gram(s) IV Intermittent every 8 hours  vancomycin  IVPB 750 milliGRAM(s) IV Intermittent every 24 hours    MEDICATIONS  (PRN):  dextrose 40% Gel 15 Gram(s) Oral once PRN Blood Glucose LESS THAN 70 milliGRAM(s)/deciliter  glucagon  Injectable 1 milliGRAM(s) IntraMuscular once PRN Glucose LESS THAN 70 milligrams/deciliter      Social History:  lives at home alone with 24 hour aids, bedbound, incontinent, needs assistance with bed commode, sponge baths etc   not a current smoker, had smoked 3 cigarettes 1x a month, no etoh use, denies illicit drug use  full code for now, must update MOLST with HCP Marcio (30 Aug 2020 00:19)      FAMILY HISTORY:  FH: type 2 diabetes: brother  FH: colon cancer: sister      Vital Signs Last 24 Hrs  T(C): 36.3 (31 Aug 2020 04:50), Max: 37.2 (30 Aug 2020 13:59)  T(F): 97.3 (31 Aug 2020 04:50), Max: 98.9 (30 Aug 2020 13:59)  HR: 76 (31 Aug 2020 04:50) (76 - 85)  BP: 151/68 (31 Aug 2020 04:50) (104/60 - 151/68)  BP(mean): --  RR: 17 (31 Aug 2020 04:50) (17 - 18)  SpO2: 94% (31 Aug 2020 04:50) (94% - 100%)    PHYSICAL EXAM:  Vascular: DP & PT palpable bilaterally, Capillary refill 3 seconds, Digital hair absent bilaterally  Neurological: Light touch sensation diminished to the level of the midfoot bilaterally   Musculoskeletal: 3/5 strength in all quadrants bilaterally, AJ & STJ ROM limited. Pain on palpation to the left foot wound. Pain on palpation to elongated nails 1-5 bilaterally   Dermatological: 1.3cm x 1.0cm x 0.2cm ulceration noted to the plantar aspect of the left 2nd metatarsal head with fibrogranular wound bed, no probe to bone, no periwound erythema, no fluctuance, no malodor, no proximal streaking at this time  Nails 1-5 are elongated, dystrophic, incurvated with subungal debri     CBC Full  -  ( 31 Aug 2020 08:46 )  WBC Count : 7.96 K/uL  RBC Count : 3.61 M/uL  Hemoglobin : 9.8 g/dL  Hematocrit : 30.5 %  Platelet Count - Automated : 238 K/uL  Mean Cell Volume : 84.5 fl  Mean Cell Hemoglobin : 27.1 pg  Mean Cell Hemoglobin Concentration : 32.1 gm/dL  Auto Neutrophil # : 5.78 K/uL  Auto Lymphocyte # : 1.46 K/uL  Auto Monocyte # : 0.54 K/uL  Auto Eosinophil # : 0.10 K/uL  Auto Basophil # : 0.04 K/uL  Auto Neutrophil % : 72.6 %  Auto Lymphocyte % : 18.3 %  Auto Monocyte % : 6.8 %  Auto Eosinophil % : 1.3 %  Auto Basophil % : 0.5 %      08-31    145  |  110<H>  |  26<H>  ----------------------------<  120<H>  3.7   |  29  |  0.90    Ca    8.5      31 Aug 2020 08:46    TPro  6.7  /  Alb  2.9<L>  /  TBili  0.2  /  DBili  x   /  AST  12<L>  /  ALT  15  /  AlkPhos  45  08-29            Culture - Urine (collected 30 Aug 2020 11:14)  Source: .Urine Clean Catch (Midstream)  Preliminary Report (31 Aug 2020 09:42):    50,000 - 99,000 CFU/mL Klebsiella pneumoniae    Culture - Blood (collected 30 Aug 2020 05:22)  Source: .Blood Blood-Venous  Preliminary Report (31 Aug 2020 06:01):    No growth to date.    Culture - Blood (collected 30 Aug 2020 05:22)  Source: .Blood Blood-Venous  Preliminary Report (31 Aug 2020 06:01):    No growth to date.    Culture - Other (collected 30 Aug 2020 05:12)  Source: .Other left great toe  Preliminary Report (31 Aug 2020 07:32):    Numerous Streptococcus agalactiae (Group B) isolated    Group B streptococci are susceptible to ampicillin,    penicillin and cefazolin, but may be resistant to    erythromycin and clindamycin.    Recommendations for intrapartum prophylaxis for Group B    streptococci are penicillin or ampicillin.    Normal skin sally isolated      Imaging: ----------  < from: Xray Foot AP + Lateral, Left (08.29.20 @ 23:09) >    EXAM:  FOOT 2VIEWS LT                            PROCEDURE DATE:  08/29/2020          INTERPRETATION:  LEFT FOOT: AP, crosstable lateral views.    CLINICAL INFORMATION: Left foot wound.    COMPARISON:  None available    FINDINGS:    Partially imaged is ORIF distal tibia and fibula.    The alignment at the tarsometatarsal joints remains grossly within normal limits.    There are degenerative changes at the first MTP joint and hallux sesamoid complex.    No definite focal osteolysis or acute fracture is noted.    Impression:    Findings as discussed above.      < end of copied text > HPI: From admission   *****Patient unable to provide a subjective history due to underlying dementia, history obtained from medical records, ER, and son*****  86 yo F with PMH of HTN, HLD, Diabetes Mellitus Type 2 not on home insulin, GERD, Dementia, brought to ED by son for wound on left foot. History taken from son at bedside. Son says pt has become bedbound x 4-5 months, prior to that was in bed most of the day, gradual decline. Son also noted pt has a sore on her right thigh where her left foot usually rests due to LLE contracture. Of note pt has baseline dementia, knows her name, can identify her family, forgets their names. Pt baseline is bedbound, lives alone however has 24 hour aids, eats well (regular food). As per son, no recent decrease in appetitie, sick contacts, COVID 19 exposure. Pt denies fever, chills, SOB, CP, abd pain.     ED vitals afebrile, HR 72, /65, 99% on RA. Pt received zosyn 3.375 x1, vanc IV x1, 1L NS bolus. Significant labs ESR 28, Hgb 10.5, lactate 3.4. CXR pending. X ray AP  lateral L foot pending. (30 Aug 2020 00:19)          Patient is an 87y year old Female seen at [facility] 3WES 354 W1 for left foot wound and elongated nails. Patient was resting in bed comfortably and was in NAD. Patient was awake but was unable to provide any history regarding the left foot wound.       REVIEW OF SYSTEMS    PAST MEDICAL & SURGICAL HISTORY:  Dementia  DM (diabetes mellitus)  HLD (hyperlipidemia)  HTN (hypertension)  GERD (gastroesophageal reflux disease)  History of cholecystectomy  Ankle injury: s/p ankle surgery, pt doesn&#x27;t remember which ankle      Allergies    sulfa drugs (Unknown)    Intolerances        MEDICATIONS  (STANDING):  amLODIPine   Tablet 10 milliGRAM(s) Oral daily  aspirin  chewable 81 milliGRAM(s) Oral daily  atorvastatin 10 milliGRAM(s) Oral at bedtime  carvedilol 6.25 milliGRAM(s) Oral every 12 hours  dextrose 5%. 1000 milliLiter(s) (50 mL/Hr) IV Continuous <Continuous>  dextrose 50% Injectable 12.5 Gram(s) IV Push once  dextrose 50% Injectable 25 Gram(s) IV Push once  dextrose 50% Injectable 25 Gram(s) IV Push once  donepezil 5 milliGRAM(s) Oral at bedtime  ferrous    sulfate 325 milliGRAM(s) Oral daily  heparin   Injectable 5000 Unit(s) SubCutaneous every 8 hours  insulin lispro (HumaLOG) corrective regimen sliding scale   SubCutaneous three times a day before meals  insulin lispro (HumaLOG) corrective regimen sliding scale   SubCutaneous at bedtime  lactobacillus acidophilus 1 Tablet(s) Oral daily  losartan 100 milliGRAM(s) Oral daily  pantoprazole    Tablet 20 milliGRAM(s) Oral before breakfast  PARoxetine 20 milliGRAM(s) Oral daily  piperacillin/tazobactam IVPB.. 3.375 Gram(s) IV Intermittent every 8 hours  vancomycin  IVPB 750 milliGRAM(s) IV Intermittent every 24 hours    MEDICATIONS  (PRN):  dextrose 40% Gel 15 Gram(s) Oral once PRN Blood Glucose LESS THAN 70 milliGRAM(s)/deciliter  glucagon  Injectable 1 milliGRAM(s) IntraMuscular once PRN Glucose LESS THAN 70 milligrams/deciliter      Social History:  lives at home alone with 24 hour aids, bedbound, incontinent, needs assistance with bed commode, sponge baths etc   not a current smoker, had smoked 3 cigarettes 1x a month, no etoh use, denies illicit drug use  full code for now, must update MOLST with HCP Marcio (30 Aug 2020 00:19)      FAMILY HISTORY:  FH: type 2 diabetes: brother  FH: colon cancer: sister      Vital Signs Last 24 Hrs  T(C): 36.3 (31 Aug 2020 04:50), Max: 37.2 (30 Aug 2020 13:59)  T(F): 97.3 (31 Aug 2020 04:50), Max: 98.9 (30 Aug 2020 13:59)  HR: 76 (31 Aug 2020 04:50) (76 - 85)  BP: 151/68 (31 Aug 2020 04:50) (104/60 - 151/68)  BP(mean): --  RR: 17 (31 Aug 2020 04:50) (17 - 18)  SpO2: 94% (31 Aug 2020 04:50) (94% - 100%)    PHYSICAL EXAM:  Vascular: DP & PT palpable bilaterally, Capillary refill 3 seconds, Digital hair absent bilaterally  Neurological: Light touch sensation diminished to the level of the midfoot bilaterally   Musculoskeletal: 3/5 strength in all quadrants bilaterally, AJ & STJ ROM limited. Pain on palpation to the left foot wound. Pain on palpation to elongated nails 1-5 bilaterally   Dermatological: 1.3cm x 1.0cm x 0.2cm ulceration noted to the plantar aspect of the left 1st metatarsal head with fibrogranular wound bed, no probe to bone, no periwound erythema, no fluctuance, no malodor, no proximal streaking at this time  Nails 1-5 are elongated, dystrophic, incurvated with subungal debri     CBC Full  -  ( 31 Aug 2020 08:46 )  WBC Count : 7.96 K/uL  RBC Count : 3.61 M/uL  Hemoglobin : 9.8 g/dL  Hematocrit : 30.5 %  Platelet Count - Automated : 238 K/uL  Mean Cell Volume : 84.5 fl  Mean Cell Hemoglobin : 27.1 pg  Mean Cell Hemoglobin Concentration : 32.1 gm/dL  Auto Neutrophil # : 5.78 K/uL  Auto Lymphocyte # : 1.46 K/uL  Auto Monocyte # : 0.54 K/uL  Auto Eosinophil # : 0.10 K/uL  Auto Basophil # : 0.04 K/uL  Auto Neutrophil % : 72.6 %  Auto Lymphocyte % : 18.3 %  Auto Monocyte % : 6.8 %  Auto Eosinophil % : 1.3 %  Auto Basophil % : 0.5 %      08-31    145  |  110<H>  |  26<H>  ----------------------------<  120<H>  3.7   |  29  |  0.90    Ca    8.5      31 Aug 2020 08:46    TPro  6.7  /  Alb  2.9<L>  /  TBili  0.2  /  DBili  x   /  AST  12<L>  /  ALT  15  /  AlkPhos  45  08-29            Culture - Urine (collected 30 Aug 2020 11:14)  Source: .Urine Clean Catch (Midstream)  Preliminary Report (31 Aug 2020 09:42):    50,000 - 99,000 CFU/mL Klebsiella pneumoniae    Culture - Blood (collected 30 Aug 2020 05:22)  Source: .Blood Blood-Venous  Preliminary Report (31 Aug 2020 06:01):    No growth to date.    Culture - Blood (collected 30 Aug 2020 05:22)  Source: .Blood Blood-Venous  Preliminary Report (31 Aug 2020 06:01):    No growth to date.    Culture - Other (collected 30 Aug 2020 05:12)  Source: .Other left great toe  Preliminary Report (31 Aug 2020 07:32):    Numerous Streptococcus agalactiae (Group B) isolated    Group B streptococci are susceptible to ampicillin,    penicillin and cefazolin, but may be resistant to    erythromycin and clindamycin.    Recommendations for intrapartum prophylaxis for Group B    streptococci are penicillin or ampicillin.    Normal skin sally isolated      Imaging: ----------  < from: Xray Foot AP + Lateral, Left (08.29.20 @ 23:09) >    EXAM:  FOOT 2VIEWS LT                            PROCEDURE DATE:  08/29/2020          INTERPRETATION:  LEFT FOOT: AP, crosstable lateral views.    CLINICAL INFORMATION: Left foot wound.    COMPARISON:  None available    FINDINGS:    Partially imaged is ORIF distal tibia and fibula.    The alignment at the tarsometatarsal joints remains grossly within normal limits.    There are degenerative changes at the first MTP joint and hallux sesamoid complex.    No definite focal osteolysis or acute fracture is noted.    Impression:    Findings as discussed above.      < end of copied text >

## 2020-09-01 LAB
-  AMIKACIN: SIGNIFICANT CHANGE UP
-  AMOXICILLIN/CLAVULANIC ACID: SIGNIFICANT CHANGE UP
-  AMPICILLIN/SULBACTAM: SIGNIFICANT CHANGE UP
-  AMPICILLIN: SIGNIFICANT CHANGE UP
-  AZTREONAM: SIGNIFICANT CHANGE UP
-  CEFAZOLIN: SIGNIFICANT CHANGE UP
-  CEFEPIME: SIGNIFICANT CHANGE UP
-  CEFOXITIN: SIGNIFICANT CHANGE UP
-  CEFTRIAXONE: SIGNIFICANT CHANGE UP
-  CIPROFLOXACIN: SIGNIFICANT CHANGE UP
-  ERTAPENEM: SIGNIFICANT CHANGE UP
-  GENTAMICIN: SIGNIFICANT CHANGE UP
-  IMIPENEM: SIGNIFICANT CHANGE UP
-  LEVOFLOXACIN: SIGNIFICANT CHANGE UP
-  MEROPENEM: SIGNIFICANT CHANGE UP
-  NITROFURANTOIN: SIGNIFICANT CHANGE UP
-  PIPERACILLIN/TAZOBACTAM: SIGNIFICANT CHANGE UP
-  TIGECYCLINE: SIGNIFICANT CHANGE UP
-  TOBRAMYCIN: SIGNIFICANT CHANGE UP
-  TRIMETHOPRIM/SULFAMETHOXAZOLE: SIGNIFICANT CHANGE UP
ALBUMIN SERPL ELPH-MCNC: 2.3 G/DL — LOW (ref 3.3–5)
ALP SERPL-CCNC: 33 U/L — LOW (ref 40–120)
ALT FLD-CCNC: 9 U/L — LOW (ref 12–78)
ANION GAP SERPL CALC-SCNC: 5 MMOL/L — SIGNIFICANT CHANGE UP (ref 5–17)
AST SERPL-CCNC: 13 U/L — LOW (ref 15–37)
BASOPHILS # BLD AUTO: 0.05 K/UL — SIGNIFICANT CHANGE UP (ref 0–0.2)
BASOPHILS NFR BLD AUTO: 0.7 % — SIGNIFICANT CHANGE UP (ref 0–2)
BILIRUB SERPL-MCNC: 0.3 MG/DL — SIGNIFICANT CHANGE UP (ref 0.2–1.2)
BUN SERPL-MCNC: 28 MG/DL — HIGH (ref 7–23)
CALCIUM SERPL-MCNC: 8.3 MG/DL — LOW (ref 8.5–10.1)
CHLORIDE SERPL-SCNC: 108 MMOL/L — SIGNIFICANT CHANGE UP (ref 96–108)
CO2 SERPL-SCNC: 29 MMOL/L — SIGNIFICANT CHANGE UP (ref 22–31)
CREAT SERPL-MCNC: 0.89 MG/DL — SIGNIFICANT CHANGE UP (ref 0.5–1.3)
CULTURE RESULTS: SIGNIFICANT CHANGE UP
EOSINOPHIL # BLD AUTO: 0.17 K/UL — SIGNIFICANT CHANGE UP (ref 0–0.5)
EOSINOPHIL NFR BLD AUTO: 2.4 % — SIGNIFICANT CHANGE UP (ref 0–6)
GLUCOSE SERPL-MCNC: 162 MG/DL — HIGH (ref 70–99)
HCT VFR BLD CALC: 27.8 % — LOW (ref 34.5–45)
HGB BLD-MCNC: 8.8 G/DL — LOW (ref 11.5–15.5)
IMM GRANULOCYTES NFR BLD AUTO: 0.4 % — SIGNIFICANT CHANGE UP (ref 0–1.5)
LYMPHOCYTES # BLD AUTO: 1.62 K/UL — SIGNIFICANT CHANGE UP (ref 1–3.3)
LYMPHOCYTES # BLD AUTO: 23.3 % — SIGNIFICANT CHANGE UP (ref 13–44)
MCHC RBC-ENTMCNC: 26.5 PG — LOW (ref 27–34)
MCHC RBC-ENTMCNC: 31.7 GM/DL — LOW (ref 32–36)
MCV RBC AUTO: 83.7 FL — SIGNIFICANT CHANGE UP (ref 80–100)
METHOD TYPE: SIGNIFICANT CHANGE UP
MONOCYTES # BLD AUTO: 0.67 K/UL — SIGNIFICANT CHANGE UP (ref 0–0.9)
MONOCYTES NFR BLD AUTO: 9.6 % — SIGNIFICANT CHANGE UP (ref 2–14)
NEUTROPHILS # BLD AUTO: 4.42 K/UL — SIGNIFICANT CHANGE UP (ref 1.8–7.4)
NEUTROPHILS NFR BLD AUTO: 63.6 % — SIGNIFICANT CHANGE UP (ref 43–77)
NRBC # BLD: 0 /100 WBCS — SIGNIFICANT CHANGE UP (ref 0–0)
ORGANISM # SPEC MICROSCOPIC CNT: SIGNIFICANT CHANGE UP
ORGANISM # SPEC MICROSCOPIC CNT: SIGNIFICANT CHANGE UP
PLATELET # BLD AUTO: 216 K/UL — SIGNIFICANT CHANGE UP (ref 150–400)
POTASSIUM SERPL-MCNC: 3.9 MMOL/L — SIGNIFICANT CHANGE UP (ref 3.5–5.3)
POTASSIUM SERPL-SCNC: 3.9 MMOL/L — SIGNIFICANT CHANGE UP (ref 3.5–5.3)
PROT SERPL-MCNC: 5.6 G/DL — LOW (ref 6–8.3)
RBC # BLD: 3.32 M/UL — LOW (ref 3.8–5.2)
RBC # FLD: 15 % — HIGH (ref 10.3–14.5)
SODIUM SERPL-SCNC: 142 MMOL/L — SIGNIFICANT CHANGE UP (ref 135–145)
SPECIMEN SOURCE: SIGNIFICANT CHANGE UP
VANCOMYCIN TROUGH SERPL-MCNC: 12.3 UG/ML — SIGNIFICANT CHANGE UP (ref 10–20)
WBC # BLD: 6.96 K/UL — SIGNIFICANT CHANGE UP (ref 3.8–10.5)
WBC # FLD AUTO: 6.96 K/UL — SIGNIFICANT CHANGE UP (ref 3.8–10.5)

## 2020-09-01 PROCEDURE — 99232 SBSQ HOSP IP/OBS MODERATE 35: CPT | Mod: GC

## 2020-09-01 RX ORDER — PIPERACILLIN AND TAZOBACTAM 4; .5 G/20ML; G/20ML
3.38 INJECTION, POWDER, LYOPHILIZED, FOR SOLUTION INTRAVENOUS EVERY 8 HOURS
Refills: 0 | Status: DISCONTINUED | OUTPATIENT
Start: 2020-09-01 | End: 2020-09-02

## 2020-09-01 RX ADMIN — Medication 2: at 12:13

## 2020-09-01 RX ADMIN — Medication 325 MILLIGRAM(S): at 12:12

## 2020-09-01 RX ADMIN — PIPERACILLIN AND TAZOBACTAM 25 GRAM(S): 4; .5 INJECTION, POWDER, LYOPHILIZED, FOR SOLUTION INTRAVENOUS at 14:50

## 2020-09-01 RX ADMIN — Medication 20 MILLIGRAM(S): at 12:12

## 2020-09-01 RX ADMIN — HEPARIN SODIUM 5000 UNIT(S): 5000 INJECTION INTRAVENOUS; SUBCUTANEOUS at 05:25

## 2020-09-01 RX ADMIN — ATORVASTATIN CALCIUM 10 MILLIGRAM(S): 80 TABLET, FILM COATED ORAL at 21:34

## 2020-09-01 RX ADMIN — HEPARIN SODIUM 5000 UNIT(S): 5000 INJECTION INTRAVENOUS; SUBCUTANEOUS at 21:33

## 2020-09-01 RX ADMIN — PANTOPRAZOLE SODIUM 20 MILLIGRAM(S): 20 TABLET, DELAYED RELEASE ORAL at 05:25

## 2020-09-01 RX ADMIN — Medication 1: at 08:10

## 2020-09-01 RX ADMIN — PIPERACILLIN AND TAZOBACTAM 25 GRAM(S): 4; .5 INJECTION, POWDER, LYOPHILIZED, FOR SOLUTION INTRAVENOUS at 05:25

## 2020-09-01 RX ADMIN — Medication 1 TABLET(S): at 12:12

## 2020-09-01 RX ADMIN — CARVEDILOL PHOSPHATE 6.25 MILLIGRAM(S): 80 CAPSULE, EXTENDED RELEASE ORAL at 17:21

## 2020-09-01 RX ADMIN — CARVEDILOL PHOSPHATE 6.25 MILLIGRAM(S): 80 CAPSULE, EXTENDED RELEASE ORAL at 05:25

## 2020-09-01 RX ADMIN — DONEPEZIL HYDROCHLORIDE 5 MILLIGRAM(S): 10 TABLET, FILM COATED ORAL at 21:34

## 2020-09-01 RX ADMIN — Medication 81 MILLIGRAM(S): at 12:12

## 2020-09-01 RX ADMIN — Medication 250 MILLIGRAM(S): at 02:33

## 2020-09-01 RX ADMIN — PIPERACILLIN AND TAZOBACTAM 25 GRAM(S): 4; .5 INJECTION, POWDER, LYOPHILIZED, FOR SOLUTION INTRAVENOUS at 21:33

## 2020-09-01 RX ADMIN — LOSARTAN POTASSIUM 100 MILLIGRAM(S): 100 TABLET, FILM COATED ORAL at 05:25

## 2020-09-01 RX ADMIN — AMLODIPINE BESYLATE 10 MILLIGRAM(S): 2.5 TABLET ORAL at 05:25

## 2020-09-01 RX ADMIN — Medication 1: at 17:20

## 2020-09-01 NOTE — PROGRESS NOTE ADULT - PROBLEM SELECTOR PLAN 9
DVT ppx heparin 5000 q 8     IMPROVE VTE Individual Risk Assessment        RISK                                                          Points  [  ] Previous VTE                                                3  [  ] Thrombophilia                                             2  [  ] Lower limb paralysis                                   2        (unable to hold up >15 seconds)    [  ] Current Cancer                                            2         (within 6 months)  [ x ] Immobilization > 24 hrs                              1  [  ] ICU/CCU stay > 24 hours                            1  [ x ] Age > 60                                                    1  IMPROVE VTE Score _____2____    10 . Depression: continue Paroxetine

## 2020-09-01 NOTE — PROGRESS NOTE ADULT - ASSESSMENT
84 yo F with PMH of HTN, HLD, Diabetes Mellitus Type 2 not on home insulin, GERD, Dementia, admitted for L diabetic foot ulcer under 1st metatarsal, also noted to have sacral wound stage 1, R thigh skin tear 2/2 LLE contracture.

## 2020-09-01 NOTE — PROGRESS NOTE ADULT - PROBLEM SELECTOR PLAN 1
L diabetic foot ulcer under 1st metatarsal, pustular material, per admitter, does not appear to probe to bone.   - rpt lactate 2.2, xray L foot neg for overt osteolysis or f/x  s/p vanc and zosyn, 1L NS   cont vanc and zosyn, f/u vanc trough   minimally elevated ESR, CRP 0.2   blood cx NGTD x2, urine culture prelim: klebsiella pneumonia   - vanc trough 12.3 L diabetic foot ulcer under 1st metatarsal, pustular material, per admitter, does not appear to probe to bone.   - rpt lactate 2.2, xray L foot neg for overt osteolysis or f/x  s/p vanc and zosyn, 1L NS   - continue with zosyn  - dc vanc   minimally elevated ESR, CRP 0.2   blood cx NGTD x2, urine culture prelim: klebsiella pneumonia   - vanc trough 12.3

## 2020-09-01 NOTE — PROGRESS NOTE ADULT - SUBJECTIVE AND OBJECTIVE BOX
Patient is a 87y old  Female who presents with a chief complaint of Left foot wound (31 Aug 2020 12:28)      INTERVAL HPI/OVERNIGHT EVENTS: Patient seen and examined at bedside. No overnight events occurred. Patient has no complaints at this time. c/o some tingling around her mouth, patient states likely 2/2 to to dry mouth    MEDICATIONS  (STANDING):  amLODIPine   Tablet 10 milliGRAM(s) Oral daily  aspirin  chewable 81 milliGRAM(s) Oral daily  atorvastatin 10 milliGRAM(s) Oral at bedtime  carvedilol 6.25 milliGRAM(s) Oral every 12 hours  dextrose 5%. 1000 milliLiter(s) (50 mL/Hr) IV Continuous <Continuous>  dextrose 50% Injectable 12.5 Gram(s) IV Push once  dextrose 50% Injectable 25 Gram(s) IV Push once  dextrose 50% Injectable 25 Gram(s) IV Push once  donepezil 5 milliGRAM(s) Oral at bedtime  ferrous    sulfate 325 milliGRAM(s) Oral daily  heparin   Injectable 5000 Unit(s) SubCutaneous every 8 hours  insulin lispro (HumaLOG) corrective regimen sliding scale   SubCutaneous three times a day before meals  insulin lispro (HumaLOG) corrective regimen sliding scale   SubCutaneous at bedtime  lactobacillus acidophilus 1 Tablet(s) Oral daily  losartan 100 milliGRAM(s) Oral daily  pantoprazole    Tablet 20 milliGRAM(s) Oral before breakfast  PARoxetine 20 milliGRAM(s) Oral daily  piperacillin/tazobactam IVPB.. 3.375 Gram(s) IV Intermittent every 8 hours    MEDICATIONS  (PRN):  dextrose 40% Gel 15 Gram(s) Oral once PRN Blood Glucose LESS THAN 70 milliGRAM(s)/deciliter  glucagon  Injectable 1 milliGRAM(s) IntraMuscular once PRN Glucose LESS THAN 70 milligrams/deciliter      Allergies    sulfa drugs (Unknown)    Intolerances        REVIEW OF SYSTEMS:  CONSTITUTIONAL: No fever or chills  HEENT:  No headache, no sore throat  RESPIRATORY: No cough, wheezing, or shortness of breath  CARDIOVASCULAR: No chest pain, palpitations  GASTROINTESTINAL: No abd pain, nausea, vomiting, or diarrhea  GENITOURINARY: No dysuria, frequency, or hematuria  NEUROLOGICAL: no focal weakness or dizziness  MUSCULOSKELETAL: no myalgias     Vital Signs Last 24 Hrs  T(C): 36.8 (01 Sep 2020 04:30), Max: 36.9 (31 Aug 2020 12:44)  T(F): 98.2 (01 Sep 2020 04:30), Max: 98.4 (31 Aug 2020 12:44)  HR: 74 (01 Sep 2020 04:30) (74 - 82)  BP: 145/71 (01 Sep 2020 04:30) (131/66 - 152/60)  BP(mean): --  RR: 17 (01 Sep 2020 04:30) (16 - 17)  SpO2: 97% (01 Sep 2020 04:30) (95% - 97%)    PHYSICAL EXAM:  GENERAL: NAD  HEENT:  anicteric, dry mucous membranes  CHEST/LUNG:  CTA b/l, no rales, wheezes, or rhonchi  HEART:  RRR, S1, S2  ABDOMEN:  BS+, soft, nontender, nondistended  EXTREMITIES: no edema, cyanosis, or calf tenderness, right leg is contracted unchanged from yesterday, wearing supportive boot on feet bilaterally, left foot is wrapped no drainage or pus drainage noted   NERVOUS SYSTEM: answers questions and follows commands appropriately    LABS:                        8.8    6.96  )-----------( 216      ( 01 Sep 2020 09:31 )             27.8     CBC Full  -  ( 01 Sep 2020 09:31 )  WBC Count : 6.96 K/uL  Hemoglobin : 8.8 g/dL  Hematocrit : 27.8 %  Platelet Count - Automated : 216 K/uL  Mean Cell Volume : 83.7 fl  Mean Cell Hemoglobin : 26.5 pg  Mean Cell Hemoglobin Concentration : 31.7 gm/dL  Auto Neutrophil # : 4.42 K/uL  Auto Lymphocyte # : 1.62 K/uL  Auto Monocyte # : 0.67 K/uL  Auto Eosinophil # : 0.17 K/uL  Auto Basophil # : 0.05 K/uL  Auto Neutrophil % : 63.6 %  Auto Lymphocyte % : 23.3 %  Auto Monocyte % : 9.6 %  Auto Eosinophil % : 2.4 %  Auto Basophil % : 0.7 %    01 Sep 2020 09:31    142    |  108    |  28     ----------------------------<  162    3.9     |  29     |  0.89     Ca    8.3        01 Sep 2020 09:31    TPro  5.6    /  Alb  2.3    /  TBili  0.3    /  DBili  x      /  AST  13     /  ALT  9      /  AlkPhos  33     01 Sep 2020 09:31        CAPILLARY BLOOD GLUCOSE      POCT Blood Glucose.: 176 mg/dL (01 Sep 2020 08:05)  POCT Blood Glucose.: 171 mg/dL (31 Aug 2020 21:41)  POCT Blood Glucose.: 171 mg/dL (31 Aug 2020 17:12)  POCT Blood Glucose.: 202 mg/dL (31 Aug 2020 12:01)        Culture - Urine (collected 08-30-20 @ 11:14)  Source: .Urine Clean Catch (Midstream)  Final Report (09-01-20 @ 10:59):    50,000 - 99,000 CFU/mL Klebsiella pneumoniae  Organism: Klebsiella pneumoniae (09-01-20 @ 10:59)  Organism: Klebsiella pneumoniae (09-01-20 @ 10:59)      -  Amikacin: S <=16      -  Amoxicillin/Clavulanic Acid: S <=8/4      -  Ampicillin: R 16 These ampicillin results predict results for amoxicillin      -  Ampicillin/Sulbactam: S <=4/2 Enterobacter, Citrobacter, and Serratia may develop resistance during prolonged therapy (3-4 days)      -  Aztreonam: S <=4      -  Cefazolin: S <=2 (MIC_CL_[id number]) For uncomplicated UTI with K. pneumoniae, E. coli, or P. mirablis: MALATHI <=16 is sensitive and MALATHI >=32 is resistant. This also predicts results for oral agents cefaclor, cefdinir, cefpodoxime, cefprozil, cefuroxime axetil, cephalexin and locarbef for uncomplicated UTI. Note that some isolates may be susceptible to these agents while testing resistant to cefazolin.      -  Cefepime: S <=2      -  Cefoxitin: S <=8      -  Ceftriaxone: S <=1 Enterobacter, Citrobacter, and Serratia may develop resistance during prolonged therapy      -  Ciprofloxacin: S <=0.25      -  Ertapenem: S <=0.5      -  Gentamicin: S <=2      -  Imipenem: S <=1      -  Levofloxacin: S <=0.5      -  Meropenem: S <=1      -  Nitrofurantoin: I 64 Should not be used to treat pyelonephritis      -  Piperacillin/Tazobactam: S <=8      -  Tigecycline: S <=2      -  Tobramycin: S <=2      -  Trimethoprim/Sulfamethoxazole: S <=0.5/9.5      Method Type: MALATHI    Culture - Blood (collected 08-30-20 @ 05:22)  Source: .Blood Blood-Venous  Preliminary Report (08-31-20 @ 06:01):    No growth to date.    Culture - Blood (collected 08-30-20 @ 05:22)  Source: .Blood Blood-Venous  Preliminary Report (08-31-20 @ 06:01):    No growth to date.    Culture - Other (collected 08-30-20 @ 05:12)  Source: .Other left great toe  Preliminary Report (08-31-20 @ 07:32):    Numerous Streptococcus agalactiae (Group B) isolated    Group B streptococci are susceptible to ampicillin,    penicillin and cefazolin, but may be resistant to    erythromycin and clindamycin.    Recommendations for intrapartum prophylaxis for Group B    streptococci are penicillin or ampicillin.    Normal skin sally isolated        RADIOLOGY & ADDITIONAL TESTS:    Personally reviewed.     Consultant(s) Notes Reviewed:  [x] YES  [ ] NO

## 2020-09-01 NOTE — GOALS OF CARE CONVERSATION - ADVANCED CARE PLANNING - CONVERSATION DETAILS
spoke to pt sonMarcio on phone, explained role of PC RN, son has an aide at home with pt, goal to remain home, not SNF, pt  has hcp, Marcio is hcp, will attempt to email copy to PC RN.  Reviewed pt resuscitation wishes: pt has been doing pretty well, not a dnr at this time,  Marcio will make other decisions when circumstances arise, PC RN contact # given

## 2020-09-01 NOTE — ADVANCED PRACTICE NURSE CONSULT - RECOMMEDATIONS
Monitor glucose trends  Goal range 140 to 180mg/dl  hold metformin at discharge  due to  A1c 6.2  Communicate with family son  Mracio  follow prn

## 2020-09-01 NOTE — ADVANCED PRACTICE NURSE CONSULT - ASSESSMENT
Patient is a 87y old  Female who presents with a chief complaint of Left foot wound (31 Aug 2020 12:28)      Type: 2  DM well controlled with metformin and home glucose monitoring A1c 6.2% family is supportive patient is bedbound son states appetite is excellent at home    HPI:  *****Patient unable to provide a subjective history due to underlying dementia, history obtained from medical records, ER, and son*****  84 yo F with PMH of HTN, HLD, Diabetes Mellitus Type 2 not on home insulin, GERD, Dementia, brought to ED by son for wound on left foot. , can identify her family, forgets their names. Pt baseline is bedbound, lives alone however has 24 hour aids, eats well (regular food).     ED vitals afebrile, HR 72, /65, 99% on RA. Pt received zosyn 3.375 x1, vanc IV x1, 1L NS bolus. Significant labs ESR 28, Hgb 10.5, lactate 3.4. CXR pending. X ray AP  lateral L foot pending. (30 Aug 2020 00:19)      PAST MEDICAL & SURGICAL HISTORY:  Dementia  DM 2 (diabetes mellitus)  HLD (hyperlipidemia)  HTN (hypertension)  GERD (gastroesophageal reflux disease)  History of cholecystectomy  Ankle injury: s/p ankle surgery, pt doesn&#x27;t remember which ankle      MEDICATIONS  (STANDING):  amLODIPine   Tablet 10 milliGRAM(s) Oral daily  aspirin  chewable 81 milliGRAM(s) Oral daily  atorvastatin 10 milliGRAM(s) Oral at bedtime  carvedilol 6.25 milliGRAM(s) Oral every 12 hours  dextrose 5%. 1000 milliLiter(s) (50 mL/Hr) IV Continuous  dextrose 50% Injectable 12.5 Gram(s) IV Push once  dextrose 50% Injectable 25 Gram(s) IV Push once  dextrose 50% Injectable 25 Gram(s) IV Push once  donepezil 5 milliGRAM(s) Oral at bedtime  ferrous    sulfate 325 milliGRAM(s) Oral daily  heparin   Injectable 5000 Unit(s) SubCutaneous every 8 hours  insulin lispro (HumaLOG) corrective regimen sliding scale   SubCutaneous three times a day before meals  insulin lispro (HumaLOG) corrective regimen sliding scale   SubCutaneous at bedtime  lactobacillus acidophilus 1 Tablet(s) Oral daily  losartan 100 milliGRAM(s) Oral daily  pantoprazole    Tablet 20 milliGRAM(s) Oral before breakfast  PARoxetine 20 milliGRAM(s) Oral daily  piperacillin/tazobactam IVPB.. 3.375 Gram(s) IV Intermittent every 8 hours    Allergies    sulfa drugs (Unknown)    Intolerances         Daily Weight in k.5 (01 Sep 2020 04:30)    Vital Signs Last 24 Hrs  T(C): 36.8 (01 Sep 2020 04:30), Max: 36.9 (31 Aug 2020 12:44)  T(F): 98.2 (01 Sep 2020 04:30), Max: 98.4 (31 Aug 2020 12:44)  HR: 74 (01 Sep 2020 04:30) (74 - 82)  BP: 145/71 (01 Sep 2020 04:30) (131/66 - 152/60)  RR: 17 (01 Sep 2020 04:30) (16 - 17)  SpO2: 97% (01 Sep 2020 04:30) (95% - 97%)        142  |  108  |  28<H>  ----------------------------<  162<H>  3.9   |  29  |  0.89       eGFR if Non African American: 58 mL/min/1.73M2 <L> (20 @ 09:31)  Anion Gap, Serum: 6 mmol/L (20 @ 08:46)      POCT Blood Glucose.: 176 mg/dL (01 Sep 2020 08:05)  POCT Blood Glucose.: 171 mg/dL (31 Aug 2020 21:41)  POCT Blood Glucose.: 171 mg/dL (31 Aug 2020 17:12)  POCT Blood Glucose.: 202 mg/dL (31 Aug 2020 12:01)      DIET:

## 2020-09-02 ENCOUNTER — TRANSCRIPTION ENCOUNTER (OUTPATIENT)
Age: 85
End: 2020-09-02

## 2020-09-02 LAB
ANION GAP SERPL CALC-SCNC: 3 MMOL/L — LOW (ref 5–17)
BUN SERPL-MCNC: 29 MG/DL — HIGH (ref 7–23)
CALCIUM SERPL-MCNC: 8.8 MG/DL — SIGNIFICANT CHANGE UP (ref 8.5–10.1)
CHLORIDE SERPL-SCNC: 111 MMOL/L — HIGH (ref 96–108)
CO2 SERPL-SCNC: 29 MMOL/L — SIGNIFICANT CHANGE UP (ref 22–31)
CREAT SERPL-MCNC: 1.1 MG/DL — SIGNIFICANT CHANGE UP (ref 0.5–1.3)
GLUCOSE SERPL-MCNC: 168 MG/DL — HIGH (ref 70–99)
HCT VFR BLD CALC: 28.8 % — LOW (ref 34.5–45)
HGB BLD-MCNC: 9.3 G/DL — LOW (ref 11.5–15.5)
MCHC RBC-ENTMCNC: 27.2 PG — SIGNIFICANT CHANGE UP (ref 27–34)
MCHC RBC-ENTMCNC: 32.3 GM/DL — SIGNIFICANT CHANGE UP (ref 32–36)
MCV RBC AUTO: 84.2 FL — SIGNIFICANT CHANGE UP (ref 80–100)
NRBC # BLD: 0 /100 WBCS — SIGNIFICANT CHANGE UP (ref 0–0)
PLATELET # BLD AUTO: 220 K/UL — SIGNIFICANT CHANGE UP (ref 150–400)
POTASSIUM SERPL-MCNC: 4.2 MMOL/L — SIGNIFICANT CHANGE UP (ref 3.5–5.3)
POTASSIUM SERPL-SCNC: 4.2 MMOL/L — SIGNIFICANT CHANGE UP (ref 3.5–5.3)
RBC # BLD: 3.42 M/UL — LOW (ref 3.8–5.2)
RBC # FLD: 15.4 % — HIGH (ref 10.3–14.5)
SODIUM SERPL-SCNC: 143 MMOL/L — SIGNIFICANT CHANGE UP (ref 135–145)
WBC # BLD: 7.94 K/UL — SIGNIFICANT CHANGE UP (ref 3.8–10.5)
WBC # FLD AUTO: 7.94 K/UL — SIGNIFICANT CHANGE UP (ref 3.8–10.5)

## 2020-09-02 PROCEDURE — 93971 EXTREMITY STUDY: CPT | Mod: 26,RT

## 2020-09-02 PROCEDURE — 99233 SBSQ HOSP IP/OBS HIGH 50: CPT | Mod: GC

## 2020-09-02 PROCEDURE — 99232 SBSQ HOSP IP/OBS MODERATE 35: CPT

## 2020-09-02 RX ORDER — CEFAZOLIN SODIUM 1 G
1000 VIAL (EA) INJECTION EVERY 8 HOURS
Refills: 0 | Status: DISCONTINUED | OUTPATIENT
Start: 2020-09-02 | End: 2020-09-02

## 2020-09-02 RX ORDER — CEFAZOLIN SODIUM 1 G
500 VIAL (EA) INJECTION EVERY 12 HOURS
Refills: 0 | Status: DISCONTINUED | OUTPATIENT
Start: 2020-09-02 | End: 2020-09-07

## 2020-09-02 RX ADMIN — PIPERACILLIN AND TAZOBACTAM 25 GRAM(S): 4; .5 INJECTION, POWDER, LYOPHILIZED, FOR SOLUTION INTRAVENOUS at 05:48

## 2020-09-02 RX ADMIN — CARVEDILOL PHOSPHATE 6.25 MILLIGRAM(S): 80 CAPSULE, EXTENDED RELEASE ORAL at 17:52

## 2020-09-02 RX ADMIN — DONEPEZIL HYDROCHLORIDE 5 MILLIGRAM(S): 10 TABLET, FILM COATED ORAL at 21:24

## 2020-09-02 RX ADMIN — Medication 100 MILLIGRAM(S): at 14:24

## 2020-09-02 RX ADMIN — AMLODIPINE BESYLATE 10 MILLIGRAM(S): 2.5 TABLET ORAL at 05:48

## 2020-09-02 RX ADMIN — Medication 20 MILLIGRAM(S): at 12:26

## 2020-09-02 RX ADMIN — PANTOPRAZOLE SODIUM 20 MILLIGRAM(S): 20 TABLET, DELAYED RELEASE ORAL at 06:12

## 2020-09-02 RX ADMIN — ATORVASTATIN CALCIUM 10 MILLIGRAM(S): 80 TABLET, FILM COATED ORAL at 21:24

## 2020-09-02 RX ADMIN — Medication 3: at 12:25

## 2020-09-02 RX ADMIN — HEPARIN SODIUM 5000 UNIT(S): 5000 INJECTION INTRAVENOUS; SUBCUTANEOUS at 21:24

## 2020-09-02 RX ADMIN — Medication 1 TABLET(S): at 12:26

## 2020-09-02 RX ADMIN — Medication 325 MILLIGRAM(S): at 12:26

## 2020-09-02 RX ADMIN — CARVEDILOL PHOSPHATE 6.25 MILLIGRAM(S): 80 CAPSULE, EXTENDED RELEASE ORAL at 05:48

## 2020-09-02 RX ADMIN — Medication 1: at 08:19

## 2020-09-02 RX ADMIN — LOSARTAN POTASSIUM 100 MILLIGRAM(S): 100 TABLET, FILM COATED ORAL at 05:48

## 2020-09-02 RX ADMIN — HEPARIN SODIUM 5000 UNIT(S): 5000 INJECTION INTRAVENOUS; SUBCUTANEOUS at 14:24

## 2020-09-02 RX ADMIN — HEPARIN SODIUM 5000 UNIT(S): 5000 INJECTION INTRAVENOUS; SUBCUTANEOUS at 05:48

## 2020-09-02 RX ADMIN — Medication 81 MILLIGRAM(S): at 12:26

## 2020-09-02 NOTE — CHART NOTE - NSCHARTNOTEFT_GEN_A_CORE
Called by RN for Pt c/o left hand ecchymosis and edema. Pt seen at bedside, Pt sleeping, no acute pain, RN denies IV infiltration or trauma in the left hand. US left hand STAT.     V. Felix  PGY1 Called by RN for Pt c/o left hand ecchymosis and edema. Pt seen at bedside, Pt sleeping, no acute pain, RN denies IV infiltration or trauma in the left hand. US left hand STAT. Called by RN for Pt c/o right hand ecchymosis and edema. Pt seen at bedside, Pt sleeping, no acute pain. The swelling and ecchymosis goes into 2nd, 3rd, and 4th digits. RN denies IV infiltration or trauma. Called by RN for Pt c/o right hand ecchymosis and edema. Pt seen at bedside, Pt sleeping, no acute pain. The swelling and ecchymosis goes into 2nd, 3rd, and 4th digits. RN denies IV infiltration or trauma. Elevate and ice as needed.    addendum: US: No evidence of right upper extremity deep venous thrombosis. Nonvisualization of the right brachial vein.

## 2020-09-02 NOTE — DISCHARGE NOTE PROVIDER - PROVIDER TOKENS
PROVIDER:[TOKEN:[[id number]],FOLLOWUP:[1 week]] PROVIDER:[TOKEN:[[id number]],FOLLOWUP:[2 weeks]],PROVIDER:[TOKEN:[[id number]],FOLLOWUP:[1 week]] PROVIDER:[TOKEN:[[id number]],FOLLOWUP:[2 weeks]],PROVIDER:[TOKEN:[[id number]]]

## 2020-09-02 NOTE — CHART NOTE - NSCHARTNOTEFT_GEN_A_CORE
Assessment:   Pt seen for malnutrition follow up.  Chart reviewed, hospital course noted.  84 yo F with PMH of HTN, HLD, Diabetes Mellitus Type 2 not on home insulin, GERD, Dementia, admitted for L diabetic foot ulcer under 1st metatarsal, also noted to have sacral wound stage 1, R thigh skin tear 2/2 LLE contracture.     PO intake noted to be %, BM noted 9/1.      Factors impacting intake: [ ] none [ ] nausea  [ ] vomiting [ ] diarrhea [ ] constipation  [ ]chewing problems [ ] swallowing issues  [x] other: dementia    Diet Presciption: Diet, Consistent Carbohydrate w/Evening Snack:   DASH/TLC {Sodium & Cholesterol Restricted}  No Carb Prosource TF     Qty per Day:  2  Supplement Feeding Modality:  Oral  Glucerna Shake Cans or Servings Per Day:  1       Frequency:  Three Times a day (08-30-20 @ 12:46)    Intake: %    Current Weight: Weight 8/29 89.7#, 8/31 99.2#, 9/1 106.9#, 9/2 104#  % Weight Change    Pertinent Medications: MEDICATIONS  (STANDING):  amLODIPine   Tablet 10 milliGRAM(s) Oral daily  aspirin  chewable 81 milliGRAM(s) Oral daily  atorvastatin 10 milliGRAM(s) Oral at bedtime  carvedilol 6.25 milliGRAM(s) Oral every 12 hours  dextrose 5%. 1000 milliLiter(s) (50 mL/Hr) IV Continuous <Continuous>  dextrose 50% Injectable 12.5 Gram(s) IV Push once  dextrose 50% Injectable 25 Gram(s) IV Push once  dextrose 50% Injectable 25 Gram(s) IV Push once  donepezil 5 milliGRAM(s) Oral at bedtime  ferrous    sulfate 325 milliGRAM(s) Oral daily  heparin   Injectable 5000 Unit(s) SubCutaneous every 8 hours  insulin lispro (HumaLOG) corrective regimen sliding scale   SubCutaneous three times a day before meals  insulin lispro (HumaLOG) corrective regimen sliding scale   SubCutaneous at bedtime  lactobacillus acidophilus 1 Tablet(s) Oral daily  losartan 100 milliGRAM(s) Oral daily  pantoprazole    Tablet 20 milliGRAM(s) Oral before breakfast  PARoxetine 20 milliGRAM(s) Oral daily  piperacillin/tazobactam IVPB.. 3.375 Gram(s) IV Intermittent every 8 hours    MEDICATIONS  (PRN):  dextrose 40% Gel 15 Gram(s) Oral once PRN Blood Glucose LESS THAN 70 milliGRAM(s)/deciliter  glucagon  Injectable 1 milliGRAM(s) IntraMuscular once PRN Glucose LESS THAN 70 milligrams/deciliter    Pertinent Labs: 09-02 Na143 mmol/L Glu 168 mg/dL<H> K+ 4.2 mmol/L Cr  1.10 mg/dL BUN 29 mg/dL<H> 09-01 Alb 2.3 g/dL<L>     CAPILLARY BLOOD GLUCOSE      POCT Blood Glucose.: 178 mg/dL (02 Sep 2020 08:04)  POCT Blood Glucose.: 237 mg/dL (01 Sep 2020 21:22)  POCT Blood Glucose.: 183 mg/dL (01 Sep 2020 17:08)  POCT Blood Glucose.: 177 mg/dL (01 Sep 2020 16:53)  POCT Blood Glucose.: 201 mg/dL (01 Sep 2020 12:01)    Skin: R thigh I, L bunion US, R lat heel I, L heel DTI, R midback II, Sacral I  Edema:  +R hand    Estimated Needs:   [x] no change since previous assessment  [ ] recalculated:     Previous Nutrition Diagnosis:    [x] Malnutrition     Nutrition Diagnosis is [x] ongoing  [ ] resolved [ ] not applicable     New Nutrition Diagnosis: [x] not applicable       Interventions:   Recommend  [ ] Change Diet To:  [ ] Nutrition Supplement  [ ] Nutrition Support  [x] Other: Continue current diet and supplements (glucerna tid, prosource bid), encourage po intake, assist with meals, encourage po intake    Monitoring and Evaluation:   [x] PO intake [ x ] Tolerance to diet prescription [ x ] weights [ x ] labs[ x ] follow up per protocol  [x] other: bowel function, skin integrity, s/s GI distress

## 2020-09-02 NOTE — PROGRESS NOTE ADULT - ASSESSMENT
Patient examined and evaluated at this time.  Wound to the left foot is stable with no signs of infection   Applied aquacel and DSD to left foot wound.   Will change dressing every other day.

## 2020-09-02 NOTE — PROGRESS NOTE ADULT - ASSESSMENT
84 yo F with PMH of HTN, HLD, Diabetes Mellitus Type 2 not on home insulin, GERD, Dementia, admitted for L diabetic foot ulcer under 1st metatarsal, also noted to have sacral wound stage 1, R thigh skin tear 2/2 LLE contracture. 86yo F with PMH of HTN, HLD, Diabetes Mellitus Type 2 not on home insulin, GERD, Dementia, left leg contracture, bedbound, admitted for L diabetic foot ulcer.

## 2020-09-02 NOTE — PROGRESS NOTE ADULT - PROBLEM SELECTOR PLAN 1
L diabetic foot ulcer under 1st metatarsal, pustular material, per admitter, does not appear to probe to bone.   - rpt lactate 2.2, xray L foot neg for overt osteolysis or f/x  s/p vanc and zosyn, 1L NS   - start Ancef   - completed 5 day course of zosyn    - start ancef   - minimally elevated ESR, CRP 0.2   - blood cx NGTD x2, urine culture prelim: klebsiella pneumonia; sensitive to cefipime  - podiatry following L diabetic foot ulcer under 1st metatarsal, pustular material, per admitting team, does not probe to bone.   - on presentation, elevated lactate of 3.4 down to 2.2 after some IVF in a few hours, xray L foot neg for overt osteolysis or f/x  - initially treated with zosyn   - switch to Ancef as only GBS growing in wound culture   - minimally elevated ESR, CRP 0.2   - blood cx NGTD x2, urine culture: klebsiella pneumoniae; sensitive to cefazolin -- pt may also have UTI (though unclear as she could not reliably report ROS on admission) -- bacteria is covered by the Ancef  - podiatry Shashi following, recs appreciated

## 2020-09-02 NOTE — DISCHARGE NOTE PROVIDER - HOSPITAL COURSE
FROM ADMISSION H+P:     HPI:    *****Patient unable to provide a subjective history due to underlying dementia, history obtained from medical records, ER, and son*****    84 yo F with PMH of HTN, HLD, Diabetes Mellitus Type 2 not on home insulin, GERD, Dementia, brought to ED by son for wound on left foot. History taken from son at bedside. Son says pt has become bedbound x 4-5 months, prior to that was in bed most of the day, gradual decline. Son also noted pt has a sore on her right thigh where her left foot usually rests due to LLE contracture. Of note pt has baseline dementia, knows her name, can identify her family, forgets their names. Pt baseline is bedbound, lives alone however has 24 hour aids, eats well (regular food). As per son, no recent decrease in appetitie, sick contacts, COVID 19 exposure. Pt denies fever, chills, SOB, CP, abd pain.         ED vitals afebrile, HR 72, /65, 99% on RA. Pt received zosyn 3.375 x1, vanc IV x1, 1L NS bolus. Significant labs ESR 28, Hgb 10.5, lactate 3.4. CXR pending. X ray AP  lateral L foot pending. (30 Aug 2020 00:19)            ---    HOSPITAL COURSE:     This patient was admitted for an infected diabetic foot ulcer and stage 1 wound. Blood cultures and urine cultures were negative. Podiatry was consulted and followed. She was started on broad spectrum antibiotics, zosyn. Wound culture grew GBS bacteria sensitive to ancef and the patient was switched to ancef. Dressings were changed everyday and the patient clinically improved.     Patient is hemodynamically stable and medically optimized for discharge.    ---    CONSULTANTS:         ---    TIME SPENT:    I, the attending physician, was physically present for the key portions of the evaluation and management (E/M) service provided. The total amount of time spent reviewing the hospital notes, laboratory values, imaging findings, assessing/counseling the patient, discussing with consultant physicians, social work, nursing staff was -- minutes        ---    Primary care provider was made aware of plan for discharge:      [  ] NO     [  ] YES FROM ADMISSION H+P:     HPI:    *****Patient unable to provide a subjective history due to underlying dementia, history obtained from medical records, ER, and son*****    84 yo F with PMH of HTN, HLD, Diabetes Mellitus Type 2 not on home insulin, GERD, Dementia, brought to ED by son for wound on left foot. History taken from son at bedside. Son says pt has become bedbound x 4-5 months, prior to that was in bed most of the day, gradual decline. Son also noted pt has a sore on her right thigh where her left foot usually rests due to LLE contracture. Of note pt has baseline dementia, knows her name, can identify her family, forgets their names. Pt baseline is bedbound, lives alone however has 24 hour aids, eats well (regular food). As per son, no recent decrease in appetitie, sick contacts, COVID 19 exposure. Pt denies fever, chills, SOB, CP, abd pain.         ED vitals afebrile, HR 72, /65, 99% on RA. Pt received zosyn 3.375 x1, vanc IV x1, 1L NS bolus. Significant labs ESR 28, Hgb 10.5, lactate 3.4. CXR pending. X ray AP  lateral L foot pending. (30 Aug 2020 00:19)            ---    HOSPITAL COURSE:     This patient was admitted for an infected diabetic foot ulcer and stage 1 wound on the right thigh. Blood cultures and urine cultures were negative. Podiatry was consulted and followed. She was started on broad spectrum antibiotics, zosyn. Wound culture grew GBS bacteria sensitive to ancef and the patient was switched to ancef. Dressings were changed everyday and the patient clinically improved.     Patient is hemodynamically stable and medically optimized for discharge.    ---    CONSULTANTS:         ---    TIME SPENT:    I, the attending physician, was physically present for the key portions of the evaluation and management (E/M) service provided. The total amount of time spent reviewing the hospital notes, laboratory values, imaging findings, assessing/counseling the patient, discussing with consultant physicians, social work, nursing staff was -- minutes        ---    Primary care provider was made aware of plan for discharge:      [  ] NO     [  ] YES FROM ADMISSION H+P:     HPI:    *****Patient unable to provide a subjective history due to underlying dementia, history obtained from medical records, ER, and son*****    84 yo F with PMH of HTN, HLD, Diabetes Mellitus Type 2 not on home insulin, GERD, Dementia, brought to ED by son for wound on left foot. History taken from son at bedside. Son says pt has become bedbound x 4-5 months, prior to that was in bed most of the day, gradual decline. Son also noted pt has a sore on her right thigh where her left foot usually rests due to LLE contracture. Of note pt has baseline dementia, knows her name, can identify her family, forgets their names. Pt baseline is bedbound, lives alone however has 24 hour aids, eats well (regular food). As per son, no recent decrease in appetitie, sick contacts, COVID 19 exposure. Pt denies fever, chills, SOB, CP, abd pain.         ED vitals afebrile, HR 72, /65, 99% on RA. Pt received zosyn 3.375 x1, vanc IV x1, 1L NS bolus. Significant labs ESR 28, Hgb 10.5, lactate 3.4. CXR pending. X ray AP  lateral L foot pending. (30 Aug 2020 00:19)            ---    HOSPITAL COURSE:     This patient was admitted for an infected diabetic foot ulcer and stage 1 wound on the right thigh. Blood cultures and urine cultures were negative. Podiatry was consulted and followed. She was started on broad spectrum antibiotics, zosyn. Wound culture grew GBS bacteria sensitive to ancef and the patient was switched to ancef. Dressings were changed everyday and the patient clinically improved.     Patient is hemodynamically stable and medically optimized for discharge to home with 24 hour aid.     ---    CONSULTANTS:         ---    TIME SPENT:    I, the attending physician, was physically present for the key portions of the evaluation and management (E/M) service provided. The total amount of time spent reviewing the hospital notes, laboratory values, imaging findings, assessing/counseling the patient, discussing with consultant physicians, social work, nursing staff was -- minutes        ---    Primary care provider was made aware of plan for discharge:      [  ] NO     [  ] YES FROM ADMISSION H+P:     HPI:    *****Patient unable to provide a subjective history due to underlying dementia, history obtained from medical records, ER, and son*****    84 yo F with PMH of HTN, HLD, Diabetes Mellitus Type 2 not on home insulin, GERD, Dementia, brought to ED by son for wound on left foot. History taken from son at bedside. Son says pt has become bedbound x 4-5 months, prior to that was in bed most of the day, gradual decline. Son also noted pt has a sore on her right thigh where her left foot usually rests due to LLE contracture. Of note pt has baseline dementia, knows her name, can identify her family, forgets their names. Pt baseline is bedbound, lives alone however has 24 hour aids, eats well (regular food). As per son, no recent decrease in appetitie, sick contacts, COVID 19 exposure. Pt denies fever, chills, SOB, CP, abd pain.         ED vitals afebrile, HR 72, /65, 99% on RA. Pt received zosyn 3.375 x1, vanc IV x1, 1L NS bolus. Significant labs ESR 28, Hgb 10.5, lactate 3.4. CXR pending. X ray AP  lateral L foot pending. (30 Aug 2020 00:19)        Vital Signs Last 24 Hrs    T(C): 36.7 (04 Sep 2020 05:03), Max: 37.2 (03 Sep 2020 13:25)    T(F): 98 (04 Sep 2020 05:03), Max: 98.9 (03 Sep 2020 13:25)    HR: 72 (04 Sep 2020 05:03) (72 - 75)    BP: 148/80 (04 Sep 2020 05:03) (122/56 - 148/80)    BP(mean): --    RR: 17 (04 Sep 2020 05:03) (17 - 17)    SpO2: 98% (04 Sep 2020 05:03) (90% - 98%)        LOS: 6 days        VITALS:     T(C): 36.7 (09-04-20 @ 05:03), Max: 37.2 (09-03-20 @ 13:25)    HR: 72 (09-04-20 @ 05:03) (72 - 75)    BP: 148/80 (09-04-20 @ 05:03) (122/56 - 148/80)    RR: 17 (09-04-20 @ 05:03) (17 - 17)    SpO2: 98% (09-04-20 @ 05:03) (90% - 98%)        GENERAL: NAD, lying in bed comfortably    HEAD:  Atraumatic, Normocephalic    EYES: PERRLA, conjunctiva and sclera clear    ENT: Moist mucous membranes    NECK: Supple, No JVD    CHEST/LUNG: Clear to auscultation bilaterally; No rales, rhonchi, wheezing, or rubs. Unlabored respirations    HEART: Regular rate and rhythm; No murmurs, rubs, or gallops     ABDOMEN: BSx4; Soft, nontender, nondistended    EXTREMITIES:  2+ Peripheral Pulses. No clubbing, cyanosis, or edema. RLE contracture. Left foot diabetic ulcer is wrapped, clean dry, intact no pus or drainage.     NERVOUS SYSTEM:  A&Ox3, no focal deficits     SKIN: No rashes or lesions noted         ---    HOSPITAL COURSE:     This patient was admitted for an infected diabetic foot ulcer and stage 1 wound on the right thigh. Blood cultures and urine cultures were negative. Podiatry was consulted and followed. She was started on broad spectrum antibiotics, zosyn. Wound culture grew GBS bacteria sensitive to ancef and the patient was switched to ancef. Dressings were changed everyday and the patient clinically improved. Patient is hemodynamically stable and medically optimized for discharge to home with 24 hour aid.     ---    CONSULTANTS:     Podiatry: Dr. Mullins     ---    TIME SPENT:    I, the attending physician, was physically present for the key portions of the evaluation and management (E/M) service provided. The total amount of time spent reviewing the hospital notes, laboratory values, imaging findings, assessing/counseling the patient, discussing with consultant physicians, social work, nursing staff was -- minutes        ---    Primary care provider was made aware of plan for discharge:      [  ] NO     [  ] YES FROM ADMISSION H+P:     HPI:    *****Patient unable to provide a subjective history due to underlying dementia, history obtained from medical records, ER, and son*****    84 yo F with PMH of HTN, HLD, Diabetes Mellitus Type 2 not on home insulin, GERD, Dementia, brought to ED by son for wound on left foot. History taken from son at bedside. Son says pt has become bedbound x 4-5 months, prior to that was in bed most of the day, gradual decline. Son also noted pt has a sore on her right thigh where her left foot usually rests due to LLE contracture. Of note pt has baseline dementia, knows her name, can identify her family, forgets their names. Pt baseline is bedbound, lives alone however has 24 hour aids, eats well (regular food). As per son, no recent decrease in appetitie, sick contacts, COVID 19 exposure. Pt denies fever, chills, SOB, CP, abd pain.         ED vitals afebrile, HR 72, /65, 99% on RA. Pt received zosyn 3.375 x1, vanc IV x1, 1L NS bolus. Significant labs ESR 28, Hgb 10.5, lactate 3.4. CXR pending. X ray AP  lateral L foot pending. (30 Aug 2020 00:19)        Vital Signs Last 24 Hrs    T(C): 36.7 (04 Sep 2020 05:03), Max: 37.2 (03 Sep 2020 13:25)    T(F): 98 (04 Sep 2020 05:03), Max: 98.9 (03 Sep 2020 13:25)    HR: 72 (04 Sep 2020 05:03) (72 - 75)    BP: 148/80 (04 Sep 2020 05:03) (122/56 - 148/80)    BP(mean): --    RR: 17 (04 Sep 2020 05:03) (17 - 17)    SpO2: 98% (04 Sep 2020 05:03) (90% - 98%)        LOS: 6 days        VITALS:     T(C): 36.7 (09-04-20 @ 05:03), Max: 37.2 (09-03-20 @ 13:25)    HR: 72 (09-04-20 @ 05:03) (72 - 75)    BP: 148/80 (09-04-20 @ 05:03) (122/56 - 148/80)    RR: 17 (09-04-20 @ 05:03) (17 - 17)    SpO2: 98% (09-04-20 @ 05:03) (90% - 98%)        GENERAL: NAD, lying in bed comfortably    HEAD:  Atraumatic, Normocephalic    EYES: PERRLA, conjunctiva and sclera clear    ENT: Moist mucous membranes    NECK: Supple, No JVD    CHEST/LUNG: Clear to auscultation bilaterally; No rales, rhonchi, wheezing, or rubs. Unlabored respirations    HEART: Regular rate and rhythm; No murmurs, rubs, or gallops     ABDOMEN: BSx4; Soft, nontender, nondistended    EXTREMITIES:  2+ Peripheral Pulses. No clubbing, cyanosis, or edema. RLE contracture. Left foot diabetic ulcer is wrapped, clean dry, intact; no pus or drainage.     right hand shows some echymosis improved, no swelling or edema. radial pulses intact.     NERVOUS SYSTEM:  A&Ox3, no focal deficits     SKIN: No rashes or lesions noted         ---    HOSPITAL COURSE:     This patient was admitted for an infected diabetic foot ulcer and stage 1 wound on the right thigh. Blood cultures and urine cultures were negative. Podiatry was consulted and debrided the site. She was started on broad spectrum antibiotics, zosyn. Wound culture grew GBS bacteria sensitive to ancef and the patient was switched to ancef. Dressings were changed everyday and the patient clinically improved. On day 4, patient developed some right hand swelling and ecchymosis overnight. No IV filtrate noted as per RN. US of the right upper extremity was negative for any DVT. This was deemed likely 2/2 to possible self inflicted trauma during sleep. Patient is hemodynamically stable and medically optimized for discharge to home with 24 hour aid.     ---    CONSULTANTS:     Podiatry: Dr. Mullins     ---    TIME SPENT:    I, the attending physician, was physically present for the key portions of the evaluation and management (E/M) service provided. The total amount of time spent reviewing the hospital notes, laboratory values, imaging findings, assessing/counseling the patient, discussing with consultant physicians, social work, nursing staff was -- minutes        ---    Primary care provider was made aware of plan for discharge:      [  ] NO     [  ] YES FROM ADMISSION H+P:     HPI:    *****Patient unable to provide a subjective history due to underlying dementia, history obtained from medical records, ER, and son*****    84 yo F with PMH of HTN, HLD, Diabetes Mellitus Type 2 not on home insulin, GERD, Dementia, brought to ED by son for wound on left foot. History taken from son at bedside. Son says pt has become bedbound x 4-5 months, prior to that was in bed most of the day, gradual decline. Son also noted pt has a sore on her right thigh where her left foot usually rests due to LLE contracture. Of note pt has baseline dementia, knows her name, can identify her family, forgets their names. Pt baseline is bedbound, lives alone however has 24 hour aids, eats well (regular food). As per son, no recent decrease in appetitie, sick contacts, COVID 19 exposure. Pt denies fever, chills, SOB, CP, abd pain.         ED vitals afebrile, HR 72, /65, 99% on RA. Pt received zosyn 3.375 x1, vanc IV x1, 1L NS bolus. Significant labs ESR 28, Hgb 10.5, lactate 3.4. CXR pending. X ray AP  lateral L foot pending. (30 Aug 2020 00:19)        Vital Signs Last 24 Hrs    T(C): 36.7 (04 Sep 2020 05:03), Max: 37.2 (03 Sep 2020 13:25)    T(F): 98 (04 Sep 2020 05:03), Max: 98.9 (03 Sep 2020 13:25)    HR: 72 (04 Sep 2020 05:03) (72 - 75)    BP: 148/80 (04 Sep 2020 05:03) (122/56 - 148/80)    BP(mean): --    RR: 17 (04 Sep 2020 05:03) (17 - 17)    SpO2: 98% (04 Sep 2020 05:03) (90% - 98%)            ---    HOSPITAL COURSE:     This patient was admitted for an infected diabetic foot ulcer and stage 1 wound on the right thigh. Blood cultures and urine cultures were negative. Podiatry was consulted and cared for the ulcer during hospitalization. She was started on broad spectrum antibiotics, zosyn. Wound culture grew GBS bacteria sensitive to ancef and the patient was switched to ancef. Dressings were changed per podiatry and the patient clinically improved. On day 4, patient developed some right hand swelling and ecchymosis overnight. No IV filtrate noted as per RN. US of the right upper extremity was negative for any DVT. This was deemed likely due to unintentional self inflicted trauma from bedrail during sleep. Pt improved. BCx negative. No leukocytosis. Patient is hemodynamically stable and medically optimized for discharge to home with 24 hour aide.         ---        VITALS:     T(C): 36.7 (09-04-20 @ 05:03), Max: 37.2 (09-03-20 @ 13:25)    HR: 72 (09-04-20 @ 05:03) (72 - 75)    BP: 148/80 (09-04-20 @ 05:03) (122/56 - 148/80)    RR: 17 (09-04-20 @ 05:03) (17 - 17)    SpO2: 98% (09-04-20 @ 05:03) (90% - 98%)        PHYSICAL EXAM:    GENERAL: NAD, frail    HEENT:  anicteric, moist mucous membranes    CHEST/LUNG:  CTA b/l, no rales, wheezes, or rhonchi    HEART:  RRR, S1, S2    ABDOMEN:  BS+, soft, nontender, nondistended    EXTREMITIES: right hand is bruised, unchanged from yesterday ; no extremity edema or cyanosis; left lower extremity foot ulcer is bandaged and dry, no evidence of pus, blood or drainage from bandage noted, no swelling erythema noted     NERVOUS SYSTEM: answers questions and follows commands appropriately             CONSULTANTS:     Podiatry: Dr. Mullins     ---    TIME SPENT: 45min FROM ADMISSION H+P:     HPI:    *****Patient unable to provide a subjective history due to underlying dementia, history obtained from medical records, ER, and son*****    86 yo F with PMH of HTN, HLD, Diabetes Mellitus Type 2 not on home insulin, GERD, Dementia, brought to ED by son for wound on left foot. History taken from son at bedside. Son says pt has become bedbound x 4-5 months, prior to that was in bed most of the day, gradual decline. Son also noted pt has a sore on her right thigh where her left foot usually rests due to LLE contracture. Of note pt has baseline dementia, knows her name, can identify her family, forgets their names. Pt baseline is bedbound, lives alone however has 24 hour aids, eats well (regular food). As per son, no recent decrease in appetitie, sick contacts, COVID 19 exposure. Pt denies fever, chills, SOB, CP, abd pain.         ED vitals afebrile, HR 72, /65, 99% on RA. Pt received zosyn 3.375 x1, vanc IV x1, 1L NS bolus. Significant labs ESR 28, Hgb 10.5, lactate 3.4. CXR pending. X ray AP  lateral L foot pending. (30 Aug 2020 00:19)        Vital Signs Last 24 Hrs    T(C): 36.7 (04 Sep 2020 05:03), Max: 37.2 (03 Sep 2020 13:25)    T(F): 98 (04 Sep 2020 05:03), Max: 98.9 (03 Sep 2020 13:25)    HR: 72 (04 Sep 2020 05:03) (72 - 75)    BP: 148/80 (04 Sep 2020 05:03) (122/56 - 148/80)    BP(mean): --    RR: 17 (04 Sep 2020 05:03) (17 - 17)    SpO2: 98% (04 Sep 2020 05:03) (90% - 98%)            ---    HOSPITAL COURSE:     This patient was admitted for an infected diabetic foot ulcer and stage 1 wound on the right thigh. Blood cultures and urine cultures were negative. Podiatry was consulted and cared for the ulcer during hospitalization. She was started on broad spectrum antibiotics, zosyn. Wound culture grew GBS bacteria sensitive to ancef and the patient was switched to ancef. Dressings were changed per podiatry and the patient clinically improved. On day 4, patient developed some right hand swelling and ecchymosis overnight. No IV filtrate noted as per RN. US of the right upper extremity was negative for any DVT. This was deemed likely due to unintentional self inflicted trauma from bedrail during sleep. Pt improved. BCx negative. No leukocytosis. Patient is hemodynamically stable and medically optimized for discharge to home with 24 hour aide.         ---        VITALS:     T(C): 36.7 (09-04-20 @ 05:03), Max: 37.2 (09-03-20 @ 13:25)    HR: 72 (09-04-20 @ 05:03) (72 - 75)    BP: 148/80 (09-04-20 @ 05:03) (122/56 - 148/80)    RR: 17 (09-04-20 @ 05:03) (17 - 17)    SpO2: 98% (09-04-20 @ 05:03) (90% - 98%)        PHYSICAL EXAM:    GENERAL: NAD, frail    HEENT:  anicteric, moist mucous membranes    CHEST/LUNG:  CTA b/l, no rales, wheezes, or rhonchi    HEART:  RRR, S1, S2    ABDOMEN:  BS+, soft, nontender, nondistended    EXTREMITIES: right hand is bruised, (improving); no extremity edema or cyanosis; left lower extremity foot ulcer is bandaged and dry, no evidence of pus, blood or drainage from bandage noted, no swelling erythema noted     NERVOUS SYSTEM: answers questions and follows commands appropriately             CONSULTANTS:     Podiatry: Dr. Mullins     ---    TIME SPENT: 45min

## 2020-09-02 NOTE — PROGRESS NOTE ADULT - PROBLEM SELECTOR PLAN 5
Hgb 9.3, likely 2/2 iron deficiency anemia    stable   cont home iron  ferritin, TIBC, iron panel wnl Hgb mostly in the 9s, likely 2/2 iron deficiency anemia and possibly in part due to chronic disease  stable   cont home iron  ferritin, TIBC, iron panel wnl

## 2020-09-02 NOTE — PROGRESS NOTE ADULT - PROBLEM SELECTOR PLAN 4
baseline bedbound, at baseline knows her name, can identify her family, forgets their names, eats well (regular solid food)  cont donepezil   cont paroxetine -at baseline knows her name, can identify her family, forgets their names, eats well (regular solid food)  -cont donepezil   -cont paroxetine

## 2020-09-02 NOTE — PROGRESS NOTE ADULT - PROBLEM SELECTOR PLAN 2
stage 1 sacral wound, skin tear on R thigh 2/2 LLE contracture with erythema and edema on medical aspect of L foot  turn in bed, reposition q4h  keep towel between abrasion and LLE   wound care nurse consulted  - would culture grew Group B strep susceptible to cefipime  - dietary consulted for wound healing recommendations will follow up - aside from the diabetic foot ulcer, pt also with multiple pressure ulcers (largely all Stage 1 with also a DTI recorded by nursing)  - c/w frequent reposition q4h, off loading of feet and between legs  - wound care nurse consulted  - dietary consulted for wound healing recommendations will follow up -- pt reportedly does have a good appetite

## 2020-09-02 NOTE — PROGRESS NOTE ADULT - SUBJECTIVE AND OBJECTIVE BOX
Patient is a 87y old  Female who presents with a chief complaint of Type 2 DM with foot ulcer (02 Sep 2020 13:21)      INTERVAL HPI/OVERNIGHT EVENTS: Patient seen and examined at bedside.   Overnight, RN called for swelling and hematoma right hand. RN noticed no IV infiltrate. US Doppler of right upper extremity was done and showed no evidence of DVT.     Patient has no complaints at this time. Denies fevers, chills, headache, lightheadedness, chest pain, dyspnea, abdominal pain, n/v/d/c.    MEDICATIONS  (STANDING):  amLODIPine   Tablet 10 milliGRAM(s) Oral daily  aspirin  chewable 81 milliGRAM(s) Oral daily  atorvastatin 10 milliGRAM(s) Oral at bedtime  carvedilol 6.25 milliGRAM(s) Oral every 12 hours  ceFAZolin   IVPB 500 milliGRAM(s) IV Intermittent every 12 hours  dextrose 5%. 1000 milliLiter(s) (50 mL/Hr) IV Continuous <Continuous>  dextrose 50% Injectable 12.5 Gram(s) IV Push once  dextrose 50% Injectable 25 Gram(s) IV Push once  dextrose 50% Injectable 25 Gram(s) IV Push once  donepezil 5 milliGRAM(s) Oral at bedtime  ferrous    sulfate 325 milliGRAM(s) Oral daily  heparin   Injectable 5000 Unit(s) SubCutaneous every 8 hours  insulin lispro (HumaLOG) corrective regimen sliding scale   SubCutaneous three times a day before meals  insulin lispro (HumaLOG) corrective regimen sliding scale   SubCutaneous at bedtime  lactobacillus acidophilus 1 Tablet(s) Oral daily  losartan 100 milliGRAM(s) Oral daily  pantoprazole    Tablet 20 milliGRAM(s) Oral before breakfast  PARoxetine 20 milliGRAM(s) Oral daily    MEDICATIONS  (PRN):  dextrose 40% Gel 15 Gram(s) Oral once PRN Blood Glucose LESS THAN 70 milliGRAM(s)/deciliter  glucagon  Injectable 1 milliGRAM(s) IntraMuscular once PRN Glucose LESS THAN 70 milligrams/deciliter      Allergies    sulfa drugs (Unknown)    Intolerances        REVIEW OF SYSTEMS:  CONSTITUTIONAL: No fever or chills  HEENT:  No headache, no sore throat  RESPIRATORY: No cough, wheezing, or shortness of breath  CARDIOVASCULAR: No chest pain, palpitations  GASTROINTESTINAL: No abd pain, nausea, vomiting, or diarrhea  NEUROLOGICAL: no focal weakness or dizziness  MUSCULOSKELETAL: no myalgias     Vital Signs Last 24 Hrs  T(C): 37 (02 Sep 2020 12:52), Max: 37 (02 Sep 2020 12:52)  T(F): 98.6 (02 Sep 2020 12:52), Max: 98.6 (02 Sep 2020 12:52)  HR: 73 (02 Sep 2020 12:52) (70 - 79)  BP: 147/70 (02 Sep 2020 12:52) (118/52 - 147/70)  BP(mean): --  RR: 18 (02 Sep 2020 12:52) (17 - 18)  SpO2: 94% (02 Sep 2020 12:52) (94% - 99%)    PHYSICAL EXAM:  GENERAL: NAD  HEENT:  anicteric, moist mucous membranes, pale conjunctiva   CHEST/LUNG:  CTA b/l, no rales, wheezes, or rhonchi  HEART:  RRR, S1, S2,   ABDOMEN:  BS+, soft, nontender, nondistended  EXTREMITIES: left leg is contracted, unchanged. left foot is wrapped, no pus drainage or blood noted through bandaging.   NERVOUS SYSTEM: answers questions and follows commands appropriately    LABS:                        9.3    7.94  )-----------( 220      ( 02 Sep 2020 08:28 )             28.8     CBC Full  -  ( 02 Sep 2020 08:28 )  WBC Count : 7.94 K/uL  Hemoglobin : 9.3 g/dL  Hematocrit : 28.8 %  Platelet Count - Automated : 220 K/uL  Mean Cell Volume : 84.2 fl  Mean Cell Hemoglobin : 27.2 pg  Mean Cell Hemoglobin Concentration : 32.3 gm/dL  Auto Neutrophil # : x  Auto Lymphocyte # : x  Auto Monocyte # : x  Auto Eosinophil # : x  Auto Basophil # : x  Auto Neutrophil % : x  Auto Lymphocyte % : x  Auto Monocyte % : x  Auto Eosinophil % : x  Auto Basophil % : x    02 Sep 2020 08:28    143    |  111    |  29     ----------------------------<  168    4.2     |  29     |  1.10     Ca    8.8        02 Sep 2020 08:28          CAPILLARY BLOOD GLUCOSE      POCT Blood Glucose.: 261 mg/dL (02 Sep 2020 11:55)  POCT Blood Glucose.: 178 mg/dL (02 Sep 2020 08:04)  POCT Blood Glucose.: 237 mg/dL (01 Sep 2020 21:22)  POCT Blood Glucose.: 183 mg/dL (01 Sep 2020 17:08)  POCT Blood Glucose.: 177 mg/dL (01 Sep 2020 16:53)        Culture - Urine (collected 08-30-20 @ 11:14)  Source: .Urine Clean Catch (Midstream)  Final Report (09-01-20 @ 10:59):    50,000 - 99,000 CFU/mL Klebsiella pneumoniae  Organism: Klebsiella pneumoniae (09-01-20 @ 10:59)  Organism: Klebsiella pneumoniae (09-01-20 @ 10:59)      -  Amikacin: S <=16      -  Amoxicillin/Clavulanic Acid: S <=8/4      -  Ampicillin: R 16 These ampicillin results predict results for amoxicillin      -  Ampicillin/Sulbactam: S <=4/2 Enterobacter, Citrobacter, and Serratia may develop resistance during prolonged therapy (3-4 days)      -  Aztreonam: S <=4      -  Cefazolin: S <=2 (MIC_CL_COM_ENTERIC_CEFAZU) For uncomplicated UTI with K. pneumoniae, E. coli, or P. mirablis: MALATHI <=16 is sensitive and MALATHI >=32 is resistant. This also predicts results for oral agents cefaclor, cefdinir, cefpodoxime, cefprozil, cefuroxime axetil, cephalexin and locarbef for uncomplicated UTI. Note that some isolates may be susceptible to these agents while testing resistant to cefazolin.      -  Cefepime: S <=2      -  Cefoxitin: S <=8      -  Ceftriaxone: S <=1 Enterobacter, Citrobacter, and Serratia may develop resistance during prolonged therapy      -  Ciprofloxacin: S <=0.25      -  Ertapenem: S <=0.5      -  Gentamicin: S <=2      -  Imipenem: S <=1      -  Levofloxacin: S <=0.5      -  Meropenem: S <=1      -  Nitrofurantoin: I 64 Should not be used to treat pyelonephritis      -  Piperacillin/Tazobactam: S <=8      -  Tigecycline: S <=2      -  Tobramycin: S <=2      -  Trimethoprim/Sulfamethoxazole: S <=0.5/9.5      Method Type: MALATHI    Culture - Blood (collected 08-30-20 @ 05:22)  Source: .Blood Blood-Venous  Preliminary Report (08-31-20 @ 06:01):    No growth to date.    Culture - Blood (collected 08-30-20 @ 05:22)  Source: .Blood Blood-Venous  Preliminary Report (08-31-20 @ 06:01):    No growth to date.    Culture - Other (collected 08-30-20 @ 05:12)  Source: .Other left great toe  Preliminary Report (08-31-20 @ 07:32):    Numerous Streptococcus agalactiae (Group B) isolated    Group B streptococci are susceptible to ampicillin,    penicillin and cefazolin, but may be resistant to    erythromycin and clindamycin.    Recommendations for intrapartum prophylaxis for Group B    streptococci are penicillin or ampicillin.    Normal skin sally isolated        RADIOLOGY & ADDITIONAL TESTS:    Personally reviewed.     Consultant(s) Notes Reviewed:  [x] YES  [ ] NO Patient is a 87y old  Female who presents with a chief complaint of left foot ulcer.      INTERVAL HPI/OVERNIGHT EVENTS: Patient seen and examined at bedside.   Overnight, RN called for swelling and hematoma right hand. RN noticed no IV infiltrate. US Doppler of right upper extremity was done and showed no evidence of DVT.     Patient has no complaints at this time. Denies fevers, chills, headache, lightheadedness, chest pain, dyspnea, abdominal pain, n/v/d/c.    MEDICATIONS  (STANDING):  amLODIPine   Tablet 10 milliGRAM(s) Oral daily  aspirin  chewable 81 milliGRAM(s) Oral daily  atorvastatin 10 milliGRAM(s) Oral at bedtime  carvedilol 6.25 milliGRAM(s) Oral every 12 hours  ceFAZolin   IVPB 500 milliGRAM(s) IV Intermittent every 12 hours  dextrose 5%. 1000 milliLiter(s) (50 mL/Hr) IV Continuous <Continuous>  dextrose 50% Injectable 12.5 Gram(s) IV Push once  dextrose 50% Injectable 25 Gram(s) IV Push once  dextrose 50% Injectable 25 Gram(s) IV Push once  donepezil 5 milliGRAM(s) Oral at bedtime  ferrous    sulfate 325 milliGRAM(s) Oral daily  heparin   Injectable 5000 Unit(s) SubCutaneous every 8 hours  insulin lispro (HumaLOG) corrective regimen sliding scale   SubCutaneous three times a day before meals  insulin lispro (HumaLOG) corrective regimen sliding scale   SubCutaneous at bedtime  lactobacillus acidophilus 1 Tablet(s) Oral daily  losartan 100 milliGRAM(s) Oral daily  pantoprazole    Tablet 20 milliGRAM(s) Oral before breakfast  PARoxetine 20 milliGRAM(s) Oral daily    MEDICATIONS  (PRN):  dextrose 40% Gel 15 Gram(s) Oral once PRN Blood Glucose LESS THAN 70 milliGRAM(s)/deciliter  glucagon  Injectable 1 milliGRAM(s) IntraMuscular once PRN Glucose LESS THAN 70 milligrams/deciliter      Allergies    sulfa drugs (Unknown)    Intolerances        REVIEW OF SYSTEMS:  CONSTITUTIONAL: No fever or chills  HEENT:  No headache, no sore throat  RESPIRATORY: No cough, wheezing, or shortness of breath  CARDIOVASCULAR: No chest pain, palpitations  GASTROINTESTINAL: No abd pain, nausea, vomiting, or diarrhea  NEUROLOGICAL: no focal weakness or dizziness  MUSCULOSKELETAL: no myalgias     Vital Signs Last 24 Hrs  T(C): 37 (02 Sep 2020 12:52), Max: 37 (02 Sep 2020 12:52)  T(F): 98.6 (02 Sep 2020 12:52), Max: 98.6 (02 Sep 2020 12:52)  HR: 73 (02 Sep 2020 12:52) (70 - 79)  BP: 147/70 (02 Sep 2020 12:52) (118/52 - 147/70)  BP(mean): --  RR: 18 (02 Sep 2020 12:52) (17 - 18)  SpO2: 94% (02 Sep 2020 12:52) (94% - 99%)    PHYSICAL EXAM:  GENERAL: NAD  HEENT:  anicteric, moist mucous membranes, pale conjunctiva   CHEST/LUNG:  CTA b/l, no rales, wheezes, or rhonchi  HEART:  rrr, S1, S2  ABDOMEN:  BS+, soft, nontender, nondistended  EXTREMITIES: left leg is contracted, unchanged. left foot is wrapped, no pus drainage or blood noted through podiatric dressing.   NERVOUS SYSTEM: answers simple questions and follows commands appropriately    LABS:                        9.3    7.94  )-----------( 220      ( 02 Sep 2020 08:28 )             28.8     CBC Full  -  ( 02 Sep 2020 08:28 )  WBC Count : 7.94 K/uL  Hemoglobin : 9.3 g/dL  Hematocrit : 28.8 %  Platelet Count - Automated : 220 K/uL  Mean Cell Volume : 84.2 fl  Mean Cell Hemoglobin : 27.2 pg  Mean Cell Hemoglobin Concentration : 32.3 gm/dL  Auto Neutrophil # : x  Auto Lymphocyte # : x  Auto Monocyte # : x  Auto Eosinophil # : x  Auto Basophil # : x  Auto Neutrophil % : x  Auto Lymphocyte % : x  Auto Monocyte % : x  Auto Eosinophil % : x  Auto Basophil % : x    02 Sep 2020 08:28    143    |  111    |  29     ----------------------------<  168    4.2     |  29     |  1.10     Ca    8.8        02 Sep 2020 08:28          CAPILLARY BLOOD GLUCOSE      POCT Blood Glucose.: 261 mg/dL (02 Sep 2020 11:55)  POCT Blood Glucose.: 178 mg/dL (02 Sep 2020 08:04)  POCT Blood Glucose.: 237 mg/dL (01 Sep 2020 21:22)  POCT Blood Glucose.: 183 mg/dL (01 Sep 2020 17:08)  POCT Blood Glucose.: 177 mg/dL (01 Sep 2020 16:53)        Culture - Urine (collected 08-30-20 @ 11:14)  Source: .Urine Clean Catch (Midstream)  Final Report (09-01-20 @ 10:59):    50,000 - 99,000 CFU/mL Klebsiella pneumoniae  Organism: Klebsiella pneumoniae (09-01-20 @ 10:59)  Organism: Klebsiella pneumoniae (09-01-20 @ 10:59)      -  Amikacin: S <=16      -  Amoxicillin/Clavulanic Acid: S <=8/4      -  Ampicillin: R 16 These ampicillin results predict results for amoxicillin      -  Ampicillin/Sulbactam: S <=4/2 Enterobacter, Citrobacter, and Serratia may develop resistance during prolonged therapy (3-4 days)      -  Aztreonam: S <=4      -  Cefazolin: S <=2 (MIC_CL_COM_ENTERIC_CEFAZU) For uncomplicated UTI with K. pneumoniae, E. coli, or P. mirablis: MALATHI <=16 is sensitive and MALATHI >=32 is resistant. This also predicts results for oral agents cefaclor, cefdinir, cefpodoxime, cefprozil, cefuroxime axetil, cephalexin and locarbef for uncomplicated UTI. Note that some isolates may be susceptible to these agents while testing resistant to cefazolin.      -  Cefepime: S <=2      -  Cefoxitin: S <=8      -  Ceftriaxone: S <=1 Enterobacter, Citrobacter, and Serratia may develop resistance during prolonged therapy      -  Ciprofloxacin: S <=0.25      -  Ertapenem: S <=0.5      -  Gentamicin: S <=2      -  Imipenem: S <=1      -  Levofloxacin: S <=0.5      -  Meropenem: S <=1      -  Nitrofurantoin: I 64 Should not be used to treat pyelonephritis      -  Piperacillin/Tazobactam: S <=8      -  Tigecycline: S <=2      -  Tobramycin: S <=2      -  Trimethoprim/Sulfamethoxazole: S <=0.5/9.5      Method Type: MALATHI    Culture - Blood (collected 08-30-20 @ 05:22)  Source: .Blood Blood-Venous  Preliminary Report (08-31-20 @ 06:01):    No growth to date.    Culture - Blood (collected 08-30-20 @ 05:22)  Source: .Blood Blood-Venous  Preliminary Report (08-31-20 @ 06:01):    No growth to date.    Culture - Other (collected 08-30-20 @ 05:12)  Source: .Other left great toe  Preliminary Report (08-31-20 @ 07:32):    Numerous Streptococcus agalactiae (Group B) isolated    Group B streptococci are susceptible to ampicillin,    penicillin and cefazolin, but may be resistant to    erythromycin and clindamycin.    Recommendations for intrapartum prophylaxis for Group B    streptococci are penicillin or ampicillin.    Normal skin sally isolated        RADIOLOGY & ADDITIONAL TESTS:    Personally reviewed.     Consultant(s) Notes Reviewed:  [x] YES  [ ] NO

## 2020-09-02 NOTE — DISCHARGE NOTE PROVIDER - NSDCFUADDINST_GEN_ALL_CORE_FT
Wound Care Instructions:  -Keep dressing clean, dry, and intact to the left foot  -Change dressing every 2-3 days with silver alginate, abd pads and dry sterile dressing  -Apply Z-flex offloading boots to bilateral lower extremity at all times   -Monitor for any signs of infection including redness, swelling in the leg above the bandage, nausea/vomiting/fever/chills/chest pain/shortness of breath, if any are present patient must report to the emergency department immediately  -Patient is to follow up with Dr. Mullins/Dr. Danielle within 5 days after discharge at [facility] [place].

## 2020-09-02 NOTE — DISCHARGE NOTE PROVIDER - CARE PROVIDER_API CALL
Tramaine Robles  [facility] MEDICINE  [address] [place] [address]  Phone: [phone]  Fax: [phone]  Follow Up Time: 1 week Tramaine Robles  FAMILY MEDICINE  [address] [place] [address]  Phone: [phone]  Fax: [phone]  Follow Up Time: 2 weeks    Vidal Mullins  PODIATRIC MEDICINE AND SURGERY  [address] [place] [address]  Phone: [phone]  Fax: [phone]  Follow Up Time: 1 week Tramaine Robles  [facility] MEDICINE  [address] [place] [address]  Phone: [phone]  Fax: [phone]  Follow Up Time: 2 weeks    Jordon Danielle (DPM)  [facility]  [address] [place] [address]  Phone: [phone]  Fax: [phone]  Follow Up Time:

## 2020-09-02 NOTE — PROGRESS NOTE ADULT - PROBLEM SELECTOR PLAN 9
DVT ppx heparin 5000 q 8     IMPROVE VTE Individual Risk Assessment        RISK                                                          Points  [  ] Previous VTE                                                3  [  ] Thrombophilia                                             2  [  ] Lower limb paralysis                                   2        (unable to hold up >15 seconds)    [  ] Current Cancer                                            2         (within 6 months)  [ x ] Immobilization > 24 hrs                              1  [  ] ICU/CCU stay > 24 hours                            1  [ x ] Age > 60                                                    1  IMPROVE VTE Score _____2____    10 . Depression: continue Paroxetine DVT ppx: HSQ    10 . Depression: continue Paroxetine

## 2020-09-02 NOTE — DISCHARGE NOTE PROVIDER - CARE PROVIDERS DIRECT ADDRESSES
,DirectAddress_Unknown ,DirectAddress_Unknown,[email or web address] ,DirectAddress_Unknown,DirectAddress_Unknown

## 2020-09-02 NOTE — DISCHARGE NOTE PROVIDER - NSDCMRMEDTOKEN_GEN_ALL_CORE_FT
amLODIPine 10 mg oral tablet:   aspirin 81 mg oral tablet, chewable: 1 tab(s) orally once a day  carvedilol 6.25 mg oral tablet: 1 tab(s) orally 2 times a day  donepezil 5 mg oral tablet: 1 tab(s) orally once a day (at bedtime)  iron: 27 milligram(s) orally  lactobacillus acidophilus oral capsule: 1 tab(s) orally 2 times a day  losartan 100 mg oral tablet:   lovastatin 40 mg oral tablet: 1 tab(s) orally once a day  metFORMIN 500 mg oral tablet: 1 tab(s) orally 2 times a day  omeprazole 20 mg oral delayed release capsule: 1 cap(s) orally once a day  PARoxetine 20 mg oral tablet:   Vitamin B12 500 mcg oral tablet: 1 tab(s) orally once a day  Vitamin D3 2000 intl units oral tablet: 1 tab(s) orally once a day amLODIPine 10 mg oral tablet:   aspirin 81 mg oral tablet, chewable: 1 tab(s) orally once a day  carvedilol 6.25 mg oral tablet: 1 tab(s) orally 2 times a day  cephalexin 250 mg oral capsule: 1 cap(s) orally 3 times a day for two more days starting this evening 9/4 at ~9PM and ending after doses on 9/6/20  donepezil 5 mg oral tablet: 1 tab(s) orally once a day (at bedtime)  iron: 27 milligram(s) orally  lactobacillus acidophilus oral capsule: 1 tab(s) orally 2 times a day  losartan 100 mg oral tablet:   lovastatin 40 mg oral tablet: 1 tab(s) orally once a day  metFORMIN 500 mg oral tablet: 1 tab(s) orally 2 times a day  omeprazole 20 mg oral delayed release capsule: 1 cap(s) orally once a day  PARoxetine 20 mg oral tablet:   Vitamin B12 500 mcg oral tablet: 1 tab(s) orally once a day  Vitamin D3 2000 intl units oral tablet: 1 tab(s) orally once a day

## 2020-09-02 NOTE — DISCHARGE NOTE PROVIDER - NSDCCPCAREPLAN_GEN_ALL_CORE_FT
PRINCIPAL DISCHARGE DIAGNOSIS  Diagnosis: Diabetic foot ulcer  Assessment and Plan of Treatment: You were found to have an infected diabetic foot ulcer. The cultures grew bacteria that we treated with appropiate antibiotics.   START keflex      SECONDARY DISCHARGE DIAGNOSES  Diagnosis: Diabetes mellitus type 2, noninsulin dependent  Assessment and Plan of Treatment: Diabetes mellitus type 2, noninsulin dependent  Continue your metformin.    Diagnosis: Iron deficiency anemia  Assessment and Plan of Treatment: Iron deficiency anemia.   Continue your iron supplement.    Diagnosis: HLD (hyperlipidemia)  Assessment and Plan of Treatment: HLD (hyperlipidemia)  Continue your lovastatin.    Diagnosis: GERD (gastroesophageal reflux disease)  Assessment and Plan of Treatment: GERD (gastroesophageal reflux disease)  Continue your omeprazole.    Diagnosis: HTN (hypertension)  Assessment and Plan of Treatment: HTN (hypertension)  Continue your amlodipine, carvedilol and losartan PRINCIPAL DISCHARGE DIAGNOSIS  Diagnosis: Diabetic foot ulcer  Assessment and Plan of Treatment: You were found to have an infected diabetic foot ulcer. The cultures grew bacteria that is susceptible to multiple antibiotics. You were treated with IV antibiotics in the hospital and can complete course with the cephalexin as prescribed for 2 more days (first dose tonight 9/4/20 ~9PM and ending on night dose of 9/6/20).   Visiting nurse will help teach dressing changes.  Please follow up with podiatry, Dr. Danielle (number below) in the wound care center within a week.  Wound care instructions per podiatry:   -Keep dressing clean, dry, and intact to the left foot  -Change dressing every 2-3 days with silver alginate, abd pads and dry sterile dressing  -Apply Z-flex offloading boots to bilateral lower extremity at all times   -Monitor for any signs of infection including redness, swelling in the leg above the bandage, nausea/vomiting/fever/chills/chest pain/shortness of breath, if any are present patient must report to the emergency department immediately  -Patient is to follow up with Dr. Mullins/Dr. Danielle within 5 days after discharge at [facility] [place].      SECONDARY DISCHARGE DIAGNOSES  Diagnosis: GERD (gastroesophageal reflux disease)  Assessment and Plan of Treatment: Continue your omeprazole. Follow up with your PMD.    Diagnosis: HLD (hyperlipidemia)  Assessment and Plan of Treatment: Continue your lovastatin. Follow up with your PMD.    Diagnosis: HTN (hypertension)  Assessment and Plan of Treatment: Continue your home regimen and follow up with your PMD.    Diagnosis: Iron deficiency anemia  Assessment and Plan of Treatment: Continue your iron supplement.    Diagnosis: Diabetes mellitus type 2, noninsulin dependent  Assessment and Plan of Treatment: Continue your metformin. Follow up with your PMD. PRINCIPAL DISCHARGE DIAGNOSIS  Diagnosis: Diabetic foot ulcer  Assessment and Plan of Treatment: You were found to have an infected diabetic foot ulcer. The cultures grew bacteria that is susceptible to multiple antibiotics. You were treated with IV antibiotics in the hospital and completed the course of antibiotics. No need to take any further antibiotics as an outpatient.  Visiting nurse will help teach dressing changes.  Please follow up with podiatry, Dr. Danielle (number below) in the wound care center within a week.  Wound care instructions per podiatry:   -Keep dressing clean, dry, and intact to the left foot  -Change dressing every 2-3 days with silver alginate, abd pads and dry sterile dressing  -Apply Z-flex offloading boots to bilateral lower extremity at all times   -Monitor for any signs of infection including redness, swelling in the leg above the bandage, nausea/vomiting/fever/chills/chest pain/shortness of breath, if any are present patient must report to the emergency department immediately  -Patient is to follow up with Dr. Mullins/Dr. Danielle within 5 days after discharge at [facility] [place].      SECONDARY DISCHARGE DIAGNOSES  Diagnosis: Iron deficiency anemia  Assessment and Plan of Treatment: Continue your iron supplement. Continue your vitamins. Follow up with your PMD in a week to monitor your blood counts.    Diagnosis: GERD (gastroesophageal reflux disease)  Assessment and Plan of Treatment: Continue your omeprazole. Follow up with your PMD.    Diagnosis: HLD (hyperlipidemia)  Assessment and Plan of Treatment: Continue your lovastatin. Follow up with your PMD.    Diagnosis: HTN (hypertension)  Assessment and Plan of Treatment: Continue your home regimen and follow up with your PMD.    Diagnosis: Diabetes mellitus type 2, noninsulin dependent  Assessment and Plan of Treatment: Continue your metformin. Follow up with your PMD.

## 2020-09-02 NOTE — PROGRESS NOTE ADULT - PROBLEM SELECTOR PLAN 3
hold home metformin  start low dose ISS, fingersticks, hypoglycemia protocol   A1C 6.2%   POCT Glucose: well controlled   patient has good appetite hold home metformin  start low dose ISS, fingersticks, hypoglycemia protocol   Hgb A1C 6.2%   POCT Glucose: well controlled   patient has good appetite

## 2020-09-02 NOTE — PROGRESS NOTE ADULT - SUBJECTIVE AND OBJECTIVE BOX
Patient is an 87y year old Female seen at [facility] 3WES 354 W1 for Left foot wound on the plantar aspect of the 1st metatarsal. Patient was resting in bed comfortably and was in NAD. Patient complains of pain in the hip during dressing change to the left foot wound with slight movement secondary to left lower extremity contracture. Denies any fever, chills, nausea, vomiting, chest pain, shortness of breath, or calf pain at this time.    Allergies    sulfa drugs (Unknown)    Intolerances        MEDICATIONS  (STANDING):  amLODIPine   Tablet 10 milliGRAM(s) Oral daily  aspirin  chewable 81 milliGRAM(s) Oral daily  atorvastatin 10 milliGRAM(s) Oral at bedtime  carvedilol 6.25 milliGRAM(s) Oral every 12 hours  ceFAZolin   IVPB 500 milliGRAM(s) IV Intermittent every 12 hours  dextrose 5%. 1000 milliLiter(s) (50 mL/Hr) IV Continuous <Continuous>  dextrose 50% Injectable 12.5 Gram(s) IV Push once  dextrose 50% Injectable 25 Gram(s) IV Push once  dextrose 50% Injectable 25 Gram(s) IV Push once  donepezil 5 milliGRAM(s) Oral at bedtime  ferrous    sulfate 325 milliGRAM(s) Oral daily  heparin   Injectable 5000 Unit(s) SubCutaneous every 8 hours  insulin lispro (HumaLOG) corrective regimen sliding scale   SubCutaneous three times a day before meals  insulin lispro (HumaLOG) corrective regimen sliding scale   SubCutaneous at bedtime  lactobacillus acidophilus 1 Tablet(s) Oral daily  losartan 100 milliGRAM(s) Oral daily  pantoprazole    Tablet 20 milliGRAM(s) Oral before breakfast  PARoxetine 20 milliGRAM(s) Oral daily    MEDICATIONS  (PRN):  dextrose 40% Gel 15 Gram(s) Oral once PRN Blood Glucose LESS THAN 70 milliGRAM(s)/deciliter  glucagon  Injectable 1 milliGRAM(s) IntraMuscular once PRN Glucose LESS THAN 70 milligrams/deciliter      Vital Signs Last 24 Hrs  T(C): 37 (02 Sep 2020 12:52), Max: 37 (02 Sep 2020 12:52)  T(F): 98.6 (02 Sep 2020 12:52), Max: 98.6 (02 Sep 2020 12:52)  HR: 73 (02 Sep 2020 12:52) (70 - 79)  BP: 147/70 (02 Sep 2020 12:52) (118/52 - 147/70)  BP(mean): --  RR: 18 (02 Sep 2020 12:52) (17 - 18)  SpO2: 94% (02 Sep 2020 12:52) (94% - 99%)    PHYSICAL EXAM:  Vascular: DP & PT palpable bilaterally, Capillary refill 3 seconds, Digital hair absent bilaterally  Neurological: Light touch sensation diminished to the level of the midfoot bilaterally   Musculoskeletal: 3/5 strength in all quadrants bilaterally, AJ & STJ ROM limited. Pain on palpation to the left foot wound. Pain on palpation to elongated nails 1-5 bilaterally   Dermatological: 1.3cm x 1.0cm x 0.2cm ulceration noted to the plantar aspect of the left 1st metatarsal head with fibrogranular wound bed, no probe to bone, no periwound erythema, no fluctuance, no malodor, no proximal streaking at this time  Nails 1-5 are elongated, dystrophic, incurvated with subungal debri       CBC Full  -  ( 02 Sep 2020 08:28 )  WBC Count : 7.94 K/uL  RBC Count : 3.42 M/uL  Hemoglobin : 9.3 g/dL  Hematocrit : 28.8 %  Platelet Count - Automated : 220 K/uL  Mean Cell Volume : 84.2 fl  Mean Cell Hemoglobin : 27.2 pg  Mean Cell Hemoglobin Concentration : 32.3 gm/dL  Auto Neutrophil # : x  Auto Lymphocyte # : x  Auto Monocyte # : x  Auto Eosinophil # : x  Auto Basophil # : x  Auto Neutrophil % : x  Auto Lymphocyte % : x  Auto Monocyte % : x  Auto Eosinophil % : x  Auto Basophil % : x      09-02    143  |  111<H>  |  29<H>  ----------------------------<  168<H>  4.2   |  29  |  1.10    Ca    8.8      02 Sep 2020 08:28    TPro  5.6<L>  /  Alb  2.3<L>  /  TBili  0.3  /  DBili  x   /  AST  13<L>  /  ALT  9<L>  /  AlkPhos  33<L>  09-01      Imaging: ----------  < from: Xray Foot AP + Lateral, Left (08.29.20 @ 23:09) >  EXAM:  FOOT 2VIEWS LT                            PROCEDURE DATE:  08/29/2020          INTERPRETATION:  LEFT FOOT: AP, crosstable lateral views.    CLINICAL INFORMATION: Left foot wound.    COMPARISON:  None available    FINDINGS:    Partially imaged is ORIF distal tibia and fibula.    The alignment at the tarsometatarsal joints remains grossly within normal limits.    There are degenerative changes at the first MTP joint and hallux sesamoid complex.    No definite focal osteolysis or acute fracture is noted.    Impression:    Findings as discussed above.      < end of copied text >

## 2020-09-02 NOTE — PROGRESS NOTE ADULT - PROBLEM SELECTOR PLAN 1
Patient examined and evaluated at this time.  Wound to the left foot is stable with no signs of infection   Applied aquacel and DSD to left foot wound.   Will change dressing every other day.     Wound Care Instructions:  -Keep dressing clean, dry, and intact to the left foot  -Change dressing every 2-3 days with silver alginate, abd pads and DSD   -Apply Z-flex offloading boots to bilateral lower extremity at all times   -Monitor for any signs of infection including redness, swelling in the leg above the bandage, nausea/vomiting/fever/chills/chest pain/shortness of breath, if any are present patient must report to the emergency department immediately  -Patient is to follow up with Dr. Mullins/Dr. Danielle within 5 days after discharge at [facility] [place]

## 2020-09-02 NOTE — PROVIDER CONTACT NOTE (OTHER) - SITUATION
Pt noted to have right hand that is swollen and ecchymotic.  The swelling goes into 2nd, 3rd, and 4th digits.

## 2020-09-02 NOTE — PROGRESS NOTE ADULT - PROBLEM SELECTOR PLAN 7
cont losartan, carvedilol, amlodipine with hold parameters  - stable BP overnight - c/w losartan, carvedilol, amlodipine with hold parameters  - stable BP overnight

## 2020-09-03 LAB
ANION GAP SERPL CALC-SCNC: 6 MMOL/L — SIGNIFICANT CHANGE UP (ref 5–17)
BUN SERPL-MCNC: 33 MG/DL — HIGH (ref 7–23)
CALCIUM SERPL-MCNC: 9 MG/DL — SIGNIFICANT CHANGE UP (ref 8.5–10.1)
CHLORIDE SERPL-SCNC: 111 MMOL/L — HIGH (ref 96–108)
CO2 SERPL-SCNC: 28 MMOL/L — SIGNIFICANT CHANGE UP (ref 22–31)
CREAT SERPL-MCNC: 0.89 MG/DL — SIGNIFICANT CHANGE UP (ref 0.5–1.3)
GLUCOSE SERPL-MCNC: 143 MG/DL — HIGH (ref 70–99)
HCT VFR BLD CALC: 27.8 % — LOW (ref 34.5–45)
HGB BLD-MCNC: 8.8 G/DL — LOW (ref 11.5–15.5)
MCHC RBC-ENTMCNC: 27.3 PG — SIGNIFICANT CHANGE UP (ref 27–34)
MCHC RBC-ENTMCNC: 31.7 GM/DL — LOW (ref 32–36)
MCV RBC AUTO: 86.3 FL — SIGNIFICANT CHANGE UP (ref 80–100)
NRBC # BLD: 0 /100 WBCS — SIGNIFICANT CHANGE UP (ref 0–0)
PLATELET # BLD AUTO: 209 K/UL — SIGNIFICANT CHANGE UP (ref 150–400)
POTASSIUM SERPL-MCNC: 4.4 MMOL/L — SIGNIFICANT CHANGE UP (ref 3.5–5.3)
POTASSIUM SERPL-SCNC: 4.4 MMOL/L — SIGNIFICANT CHANGE UP (ref 3.5–5.3)
RBC # BLD: 3.22 M/UL — LOW (ref 3.8–5.2)
RBC # FLD: 15.3 % — HIGH (ref 10.3–14.5)
SODIUM SERPL-SCNC: 145 MMOL/L — SIGNIFICANT CHANGE UP (ref 135–145)
WBC # BLD: 5.79 K/UL — SIGNIFICANT CHANGE UP (ref 3.8–10.5)
WBC # FLD AUTO: 5.79 K/UL — SIGNIFICANT CHANGE UP (ref 3.8–10.5)

## 2020-09-03 PROCEDURE — 99232 SBSQ HOSP IP/OBS MODERATE 35: CPT | Mod: GC

## 2020-09-03 RX ADMIN — ATORVASTATIN CALCIUM 10 MILLIGRAM(S): 80 TABLET, FILM COATED ORAL at 21:50

## 2020-09-03 RX ADMIN — PANTOPRAZOLE SODIUM 20 MILLIGRAM(S): 20 TABLET, DELAYED RELEASE ORAL at 07:49

## 2020-09-03 RX ADMIN — CARVEDILOL PHOSPHATE 6.25 MILLIGRAM(S): 80 CAPSULE, EXTENDED RELEASE ORAL at 05:48

## 2020-09-03 RX ADMIN — Medication 4: at 12:34

## 2020-09-03 RX ADMIN — LOSARTAN POTASSIUM 100 MILLIGRAM(S): 100 TABLET, FILM COATED ORAL at 05:48

## 2020-09-03 RX ADMIN — Medication 100 MILLIGRAM(S): at 00:54

## 2020-09-03 RX ADMIN — Medication 1: at 08:53

## 2020-09-03 RX ADMIN — HEPARIN SODIUM 5000 UNIT(S): 5000 INJECTION INTRAVENOUS; SUBCUTANEOUS at 05:48

## 2020-09-03 RX ADMIN — AMLODIPINE BESYLATE 10 MILLIGRAM(S): 2.5 TABLET ORAL at 05:48

## 2020-09-03 RX ADMIN — HEPARIN SODIUM 5000 UNIT(S): 5000 INJECTION INTRAVENOUS; SUBCUTANEOUS at 21:50

## 2020-09-03 RX ADMIN — CARVEDILOL PHOSPHATE 6.25 MILLIGRAM(S): 80 CAPSULE, EXTENDED RELEASE ORAL at 17:22

## 2020-09-03 RX ADMIN — HEPARIN SODIUM 5000 UNIT(S): 5000 INJECTION INTRAVENOUS; SUBCUTANEOUS at 13:51

## 2020-09-03 RX ADMIN — Medication 1 TABLET(S): at 12:32

## 2020-09-03 RX ADMIN — DONEPEZIL HYDROCHLORIDE 5 MILLIGRAM(S): 10 TABLET, FILM COATED ORAL at 21:51

## 2020-09-03 RX ADMIN — Medication 81 MILLIGRAM(S): at 12:32

## 2020-09-03 RX ADMIN — Medication 20 MILLIGRAM(S): at 12:32

## 2020-09-03 RX ADMIN — Medication 100 MILLIGRAM(S): at 13:50

## 2020-09-03 RX ADMIN — Medication 325 MILLIGRAM(S): at 12:32

## 2020-09-03 NOTE — PROGRESS NOTE ADULT - SUBJECTIVE AND OBJECTIVE BOX
Patient is a 87y old  Female who presents with a chief complaint of diabetic foot ulcer on L LE (02 Sep 2020 14:01)      INTERVAL HPI/OVERNIGHT EVENTS: Patient seen and examined at bedside. No overnight events occurred. Patient has no complaints at this time. Denies fevers, chills, headache, lightheadedness, chest pain, dyspnea, abdominal pain, n/v/d/c.    MEDICATIONS  (STANDING):  amLODIPine   Tablet 10 milliGRAM(s) Oral daily  aspirin  chewable 81 milliGRAM(s) Oral daily  atorvastatin 10 milliGRAM(s) Oral at bedtime  carvedilol 6.25 milliGRAM(s) Oral every 12 hours  ceFAZolin   IVPB 500 milliGRAM(s) IV Intermittent every 12 hours  dextrose 5%. 1000 milliLiter(s) (50 mL/Hr) IV Continuous <Continuous>  dextrose 50% Injectable 12.5 Gram(s) IV Push once  dextrose 50% Injectable 25 Gram(s) IV Push once  dextrose 50% Injectable 25 Gram(s) IV Push once  donepezil 5 milliGRAM(s) Oral at bedtime  ferrous    sulfate 325 milliGRAM(s) Oral daily  heparin   Injectable 5000 Unit(s) SubCutaneous every 8 hours  insulin lispro (HumaLOG) corrective regimen sliding scale   SubCutaneous three times a day before meals  insulin lispro (HumaLOG) corrective regimen sliding scale   SubCutaneous at bedtime  lactobacillus acidophilus 1 Tablet(s) Oral daily  losartan 100 milliGRAM(s) Oral daily  pantoprazole    Tablet 20 milliGRAM(s) Oral before breakfast  PARoxetine 20 milliGRAM(s) Oral daily    MEDICATIONS  (PRN):  dextrose 40% Gel 15 Gram(s) Oral once PRN Blood Glucose LESS THAN 70 milliGRAM(s)/deciliter  glucagon  Injectable 1 milliGRAM(s) IntraMuscular once PRN Glucose LESS THAN 70 milligrams/deciliter      Allergies    sulfa drugs (Unknown)    Intolerances        REVIEW OF SYSTEMS:  CONSTITUTIONAL: No fever or chills  HEENT:  No headache, no sore throat  RESPIRATORY: No cough, wheezing, or shortness of breath  CARDIOVASCULAR: No chest pain, palpitations  GASTROINTESTINAL: No abd pain, nausea, vomiting, or diarrhea  GENITOURINARY: No dysuria, frequency, or hematuria  NEUROLOGICAL: no focal weakness or dizziness  MUSCULOSKELETAL: no myalgias     Vital Signs Last 24 Hrs  T(C): 36.9 (03 Sep 2020 05:03), Max: 37 (02 Sep 2020 12:52)  T(F): 98.5 (03 Sep 2020 05:03), Max: 98.6 (02 Sep 2020 12:52)  HR: 64 (03 Sep 2020 05:03) (64 - 77)  BP: 149/62 (03 Sep 2020 05:03) (123/58 - 149/62)  BP(mean): --  RR: 18 (03 Sep 2020 05:03) (18 - 18)  SpO2: 93% (03 Sep 2020 05:03) (93% - 94%)    PHYSICAL EXAM:  GENERAL: NAD  HEENT:  anicteric, moist mucous membranes  CHEST/LUNG:  CTA b/l, no rales, wheezes, or rhonchi  HEART:  RRR, S1, S2  ABDOMEN:  BS+, soft, nontender, nondistended  EXTREMITIES: no edema, cyanosis, or calf tenderness  NERVOUS SYSTEM: answers questions and follows commands appropriately    LABS:                        8.8    5.79  )-----------( 209      ( 03 Sep 2020 07:08 )             27.8     CBC Full  -  ( 03 Sep 2020 07:08 )  WBC Count : 5.79 K/uL  Hemoglobin : 8.8 g/dL  Hematocrit : 27.8 %  Platelet Count - Automated : 209 K/uL  Mean Cell Volume : 86.3 fl  Mean Cell Hemoglobin : 27.3 pg  Mean Cell Hemoglobin Concentration : 31.7 gm/dL  Auto Neutrophil # : x  Auto Lymphocyte # : x  Auto Monocyte # : x  Auto Eosinophil # : x  Auto Basophil # : x  Auto Neutrophil % : x  Auto Lymphocyte % : x  Auto Monocyte % : x  Auto Eosinophil % : x  Auto Basophil % : x    03 Sep 2020 07:08    145    |  111    |  33     ----------------------------<  143    4.4     |  28     |  0.89     Ca    9.0        03 Sep 2020 07:08          CAPILLARY BLOOD GLUCOSE      POCT Blood Glucose.: 151 mg/dL (03 Sep 2020 08:18)  POCT Blood Glucose.: 211 mg/dL (02 Sep 2020 21:27)  POCT Blood Glucose.: 122 mg/dL (02 Sep 2020 16:55)  POCT Blood Glucose.: 261 mg/dL (02 Sep 2020 11:55)        Culture - Urine (collected 08-30-20 @ 11:14)  Source: .Urine Clean Catch (Midstream)  Final Report (09-01-20 @ 10:59):    50,000 - 99,000 CFU/mL Klebsiella pneumoniae  Organism: Klebsiella pneumoniae (09-01-20 @ 10:59)  Organism: Klebsiella pneumoniae (09-01-20 @ 10:59)      -  Amikacin: S <=16      -  Amoxicillin/Clavulanic Acid: S <=8/4      -  Ampicillin: R 16 These ampicillin results predict results for amoxicillin      -  Ampicillin/Sulbactam: S <=4/2 Enterobacter, Citrobacter, and Serratia may develop resistance during prolonged therapy (3-4 days)      -  Aztreonam: S <=4      -  Cefazolin: S <=2 (MIC_CL_COM_[email or web address]) For uncomplicated UTI with K. pneumoniae, E. coli, or P. mirablis: MALATHI <=16 is sensitive and MALATHI >=32 is resistant. This also predicts results for oral agents cefaclor, cefdinir, cefpodoxime, cefprozil, cefuroxime axetil, cephalexin and locarbef for uncomplicated UTI. Note that some isolates may be susceptible to these agents while testing resistant to cefazolin.      -  Cefepime: S <=2      -  Cefoxitin: S <=8      -  Ceftriaxone: S <=1 Enterobacter, Citrobacter, and Serratia may develop resistance during prolonged therapy      -  Ciprofloxacin: S <=0.25      -  Ertapenem: S <=0.5      -  Gentamicin: S <=2      -  Imipenem: S <=1      -  Levofloxacin: S <=0.5      -  Meropenem: S <=1      -  Nitrofurantoin: I 64 Should not be used to treat pyelonephritis      -  Piperacillin/Tazobactam: S <=8      -  Tigecycline: S <=2      -  Tobramycin: S <=2      -  Trimethoprim/Sulfamethoxazole: S <=0.5/9.5      Method Type: MALATHI    Culture - Blood (collected 08-30-20 @ 05:22)  Source: .Blood Blood-Venous  Preliminary Report (08-31-20 @ 06:01):    No growth to date.    Culture - Blood (collected 08-30-20 @ 05:22)  Source: .Blood Blood-Venous  Preliminary Report (08-31-20 @ 06:01):    No growth to date.    Culture - Other (collected 08-30-20 @ 05:12)  Source: .Other left great toe  Preliminary Report (08-31-20 @ 07:32):    Numerous Streptococcus agalactiae (Group B) isolated    Group B streptococci are susceptible to ampicillin,    penicillin and cefazolin, but may be resistant to    erythromycin and clindamycin.    Recommendations for intrapartum prophylaxis for Group B    streptococci are penicillin or ampicillin.    Normal skin sally isolated        RADIOLOGY & ADDITIONAL TESTS:    Personally reviewed.     Consultant(s) Notes Reviewed:  [x] YES  [ ] NO Patient is a 87y old  Female who presents with a chief complaint of diabetic foot ulcer on L LE (02 Sep 2020 14:01)      INTERVAL HPI/OVERNIGHT EVENTS: Patient seen and examined at bedside. No overnight events occurred. Patient has no complaints at this time. Denies fevers, chills, headache, lightheadedness, chest pain, dyspnea, abdominal pain, n/v/d/c.    MEDICATIONS  (STANDING):  amLODIPine   Tablet 10 milliGRAM(s) Oral daily  aspirin  chewable 81 milliGRAM(s) Oral daily  atorvastatin 10 milliGRAM(s) Oral at bedtime  carvedilol 6.25 milliGRAM(s) Oral every 12 hours  ceFAZolin   IVPB 500 milliGRAM(s) IV Intermittent every 12 hours  dextrose 5%. 1000 milliLiter(s) (50 mL/Hr) IV Continuous <Continuous>  dextrose 50% Injectable 12.5 Gram(s) IV Push once  dextrose 50% Injectable 25 Gram(s) IV Push once  dextrose 50% Injectable 25 Gram(s) IV Push once  donepezil 5 milliGRAM(s) Oral at bedtime  ferrous    sulfate 325 milliGRAM(s) Oral daily  heparin   Injectable 5000 Unit(s) SubCutaneous every 8 hours  insulin lispro (HumaLOG) corrective regimen sliding scale   SubCutaneous three times a day before meals  insulin lispro (HumaLOG) corrective regimen sliding scale   SubCutaneous at bedtime  lactobacillus acidophilus 1 Tablet(s) Oral daily  losartan 100 milliGRAM(s) Oral daily  pantoprazole    Tablet 20 milliGRAM(s) Oral before breakfast  PARoxetine 20 milliGRAM(s) Oral daily    MEDICATIONS  (PRN):  dextrose 40% Gel 15 Gram(s) Oral once PRN Blood Glucose LESS THAN 70 milliGRAM(s)/deciliter  glucagon  Injectable 1 milliGRAM(s) IntraMuscular once PRN Glucose LESS THAN 70 milligrams/deciliter      Allergies    sulfa drugs (Unknown)    Intolerances        REVIEW OF SYSTEMS:  CONSTITUTIONAL: No fever or chills  HEENT:  No headache, no sore throat  RESPIRATORY: No cough, wheezing, or shortness of breath  CARDIOVASCULAR: No chest pain, palpitations  GASTROINTESTINAL: No abd pain, nausea, vomiting, or diarrhea  GENITOURINARY: No dysuria, frequency, or hematuria  NEUROLOGICAL: no focal weakness or dizziness  MUSCULOSKELETAL: no myalgias     Vital Signs Last 24 Hrs  T(C): 36.9 (03 Sep 2020 05:03), Max: 37 (02 Sep 2020 12:52)  T(F): 98.5 (03 Sep 2020 05:03), Max: 98.6 (02 Sep 2020 12:52)  HR: 64 (03 Sep 2020 05:03) (64 - 77)  BP: 149/62 (03 Sep 2020 05:03) (123/58 - 149/62)  BP(mean): --  RR: 18 (03 Sep 2020 05:03) (18 - 18)  SpO2: 93% (03 Sep 2020 05:03) (93% - 94%)    PHYSICAL EXAM:  GENERAL: NAD  HEENT:  anicteric, moist mucous membranes  CHEST/LUNG:  CTA b/l, no rales, wheezes, or rhonchi  HEART:  RRR, S1, S2  ABDOMEN:  BS+, soft, nontender, nondistended  EXTREMITIES: right hand is discolored, unchanged from yesterday, radial pulses intact bilaterally, sensation is intact, good muscle tone   NERVOUS SYSTEM: answers questions and follows commands appropriately  Skin: Right thigh skin tear; unchanged from yesterday. skin is clean dry and intact,   left lower extremity ulcer is bandaged and dry, no evidence of pus, blood or drainage from bandage noted, no swelling erythema noted     LABS:                        8.8    5.79  )-----------( 209      ( 03 Sep 2020 07:08 )             27.8     CBC Full  -  ( 03 Sep 2020 07:08 )  WBC Count : 5.79 K/uL  Hemoglobin : 8.8 g/dL  Hematocrit : 27.8 %  Platelet Count - Automated : 209 K/uL  Mean Cell Volume : 86.3 fl  Mean Cell Hemoglobin : 27.3 pg  Mean Cell Hemoglobin Concentration : 31.7 gm/dL  Auto Neutrophil # : x  Auto Lymphocyte # : x  Auto Monocyte # : x  Auto Eosinophil # : x  Auto Basophil # : x  Auto Neutrophil % : x  Auto Lymphocyte % : x  Auto Monocyte % : x  Auto Eosinophil % : x  Auto Basophil % : x    03 Sep 2020 07:08    145    |  111    |  33     ----------------------------<  143    4.4     |  28     |  0.89     Ca    9.0        03 Sep 2020 07:08          CAPILLARY BLOOD GLUCOSE      POCT Blood Glucose.: 151 mg/dL (03 Sep 2020 08:18)  POCT Blood Glucose.: 211 mg/dL (02 Sep 2020 21:27)  POCT Blood Glucose.: 122 mg/dL (02 Sep 2020 16:55)  POCT Blood Glucose.: 261 mg/dL (02 Sep 2020 11:55)        Culture - Urine (collected 08-30-20 @ 11:14)  Source: .Urine Clean Catch (Midstream)  Final Report (09-01-20 @ 10:59):    50,000 - 99,000 CFU/mL Klebsiella pneumoniae  Organism: Klebsiella pneumoniae (09-01-20 @ 10:59)  Organism: Klebsiella pneumoniae (09-01-20 @ 10:59)      -  Amikacin: S <=16      -  Amoxicillin/Clavulanic Acid: S <=8/4      -  Ampicillin: R 16 These ampicillin results predict results for amoxicillin      -  Ampicillin/Sulbactam: S <=4/2 Enterobacter, Citrobacter, and Serratia may develop resistance during prolonged therapy (3-4 days)      -  Aztreonam: S <=4      -  Cefazolin: S <=2 (MIC_CL_COM_[email or web address]) For uncomplicated UTI with K. pneumoniae, E. coli, or P. mirablis: MALATHI <=16 is sensitive and MALATHI >=32 is resistant. This also predicts results for oral agents cefaclor, cefdinir, cefpodoxime, cefprozil, cefuroxime axetil, cephalexin and locarbef for uncomplicated UTI. Note that some isolates may be susceptible to these agents while testing resistant to cefazolin.      -  Cefepime: S <=2      -  Cefoxitin: S <=8      -  Ceftriaxone: S <=1 Enterobacter, Citrobacter, and Serratia may develop resistance during prolonged therapy      -  Ciprofloxacin: S <=0.25      -  Ertapenem: S <=0.5      -  Gentamicin: S <=2      -  Imipenem: S <=1      -  Levofloxacin: S <=0.5      -  Meropenem: S <=1      -  Nitrofurantoin: I 64 Should not be used to treat pyelonephritis      -  Piperacillin/Tazobactam: S <=8      -  Tigecycline: S <=2      -  Tobramycin: S <=2      -  Trimethoprim/Sulfamethoxazole: S <=0.5/9.5      Method Type: MALATHI    Culture - Blood (collected 08-30-20 @ 05:22)  Source: .Blood Blood-Venous  Preliminary Report (08-31-20 @ 06:01):    No growth to date.    Culture - Blood (collected 08-30-20 @ 05:22)  Source: .Blood Blood-Venous  Preliminary Report (08-31-20 @ 06:01):    No growth to date.    Culture - Other (collected 08-30-20 @ 05:12)  Source: .Other left great toe  Preliminary Report (08-31-20 @ 07:32):    Numerous Streptococcus agalactiae (Group B) isolated    Group B streptococci are susceptible to ampicillin,    penicillin and cefazolin, but may be resistant to    erythromycin and clindamycin.    Recommendations for intrapartum prophylaxis for Group B    streptococci are penicillin or ampicillin.    Normal skin sally isolated        RADIOLOGY & ADDITIONAL TESTS:    Personally reviewed.     Consultant(s) Notes Reviewed:  [x] YES  [ ] NO Patient is a 87y old  Female who presents with a chief complaint of left foot ulcer.      INTERVAL HPI/OVERNIGHT EVENTS: Patient seen and examined at bedside. No overnight events occurred. Patient has no complaints at this time. Denies foot pain, fevers, chills, headache, lightheadedness, chest pain, dyspnea, abdominal pain, n/v/d/c.    MEDICATIONS  (STANDING):  amLODIPine   Tablet 10 milliGRAM(s) Oral daily  aspirin  chewable 81 milliGRAM(s) Oral daily  atorvastatin 10 milliGRAM(s) Oral at bedtime  carvedilol 6.25 milliGRAM(s) Oral every 12 hours  ceFAZolin   IVPB 500 milliGRAM(s) IV Intermittent every 12 hours  dextrose 5%. 1000 milliLiter(s) (50 mL/Hr) IV Continuous <Continuous>  dextrose 50% Injectable 12.5 Gram(s) IV Push once  dextrose 50% Injectable 25 Gram(s) IV Push once  dextrose 50% Injectable 25 Gram(s) IV Push once  donepezil 5 milliGRAM(s) Oral at bedtime  ferrous    sulfate 325 milliGRAM(s) Oral daily  heparin   Injectable 5000 Unit(s) SubCutaneous every 8 hours  insulin lispro (HumaLOG) corrective regimen sliding scale   SubCutaneous three times a day before meals  insulin lispro (HumaLOG) corrective regimen sliding scale   SubCutaneous at bedtime  lactobacillus acidophilus 1 Tablet(s) Oral daily  losartan 100 milliGRAM(s) Oral daily  pantoprazole    Tablet 20 milliGRAM(s) Oral before breakfast  PARoxetine 20 milliGRAM(s) Oral daily    MEDICATIONS  (PRN):  dextrose 40% Gel 15 Gram(s) Oral once PRN Blood Glucose LESS THAN 70 milliGRAM(s)/deciliter  glucagon  Injectable 1 milliGRAM(s) IntraMuscular once PRN Glucose LESS THAN 70 milligrams/deciliter      Allergies    sulfa drugs (Unknown)    Intolerances        REVIEW OF SYSTEMS:  CONSTITUTIONAL: No fever or chills  HEENT:  No headache, no sore throat  RESPIRATORY: No cough, wheezing, or shortness of breath  CARDIOVASCULAR: No chest pain, palpitations  GASTROINTESTINAL: No abd pain, nausea, vomiting, or diarrhea  GENITOURINARY: No dysuria, frequency, or hematuria  NEUROLOGICAL: no focal weakness or dizziness  MUSCULOSKELETAL: +chronic contracture of left leg ; no foot pain; no myalgias     Vital Signs Last 24 Hrs  T(C): 36.9 (03 Sep 2020 05:03), Max: 37 (02 Sep 2020 12:52)  T(F): 98.5 (03 Sep 2020 05:03), Max: 98.6 (02 Sep 2020 12:52)  HR: 64 (03 Sep 2020 05:03) (64 - 77)  BP: 149/62 (03 Sep 2020 05:03) (123/58 - 149/62)  BP(mean): --  RR: 18 (03 Sep 2020 05:03) (18 - 18)  SpO2: 93% (03 Sep 2020 05:03) (93% - 94%)    PHYSICAL EXAM:  GENERAL: NAD, frail  HEENT:  anicteric, moist mucous membranes  CHEST/LUNG:  CTA b/l, no rales, wheezes, or rhonchi  HEART:  RRR, S1, S2  ABDOMEN:  BS+, soft, nontender, nondistended  EXTREMITIES: right hand is bruised, unchanged from yesterday ; no extremity edema or cyanosis; left lower extremity foot ulcer is bandaged and dry, no evidence of pus, blood or drainage from bandage noted, no swelling erythema noted   NERVOUS SYSTEM: answers questions and follows commands appropriately      LABS:                        8.8    5.79  )-----------( 209      ( 03 Sep 2020 07:08 )             27.8     CBC Full  -  ( 03 Sep 2020 07:08 )  WBC Count : 5.79 K/uL  Hemoglobin : 8.8 g/dL  Hematocrit : 27.8 %  Platelet Count - Automated : 209 K/uL  Mean Cell Volume : 86.3 fl  Mean Cell Hemoglobin : 27.3 pg  Mean Cell Hemoglobin Concentration : 31.7 gm/dL  Auto Neutrophil # : x  Auto Lymphocyte # : x  Auto Monocyte # : x  Auto Eosinophil # : x  Auto Basophil # : x  Auto Neutrophil % : x  Auto Lymphocyte % : x  Auto Monocyte % : x  Auto Eosinophil % : x  Auto Basophil % : x    03 Sep 2020 07:08    145    |  111    |  33     ----------------------------<  143    4.4     |  28     |  0.89     Ca    9.0        03 Sep 2020 07:08          CAPILLARY BLOOD GLUCOSE      POCT Blood Glucose.: 151 mg/dL (03 Sep 2020 08:18)  POCT Blood Glucose.: 211 mg/dL (02 Sep 2020 21:27)  POCT Blood Glucose.: 122 mg/dL (02 Sep 2020 16:55)  POCT Blood Glucose.: 261 mg/dL (02 Sep 2020 11:55)        Culture - Urine (collected 08-30-20 @ 11:14)  Source: .Urine Clean Catch (Midstream)  Final Report (09-01-20 @ 10:59):    50,000 - 99,000 CFU/mL Klebsiella pneumoniae  Organism: Klebsiella pneumoniae (09-01-20 @ 10:59)  Organism: Klebsiella pneumoniae (09-01-20 @ 10:59)      -  Amikacin: S <=16      -  Amoxicillin/Clavulanic Acid: S <=8/4      -  Ampicillin: R 16 These ampicillin results predict results for amoxicillin      -  Ampicillin/Sulbactam: S <=4/2 Enterobacter, Citrobacter, and Serratia may develop resistance during prolonged therapy (3-4 days)      -  Aztreonam: S <=4      -  Cefazolin: S <=2 (MIC_CL_COM_ENTERIC_CEFAZU) For uncomplicated UTI with K. pneumoniae, E. coli, or P. mirablis: MALATHI <=16 is sensitive and MALATHI >=32 is resistant. This also predicts results for oral agents cefaclor, cefdinir, cefpodoxime, cefprozil, cefuroxime axetil, cephalexin and locarbef for uncomplicated UTI. Note that some isolates may be susceptible to these agents while testing resistant to cefazolin.      -  Cefepime: S <=2      -  Cefoxitin: S <=8      -  Ceftriaxone: S <=1 Enterobacter, Citrobacter, and Serratia may develop resistance during prolonged therapy      -  Ciprofloxacin: S <=0.25      -  Ertapenem: S <=0.5      -  Gentamicin: S <=2      -  Imipenem: S <=1      -  Levofloxacin: S <=0.5      -  Meropenem: S <=1      -  Nitrofurantoin: I 64 Should not be used to treat pyelonephritis      -  Piperacillin/Tazobactam: S <=8      -  Tigecycline: S <=2      -  Tobramycin: S <=2      -  Trimethoprim/Sulfamethoxazole: S <=0.5/9.5      Method Type: MALATHI    Culture - Blood (collected 08-30-20 @ 05:22)  Source: .Blood Blood-Venous  Preliminary Report (08-31-20 @ 06:01):    No growth to date.    Culture - Blood (collected 08-30-20 @ 05:22)  Source: .Blood Blood-Venous  Preliminary Report (08-31-20 @ 06:01):    No growth to date.    Culture - Other (collected 08-30-20 @ 05:12)  Source: .Other left great toe  Preliminary Report (08-31-20 @ 07:32):    Numerous Streptococcus agalactiae (Group B) isolated    Group B streptococci are susceptible to ampicillin,    penicillin and cefazolin, but may be resistant to    erythromycin and clindamycin.    Recommendations for intrapartum prophylaxis for Group B    streptococci are penicillin or ampicillin.    Normal skin sally isolated        RADIOLOGY & ADDITIONAL TESTS:    Personally reviewed.     Consultant(s) Notes Reviewed:  [x] YES  [ ] NO

## 2020-09-03 NOTE — PROGRESS NOTE ADULT - PROBLEM SELECTOR PLAN 5
Hgb mostly in the 9s, likely 2/2 iron deficiency anemia and possibly in part due to chronic disease  stable   cont home iron  ferritin, TIBC, iron panel wnl

## 2020-09-03 NOTE — ADVANCED PRACTICE NURSE CONSULT - ASSESSMENT
Patient is a bedbound 88yo female with dementia admitted for a diabetic foot ulcer complication. BMI 15, PMHX HLD, HTN, GERD  I Was ask to see patient regarding a right thigh skin tear skin is intact no erythema noted

## 2020-09-03 NOTE — PROGRESS NOTE ADULT - PROBLEM SELECTOR PLAN 3
hold home metformin  start low dose ISS, fingersticks, hypoglycemia protocol   Hgb A1C 6.2%   POCT Glucose: well controlled   patient has good appetite

## 2020-09-03 NOTE — ADVANCED PRACTICE NURSE CONSULT - REASON FOR CONSULT
Wound COnsult cw statin simvastatin 20 mg daily cw statin simvastatin 20 mg daily ans son says take aspirin 81 mg daily

## 2020-09-03 NOTE — PROGRESS NOTE ADULT - PROBLEM SELECTOR PLAN 2
- aside from the diabetic foot ulcer, pt also with multiple pressure ulcers (largely all Stage 1 with also a DTI recorded by nursing)  - c/w frequent reposition q4h, off loading of feet and between legs  - wound care nurse consulted  - dietary consulted for wound healing recommendations will follow up -- pt reportedly does have a good appetite - Right thigh skin tear; unchanged from yesterday. skin is clean dry and intact   - aside from the diabetic foot ulcer, pt also with multiple pressure ulcers (largely all Stage 1 with also a DTI recorded by nursing)  - c/w frequent reposition q4h, off loading of feet and between legs  - wound care nurse consulted - Right thigh skin tear; improved. skin is clean dry and intact now  - aside from the diabetic foot ulcer, pt also with multiple pressure ulcers (largely all Stage 1 with also a DTI recorded by nursing)  - c/w frequent reposition q4h, off loading of feet and between legs; z-float boots   - wound care nurse consulted

## 2020-09-03 NOTE — PROGRESS NOTE ADULT - PROBLEM SELECTOR PLAN 1
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   - on presentation, elevated lactate of 3.4 down to 2.2 after some IVF in a few hours, xray L foot neg for overt osteolysis or f/x  - initially treated with zosyn   - switch to Ancef as only GBS growing in wound culture   - minimally elevated ESR, CRP 0.2   - blood cx NGTD x2, urine culture: klebsiella pneumoniae; sensitive to cefazolin -- pt may also have UTI (though unclear as she could not reliably report ROS on admission) -- bacteria is covered by the Ancef  - podiatry Shashi following, recs appreciated L diabetic foot ulcer under 1st metatarsal, pustular material, per admitting team, does not probe to bone.   - on presentation, elevated lactate of 3.4 down to 2.2 after some IVF in a few hours, xray L foot neg for overt osteolysis or f/x  - initially treated with zosyn   - switch to Ancef as only GBS growing in wound culture   - minimally elevated ESR, CRP 0.2   - blood cx NGTD x2, urine culture: klebsiella pneumoniae; sensitive to cefazolin -- pt may also have UTI (though unclear as she could not reliably report ROS on admission) -- bacteria is covered by the Anc anyway  - podiatry Shashi following, recs appreciated

## 2020-09-03 NOTE — PROGRESS NOTE ADULT - PROBLEM SELECTOR PLAN 4
-at baseline knows her name, can identify her family, forgets their names, eats well (regular solid food)  -cont donepezil   -cont paroxetine

## 2020-09-03 NOTE — PROGRESS NOTE ADULT - ASSESSMENT
88yo F with PMH of HTN, HLD, Diabetes Mellitus Type 2 not on home insulin, GERD, Dementia, left leg contracture, bedbound, admitted for L diabetic foot ulcer.

## 2020-09-04 ENCOUNTER — TRANSCRIPTION ENCOUNTER (OUTPATIENT)
Age: 85
End: 2020-09-04

## 2020-09-04 LAB
ANION GAP SERPL CALC-SCNC: 4 MMOL/L — LOW (ref 5–17)
BUN SERPL-MCNC: 48 MG/DL — HIGH (ref 7–23)
CALCIUM SERPL-MCNC: 8.4 MG/DL — LOW (ref 8.5–10.1)
CHLORIDE SERPL-SCNC: 108 MMOL/L — SIGNIFICANT CHANGE UP (ref 96–108)
CO2 SERPL-SCNC: 31 MMOL/L — SIGNIFICANT CHANGE UP (ref 22–31)
CREAT SERPL-MCNC: 1.2 MG/DL — SIGNIFICANT CHANGE UP (ref 0.5–1.3)
CULTURE RESULTS: SIGNIFICANT CHANGE UP
GLUCOSE SERPL-MCNC: 161 MG/DL — HIGH (ref 70–99)
HCT VFR BLD CALC: 26.4 % — LOW (ref 34.5–45)
HGB BLD-MCNC: 8.4 G/DL — LOW (ref 11.5–15.5)
MCHC RBC-ENTMCNC: 27.2 PG — SIGNIFICANT CHANGE UP (ref 27–34)
MCHC RBC-ENTMCNC: 31.8 GM/DL — LOW (ref 32–36)
MCV RBC AUTO: 85.4 FL — SIGNIFICANT CHANGE UP (ref 80–100)
NRBC # BLD: 0 /100 WBCS — SIGNIFICANT CHANGE UP (ref 0–0)
PLATELET # BLD AUTO: 238 K/UL — SIGNIFICANT CHANGE UP (ref 150–400)
POTASSIUM SERPL-MCNC: 4.1 MMOL/L — SIGNIFICANT CHANGE UP (ref 3.5–5.3)
POTASSIUM SERPL-SCNC: 4.1 MMOL/L — SIGNIFICANT CHANGE UP (ref 3.5–5.3)
RBC # BLD: 3.09 M/UL — LOW (ref 3.8–5.2)
RBC # FLD: 15.5 % — HIGH (ref 10.3–14.5)
SODIUM SERPL-SCNC: 143 MMOL/L — SIGNIFICANT CHANGE UP (ref 135–145)
SPECIMEN SOURCE: SIGNIFICANT CHANGE UP
WBC # BLD: 6.15 K/UL — SIGNIFICANT CHANGE UP (ref 3.8–10.5)
WBC # FLD AUTO: 6.15 K/UL — SIGNIFICANT CHANGE UP (ref 3.8–10.5)

## 2020-09-04 PROCEDURE — 99232 SBSQ HOSP IP/OBS MODERATE 35: CPT | Mod: GC

## 2020-09-04 PROCEDURE — 99232 SBSQ HOSP IP/OBS MODERATE 35: CPT

## 2020-09-04 RX ORDER — CEPHALEXIN 500 MG
1 CAPSULE ORAL
Qty: 7 | Refills: 0
Start: 2020-09-04 | End: 2020-09-05

## 2020-09-04 RX ADMIN — LOSARTAN POTASSIUM 100 MILLIGRAM(S): 100 TABLET, FILM COATED ORAL at 05:57

## 2020-09-04 RX ADMIN — Medication 100 MILLIGRAM(S): at 00:55

## 2020-09-04 RX ADMIN — Medication 20 MILLIGRAM(S): at 11:27

## 2020-09-04 RX ADMIN — DONEPEZIL HYDROCHLORIDE 5 MILLIGRAM(S): 10 TABLET, FILM COATED ORAL at 21:33

## 2020-09-04 RX ADMIN — AMLODIPINE BESYLATE 10 MILLIGRAM(S): 2.5 TABLET ORAL at 05:58

## 2020-09-04 RX ADMIN — Medication 1: at 12:37

## 2020-09-04 RX ADMIN — HEPARIN SODIUM 5000 UNIT(S): 5000 INJECTION INTRAVENOUS; SUBCUTANEOUS at 13:35

## 2020-09-04 RX ADMIN — Medication 1: at 08:42

## 2020-09-04 RX ADMIN — ATORVASTATIN CALCIUM 10 MILLIGRAM(S): 80 TABLET, FILM COATED ORAL at 21:33

## 2020-09-04 RX ADMIN — Medication 325 MILLIGRAM(S): at 11:27

## 2020-09-04 RX ADMIN — CARVEDILOL PHOSPHATE 6.25 MILLIGRAM(S): 80 CAPSULE, EXTENDED RELEASE ORAL at 17:17

## 2020-09-04 RX ADMIN — Medication 1 TABLET(S): at 11:27

## 2020-09-04 RX ADMIN — PANTOPRAZOLE SODIUM 20 MILLIGRAM(S): 20 TABLET, DELAYED RELEASE ORAL at 05:58

## 2020-09-04 RX ADMIN — HEPARIN SODIUM 5000 UNIT(S): 5000 INJECTION INTRAVENOUS; SUBCUTANEOUS at 05:58

## 2020-09-04 RX ADMIN — Medication 100 MILLIGRAM(S): at 13:33

## 2020-09-04 RX ADMIN — Medication 81 MILLIGRAM(S): at 11:27

## 2020-09-04 RX ADMIN — HEPARIN SODIUM 5000 UNIT(S): 5000 INJECTION INTRAVENOUS; SUBCUTANEOUS at 21:33

## 2020-09-04 RX ADMIN — Medication 1: at 17:57

## 2020-09-04 RX ADMIN — CARVEDILOL PHOSPHATE 6.25 MILLIGRAM(S): 80 CAPSULE, EXTENDED RELEASE ORAL at 05:58

## 2020-09-04 NOTE — PROGRESS NOTE ADULT - PROBLEM SELECTOR PLAN 1
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   - on presentation, elevated lactate of 3.4 down to 2.2 after some IVF in a few hours, xray L foot neg for overt osteolysis or f/x  - initially treated with zosyn   - switch to Ancef as only GBS growing in wound culture   - minimally elevated ESR, CRP 0.2   - blood cx NGTD x2, urine culture: klebsiella pneumoniae; sensitive to cefazolin -- pt may also have UTI (though unclear as she could not reliably report ROS on admission) -- bacteria is covered by the Anc anyway  - podiatry Shashi following, recs appreciated

## 2020-09-04 NOTE — PROGRESS NOTE ADULT - PROBLEM SELECTOR PLAN 1
Patient examined and evaluated at this time.  Wound to the left foot is stable with no signs of infection   Applied aquacel and DSD to left foot wound.   Will change dressing every other day.     Wound Care Instructions:  -Keep dressing clean, dry, and intact to the left foot  -Change dressing every 2-3 days with silver alginate, abd pads and DSD   -Apply Z-flex offloading boots to bilateral lower extremity at all times   -Monitor for any signs of infection including redness, swelling in the leg above the bandage, nausea/vomiting/fever/chills/chest pain/shortness of breath, if any are present patient must report to the emergency department immediately  -Patient is to follow up with Dr. Mullins/Dr. Danielle within 5 days after discharge at [facility] [place].

## 2020-09-04 NOTE — DISCHARGE NOTE NURSING/CASE MANAGEMENT/SOCIAL WORK - PATIENT PORTAL LINK FT
You can access the FollowMyHealth Patient Portal offered by [facility] by registering at the following website: [email or web address]. By joining [employer]’s FollowMyHealth portal, you will also be able to view your health information using other applications (apps) compatible with our system.

## 2020-09-04 NOTE — PROGRESS NOTE ADULT - PROBLEM SELECTOR PLAN 2
- Right thigh skin tear; improved. skin is clean dry and intact now  - aside from the diabetic foot ulcer, pt also with multiple pressure ulcers (largely all Stage 1 with also a DTI recorded by nursing)  - c/w frequent reposition q4h, off loading of feet and between legs; z-float boots   - wound care nurse consulted and following

## 2020-09-04 NOTE — DISCHARGE NOTE NURSING/CASE MANAGEMENT/SOCIAL WORK - NSDCFUADDAPPT_GEN_ALL_CORE_FT
Numbers for MDs for the home bound    [facility] ( [facility]) [phone]  [facility] [phone]  [facility] Calls [phone]  [facility] Calls  [phone]  [facility] [phone]  [facility] [phone]  [facility] [phone]  [facility] [phone]

## 2020-09-04 NOTE — PROGRESS NOTE ADULT - SUBJECTIVE AND OBJECTIVE BOX
Patient is an 87y year old Female seen at [facility] 3WES 354 W1 for Left foot wound on the plantar aspect of the 1st metatarsal. Patient was resting in bed comfortably and was in NAD. Patient complains of pain in the hip during dressing change to the left foot wound with slight movement secondary to left lower extremity contracture. Denies any fever, chills, nausea, vomiting, chest pain, shortness of breath, or calf pain at this time.      Allergies    sulfa drugs (Unknown)    Intolerances        MEDICATIONS  (STANDING):  amLODIPine   Tablet 10 milliGRAM(s) Oral daily  aspirin  chewable 81 milliGRAM(s) Oral daily  atorvastatin 10 milliGRAM(s) Oral at bedtime  carvedilol 6.25 milliGRAM(s) Oral every 12 hours  ceFAZolin   IVPB 500 milliGRAM(s) IV Intermittent every 12 hours  dextrose 5%. 1000 milliLiter(s) (50 mL/Hr) IV Continuous <Continuous>  dextrose 50% Injectable 12.5 Gram(s) IV Push once  dextrose 50% Injectable 25 Gram(s) IV Push once  dextrose 50% Injectable 25 Gram(s) IV Push once  donepezil 5 milliGRAM(s) Oral at bedtime  ferrous    sulfate 325 milliGRAM(s) Oral daily  heparin   Injectable 5000 Unit(s) SubCutaneous every 8 hours  insulin lispro (HumaLOG) corrective regimen sliding scale   SubCutaneous three times a day before meals  insulin lispro (HumaLOG) corrective regimen sliding scale   SubCutaneous at bedtime  lactobacillus acidophilus 1 Tablet(s) Oral daily  losartan 100 milliGRAM(s) Oral daily  pantoprazole    Tablet 20 milliGRAM(s) Oral before breakfast  PARoxetine 20 milliGRAM(s) Oral daily    MEDICATIONS  (PRN):  dextrose 40% Gel 15 Gram(s) Oral once PRN Blood Glucose LESS THAN 70 milliGRAM(s)/deciliter  glucagon  Injectable 1 milliGRAM(s) IntraMuscular once PRN Glucose LESS THAN 70 milligrams/deciliter      Vital Signs Last 24 Hrs  T(C): 36.9 (04 Sep 2020 13:03), Max: 36.9 (03 Sep 2020 19:58)  T(F): 98.4 (04 Sep 2020 13:03), Max: 98.4 (03 Sep 2020 19:58)  HR: 78 (04 Sep 2020 13:03) (72 - 78)  BP: 148/67 (04 Sep 2020 13:03) (122/56 - 148/80)  BP(mean): --  RR: 16 (04 Sep 2020 13:03) (16 - 17)  SpO2: 98% (04 Sep 2020 13:03) (90% - 98%)    PHYSICAL EXAM:  Vascular: DP & PT palpable bilaterally, Capillary refill 3 seconds, Digital hair absent bilaterally  Neurological: Light touch sensation diminished to the level of the midfoot bilaterally   Musculoskeletal: 3/5 strength in all quadrants bilaterally, AJ & STJ ROM limited. Pain on palpation to the left foot wound. Pain on palpation to elongated nails 1-5 bilaterally   Dermatological: 1.3cm x 1.0cm x 0.2cm ulceration noted to the plantar aspect of the left 1st metatarsal head with fibrogranular wound bed, no probe to bone, no periwound erythema, no fluctuance, no malodor, no proximal streaking at this time  Nails 1-5 are dystrophic, incurvated with subungal debri     CBC Full  -  ( 04 Sep 2020 07:48 )  WBC Count : 6.15 K/uL  RBC Count : 3.09 M/uL  Hemoglobin : 8.4 g/dL  Hematocrit : 26.4 %  Platelet Count - Automated : 238 K/uL  Mean Cell Volume : 85.4 fl  Mean Cell Hemoglobin : 27.2 pg  Mean Cell Hemoglobin Concentration : 31.8 gm/dL  Auto Neutrophil # : x  Auto Lymphocyte # : x  Auto Monocyte # : x  Auto Eosinophil # : x  Auto Basophil # : x  Auto Neutrophil % : x  Auto Lymphocyte % : x  Auto Monocyte % : x  Auto Eosinophil % : x  Auto Basophil % : x      09-04    143  |  108  |  48<H>  ----------------------------<  161<H>  4.1   |  31  |  1.20    Ca    8.4<L>      04 Sep 2020 07:48    Culture - Urine (08.30.20 @ 11:14)    -  Amikacin: S <=16    -  Amoxicillin/Clavulanic Acid: S <=8/4    -  Ampicillin: R 16 These ampicillin results predict results for amoxicillin    -  Ampicillin/Sulbactam: S <=4/2 Enterobacter, Citrobacter, and Serratia may develop resistance during prolonged therapy (3-4 days)    -  Aztreonam: S <=4    -  Cefazolin: S <=2 (MIC_CL_COM_ENTERIC_CEFAZU) For uncomplicated UTI with K. pneumoniae, E. coli, or P. mirablis: MALATHI <=16 is sensitive and MALATHI >=32 is resistant. This also predicts results for oral agents cefaclor, cefdinir, cefpodoxime, cefprozil, cefuroxime axetil, cephalexin and locarbef for uncomplicated UTI. Note that some isolates may be susceptible to these agents while testing resistant to cefazolin.    -  Cefepime: S <=2    -  Cefoxitin: S <=8    -  Ceftriaxone: S <=1 Enterobacter, Citrobacter, and Serratia may develop resistance during prolonged therapy    -  Ciprofloxacin: S <=0.25    -  Ertapenem: S <=0.5    -  Gentamicin: S <=2    -  Imipenem: S <=1    -  Levofloxacin: S <=0.5    -  Meropenem: S <=1    -  Nitrofurantoin: I 64 Should not be used to treat pyelonephritis    -  Piperacillin/Tazobactam: S <=8    -  Tigecycline: S <=2    -  Tobramycin: S <=2    -  Trimethoprim/Sulfamethoxazole: S <=0.5/9.5    Specimen Source: .Urine Clean Catch (Midstream)    Culture Results:   50,000 - 99,000 CFU/mL Klebsiella pneumoniae    Organism Identification: Klebsiella pneumoniae    Organism: Klebsiella pneumoniae    Method Type: MALATHI      Imaging: ----------

## 2020-09-05 LAB
ANION GAP SERPL CALC-SCNC: 5 MMOL/L — SIGNIFICANT CHANGE UP (ref 5–17)
BUN SERPL-MCNC: 41 MG/DL — HIGH (ref 7–23)
CALCIUM SERPL-MCNC: 9 MG/DL — SIGNIFICANT CHANGE UP (ref 8.5–10.1)
CHLORIDE SERPL-SCNC: 108 MMOL/L — SIGNIFICANT CHANGE UP (ref 96–108)
CO2 SERPL-SCNC: 30 MMOL/L — SIGNIFICANT CHANGE UP (ref 22–31)
CREAT SERPL-MCNC: 0.82 MG/DL — SIGNIFICANT CHANGE UP (ref 0.5–1.3)
GLUCOSE SERPL-MCNC: 168 MG/DL — HIGH (ref 70–99)
HCT VFR BLD CALC: 29.3 % — LOW (ref 34.5–45)
HGB BLD-MCNC: 9.4 G/DL — LOW (ref 11.5–15.5)
MCHC RBC-ENTMCNC: 26.9 PG — LOW (ref 27–34)
MCHC RBC-ENTMCNC: 32.1 GM/DL — SIGNIFICANT CHANGE UP (ref 32–36)
MCV RBC AUTO: 84 FL — SIGNIFICANT CHANGE UP (ref 80–100)
NRBC # BLD: 0 /100 WBCS — SIGNIFICANT CHANGE UP (ref 0–0)
PLATELET # BLD AUTO: 267 K/UL — SIGNIFICANT CHANGE UP (ref 150–400)
POTASSIUM SERPL-MCNC: 3.9 MMOL/L — SIGNIFICANT CHANGE UP (ref 3.5–5.3)
POTASSIUM SERPL-SCNC: 3.9 MMOL/L — SIGNIFICANT CHANGE UP (ref 3.5–5.3)
RBC # BLD: 3.49 M/UL — LOW (ref 3.8–5.2)
RBC # FLD: 15.3 % — HIGH (ref 10.3–14.5)
SODIUM SERPL-SCNC: 143 MMOL/L — SIGNIFICANT CHANGE UP (ref 135–145)
WBC # BLD: 7.18 K/UL — SIGNIFICANT CHANGE UP (ref 3.8–10.5)
WBC # FLD AUTO: 7.18 K/UL — SIGNIFICANT CHANGE UP (ref 3.8–10.5)

## 2020-09-05 PROCEDURE — 99233 SBSQ HOSP IP/OBS HIGH 50: CPT | Mod: GC

## 2020-09-05 RX ORDER — INSULIN GLARGINE 100 [IU]/ML
5 INJECTION, SOLUTION SUBCUTANEOUS EVERY MORNING
Refills: 0 | Status: DISCONTINUED | OUTPATIENT
Start: 2020-09-05 | End: 2020-09-07

## 2020-09-05 RX ADMIN — HEPARIN SODIUM 5000 UNIT(S): 5000 INJECTION INTRAVENOUS; SUBCUTANEOUS at 05:34

## 2020-09-05 RX ADMIN — Medication 1: at 08:20

## 2020-09-05 RX ADMIN — Medication 81 MILLIGRAM(S): at 12:40

## 2020-09-05 RX ADMIN — HEPARIN SODIUM 5000 UNIT(S): 5000 INJECTION INTRAVENOUS; SUBCUTANEOUS at 21:38

## 2020-09-05 RX ADMIN — ATORVASTATIN CALCIUM 10 MILLIGRAM(S): 80 TABLET, FILM COATED ORAL at 21:38

## 2020-09-05 RX ADMIN — PANTOPRAZOLE SODIUM 20 MILLIGRAM(S): 20 TABLET, DELAYED RELEASE ORAL at 05:34

## 2020-09-05 RX ADMIN — Medication 325 MILLIGRAM(S): at 12:40

## 2020-09-05 RX ADMIN — Medication 20 MILLIGRAM(S): at 12:40

## 2020-09-05 RX ADMIN — Medication 100 MILLIGRAM(S): at 00:14

## 2020-09-05 RX ADMIN — Medication 1 TABLET(S): at 12:40

## 2020-09-05 RX ADMIN — HEPARIN SODIUM 5000 UNIT(S): 5000 INJECTION INTRAVENOUS; SUBCUTANEOUS at 13:03

## 2020-09-05 RX ADMIN — Medication 2: at 12:36

## 2020-09-05 RX ADMIN — Medication 1: at 17:26

## 2020-09-05 RX ADMIN — INSULIN GLARGINE 5 UNIT(S): 100 INJECTION, SOLUTION SUBCUTANEOUS at 12:37

## 2020-09-05 RX ADMIN — AMLODIPINE BESYLATE 10 MILLIGRAM(S): 2.5 TABLET ORAL at 05:34

## 2020-09-05 RX ADMIN — DONEPEZIL HYDROCHLORIDE 5 MILLIGRAM(S): 10 TABLET, FILM COATED ORAL at 21:38

## 2020-09-05 RX ADMIN — LOSARTAN POTASSIUM 100 MILLIGRAM(S): 100 TABLET, FILM COATED ORAL at 05:34

## 2020-09-05 RX ADMIN — Medication 100 MILLIGRAM(S): at 13:02

## 2020-09-05 RX ADMIN — CARVEDILOL PHOSPHATE 6.25 MILLIGRAM(S): 80 CAPSULE, EXTENDED RELEASE ORAL at 05:34

## 2020-09-05 RX ADMIN — CARVEDILOL PHOSPHATE 6.25 MILLIGRAM(S): 80 CAPSULE, EXTENDED RELEASE ORAL at 17:24

## 2020-09-05 NOTE — PROGRESS NOTE ADULT - PROBLEM SELECTOR PLAN 1
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   - on presentation, elevated lactate of 3.4 down to 2.2 after some IVF in a few hours, xray L foot neg for overt osteolysis or f/x  - initially treated with zosyn   - switch to Ancef as only GBS growing in wound culture   - minimally elevated ESR, CRP 0.2   - blood cx NGTD x2, urine culture: klebsiella pneumoniae; sensitive to cefazolin -- pt may also have UTI (though unclear as she could not reliably report ROS on admission) -- bacteria is covered by the Anc anyway  - podiatry Shashi following, recs appreciated L diabetic foot ulcer under 1st metatarsal, pustular material, per admitting team, does not probe to bone.   - on presentation, elevated lactate of 3.4 down to 2.2 after some IVF in a few hours, xray L foot neg for overt osteolysis or fx  - initially treated with zosyn   - switched to Ancef as only GBS growing in wound culture   - minimally elevated ESR, CRP 0.2   - blood cx NGTD x2, urine culture: klebsiella pneumoniae; sensitive to cefazolin -- pt may also have UTI (though unclear as she could not reliably report ROS on admission) -- bacteria is covered by the Anc anyway  - podiatry Shashi following, recs appreciated

## 2020-09-05 NOTE — PROGRESS NOTE ADULT - PROBLEM SELECTOR PLAN 3
hold home metformin  -Given elevated evening FS > 250-300, will start patient on 5U a.m. lantus  start low dose ISS, fingersticks, hypoglycemia protocol   Hgb A1C 6.2%   POCT Glucose: well controlled   patient has good appetite hold home metformin  -Given elevated FSG, started patient on 5U a.m. lantus  start low dose ISS, fingersticks, hypoglycemia protocol   Hgb A1C 6.2%   POCT Glucose: well controlled   patient has good appetite

## 2020-09-05 NOTE — PROGRESS NOTE ADULT - SUBJECTIVE AND OBJECTIVE BOX
Patient is a 87y old  Female who presents with a chief complaint of Left foot wound (04 Sep 2020 16:37)      INTERVAL HPI/OVERNIGHT EVENTS: Patient seen and examined at bedside. No overnight events occurred. Patient has no complaints at this time. Denies current foot pain, chills, SOB, chest pain.     MEDICATIONS  (STANDING):  amLODIPine   Tablet 10 milliGRAM(s) Oral daily  aspirin  chewable 81 milliGRAM(s) Oral daily  atorvastatin 10 milliGRAM(s) Oral at bedtime  carvedilol 6.25 milliGRAM(s) Oral every 12 hours  ceFAZolin   IVPB 500 milliGRAM(s) IV Intermittent every 12 hours  dextrose 5%. 1000 milliLiter(s) (50 mL/Hr) IV Continuous <Continuous>  dextrose 50% Injectable 12.5 Gram(s) IV Push once  dextrose 50% Injectable 25 Gram(s) IV Push once  dextrose 50% Injectable 25 Gram(s) IV Push once  donepezil 5 milliGRAM(s) Oral at bedtime  ferrous    sulfate 325 milliGRAM(s) Oral daily  heparin   Injectable 5000 Unit(s) SubCutaneous every 8 hours  insulin glargine Injectable (LANTUS) 5 Unit(s) SubCutaneous every morning  insulin lispro (HumaLOG) corrective regimen sliding scale   SubCutaneous three times a day before meals  insulin lispro (HumaLOG) corrective regimen sliding scale   SubCutaneous at bedtime  lactobacillus acidophilus 1 Tablet(s) Oral daily  losartan 100 milliGRAM(s) Oral daily  pantoprazole    Tablet 20 milliGRAM(s) Oral before breakfast  PARoxetine 20 milliGRAM(s) Oral daily    MEDICATIONS  (PRN):  dextrose 40% Gel 15 Gram(s) Oral once PRN Blood Glucose LESS THAN 70 milliGRAM(s)/deciliter  glucagon  Injectable 1 milliGRAM(s) IntraMuscular once PRN Glucose LESS THAN 70 milligrams/deciliter      Allergies    sulfa drugs (Unknown)    Intolerances        REVIEW OF SYSTEMS:  CONSTITUTIONAL: No fever or chills  HEENT:  No headache, no sore throat  RESPIRATORY: No cough, wheezing, or shortness of breath  CARDIOVASCULAR: No chest pain, palpitations  GASTROINTESTINAL: No abd pain, nausea, vomiting, or diarrhea  GENITOURINARY: No dysuria, frequency, or hematuria  NEUROLOGICAL: no focal weakness or dizziness  MUSCULOSKELETAL: no myalgias     Vital Signs Last 24 Hrs  T(C): 36.8 (05 Sep 2020 12:34), Max: 36.9 (04 Sep 2020 21:37)  T(F): 98.3 (05 Sep 2020 12:34), Max: 98.5 (04 Sep 2020 21:37)  HR: 77 (05 Sep 2020 12:34) (67 - 77)  BP: 149/69 (05 Sep 2020 12:34) (128/58 - 154/62)  BP(mean): --  RR: 18 (05 Sep 2020 12:34) (17 - 18)  SpO2: 98% (05 Sep 2020 12:34) (94% - 98%)    PHYSICAL EXAM:  GENERAL: NAD  HEENT:  anicteric, moist mucous membranes  CHEST/LUNG:  CTA b/l, no rales, wheezes, or rhonchi  HEART:  RRR, S1, S2  ABDOMEN:  BS+, soft, nontender, nondistended  EXTREMITIES: no edema, cyanosis, or calf tenderness  NERVOUS SYSTEM: answers questions and follows commands appropriately    LABS:                        9.4    7.18  )-----------( 267      ( 05 Sep 2020 05:48 )             29.3     CBC Full  -  ( 05 Sep 2020 05:48 )  WBC Count : 7.18 K/uL  Hemoglobin : 9.4 g/dL  Hematocrit : 29.3 %  Platelet Count - Automated : 267 K/uL  Mean Cell Volume : 84.0 fl  Mean Cell Hemoglobin : 26.9 pg  Mean Cell Hemoglobin Concentration : 32.1 gm/dL  Auto Neutrophil # : x  Auto Lymphocyte # : x  Auto Monocyte # : x  Auto Eosinophil # : x  Auto Basophil # : x  Auto Neutrophil % : x  Auto Lymphocyte % : x  Auto Monocyte % : x  Auto Eosinophil % : x  Auto Basophil % : x    05 Sep 2020 05:48    143    |  108    |  41     ----------------------------<  168    3.9     |  30     |  0.82     Ca    9.0        05 Sep 2020 05:48          CAPILLARY BLOOD GLUCOSE      POCT Blood Glucose.: 202 mg/dL (05 Sep 2020 12:20)  POCT Blood Glucose.: 174 mg/dL (05 Sep 2020 08:08)  POCT Blood Glucose.: 211 mg/dL (04 Sep 2020 21:13)  POCT Blood Glucose.: 180 mg/dL (04 Sep 2020 17:40)        Culture - Urine (collected 08-30-20 @ 11:14)  Source: .Urine Clean Catch (Midstream)  Final Report (09-01-20 @ 10:59):    50,000 - 99,000 CFU/mL Klebsiella pneumoniae  Organism: Klebsiella pneumoniae (09-01-20 @ 10:59)  Organism: Klebsiella pneumoniae (09-01-20 @ 10:59)      -  Amikacin: S <=16      -  Amoxicillin/Clavulanic Acid: S <=8/4      -  Ampicillin: R 16 These ampicillin results predict results for amoxicillin      -  Ampicillin/Sulbactam: S <=4/2 Enterobacter, Citrobacter, and Serratia may develop resistance during prolonged therapy (3-4 days)      -  Aztreonam: S <=4      -  Cefazolin: S <=2 (MIC_CL_COM_ENTERIC_CEFAZU) For uncomplicated UTI with K. pneumoniae, E. coli, or P. mirablis: MALATIH <=16 is sensitive and MALATHI >=32 is resistant. This also predicts results for oral agents cefaclor, cefdinir, cefpodoxime, cefprozil, cefuroxime axetil, cephalexin and locarbef for uncomplicated UTI. Note that some isolates may be susceptible to these agents while testing resistant to cefazolin.      -  Cefepime: S <=2      -  Cefoxitin: S <=8      -  Ceftriaxone: S <=1 Enterobacter, Citrobacter, and Serratia may develop resistance during prolonged therapy      -  Ciprofloxacin: S <=0.25      -  Ertapenem: S <=0.5      -  Gentamicin: S <=2      -  Imipenem: S <=1      -  Levofloxacin: S <=0.5      -  Meropenem: S <=1      -  Nitrofurantoin: I 64 Should not be used to treat pyelonephritis      -  Piperacillin/Tazobactam: S <=8      -  Tigecycline: S <=2      -  Tobramycin: S <=2      -  Trimethoprim/Sulfamethoxazole: S <=0.5/9.5      Method Type: MALATHI    Culture - Blood (collected 08-30-20 @ 05:22)  Source: .Blood Blood-Venous  Final Report (09-04-20 @ 06:00):    No Growth Final    Culture - Blood (collected 08-30-20 @ 05:22)  Source: .Blood Blood-Venous  Final Report (09-04-20 @ 06:00):    No Growth Final    Culture - Other (collected 08-30-20 @ 05:12)  Source: .Other left great toe  Final Report (09-04-20 @ 14:22):    Numerous Streptococcus agalactiae (Group B) isolated    Group B streptococci are susceptible to ampicillin,    penicillin and cefazolin, but may be resistant to    erythromycin and clindamycin.    Recommendations for intrapartum prophylaxis for Group B    streptococci are penicillin or ampicillin.    Normal skin sally isolated        RADIOLOGY & ADDITIONAL TESTS:    Personally reviewed.     Consultant(s) Notes Reviewed:  [x] YES  [ ] NO Patient is a 87y old  Female who presents with a chief complaint of left foot ulcer.      INTERVAL HPI/OVERNIGHT EVENTS: Patient seen and examined at bedside. No overnight events occurred. Patient has no complaints at this time. Denies current foot pain, chills, SOB, chest pain.     MEDICATIONS  (STANDING):  amLODIPine   Tablet 10 milliGRAM(s) Oral daily  aspirin  chewable 81 milliGRAM(s) Oral daily  atorvastatin 10 milliGRAM(s) Oral at bedtime  carvedilol 6.25 milliGRAM(s) Oral every 12 hours  ceFAZolin   IVPB 500 milliGRAM(s) IV Intermittent every 12 hours  dextrose 5%. 1000 milliLiter(s) (50 mL/Hr) IV Continuous <Continuous>  dextrose 50% Injectable 12.5 Gram(s) IV Push once  dextrose 50% Injectable 25 Gram(s) IV Push once  dextrose 50% Injectable 25 Gram(s) IV Push once  donepezil 5 milliGRAM(s) Oral at bedtime  ferrous    sulfate 325 milliGRAM(s) Oral daily  heparin   Injectable 5000 Unit(s) SubCutaneous every 8 hours  insulin glargine Injectable (LANTUS) 5 Unit(s) SubCutaneous every morning  insulin lispro (HumaLOG) corrective regimen sliding scale   SubCutaneous three times a day before meals  insulin lispro (HumaLOG) corrective regimen sliding scale   SubCutaneous at bedtime  lactobacillus acidophilus 1 Tablet(s) Oral daily  losartan 100 milliGRAM(s) Oral daily  pantoprazole    Tablet 20 milliGRAM(s) Oral before breakfast  PARoxetine 20 milliGRAM(s) Oral daily    MEDICATIONS  (PRN):  dextrose 40% Gel 15 Gram(s) Oral once PRN Blood Glucose LESS THAN 70 milliGRAM(s)/deciliter  glucagon  Injectable 1 milliGRAM(s) IntraMuscular once PRN Glucose LESS THAN 70 milligrams/deciliter      Allergies    sulfa drugs (Unknown)    Intolerances        REVIEW OF SYSTEMS:  CONSTITUTIONAL: No fever or chills  HEENT:  No headache, no sore throat  RESPIRATORY: No cough, wheezing, or shortness of breath  CARDIOVASCULAR: No chest pain, palpitations  GASTROINTESTINAL: No abd pain, nausea, vomiting, or diarrhea  GENITOURINARY: No dysuria, frequency, or hematuria  NEUROLOGICAL: no focal weakness or dizziness  MUSCULOSKELETAL: no myalgias     Vital Signs Last 24 Hrs  T(C): 36.8 (05 Sep 2020 12:34), Max: 36.9 (04 Sep 2020 21:37)  T(F): 98.3 (05 Sep 2020 12:34), Max: 98.5 (04 Sep 2020 21:37)  HR: 77 (05 Sep 2020 12:34) (67 - 77)  BP: 149/69 (05 Sep 2020 12:34) (128/58 - 154/62)  BP(mean): --  RR: 18 (05 Sep 2020 12:34) (17 - 18)  SpO2: 98% (05 Sep 2020 12:34) (94% - 98%)    PHYSICAL EXAM:  GENERAL: NAD, frail  HEENT:  anicteric, moist mucous membranes  CHEST/LUNG:  CTA b/l, no rales, wheezes, or rhonchi  HEART:  RRR, S1, S2  ABDOMEN:  BS+, soft, nontender, nondistended  EXTREMITIES: right hand is bruised, unchanged from yesterday ; no extremity edema or cyanosis; left lower extremity foot ulcer is bandaged and dry, no evidence of pus, blood or drainage from bandage noted, no swelling erythema noted   NERVOUS SYSTEM: answers questions and follows commands appropriately    LABS:                        9.4    7.18  )-----------( 267      ( 05 Sep 2020 05:48 )             29.3     CBC Full  -  ( 05 Sep 2020 05:48 )  WBC Count : 7.18 K/uL  Hemoglobin : 9.4 g/dL  Hematocrit : 29.3 %  Platelet Count - Automated : 267 K/uL  Mean Cell Volume : 84.0 fl  Mean Cell Hemoglobin : 26.9 pg  Mean Cell Hemoglobin Concentration : 32.1 gm/dL  Auto Neutrophil # : x  Auto Lymphocyte # : x  Auto Monocyte # : x  Auto Eosinophil # : x  Auto Basophil # : x  Auto Neutrophil % : x  Auto Lymphocyte % : x  Auto Monocyte % : x  Auto Eosinophil % : x  Auto Basophil % : x    05 Sep 2020 05:48    143    |  108    |  41     ----------------------------<  168    3.9     |  30     |  0.82     Ca    9.0        05 Sep 2020 05:48          CAPILLARY BLOOD GLUCOSE      POCT Blood Glucose.: 202 mg/dL (05 Sep 2020 12:20)  POCT Blood Glucose.: 174 mg/dL (05 Sep 2020 08:08)  POCT Blood Glucose.: 211 mg/dL (04 Sep 2020 21:13)  POCT Blood Glucose.: 180 mg/dL (04 Sep 2020 17:40)        Culture - Urine (collected 08-30-20 @ 11:14)  Source: .Urine Clean Catch (Midstream)  Final Report (09-01-20 @ 10:59):    50,000 - 99,000 CFU/mL Klebsiella pneumoniae  Organism: Klebsiella pneumoniae (09-01-20 @ 10:59)  Organism: Klebsiella pneumoniae (09-01-20 @ 10:59)      -  Amikacin: S <=16      -  Amoxicillin/Clavulanic Acid: S <=8/4      -  Ampicillin: R 16 These ampicillin results predict results for amoxicillin      -  Ampicillin/Sulbactam: S <=4/2 Enterobacter, Citrobacter, and Serratia may develop resistance during prolonged therapy (3-4 days)      -  Aztreonam: S <=4      -  Cefazolin: S <=2 (MIC_CL_COM_ENTERIC_CEFAZU) For uncomplicated UTI with K. pneumoniae, E. coli, or P. mirablis: AMLATHI <=16 is sensitive and MALATHI >=32 is resistant. This also predicts results for oral agents cefaclor, cefdinir, cefpodoxime, cefprozil, cefuroxime axetil, cephalexin and locarbef for uncomplicated UTI. Note that some isolates may be susceptible to these agents while testing resistant to cefazolin.      -  Cefepime: S <=2      -  Cefoxitin: S <=8      -  Ceftriaxone: S <=1 Enterobacter, Citrobacter, and Serratia may develop resistance during prolonged therapy      -  Ciprofloxacin: S <=0.25      -  Ertapenem: S <=0.5      -  Gentamicin: S <=2      -  Imipenem: S <=1      -  Levofloxacin: S <=0.5      -  Meropenem: S <=1      -  Nitrofurantoin: I 64 Should not be used to treat pyelonephritis      -  Piperacillin/Tazobactam: S <=8      -  Tigecycline: S <=2      -  Tobramycin: S <=2      -  Trimethoprim/Sulfamethoxazole: S <=0.5/9.5      Method Type: MALATHI    Culture - Blood (collected 08-30-20 @ 05:22)  Source: .Blood Blood-Venous  Final Report (09-04-20 @ 06:00):    No Growth Final    Culture - Blood (collected 08-30-20 @ 05:22)  Source: .Blood Blood-Venous  Final Report (09-04-20 @ 06:00):    No Growth Final    Culture - Other (collected 08-30-20 @ 05:12)  Source: .Other left great toe  Final Report (09-04-20 @ 14:22):    Numerous Streptococcus agalactiae (Group B) isolated    Group B streptococci are susceptible to ampicillin,    penicillin and cefazolin, but may be resistant to    erythromycin and clindamycin.    Recommendations for intrapartum prophylaxis for Group B    streptococci are penicillin or ampicillin.    Normal skin sally isolated        RADIOLOGY & ADDITIONAL TESTS:    Personally reviewed.     Consultant(s) Notes Reviewed:  [x] YES  [ ] NO

## 2020-09-05 NOTE — PROGRESS NOTE ADULT - ASSESSMENT
86yo F with PMH of HTN, HLD, Diabetes Mellitus Type 2 not on home insulin, GERD, Dementia, left leg contracture, bedbound, admitted for L diabetic foot ulcer.

## 2020-09-06 LAB
ANION GAP SERPL CALC-SCNC: 4 MMOL/L — LOW (ref 5–17)
BASOPHILS # BLD AUTO: 0.07 K/UL — SIGNIFICANT CHANGE UP (ref 0–0.2)
BASOPHILS NFR BLD AUTO: 0.9 % — SIGNIFICANT CHANGE UP (ref 0–2)
BUN SERPL-MCNC: 43 MG/DL — HIGH (ref 7–23)
CALCIUM SERPL-MCNC: 8.7 MG/DL — SIGNIFICANT CHANGE UP (ref 8.5–10.1)
CHLORIDE SERPL-SCNC: 111 MMOL/L — HIGH (ref 96–108)
CO2 SERPL-SCNC: 29 MMOL/L — SIGNIFICANT CHANGE UP (ref 22–31)
CREAT SERPL-MCNC: 0.81 MG/DL — SIGNIFICANT CHANGE UP (ref 0.5–1.3)
EOSINOPHIL # BLD AUTO: 0.12 K/UL — SIGNIFICANT CHANGE UP (ref 0–0.5)
EOSINOPHIL NFR BLD AUTO: 1.6 % — SIGNIFICANT CHANGE UP (ref 0–6)
GLUCOSE SERPL-MCNC: 119 MG/DL — HIGH (ref 70–99)
HCT VFR BLD CALC: 28.9 % — LOW (ref 34.5–45)
HGB BLD-MCNC: 9.2 G/DL — LOW (ref 11.5–15.5)
IMM GRANULOCYTES NFR BLD AUTO: 0.5 % — SIGNIFICANT CHANGE UP (ref 0–1.5)
LYMPHOCYTES # BLD AUTO: 2.06 K/UL — SIGNIFICANT CHANGE UP (ref 1–3.3)
LYMPHOCYTES # BLD AUTO: 27.3 % — SIGNIFICANT CHANGE UP (ref 13–44)
MCHC RBC-ENTMCNC: 27 PG — SIGNIFICANT CHANGE UP (ref 27–34)
MCHC RBC-ENTMCNC: 31.8 GM/DL — LOW (ref 32–36)
MCV RBC AUTO: 84.8 FL — SIGNIFICANT CHANGE UP (ref 80–100)
MONOCYTES # BLD AUTO: 0.67 K/UL — SIGNIFICANT CHANGE UP (ref 0–0.9)
MONOCYTES NFR BLD AUTO: 8.9 % — SIGNIFICANT CHANGE UP (ref 2–14)
NEUTROPHILS # BLD AUTO: 4.59 K/UL — SIGNIFICANT CHANGE UP (ref 1.8–7.4)
NEUTROPHILS NFR BLD AUTO: 60.8 % — SIGNIFICANT CHANGE UP (ref 43–77)
NRBC # BLD: 0 /100 WBCS — SIGNIFICANT CHANGE UP (ref 0–0)
PLATELET # BLD AUTO: 260 K/UL — SIGNIFICANT CHANGE UP (ref 150–400)
POTASSIUM SERPL-MCNC: 3.9 MMOL/L — SIGNIFICANT CHANGE UP (ref 3.5–5.3)
POTASSIUM SERPL-SCNC: 3.9 MMOL/L — SIGNIFICANT CHANGE UP (ref 3.5–5.3)
RBC # BLD: 3.41 M/UL — LOW (ref 3.8–5.2)
RBC # FLD: 15.8 % — HIGH (ref 10.3–14.5)
SODIUM SERPL-SCNC: 144 MMOL/L — SIGNIFICANT CHANGE UP (ref 135–145)
WBC # BLD: 7.55 K/UL — SIGNIFICANT CHANGE UP (ref 3.8–10.5)
WBC # FLD AUTO: 7.55 K/UL — SIGNIFICANT CHANGE UP (ref 3.8–10.5)

## 2020-09-06 PROCEDURE — 99232 SBSQ HOSP IP/OBS MODERATE 35: CPT | Mod: GC

## 2020-09-06 RX ADMIN — CARVEDILOL PHOSPHATE 6.25 MILLIGRAM(S): 80 CAPSULE, EXTENDED RELEASE ORAL at 05:09

## 2020-09-06 RX ADMIN — Medication 3: at 12:32

## 2020-09-06 RX ADMIN — Medication 1: at 21:40

## 2020-09-06 RX ADMIN — HEPARIN SODIUM 5000 UNIT(S): 5000 INJECTION INTRAVENOUS; SUBCUTANEOUS at 05:09

## 2020-09-06 RX ADMIN — PANTOPRAZOLE SODIUM 20 MILLIGRAM(S): 20 TABLET, DELAYED RELEASE ORAL at 05:09

## 2020-09-06 RX ADMIN — Medication 100 MILLIGRAM(S): at 00:50

## 2020-09-06 RX ADMIN — Medication 1 TABLET(S): at 11:56

## 2020-09-06 RX ADMIN — HEPARIN SODIUM 5000 UNIT(S): 5000 INJECTION INTRAVENOUS; SUBCUTANEOUS at 14:30

## 2020-09-06 RX ADMIN — Medication 325 MILLIGRAM(S): at 11:56

## 2020-09-06 RX ADMIN — INSULIN GLARGINE 5 UNIT(S): 100 INJECTION, SOLUTION SUBCUTANEOUS at 08:21

## 2020-09-06 RX ADMIN — DONEPEZIL HYDROCHLORIDE 5 MILLIGRAM(S): 10 TABLET, FILM COATED ORAL at 21:40

## 2020-09-06 RX ADMIN — ATORVASTATIN CALCIUM 10 MILLIGRAM(S): 80 TABLET, FILM COATED ORAL at 21:40

## 2020-09-06 RX ADMIN — AMLODIPINE BESYLATE 10 MILLIGRAM(S): 2.5 TABLET ORAL at 05:09

## 2020-09-06 RX ADMIN — CARVEDILOL PHOSPHATE 6.25 MILLIGRAM(S): 80 CAPSULE, EXTENDED RELEASE ORAL at 17:36

## 2020-09-06 RX ADMIN — Medication 100 MILLIGRAM(S): at 14:30

## 2020-09-06 RX ADMIN — HEPARIN SODIUM 5000 UNIT(S): 5000 INJECTION INTRAVENOUS; SUBCUTANEOUS at 21:40

## 2020-09-06 RX ADMIN — LOSARTAN POTASSIUM 100 MILLIGRAM(S): 100 TABLET, FILM COATED ORAL at 05:09

## 2020-09-06 RX ADMIN — Medication 20 MILLIGRAM(S): at 11:56

## 2020-09-06 RX ADMIN — Medication 81 MILLIGRAM(S): at 11:56

## 2020-09-06 NOTE — PROGRESS NOTE ADULT - REASON FOR ADMISSION
Left foot wound
Type 2 DM with foot ulcer
diabetic foot ulcer on L LE

## 2020-09-06 NOTE — PROGRESS NOTE ADULT - PROBLEM SELECTOR PROBLEM 3
Diabetes mellitus type 2, noninsulin dependent

## 2020-09-06 NOTE — CHART NOTE - NSCHARTNOTEFT_GEN_A_CORE
Assessment: Pt seen for malnutrition follow-up. As per chart pt is a 76 year old female with a PMH of HTN, HLD, Diabetes Mellitus Type 2 not on home insulin, GERD, Dementia, left leg contracture, bedbound, admitted for L diabetic foot ulcer.     Pt seen at bedside, sleeping at time of visit, spoke to CNA. Per CNA pt with very good appetite and PO intake, noted to be consuming about % of meals in house. CNA also states that the pt enjoys consuming the Glucerna. No GI distress noted at this time, Last BM 9/4.      Factors impacting intake: [x ] none [ ] nausea  [ ] vomiting [ ] diarrhea [ ] constipation  [ ]chewing problems [ ] swallowing issues  [ ] other:     Diet Presciption: Diet, Consistent Carbohydrate w/Evening Snack:   DASH/TLC {Sodium & Cholesterol Restricted}  No Carb Prosource TF     Qty per Day:  2  Supplement Feeding Modality:  Oral  Glucerna Shake Cans or Servings Per Day:  1       Frequency:  Three Times a day (08-30-20 @ 12:46)    Intake: good     Current Weight: (9/2) 104lbs, (9/1) 106.9lbs  Previous Weight: 99.2lbs  Admission Weight: 89.15lbs  % Weight Change- question weight gain, likely due to inaccurate bedscale weight     Pertinent Medications: MEDICATIONS  (STANDING):  amLODIPine   Tablet 10 milliGRAM(s) Oral daily  aspirin  chewable 81 milliGRAM(s) Oral daily  atorvastatin 10 milliGRAM(s) Oral at bedtime  carvedilol 6.25 milliGRAM(s) Oral every 12 hours  ceFAZolin   IVPB 500 milliGRAM(s) IV Intermittent every 12 hours  dextrose 5%. 1000 milliLiter(s) (50 mL/Hr) IV Continuous <Continuous>  dextrose 50% Injectable 12.5 Gram(s) IV Push once  dextrose 50% Injectable 25 Gram(s) IV Push once  dextrose 50% Injectable 25 Gram(s) IV Push once  donepezil 5 milliGRAM(s) Oral at bedtime  ferrous    sulfate 325 milliGRAM(s) Oral daily  heparin   Injectable 5000 Unit(s) SubCutaneous every 8 hours  insulin glargine Injectable (LANTUS) 5 Unit(s) SubCutaneous every morning  insulin lispro (HumaLOG) corrective regimen sliding scale   SubCutaneous three times a day before meals  insulin lispro (HumaLOG) corrective regimen sliding scale   SubCutaneous at bedtime  lactobacillus acidophilus 1 Tablet(s) Oral daily  losartan 100 milliGRAM(s) Oral daily  pantoprazole    Tablet 20 milliGRAM(s) Oral before breakfast  PARoxetine 20 milliGRAM(s) Oral daily    MEDICATIONS  (PRN):  dextrose 40% Gel 15 Gram(s) Oral once PRN Blood Glucose LESS THAN 70 milliGRAM(s)/deciliter  glucagon  Injectable 1 milliGRAM(s) IntraMuscular once PRN Glucose LESS THAN 70 milligrams/deciliter    Pertinent Labs: 09-06 Na144 mmol/L Glu 119 mg/dL<H> K+ 3.9 mmol/L Cr  0.81 mg/dL BUN 43 mg/dL<H>, Hgb 9.2, Hct 28.9, Chloride 111, 09-01 Alb 2.3 g/dL<L>     CAPILLARY BLOOD GLUCOSE    POCT Blood Glucose.: 291 mg/dL (06 Sep 2020 12:16)  POCT Blood Glucose.: 141 mg/dL (06 Sep 2020 08:01)  POCT Blood Glucose.: 204 mg/dL (05 Sep 2020 21:22)  POCT Blood Glucose.: 182 mg/dL (05 Sep 2020 17:00)    Skin: No edema noted at this time   Pressure injuries:   Stage 1 right thigh, sacrum, right lateral heel  Stage 2 right mid back  Unstageable left bunion  Left lateral heel DTI    Estimated Needs:   [x ] no change since previous assessment  [ ] recalculated:     Previous Nutrition Diagnosis:    [x ] Malnutrition     Nutrition Diagnosis is [ x] ongoing-being addressed with good PO intake and oral nutritional supplements     New Nutrition Diagnosis: [x ] not applicable       Interventions: Continue with current Consistent CHO, DASH/TLC diet   Recommend  [ ] Change Diet To:  [x ] Nutrition Supplement: Continue with Glucerna TID and Prosource BID   [ ] Nutrition Support  [ x] Other:   1) Recommend Vitamin C, 500mg/day, MVI/ daily  2) Encourage to continue with good PO intake and intake of oral nutritional supplements  3) Monitor pt's PO intake, weight, skin, edema, GI distress     Monitoring and Evaluation:   [ x] PO intake [ x ] Tolerance to diet prescription [ x ] weights [ x ] labs[ x ] follow up per protocol  [x ] other: RD to remain available

## 2020-09-06 NOTE — PROGRESS NOTE ADULT - PROBLEM SELECTOR PLAN 9
DVT ppx: HSQ    10 . Depression: continue Paroxetine DVT ppx: HSQ    10 . Depression: continue Paroxetine    11. Moderate protein-calorie malnutrition:   -dietician recs appreciated  -c/w Glucerna supplements    12. Functional quadriplegia:  -pt's severe left LE contracture caused pt to become bedbound and subsequently become more deconditioned and now is reliant on nursing for ADLs   -c/w nursing care  -frequent position changes / skin care

## 2020-09-06 NOTE — PROGRESS NOTE ADULT - PROBLEM SELECTOR PLAN 7
- c/w losartan, carvedilol, amlodipine with hold parameters  - stable BP overnight  - continue to monitor routine hemodynamics

## 2020-09-06 NOTE — PROGRESS NOTE ADULT - PROBLEM SELECTOR PROBLEM 1
Diabetic foot ulcer

## 2020-09-06 NOTE — PROGRESS NOTE ADULT - NSHPATTENDINGPLANDISCUSS_GEN_ALL_CORE
Attending
pt, pt's son, consultants, nursing
pt, pt's sons, consultants, nursing

## 2020-09-06 NOTE — PROGRESS NOTE ADULT - SUBJECTIVE AND OBJECTIVE BOX
Patient is a 87y old  Female who presents with a chief complaint of diabetic foot ulcer on L LE (05 Sep 2020 13:35)      INTERVAL HPI/OVERNIGHT EVENTS: Patient seen and examined at bedside. No overnight events occurred. Patient has no complaints at this time.     MEDICATIONS  (STANDING):  amLODIPine   Tablet 10 milliGRAM(s) Oral daily  aspirin  chewable 81 milliGRAM(s) Oral daily  atorvastatin 10 milliGRAM(s) Oral at bedtime  carvedilol 6.25 milliGRAM(s) Oral every 12 hours  ceFAZolin   IVPB 500 milliGRAM(s) IV Intermittent every 12 hours  dextrose 5%. 1000 milliLiter(s) (50 mL/Hr) IV Continuous <Continuous>  dextrose 50% Injectable 12.5 Gram(s) IV Push once  dextrose 50% Injectable 25 Gram(s) IV Push once  dextrose 50% Injectable 25 Gram(s) IV Push once  donepezil 5 milliGRAM(s) Oral at bedtime  ferrous    sulfate 325 milliGRAM(s) Oral daily  heparin   Injectable 5000 Unit(s) SubCutaneous every 8 hours  insulin glargine Injectable (LANTUS) 5 Unit(s) SubCutaneous every morning  insulin lispro (HumaLOG) corrective regimen sliding scale   SubCutaneous three times a day before meals  insulin lispro (HumaLOG) corrective regimen sliding scale   SubCutaneous at bedtime  lactobacillus acidophilus 1 Tablet(s) Oral daily  losartan 100 milliGRAM(s) Oral daily  pantoprazole    Tablet 20 milliGRAM(s) Oral before breakfast  PARoxetine 20 milliGRAM(s) Oral daily    MEDICATIONS  (PRN):  dextrose 40% Gel 15 Gram(s) Oral once PRN Blood Glucose LESS THAN 70 milliGRAM(s)/deciliter  glucagon  Injectable 1 milliGRAM(s) IntraMuscular once PRN Glucose LESS THAN 70 milligrams/deciliter      Allergies    sulfa drugs (Unknown)    Intolerances        REVIEW OF SYSTEMS:  unable to assess ROS but patient denies chest pain, or shortness of breath. did not answer further review of systems     Vital Signs Last 24 Hrs  T(C): 36.6 (06 Sep 2020 12:37), Max: 36.8 (05 Sep 2020 22:06)  T(F): 97.9 (06 Sep 2020 12:37), Max: 98.3 (05 Sep 2020 22:06)  HR: 75 (06 Sep 2020 12:37) (64 - 76)  BP: 152/64 (06 Sep 2020 12:37) (120/84 - 154/68)  BP(mean): --  RR: 18 (06 Sep 2020 12:37) (17 - 18)  SpO2: 97% (06 Sep 2020 12:37) (94% - 97%)    PHYSICAL EXAM:  GENERAL: NAD  HEENT:  anicteric, moist mucous membranes  CHEST/LUNG:  CTA b/l, no rales, wheezes, or rhonchi  HEART:  RRR, S1, S2  ABDOMEN:  BS+, soft, nontender, nondistended  EXTREMITIES: no edema, cyanosis, or calf tenderness. left foot bandaged. no blood or drainage noted. no ttp, swelling or edema.   NERVOUS SYSTEM: answers questions and follows commands appropriately, however, very hard of hearing     LABS:                        9.2    7.55  )-----------( 260      ( 06 Sep 2020 06:48 )             28.9     CBC Full  -  ( 06 Sep 2020 06:48 )  WBC Count : 7.55 K/uL  Hemoglobin : 9.2 g/dL  Hematocrit : 28.9 %  Platelet Count - Automated : 260 K/uL  Mean Cell Volume : 84.8 fl  Mean Cell Hemoglobin : 27.0 pg  Mean Cell Hemoglobin Concentration : 31.8 gm/dL  Auto Neutrophil # : 4.59 K/uL  Auto Lymphocyte # : 2.06 K/uL  Auto Monocyte # : 0.67 K/uL  Auto Eosinophil # : 0.12 K/uL  Auto Basophil # : 0.07 K/uL  Auto Neutrophil % : 60.8 %  Auto Lymphocyte % : 27.3 %  Auto Monocyte % : 8.9 %  Auto Eosinophil % : 1.6 %  Auto Basophil % : 0.9 %    06 Sep 2020 06:48    144    |  111    |  43     ----------------------------<  119    3.9     |  29     |  0.81     Ca    8.7        06 Sep 2020 06:48          CAPILLARY BLOOD GLUCOSE      POCT Blood Glucose.: 291 mg/dL (06 Sep 2020 12:16)  POCT Blood Glucose.: 141 mg/dL (06 Sep 2020 08:01)  POCT Blood Glucose.: 204 mg/dL (05 Sep 2020 21:22)  POCT Blood Glucose.: 182 mg/dL (05 Sep 2020 17:00)          RADIOLOGY & ADDITIONAL TESTS:    Personally reviewed.     Consultant(s) Notes Reviewed:  [x] YES  [ ] NO Patient is a 87y old  Female who presents with a chief complaint of left foot ulcer.      INTERVAL HPI/OVERNIGHT EVENTS: Patient seen and examined at bedside. No overnight events occurred. Patient has no complaints at this time. Denies foot pain, fever, chills.    MEDICATIONS  (STANDING):  amLODIPine   Tablet 10 milliGRAM(s) Oral daily  aspirin  chewable 81 milliGRAM(s) Oral daily  atorvastatin 10 milliGRAM(s) Oral at bedtime  carvedilol 6.25 milliGRAM(s) Oral every 12 hours  ceFAZolin   IVPB 500 milliGRAM(s) IV Intermittent every 12 hours  dextrose 5%. 1000 milliLiter(s) (50 mL/Hr) IV Continuous <Continuous>  dextrose 50% Injectable 12.5 Gram(s) IV Push once  dextrose 50% Injectable 25 Gram(s) IV Push once  dextrose 50% Injectable 25 Gram(s) IV Push once  donepezil 5 milliGRAM(s) Oral at bedtime  ferrous    sulfate 325 milliGRAM(s) Oral daily  heparin   Injectable 5000 Unit(s) SubCutaneous every 8 hours  insulin glargine Injectable (LANTUS) 5 Unit(s) SubCutaneous every morning  insulin lispro (HumaLOG) corrective regimen sliding scale   SubCutaneous three times a day before meals  insulin lispro (HumaLOG) corrective regimen sliding scale   SubCutaneous at bedtime  lactobacillus acidophilus 1 Tablet(s) Oral daily  losartan 100 milliGRAM(s) Oral daily  pantoprazole    Tablet 20 milliGRAM(s) Oral before breakfast  PARoxetine 20 milliGRAM(s) Oral daily    MEDICATIONS  (PRN):  dextrose 40% Gel 15 Gram(s) Oral once PRN Blood Glucose LESS THAN 70 milliGRAM(s)/deciliter  glucagon  Injectable 1 milliGRAM(s) IntraMuscular once PRN Glucose LESS THAN 70 milligrams/deciliter      Allergies    sulfa drugs (Unknown)    Intolerances        REVIEW OF SYSTEMS:  CONSTITUTIONAL: No fever or chills  HEENT:  No headache, no sore throat  RESPIRATORY: No cough, wheezing, or shortness of breath  CARDIOVASCULAR: No chest pain, palpitations  GASTROINTESTINAL: No abd pain, nausea, vomiting, or diarrhea  GENITOURINARY: No dysuria, frequency, or hematuria  NEUROLOGICAL: no focal weakness or dizziness  MUSCULOSKELETAL: no myalgias     Vital Signs Last 24 Hrs  T(C): 36.6 (06 Sep 2020 12:37), Max: 36.8 (05 Sep 2020 22:06)  T(F): 97.9 (06 Sep 2020 12:37), Max: 98.3 (05 Sep 2020 22:06)  HR: 75 (06 Sep 2020 12:37) (64 - 76)  BP: 152/64 (06 Sep 2020 12:37) (120/84 - 154/68)  BP(mean): --  RR: 18 (06 Sep 2020 12:37) (17 - 18)  SpO2: 97% (06 Sep 2020 12:37) (94% - 97%)    PHYSICAL EXAM:  GENERAL: NAD  HEENT:  anicteric, moist mucous membranes  CHEST/LUNG:  CTA b/l, no rales, wheezes, or rhonchi  HEART:  RRR, S1, S2  ABDOMEN:  BS+, soft, nontender, nondistended  EXTREMITIES: no edema, cyanosis, or calf tenderness. left foot bandaged. no blood or drainage noted. no ttp, swelling or edema. ; L LE contracted  NERVOUS SYSTEM: answers questions and follows commands appropriately, however, very hard of hearing     LABS:                        9.2    7.55  )-----------( 260      ( 06 Sep 2020 06:48 )             28.9     CBC Full  -  ( 06 Sep 2020 06:48 )  WBC Count : 7.55 K/uL  Hemoglobin : 9.2 g/dL  Hematocrit : 28.9 %  Platelet Count - Automated : 260 K/uL  Mean Cell Volume : 84.8 fl  Mean Cell Hemoglobin : 27.0 pg  Mean Cell Hemoglobin Concentration : 31.8 gm/dL  Auto Neutrophil # : 4.59 K/uL  Auto Lymphocyte # : 2.06 K/uL  Auto Monocyte # : 0.67 K/uL  Auto Eosinophil # : 0.12 K/uL  Auto Basophil # : 0.07 K/uL  Auto Neutrophil % : 60.8 %  Auto Lymphocyte % : 27.3 %  Auto Monocyte % : 8.9 %  Auto Eosinophil % : 1.6 %  Auto Basophil % : 0.9 %    06 Sep 2020 06:48    144    |  111    |  43     ----------------------------<  119    3.9     |  29     |  0.81     Ca    8.7        06 Sep 2020 06:48          CAPILLARY BLOOD GLUCOSE      POCT Blood Glucose.: 291 mg/dL (06 Sep 2020 12:16)  POCT Blood Glucose.: 141 mg/dL (06 Sep 2020 08:01)  POCT Blood Glucose.: 204 mg/dL (05 Sep 2020 21:22)  POCT Blood Glucose.: 182 mg/dL (05 Sep 2020 17:00)          RADIOLOGY & ADDITIONAL TESTS:    Personally reviewed.     Consultant(s) Notes Reviewed:  [x] YES  [ ] NO

## 2020-09-06 NOTE — PROGRESS NOTE ADULT - ATTENDING COMMENTS
86 yo F with PMH of HTN, HLD, Diabetes Mellitus Type 2 not on home insulin, GERD, Dementia, admitted for L diabetic foot ulcer under 1st metatarsal, also noted to have sacral wound stage 1, R thigh skin tear 2/2 LLE contracture.       Diabetic foot ulcer.  Plan: L diabetic foot ulcer under 1st metatarsal, pustular material, per admitter, does not appear to probe to bone.   - rpt lactate 2.2, xray L foot neg for overt osteolysis or f/x  s/p vanc and zosyn, 1L NS   cont vanc and zosyn, f/u vanc trough   minimally elevated ESR, f/u CRP   f/u blood cx x2, urine culture    poidatry eval pending
84 yo F with PMH of HTN, HLD, Diabetes Mellitus Type 2 not on home insulin, GERD, Dementia, admitted for L diabetic foot ulcer under 1st metatarsal, also noted to have sacral wound stage 1, R thigh skin tear 2/2 LLE contracture.        Diabetic foot ulcer.  Plan: L diabetic foot ulcer under 1st metatarsal, pustular material\  - rpt lactate 2.2, xray L foot neg for overt osteolysis or f/x  s/p vanc and zosyn, 1L NS   - continue with zosyn  - dc vanc   minimally elevated ESR, CRP 0.2   blood cx NGTD x2, urine culture prelim: klebsiella pneumonia   will d/c vanco and conitnue on zosyn   f/u podiatry , ID, once cleared will start dc planning
L diabetic foot ulcer under 1st metatarsal, pustular material, per admitter, does not appear to probe to bone.   - rpt lactate 2.2, xray L foot neg for overt osteolysis or f/x  s/p vanc and zosyn, 1L NS   cont vanc and zosyn, f/u vanc trough   minimally elevated ESR, f/u CRP   f/u blood cx x2, urine culture    podiatry eval
Pt seen + examined. Case discussed with resident. Agree with assessment and plan above (edited by me in detail):  Time spent: 30min. More than 50% of the visit was spent counseling the patient on medical condition -- diabetic foot ulcer, pressure ulcers, offloading, Abx, positive UA/UCx.  24-hr HHA was not available per  and thus delayed discharge.
Pt seen + examined. Case discussed with resident. Agree with assessment and plan above (edited by me in detail):  Time spent: 30min. More than 50% of the visit was spent counseling the patient on medical condition -- diabetic foot ulcer, pressure ulcers, offloading, Abx, positive UA/UCx, diabetes/FSG/insulin management.  24-hr HHA was not available per  and thus delayed discharge.
Pt seen + examined. Case discussed with resident. Agree with assessment and plan above (edited by me in detail):  Time spent: 38min. More than 50% of the visit was spent counseling the patient on medical condition -- diabetic foot ulcer, pressure ulcers, offloading, Abx, positive UA/UCx, diabetes/FSG/insulin management.  24-hr HHA was not available per  and thus delayed discharge.
Pt seen + examined. Case discussed with resident. Agree with assessment and plan above (edited by me in detail):  Time spent: 30min. More than 50% of the visit was spent counseling the patient on medical condition -- diabetic foot ulcer, pressure ulcers, offloading, Abx, positive UA/UCx.
Pt seen + examined. Case discussed with resident. Agree with assessment and plan above (edited by me in detail):  Time spent: 40min. More than 50% of the visit was spent counseling the patient on medical condition -- diabetic foot ulcer, pressure ulcers, offloading, Abx, positive UA/UCx.

## 2020-09-06 NOTE — PROGRESS NOTE ADULT - PROBLEM SELECTOR PLAN 3
hold home metformin  -Given elevated FSG, started patient on 5U a.m. lantus  start low dose ISS, fingersticks, hypoglycemia protocol   Hgb A1C 6.2%   POCT Glucose: well controlled   patient has good appetite

## 2020-09-06 NOTE — PROGRESS NOTE ADULT - PROBLEM SELECTOR PROBLEM 5
Iron deficiency anemia

## 2020-09-06 NOTE — PROGRESS NOTE ADULT - PROVIDER SPECIALTY LIST ADULT
Hospitalist
Podiatry
Podiatry
Hospitalist
Hospitalist

## 2020-09-07 VITALS
OXYGEN SATURATION: 95 % | DIASTOLIC BLOOD PRESSURE: 57 MMHG | HEART RATE: 71 BPM | TEMPERATURE: 98 F | SYSTOLIC BLOOD PRESSURE: 115 MMHG | RESPIRATION RATE: 17 BRPM

## 2020-09-07 LAB
ANION GAP SERPL CALC-SCNC: 5 MMOL/L — SIGNIFICANT CHANGE UP (ref 5–17)
BUN SERPL-MCNC: 37 MG/DL — HIGH (ref 7–23)
CALCIUM SERPL-MCNC: 9.2 MG/DL — SIGNIFICANT CHANGE UP (ref 8.5–10.1)
CHLORIDE SERPL-SCNC: 112 MMOL/L — HIGH (ref 96–108)
CO2 SERPL-SCNC: 29 MMOL/L — SIGNIFICANT CHANGE UP (ref 22–31)
CREAT SERPL-MCNC: 0.73 MG/DL — SIGNIFICANT CHANGE UP (ref 0.5–1.3)
GLUCOSE SERPL-MCNC: 147 MG/DL — HIGH (ref 70–99)
HCT VFR BLD CALC: 25.9 % — LOW (ref 34.5–45)
HGB BLD-MCNC: 8.2 G/DL — LOW (ref 11.5–15.5)
MCHC RBC-ENTMCNC: 27.1 PG — SIGNIFICANT CHANGE UP (ref 27–34)
MCHC RBC-ENTMCNC: 31.7 GM/DL — LOW (ref 32–36)
MCV RBC AUTO: 85.5 FL — SIGNIFICANT CHANGE UP (ref 80–100)
NRBC # BLD: 0 /100 WBCS — SIGNIFICANT CHANGE UP (ref 0–0)
PLATELET # BLD AUTO: 235 K/UL — SIGNIFICANT CHANGE UP (ref 150–400)
POTASSIUM SERPL-MCNC: 3.9 MMOL/L — SIGNIFICANT CHANGE UP (ref 3.5–5.3)
POTASSIUM SERPL-SCNC: 3.9 MMOL/L — SIGNIFICANT CHANGE UP (ref 3.5–5.3)
RBC # BLD: 3.03 M/UL — LOW (ref 3.8–5.2)
RBC # FLD: 16.1 % — HIGH (ref 10.3–14.5)
SODIUM SERPL-SCNC: 146 MMOL/L — HIGH (ref 135–145)
WBC # BLD: 6.3 K/UL — SIGNIFICANT CHANGE UP (ref 3.8–10.5)
WBC # FLD AUTO: 6.3 K/UL — SIGNIFICANT CHANGE UP (ref 3.8–10.5)

## 2020-09-07 PROCEDURE — 82962 GLUCOSE BLOOD TEST: CPT

## 2020-09-07 PROCEDURE — 96365 THER/PROPH/DIAG IV INF INIT: CPT

## 2020-09-07 PROCEDURE — 99285 EMERGENCY DEPT VISIT HI MDM: CPT | Mod: 25

## 2020-09-07 PROCEDURE — 80048 BASIC METABOLIC PNL TOTAL CA: CPT

## 2020-09-07 PROCEDURE — 87040 BLOOD CULTURE FOR BACTERIA: CPT

## 2020-09-07 PROCEDURE — U0003: CPT

## 2020-09-07 PROCEDURE — 85652 RBC SED RATE AUTOMATED: CPT

## 2020-09-07 PROCEDURE — 82728 ASSAY OF FERRITIN: CPT

## 2020-09-07 PROCEDURE — 73620 X-RAY EXAM OF FOOT: CPT

## 2020-09-07 PROCEDURE — 81001 URINALYSIS AUTO W/SCOPE: CPT

## 2020-09-07 PROCEDURE — 93971 EXTREMITY STUDY: CPT

## 2020-09-07 PROCEDURE — 83540 ASSAY OF IRON: CPT

## 2020-09-07 PROCEDURE — 99239 HOSP IP/OBS DSCHRG MGMT >30: CPT

## 2020-09-07 PROCEDURE — 85027 COMPLETE CBC AUTOMATED: CPT

## 2020-09-07 PROCEDURE — 87186 SC STD MICRODIL/AGAR DIL: CPT

## 2020-09-07 PROCEDURE — 83605 ASSAY OF LACTIC ACID: CPT

## 2020-09-07 PROCEDURE — 83036 HEMOGLOBIN GLYCOSYLATED A1C: CPT

## 2020-09-07 PROCEDURE — 36415 COLL VENOUS BLD VENIPUNCTURE: CPT

## 2020-09-07 PROCEDURE — 71045 X-RAY EXAM CHEST 1 VIEW: CPT

## 2020-09-07 PROCEDURE — 80053 COMPREHEN METABOLIC PANEL: CPT

## 2020-09-07 PROCEDURE — 86769 SARS-COV-2 COVID-19 ANTIBODY: CPT

## 2020-09-07 PROCEDURE — 80202 ASSAY OF VANCOMYCIN: CPT

## 2020-09-07 PROCEDURE — 87070 CULTURE OTHR SPECIMN AEROBIC: CPT

## 2020-09-07 PROCEDURE — 86140 C-REACTIVE PROTEIN: CPT

## 2020-09-07 PROCEDURE — 83550 IRON BINDING TEST: CPT

## 2020-09-07 PROCEDURE — 87086 URINE CULTURE/COLONY COUNT: CPT

## 2020-09-07 RX ADMIN — Medication 1 TABLET(S): at 12:04

## 2020-09-07 RX ADMIN — Medication 3: at 12:03

## 2020-09-07 RX ADMIN — INSULIN GLARGINE 5 UNIT(S): 100 INJECTION, SOLUTION SUBCUTANEOUS at 08:26

## 2020-09-07 RX ADMIN — LOSARTAN POTASSIUM 100 MILLIGRAM(S): 100 TABLET, FILM COATED ORAL at 05:09

## 2020-09-07 RX ADMIN — Medication 1: at 08:26

## 2020-09-07 RX ADMIN — CARVEDILOL PHOSPHATE 6.25 MILLIGRAM(S): 80 CAPSULE, EXTENDED RELEASE ORAL at 05:09

## 2020-09-07 RX ADMIN — Medication 100 MILLIGRAM(S): at 00:50

## 2020-09-07 RX ADMIN — Medication 325 MILLIGRAM(S): at 12:04

## 2020-09-07 RX ADMIN — HEPARIN SODIUM 5000 UNIT(S): 5000 INJECTION INTRAVENOUS; SUBCUTANEOUS at 05:09

## 2020-09-07 RX ADMIN — AMLODIPINE BESYLATE 10 MILLIGRAM(S): 2.5 TABLET ORAL at 05:09

## 2020-09-07 RX ADMIN — Medication 20 MILLIGRAM(S): at 12:04

## 2020-09-07 RX ADMIN — Medication 81 MILLIGRAM(S): at 12:04

## 2020-09-07 RX ADMIN — PANTOPRAZOLE SODIUM 20 MILLIGRAM(S): 20 TABLET, DELAYED RELEASE ORAL at 05:09

## 2020-09-22 ENCOUNTER — APPOINTMENT (OUTPATIENT)
Dept: WOUND CARE | Facility: HOSPITAL | Age: 85
End: 2020-09-22
Payer: MEDICARE

## 2020-09-22 ENCOUNTER — OUTPATIENT (OUTPATIENT)
Dept: OUTPATIENT SERVICES | Facility: HOSPITAL | Age: 85
LOS: 1 days | Discharge: HOME | End: 2020-09-22
Payer: MEDICARE

## 2020-09-22 VITALS
HEIGHT: 59 IN | OXYGEN SATURATION: 97 % | TEMPERATURE: 97.7 F | WEIGHT: 110 LBS | RESPIRATION RATE: 20 BRPM | DIASTOLIC BLOOD PRESSURE: 53 MMHG | HEART RATE: 61 BPM | SYSTOLIC BLOOD PRESSURE: 104 MMHG | BODY MASS INDEX: 22.18 KG/M2

## 2020-09-22 DIAGNOSIS — E78.5 HYPERLIPIDEMIA, UNSPECIFIED: ICD-10-CM

## 2020-09-22 DIAGNOSIS — Z80.0 FAMILY HISTORY OF MALIGNANT NEOPLASM OF DIGESTIVE ORGANS: ICD-10-CM

## 2020-09-22 DIAGNOSIS — K21.9 GASTRO-ESOPHAGEAL REFLUX DISEASE W/OUT ESOPHAGITIS: ICD-10-CM

## 2020-09-22 DIAGNOSIS — F03.90 UNSPECIFIED DEMENTIA W/OUT BEHAVIORAL DISTURBANCE: ICD-10-CM

## 2020-09-22 DIAGNOSIS — I10 ESSENTIAL (PRIMARY) HYPERTENSION: ICD-10-CM

## 2020-09-22 DIAGNOSIS — S91.309A UNSPECIFIED OPEN WOUND, UNSPECIFIED FOOT, INITIAL ENCOUNTER: ICD-10-CM

## 2020-09-22 DIAGNOSIS — Z90.49 ACQUIRED ABSENCE OF OTHER SPECIFIED PARTS OF DIGESTIVE TRACT: Chronic | ICD-10-CM

## 2020-09-22 DIAGNOSIS — S99.919A UNSPECIFIED INJURY OF UNSPECIFIED ANKLE, INITIAL ENCOUNTER: Chronic | ICD-10-CM

## 2020-09-22 DIAGNOSIS — Z78.9 OTHER SPECIFIED HEALTH STATUS: ICD-10-CM

## 2020-09-22 DIAGNOSIS — Z87.891 PERSONAL HISTORY OF NICOTINE DEPENDENCE: ICD-10-CM

## 2020-09-22 PROCEDURE — 82962 GLUCOSE BLOOD TEST: CPT

## 2020-09-22 PROCEDURE — 99214 OFFICE O/P EST MOD 30 MIN: CPT

## 2020-09-22 PROCEDURE — G0463: CPT

## 2020-09-22 RX ORDER — CARVEDILOL 6.25 MG/1
6.25 TABLET, FILM COATED ORAL TWICE DAILY
Refills: 0 | Status: ACTIVE | COMMUNITY

## 2020-09-22 RX ORDER — PAROXETINE HYDROCHLORIDE 10 MG/1
10 TABLET, FILM COATED ORAL DAILY
Refills: 0 | Status: ACTIVE | COMMUNITY

## 2020-09-22 RX ORDER — LOVASTATIN 40 MG/1
40 TABLET ORAL
Refills: 0 | Status: ACTIVE | COMMUNITY

## 2020-09-22 RX ORDER — OMEPRAZOLE 20 MG/1
20 CAPSULE, DELAYED RELEASE ORAL
Refills: 0 | Status: ACTIVE | COMMUNITY

## 2020-09-22 RX ORDER — MELATONIN 3 MG
TABLET ORAL
Refills: 0 | Status: ACTIVE | COMMUNITY

## 2020-09-22 RX ORDER — GLUC/MSM/COLGN2/HYAL/ANTIARTH3 375-375-20
TABLET ORAL
Refills: 0 | Status: ACTIVE | COMMUNITY

## 2020-09-22 RX ORDER — L. ACIDOPHILUS/L.BULGARICUS 1MM CELL
TABLET ORAL
Refills: 0 | Status: ACTIVE | COMMUNITY

## 2020-09-22 RX ORDER — PNV NO.95/FERROUS FUM/FOLIC AC 28MG-0.8MG
TABLET ORAL
Refills: 0 | Status: ACTIVE | COMMUNITY

## 2020-09-22 RX ORDER — METFORMIN HYDROCHLORIDE 500 MG/1
500 TABLET, COATED ORAL
Refills: 0 | Status: ACTIVE | COMMUNITY

## 2020-09-22 RX ORDER — LOSARTAN POTASSIUM 100 MG/1
100 TABLET, FILM COATED ORAL
Refills: 0 | Status: ACTIVE | COMMUNITY

## 2020-09-22 RX ORDER — DONEPEZIL HYDROCHLORIDE 5 MG/1
5 TABLET ORAL
Refills: 0 | Status: ACTIVE | COMMUNITY

## 2020-09-22 NOTE — PLAN
[FreeTextEntry1] : Patient examined and evaluated at this time.\par Continue local wound care and offloading.\par Patient to follow up in 1 week.

## 2020-09-22 NOTE — REVIEW OF SYSTEMS
[Fever] : no fever [Eye Pain] : no eye pain [Earache] : no earache [Chest Pain] : no chest pain [Shortness Of Breath] : no shortness of breath [Cough] : no cough [Abdominal Pain] : no abdominal pain [Joint Pain] : joint pain [Skin Wound] : skin wound [de-identified] : left medial 1st MPJ ulcer down to skin, subcutaneous tissue, and fat [de-identified] : dementia

## 2020-09-22 NOTE — ASSESSMENT
[Verbal] : Verbal [Written] : Written [Demo] : Demo [Patient] : Patient [Family member] : Family member [Fair - mild discomfort, physical impairment, low acceptance] : Fair - mild discomfort, physical impairment, low acceptance [Needs reinforcement] : needs reinforcement [Dressing changes] : dressing changes [Foot Care] : foot care [Skin Care] : skin care [Pressure relief] : pressure relief [Signs and symptoms of infection] : sign and symptoms of infection [Nutrition] : nutrition [How and When to Call] : how and when to call [Pain Management] : pain management [Off-loading] : off-loading [Compression Therapy] : compression therapy [Home Health] : home health [Patient responsibility to plan of care] : patient responsibility to plan of care [] : Yes [Stable] : stable [Home] : Home [Stretcher] : Stretcher [Faxed - Long Term Care/Home Health Agency] : Long Term Care/Home Health Agency: Faxed [FreeTextEntry2] : Infection prevention\par Localized wound care \par Pressure relief\par Goal of remaining pain free regarding wounds.\par   [FreeTextEntry4] : Patient educated on the reasons pressure injuries occur and ways to prevent. Educated on the complications in result of pressure injuries such as pain and infection.  Educated on turning and repositioning at least every hour as well as ambulating if applicable. Patient educated on the utilization of localized wound care and any medical equipment that manage/prevent pressure injuries.  Educated on the importance of nutrition and to consume a good amount of protein in diet. Patient showed understanding.\par Follow up in 2 weeks. [FreeTextEntry1] : [facility]

## 2020-09-22 NOTE — PHYSICAL EXAM
[4 x 4] : 4 x 4  [JVD] : no jugular venous distention  [1+] : left 1+ [Skin Ulcer] : ulcer [Calm] : calm [de-identified] : calm [de-identified] : 4/5 strength in all quadrants bilaterally [de-identified] : left medial 1st MPJ ulcer down to skin, subcutaneous tissue, and fat [FreeTextEntry1] : Left medial 1st metatarsal  [FreeTextEntry2] : 0.4 [FreeTextEntry3] : 0.4 [FreeTextEntry4] : 0.2 [de-identified] : Scant Serous/sanguinous [de-identified] : .Alginate  [de-identified] : Cleansed with NS\par Kerlix  [de-identified] : Dorsalis Pedis:  +1 Bilateral \par Posterior Tibialis: +1 bilateral \par Doppler pulses:  N/A\par Extremity color: Pink \par Extremity temperature: Warm \par Capillary refill: < 3 sec\par \par  [de-identified] : Normal [de-identified] : None [de-identified] : None [de-identified] : 100% [de-identified] : No [de-identified] : 3x Weekly [de-identified] : Primary Dressing

## 2020-09-22 NOTE — HISTORY OF PRESENT ILLNESS
[FreeTextEntry1] : Patient admitted 8/30/20 to [facility] for left foot medial 1st metatarsal ulcer. She received one week of IV antibiotics and was discharged 9/7/20. She has 24 hour aids at home and uses heel booties to offload feet from bed.

## 2020-09-23 DIAGNOSIS — E11.621 TYPE 2 DIABETES MELLITUS WITH FOOT ULCER: ICD-10-CM

## 2020-09-23 DIAGNOSIS — E78.5 HYPERLIPIDEMIA, UNSPECIFIED: ICD-10-CM

## 2020-09-23 DIAGNOSIS — Z88.2 ALLERGY STATUS TO SULFONAMIDES: ICD-10-CM

## 2020-09-23 DIAGNOSIS — Z79.82 LONG TERM (CURRENT) USE OF ASPIRIN: ICD-10-CM

## 2020-09-23 DIAGNOSIS — Z87.891 PERSONAL HISTORY OF NICOTINE DEPENDENCE: ICD-10-CM

## 2020-09-23 DIAGNOSIS — I10 ESSENTIAL (PRIMARY) HYPERTENSION: ICD-10-CM

## 2020-09-23 DIAGNOSIS — Z80.0 FAMILY HISTORY OF MALIGNANT NEOPLASM OF DIGESTIVE ORGANS: ICD-10-CM

## 2020-09-23 DIAGNOSIS — Z79.899 OTHER LONG TERM (CURRENT) DRUG THERAPY: ICD-10-CM

## 2020-09-23 DIAGNOSIS — K21.9 GASTRO-ESOPHAGEAL REFLUX DISEASE WITHOUT ESOPHAGITIS: ICD-10-CM

## 2020-09-23 DIAGNOSIS — Z90.49 ACQUIRED ABSENCE OF OTHER SPECIFIED PARTS OF DIGESTIVE TRACT: ICD-10-CM

## 2020-09-23 DIAGNOSIS — Z79.4 LONG TERM (CURRENT) USE OF INSULIN: ICD-10-CM

## 2020-09-23 DIAGNOSIS — L97.422 NON-PRESSURE CHRONIC ULCER OF LEFT HEEL AND MIDFOOT WITH FAT LAYER EXPOSED: ICD-10-CM

## 2020-09-23 DIAGNOSIS — F03.90 UNSPECIFIED DEMENTIA WITHOUT BEHAVIORAL DISTURBANCE: ICD-10-CM

## 2020-09-23 DIAGNOSIS — Z96.653 PRESENCE OF ARTIFICIAL KNEE JOINT, BILATERAL: ICD-10-CM

## 2020-10-10 ENCOUNTER — OUTPATIENT (OUTPATIENT)
Dept: OUTPATIENT SERVICES | Facility: HOSPITAL | Age: 85
LOS: 1 days | Discharge: HOME | End: 2020-10-10
Payer: MEDICARE

## 2020-10-10 ENCOUNTER — APPOINTMENT (OUTPATIENT)
Dept: OBGYN | Facility: HOSPITAL | Age: 85
End: 2020-10-10
Payer: MEDICARE

## 2020-10-10 VITALS
SYSTOLIC BLOOD PRESSURE: 113 MMHG | DIASTOLIC BLOOD PRESSURE: 52 MMHG | HEIGHT: 59 IN | RESPIRATION RATE: 18 BRPM | OXYGEN SATURATION: 100 % | HEART RATE: 61 BPM | WEIGHT: 110 LBS | TEMPERATURE: 96.9 F | BODY MASS INDEX: 22.18 KG/M2

## 2020-10-10 DIAGNOSIS — I10 ESSENTIAL (PRIMARY) HYPERTENSION: ICD-10-CM

## 2020-10-10 DIAGNOSIS — L97.422 NON-PRESSURE CHRONIC ULCER OF LEFT HEEL AND MIDFOOT WITH FAT LAYER EXPOSED: ICD-10-CM

## 2020-10-10 DIAGNOSIS — Z79.899 OTHER LONG TERM (CURRENT) DRUG THERAPY: ICD-10-CM

## 2020-10-10 DIAGNOSIS — Z96.653 PRESENCE OF ARTIFICIAL KNEE JOINT, BILATERAL: ICD-10-CM

## 2020-10-10 DIAGNOSIS — Z90.49 ACQUIRED ABSENCE OF OTHER SPECIFIED PARTS OF DIGESTIVE TRACT: Chronic | ICD-10-CM

## 2020-10-10 DIAGNOSIS — S99.919A UNSPECIFIED INJURY OF UNSPECIFIED ANKLE, INITIAL ENCOUNTER: Chronic | ICD-10-CM

## 2020-10-10 DIAGNOSIS — Z87.891 PERSONAL HISTORY OF NICOTINE DEPENDENCE: ICD-10-CM

## 2020-10-10 DIAGNOSIS — Z79.82 LONG TERM (CURRENT) USE OF ASPIRIN: ICD-10-CM

## 2020-10-10 DIAGNOSIS — E11.621 TYPE 2 DIABETES MELLITUS WITH FOOT ULCER: ICD-10-CM

## 2020-10-10 DIAGNOSIS — F03.90 UNSPECIFIED DEMENTIA, UNSPECIFIED SEVERITY, WITHOUT BEHAVIORAL DISTURBANCE, PSYCHOTIC DISTURBANCE, MOOD DISTURBANCE, AND ANXIETY: ICD-10-CM

## 2020-10-10 DIAGNOSIS — Z79.4 LONG TERM (CURRENT) USE OF INSULIN: ICD-10-CM

## 2020-10-10 DIAGNOSIS — Z90.49 ACQUIRED ABSENCE OF OTHER SPECIFIED PARTS OF DIGESTIVE TRACT: ICD-10-CM

## 2020-10-10 DIAGNOSIS — K21.9 GASTRO-ESOPHAGEAL REFLUX DISEASE WITHOUT ESOPHAGITIS: ICD-10-CM

## 2020-10-10 DIAGNOSIS — E78.5 HYPERLIPIDEMIA, UNSPECIFIED: ICD-10-CM

## 2020-10-10 PROCEDURE — 99213 OFFICE O/P EST LOW 20 MIN: CPT

## 2020-10-10 PROCEDURE — G0463: CPT

## 2020-10-10 NOTE — ASSESSMENT
[Verbal] : Verbal [Written] : Written [Patient] : Patient [Good - alert, interested, motivated] : Good - alert, interested, motivated [Verbalizes knowledge/Understanding] : Verbalizes knowledge/understanding [Dressing changes] : dressing changes [Foot Care] : foot care [Skin Care] : skin care [Signs and symptoms of infection] : sign and symptoms of infection [How and When to Call] : how and when to call [Off-loading] : off-loading [Patient responsibility to plan of care] : patient responsibility to plan of care [Stable] : stable [Home] : Home [Stretcher] : Stretcher [Faxed - Long Term Care/Home Health Agency] : Long Term Care/Home Health Agency: Faxed [Pressure relief] : pressure relief [] : Yes [FreeTextEntry2] : Infection Prevention\par Localized Wound Care\par Restore Optimal Skin Integrity \par Pressure Relief  [FreeTextEntry4] : F/U to [facility] in 2 weeks\par Pt aide and family member advised to cushion bony prominences to prevent future pressure sores; pt family member and aide verbalized understanding  [FreeTextEntry1] : [facility]

## 2020-10-10 NOTE — HISTORY OF PRESENT ILLNESS
[FreeTextEntry1] : 87 yo WF, with dementia, here with her son and an aide for f/u of a pressure ulcer involving her left foot/first met. The ulcer as decreased in size since last visit. Pt with contracture of the left leg. Encouraged and stressed importance of padding to the left foot.

## 2020-10-10 NOTE — PHYSICAL EXAM
[4 x 4] : 4 x 4  [Normal Thyroid] : the thyroid was normal [Normal Breath Sounds] : Normal breath sounds [Normal Heart Sounds] : normal heart sounds [Normal Rate and Rhythm] : normal rate and rhythm [Alert] : alert [Oriented to Person] : oriented to person [Calm] : calm [JVD] : no jugular venous distention  [Ankle Swelling (On Exam)] : not present [Abdomen Masses] : No abdominal massess [Abdomen Tenderness] : ~T ~M No abdominal tenderness [Tender] : nontender [Enlarged] : not enlarged [Oriented to Place] : disoriented to place [Oriented to Time] : disoriented to time [de-identified] : elderly WF, NAD, awake, alert. [FreeTextEntry1] : Left Medial 1st Metatarsal  [FreeTextEntry2] : 0.1 [FreeTextEntry3] : 0.2 [FreeTextEntry4] : 0.2 [de-identified] : Serosanguineous  [de-identified] : Calcium Alginate  [de-identified] : Cleansed with Normal Saline \par Cloth Tape  [TWNoteComboBox4] : Small [TWNoteComboBox5] : No [de-identified] : No [de-identified] : Macerated [de-identified] : None [de-identified] : None [de-identified] : 100% [de-identified] : No [de-identified] : 3x Weekly

## 2020-10-10 NOTE — REVIEW OF SYSTEMS
[Joint Pain] : joint pain [Skin Wound] : skin wound [Fever] : no fever [Eye Pain] : no eye pain [Earache] : no earache [Chest Pain] : no chest pain [Shortness Of Breath] : no shortness of breath [Cough] : no cough [Abdominal Pain] : no abdominal pain [de-identified] : left medial 1st MPJ ulcer down to skin, subcutaneous tissue, and fat [de-identified] : dementia

## 2020-10-24 ENCOUNTER — INPATIENT (INPATIENT)
Facility: HOSPITAL | Age: 85
LOS: 10 days | Discharge: HOME | DRG: 617 | End: 2020-11-04
Attending: HOSPITALIST | Admitting: HOSPITALIST
Payer: MEDICARE

## 2020-10-24 ENCOUNTER — APPOINTMENT (OUTPATIENT)
Dept: PODIATRY | Facility: HOSPITAL | Age: 85
End: 2020-10-24
Payer: MEDICARE

## 2020-10-24 ENCOUNTER — OUTPATIENT (OUTPATIENT)
Dept: OUTPATIENT SERVICES | Facility: HOSPITAL | Age: 85
LOS: 1 days | Discharge: ANOTHER HOSPITAL | End: 2020-10-24

## 2020-10-24 VITALS
TEMPERATURE: 98 F | WEIGHT: 100.09 LBS | DIASTOLIC BLOOD PRESSURE: 70 MMHG | HEIGHT: 65 IN | RESPIRATION RATE: 14 BRPM | SYSTOLIC BLOOD PRESSURE: 130 MMHG | HEART RATE: 60 BPM | OXYGEN SATURATION: 96 %

## 2020-10-24 VITALS
RESPIRATION RATE: 18 BRPM | TEMPERATURE: 97.1 F | HEART RATE: 58 BPM | SYSTOLIC BLOOD PRESSURE: 121 MMHG | OXYGEN SATURATION: 97 % | DIASTOLIC BLOOD PRESSURE: 53 MMHG

## 2020-10-24 DIAGNOSIS — S99.919A UNSPECIFIED INJURY OF UNSPECIFIED ANKLE, INITIAL ENCOUNTER: Chronic | ICD-10-CM

## 2020-10-24 DIAGNOSIS — E11.621 TYPE 2 DIABETES MELLITUS WITH FOOT ULCER: ICD-10-CM

## 2020-10-24 DIAGNOSIS — Z90.49 ACQUIRED ABSENCE OF OTHER SPECIFIED PARTS OF DIGESTIVE TRACT: Chronic | ICD-10-CM

## 2020-10-24 DIAGNOSIS — L97.422 NON-PRESSURE CHRONIC ULCER OF LEFT HEEL AND MIDFOOT WITH FAT LAYER EXPOSED: ICD-10-CM

## 2020-10-24 DIAGNOSIS — M86.8X7 OTHER OSTEOMYELITIS, ANKLE AND FOOT: ICD-10-CM

## 2020-10-24 LAB
A1C WITH ESTIMATED AVERAGE GLUCOSE RESULT: 6.2 % — HIGH (ref 4–5.6)
ALBUMIN SERPL ELPH-MCNC: 3 G/DL — LOW (ref 3.3–5)
ALP SERPL-CCNC: 50 U/L — SIGNIFICANT CHANGE UP (ref 40–120)
ALT FLD-CCNC: 14 U/L — SIGNIFICANT CHANGE UP (ref 12–78)
ANION GAP SERPL CALC-SCNC: 6 MMOL/L — SIGNIFICANT CHANGE UP (ref 5–17)
APTT BLD: 32.9 SEC — SIGNIFICANT CHANGE UP (ref 27.5–35.5)
AST SERPL-CCNC: 28 U/L — SIGNIFICANT CHANGE UP (ref 15–37)
BASOPHILS # BLD AUTO: 0.05 K/UL — SIGNIFICANT CHANGE UP (ref 0–0.2)
BASOPHILS NFR BLD AUTO: 0.8 % — SIGNIFICANT CHANGE UP (ref 0–2)
BILIRUB SERPL-MCNC: 0.4 MG/DL — SIGNIFICANT CHANGE UP (ref 0.2–1.2)
BUN SERPL-MCNC: 32 MG/DL — HIGH (ref 7–23)
CALCIUM SERPL-MCNC: 9 MG/DL — SIGNIFICANT CHANGE UP (ref 8.5–10.1)
CHLORIDE SERPL-SCNC: 111 MMOL/L — HIGH (ref 96–108)
CO2 SERPL-SCNC: 26 MMOL/L — SIGNIFICANT CHANGE UP (ref 22–31)
CREAT SERPL-MCNC: 1 MG/DL — SIGNIFICANT CHANGE UP (ref 0.5–1.3)
CRP SERPL-MCNC: <0.1 MG/DL — SIGNIFICANT CHANGE UP (ref 0–0.4)
CRP SERPL-MCNC: <0.1 MG/DL — SIGNIFICANT CHANGE UP (ref 0–0.4)
EOSINOPHIL # BLD AUTO: 0.14 K/UL — SIGNIFICANT CHANGE UP (ref 0–0.5)
EOSINOPHIL NFR BLD AUTO: 2.3 % — SIGNIFICANT CHANGE UP (ref 0–6)
ERYTHROCYTE [SEDIMENTATION RATE] IN BLOOD: 26 MM/HR — HIGH (ref 0–20)
ERYTHROCYTE [SEDIMENTATION RATE] IN BLOOD: 26 MM/HR — HIGH (ref 0–20)
ESTIMATED AVERAGE GLUCOSE: 131 MG/DL — HIGH (ref 68–114)
GLUCOSE SERPL-MCNC: 97 MG/DL — SIGNIFICANT CHANGE UP (ref 70–99)
HCT VFR BLD CALC: 35.3 % — SIGNIFICANT CHANGE UP (ref 34.5–45)
HGB BLD-MCNC: 11.1 G/DL — LOW (ref 11.5–15.5)
IMM GRANULOCYTES NFR BLD AUTO: 0.3 % — SIGNIFICANT CHANGE UP (ref 0–1.5)
INR BLD: 0.97 RATIO — SIGNIFICANT CHANGE UP (ref 0.88–1.16)
LYMPHOCYTES # BLD AUTO: 1.67 K/UL — SIGNIFICANT CHANGE UP (ref 1–3.3)
LYMPHOCYTES # BLD AUTO: 27.3 % — SIGNIFICANT CHANGE UP (ref 13–44)
MCHC RBC-ENTMCNC: 27.1 PG — SIGNIFICANT CHANGE UP (ref 27–34)
MCHC RBC-ENTMCNC: 31.4 GM/DL — LOW (ref 32–36)
MCV RBC AUTO: 86.3 FL — SIGNIFICANT CHANGE UP (ref 80–100)
MONOCYTES # BLD AUTO: 0.43 K/UL — SIGNIFICANT CHANGE UP (ref 0–0.9)
MONOCYTES NFR BLD AUTO: 7 % — SIGNIFICANT CHANGE UP (ref 2–14)
NEUTROPHILS # BLD AUTO: 3.8 K/UL — SIGNIFICANT CHANGE UP (ref 1.8–7.4)
NEUTROPHILS NFR BLD AUTO: 62.3 % — SIGNIFICANT CHANGE UP (ref 43–77)
NRBC # BLD: 0 /100 WBCS — SIGNIFICANT CHANGE UP (ref 0–0)
NT-PROBNP SERPL-SCNC: 1182 PG/ML — HIGH (ref 0–450)
PLATELET # BLD AUTO: 218 K/UL — SIGNIFICANT CHANGE UP (ref 150–400)
POTASSIUM SERPL-MCNC: 5.1 MMOL/L — SIGNIFICANT CHANGE UP (ref 3.5–5.3)
POTASSIUM SERPL-SCNC: 5.1 MMOL/L — SIGNIFICANT CHANGE UP (ref 3.5–5.3)
PROCALCITONIN SERPL-MCNC: <0.05 — SIGNIFICANT CHANGE UP (ref 0–0.04)
PROT SERPL-MCNC: 7.2 G/DL — SIGNIFICANT CHANGE UP (ref 6–8.3)
PROTHROM AB SERPL-ACNC: 11.4 SEC — SIGNIFICANT CHANGE UP (ref 10.6–13.6)
RBC # BLD: 4.09 M/UL — SIGNIFICANT CHANGE UP (ref 3.8–5.2)
RBC # FLD: 14.5 % — SIGNIFICANT CHANGE UP (ref 10.3–14.5)
SARS-COV-2 IGG SERPL QL IA: NEGATIVE — SIGNIFICANT CHANGE UP
SARS-COV-2 IGM SERPL IA-ACNC: 0.08 INDEX — SIGNIFICANT CHANGE UP
SARS-COV-2 RNA SPEC QL NAA+PROBE: SIGNIFICANT CHANGE UP
SODIUM SERPL-SCNC: 143 MMOL/L — SIGNIFICANT CHANGE UP (ref 135–145)
TROPONIN I SERPL-MCNC: <.015 NG/ML — SIGNIFICANT CHANGE UP (ref 0.01–0.04)
WBC # BLD: 6.11 K/UL — SIGNIFICANT CHANGE UP (ref 3.8–10.5)
WBC # FLD AUTO: 6.11 K/UL — SIGNIFICANT CHANGE UP (ref 3.8–10.5)

## 2020-10-24 PROCEDURE — 73630 X-RAY EXAM OF FOOT: CPT | Mod: 26,50

## 2020-10-24 PROCEDURE — 93010 ELECTROCARDIOGRAM REPORT: CPT

## 2020-10-24 PROCEDURE — 99223 1ST HOSP IP/OBS HIGH 75: CPT | Mod: AI

## 2020-10-24 PROCEDURE — 99284 EMERGENCY DEPT VISIT MOD MDM: CPT

## 2020-10-24 PROCEDURE — 99213 OFFICE O/P EST LOW 20 MIN: CPT

## 2020-10-24 PROCEDURE — 71045 X-RAY EXAM CHEST 1 VIEW: CPT | Mod: 26

## 2020-10-24 RX ORDER — DEXTROSE 50 % IN WATER 50 %
12.5 SYRINGE (ML) INTRAVENOUS ONCE
Refills: 0 | Status: DISCONTINUED | OUTPATIENT
Start: 2020-10-24 | End: 2020-10-26

## 2020-10-24 RX ORDER — PREGABALIN 225 MG/1
1 CAPSULE ORAL
Qty: 0 | Refills: 0 | DISCHARGE

## 2020-10-24 RX ORDER — HEPARIN SODIUM 5000 [USP'U]/ML
5000 INJECTION INTRAVENOUS; SUBCUTANEOUS EVERY 12 HOURS
Refills: 0 | Status: DISCONTINUED | OUTPATIENT
Start: 2020-10-24 | End: 2020-10-26

## 2020-10-24 RX ORDER — CHOLECALCIFEROL (VITAMIN D3) 125 MCG
1 CAPSULE ORAL
Qty: 0 | Refills: 0 | DISCHARGE

## 2020-10-24 RX ORDER — DEXTROSE 50 % IN WATER 50 %
25 SYRINGE (ML) INTRAVENOUS ONCE
Refills: 0 | Status: DISCONTINUED | OUTPATIENT
Start: 2020-10-24 | End: 2020-10-26

## 2020-10-24 RX ORDER — ASPIRIN/CALCIUM CARB/MAGNESIUM 324 MG
81 TABLET ORAL DAILY
Refills: 0 | Status: DISCONTINUED | OUTPATIENT
Start: 2020-10-24 | End: 2020-10-28

## 2020-10-24 RX ORDER — METFORMIN HYDROCHLORIDE 850 MG/1
1 TABLET ORAL
Qty: 0 | Refills: 0 | DISCHARGE

## 2020-10-24 RX ORDER — AMLODIPINE BESYLATE 2.5 MG/1
10 TABLET ORAL DAILY
Refills: 0 | Status: DISCONTINUED | OUTPATIENT
Start: 2020-10-24 | End: 2020-10-28

## 2020-10-24 RX ORDER — DONEPEZIL HYDROCHLORIDE 10 MG/1
1 TABLET, FILM COATED ORAL
Qty: 0 | Refills: 0 | DISCHARGE

## 2020-10-24 RX ORDER — DONEPEZIL HYDROCHLORIDE 10 MG/1
5 TABLET, FILM COATED ORAL AT BEDTIME
Refills: 0 | Status: DISCONTINUED | OUTPATIENT
Start: 2020-10-24 | End: 2020-10-28

## 2020-10-24 RX ORDER — FERROUS GLUCONATE 100 %
1 POWDER (GRAM) MISCELLANEOUS
Qty: 0 | Refills: 0 | DISCHARGE

## 2020-10-24 RX ORDER — CARVEDILOL PHOSPHATE 80 MG/1
6.25 CAPSULE, EXTENDED RELEASE ORAL
Refills: 0 | Status: DISCONTINUED | OUTPATIENT
Start: 2020-10-24 | End: 2020-10-24

## 2020-10-24 RX ORDER — PIPERACILLIN AND TAZOBACTAM 4; .5 G/20ML; G/20ML
3.38 INJECTION, POWDER, LYOPHILIZED, FOR SOLUTION INTRAVENOUS ONCE
Refills: 0 | Status: COMPLETED | OUTPATIENT
Start: 2020-10-24 | End: 2020-10-24

## 2020-10-24 RX ORDER — OMEPRAZOLE 10 MG/1
1 CAPSULE, DELAYED RELEASE ORAL
Qty: 0 | Refills: 0 | DISCHARGE

## 2020-10-24 RX ORDER — VANCOMYCIN HCL 1 G
1000 VIAL (EA) INTRAVENOUS ONCE
Refills: 0 | Status: COMPLETED | OUTPATIENT
Start: 2020-10-24 | End: 2020-10-24

## 2020-10-24 RX ORDER — INSULIN LISPRO 100/ML
VIAL (ML) SUBCUTANEOUS
Refills: 0 | Status: DISCONTINUED | OUTPATIENT
Start: 2020-10-24 | End: 2020-10-26

## 2020-10-24 RX ORDER — CARVEDILOL PHOSPHATE 80 MG/1
1 CAPSULE, EXTENDED RELEASE ORAL
Qty: 0 | Refills: 0 | DISCHARGE

## 2020-10-24 RX ORDER — LOSARTAN POTASSIUM 100 MG/1
0 TABLET, FILM COATED ORAL
Qty: 0 | Refills: 0 | DISCHARGE

## 2020-10-24 RX ORDER — GLUCAGON INJECTION, SOLUTION 0.5 MG/.1ML
1 INJECTION, SOLUTION SUBCUTANEOUS ONCE
Refills: 0 | Status: DISCONTINUED | OUTPATIENT
Start: 2020-10-24 | End: 2020-10-26

## 2020-10-24 RX ORDER — DEXTROSE 50 % IN WATER 50 %
15 SYRINGE (ML) INTRAVENOUS ONCE
Refills: 0 | Status: DISCONTINUED | OUTPATIENT
Start: 2020-10-24 | End: 2020-10-26

## 2020-10-24 RX ORDER — LOSARTAN POTASSIUM 100 MG/1
1 TABLET, FILM COATED ORAL
Qty: 0 | Refills: 0 | DISCHARGE

## 2020-10-24 RX ORDER — PIPERACILLIN AND TAZOBACTAM 4; .5 G/20ML; G/20ML
3.38 INJECTION, POWDER, LYOPHILIZED, FOR SOLUTION INTRAVENOUS EVERY 8 HOURS
Refills: 0 | Status: DISCONTINUED | OUTPATIENT
Start: 2020-10-24 | End: 2020-10-27

## 2020-10-24 RX ORDER — VANCOMYCIN HCL 1 G
1000 VIAL (EA) INTRAVENOUS EVERY 12 HOURS
Refills: 0 | Status: DISCONTINUED | OUTPATIENT
Start: 2020-10-24 | End: 2020-10-26

## 2020-10-24 RX ORDER — CARVEDILOL PHOSPHATE 80 MG/1
6.25 CAPSULE, EXTENDED RELEASE ORAL
Refills: 0 | Status: DISCONTINUED | OUTPATIENT
Start: 2020-10-24 | End: 2020-10-28

## 2020-10-24 RX ORDER — LOVASTATIN 20 MG
1 TABLET ORAL
Qty: 0 | Refills: 0 | DISCHARGE

## 2020-10-24 RX ORDER — SITAGLIPTIN 50 MG/1
1 TABLET, FILM COATED ORAL
Qty: 0 | Refills: 0 | DISCHARGE

## 2020-10-24 RX ORDER — ATORVASTATIN CALCIUM 80 MG/1
20 TABLET, FILM COATED ORAL AT BEDTIME
Refills: 0 | Status: DISCONTINUED | OUTPATIENT
Start: 2020-10-24 | End: 2020-10-28

## 2020-10-24 RX ORDER — SODIUM CHLORIDE 9 MG/ML
1000 INJECTION, SOLUTION INTRAVENOUS
Refills: 0 | Status: DISCONTINUED | OUTPATIENT
Start: 2020-10-24 | End: 2020-10-26

## 2020-10-24 RX ADMIN — PIPERACILLIN AND TAZOBACTAM 200 GRAM(S): 4; .5 INJECTION, POWDER, LYOPHILIZED, FOR SOLUTION INTRAVENOUS at 12:22

## 2020-10-24 RX ADMIN — DONEPEZIL HYDROCHLORIDE 5 MILLIGRAM(S): 10 TABLET, FILM COATED ORAL at 22:00

## 2020-10-24 RX ADMIN — PIPERACILLIN AND TAZOBACTAM 25 GRAM(S): 4; .5 INJECTION, POWDER, LYOPHILIZED, FOR SOLUTION INTRAVENOUS at 22:00

## 2020-10-24 RX ADMIN — HEPARIN SODIUM 5000 UNIT(S): 5000 INJECTION INTRAVENOUS; SUBCUTANEOUS at 17:58

## 2020-10-24 RX ADMIN — Medication 250 MILLIGRAM(S): at 13:21

## 2020-10-24 RX ADMIN — ATORVASTATIN CALCIUM 20 MILLIGRAM(S): 80 TABLET, FILM COATED ORAL at 22:00

## 2020-10-24 RX ADMIN — CARVEDILOL PHOSPHATE 6.25 MILLIGRAM(S): 80 CAPSULE, EXTENDED RELEASE ORAL at 17:58

## 2020-10-24 RX ADMIN — Medication 1000 MILLIGRAM(S): at 14:55

## 2020-10-24 RX ADMIN — PIPERACILLIN AND TAZOBACTAM 3.38 GRAM(S): 4; .5 INJECTION, POWDER, LYOPHILIZED, FOR SOLUTION INTRAVENOUS at 13:03

## 2020-10-24 NOTE — CONSULT NOTE ADULT - SUBJECTIVE AND OBJECTIVE BOX
88y year old Female seen at [facility] ED for Grade 3 wound along the 1st left metatarsal. The patient was admitted from [facility] [place]. The patient is an original patient of Dr. Mullins. The patient has dementia and is accompanied by her son. The patient has been getting routine dressing changes but her wound has worsen to the point to probe to bone.   Denies any fever, chills, nausea, vomiting, chest pain, shortness of breath, or calf pain at this time.    REVIEW OF SYSTEMS    PAST MEDICAL & SURGICAL HISTORY:  Dementia    DM (diabetes mellitus)    HLD (hyperlipidemia)    HTN (hypertension)    GERD (gastroesophageal reflux disease)    History of cholecystectomy    Ankle injury  s/p ankle surgery, pt doesn&#x27;t remember which ankle        Allergies    sulfa drugs (Unknown)    Intolerances        MEDICATIONS  (STANDING):  piperacillin/tazobactam IVPB. 3.375 Gram(s) IV Intermittent Once  vancomycin  IVPB. 1000 milliGRAM(s) IV Intermittent once    MEDICATIONS  (PRN):      Social History:      FAMILY HISTORY:  FH: type 2 diabetes  brother    FH: colon cancer  sister        Vital Signs Last 24 Hrs  T(C): 36.7 (24 Oct 2020 11:14), Max: 36.7 (24 Oct 2020 11:14)  T(F): 98 (24 Oct 2020 11:14), Max: 98 (24 Oct 2020 11:14)  HR: 60 (24 Oct 2020 11:14) (60 - 60)  BP: 130/70 (24 Oct 2020 11:14) (130/70 - 130/70)  BP(mean): --  RR: 14 (24 Oct 2020 11:14) (14 - 14)  SpO2: 96% (24 Oct 2020 11:14) (96% - 96%)    PHYSICAL EXAM:  Vascular: DP & PT nonpalpable bilaterally, Capillary refill 3 seconds, Minimal edema along the left 1st metatarsal   Neurological: Light touch sensation intact bilaterally  Musculoskeletal: Unable to test patient muscle strength, patient is bedbound   Dermatological: -  1. Grade 3 wound of the left 1st metatarsal- size 0.3cmx0.3cmx0.3cm- probes to bone-minimal purulent drainage

## 2020-10-24 NOTE — ED ADULT NURSE NOTE - OBJECTIVE STATEMENT
pt sent from Dr Mullins's office to be admitted for IVAB.  r/o osteo  wound noted to left bunion pt contracted and incontinent at baseline.

## 2020-10-24 NOTE — CONSULT NOTE ADULT - ASSESSMENT
Grade 3 wound of the left 1st metatarsal- patient to be admitted for OM of the left 1st metatarsal. Depending on the MRI results, patient may need to have podiatric surgical intervention via bone resection. Will follow up on Left Forefoot MRI. Per Dr. Mullins- recommend vascular consultation

## 2020-10-24 NOTE — ED PROVIDER NOTE - OBJECTIVE STATEMENT
89 y/o F sent in from [facility] for admission.  Pt has baseline dementia and unable to provide history,  As per Podiatry resident, pt presented for fu to the 89 y/o F sent in from wound care center for admission.  Pt has baseline dementia and unable to provide history,  As per Podiatry resident, pt presented for fu to the wound clinic for her left 1st metatarsal wound.  Concern for osteomyelitis.

## 2020-10-24 NOTE — H&P ADULT - ASSESSMENT
86 yo F with PMH of HTN, HLD, Diabetes Mellitus Type 2 not on home insulin, GERD, Dementia, sent by ER from wound care [facility] for evaluation for concern of left metatarsal of foot.      Diabetic foot ulcer. Concern for osteomyelitis  s/p vanc and zosyn  cont vanc and zosyn, f/u vanc trough   check  ESR, f/u CRP   f/u blood cx x2, urine culture    consult podiatry Dr. Mullins  c/w vanc and zosyn  f/u ESR/CRP  F/u MRI    Pulmonary vascular congestion and left pleural effusion r/o occult CHF  check trops / ekg  echo    Stage 1 sacral wound, skin tear on R thigh 2/2 LLE contracture with erythema and edema on medical aspect of L foot  turn in bed, reposition q 4  keep towel between abrasion and LLE   wound care nurse consulted    consult wound care MD tomorrow AM (spoke with on call physician, no consult line, advised to call in AM).     Diabetes mellitus type 2, noninsulin dependent.  hold home metformin  start low dose ISS, fingersticks, hypoglycemia protocol       Dementia.  baseline bedbound, at baseline knows her name, can identify her family, forgets their names, eats well (regular solid food)  cont donepezil   cont paroxetine.    Iron deficiency anemia. Plan: Hgb 10.5, likely 2/2 iron deficiency anemia    cont home iron  f/u ferritin, TIBC, iron panel.    HLD (hyperlipidemia).  atorvastatin interchange for lovastatin.    HTN (hypertension).    cont losartan, carvedilol, amlodipine with hold parameters.     GERD (gastroesophageal reflux disease).  Plan: continue home omeprazole.       depression:c/w paroxetine    dvt ppx :heparin subq 86 yo F with PMH of HTN, HLD, Diabetes Mellitus Type 2 not on home insulin, GERD, Dementia, sent by ER from wound care [facility] for evaluation for concern of left metatarsal of foot.      Diabetic foot ulcer. Concern for osteomyelitis  s/p vanc and zosyn  cont vanc and zosyn, f/u vanc trough   check  ESR, f/u CRP   f/u blood cx x2, urine culture    consult podiatry Dr. Mullins  c/w vanc and zosyn  f/u ESR/CRP  F/u MRI    Pulmonary vascular congestion and left pleural effusion r/o occult CHF  check trops / ekg  echo    Stage 1 sacral wound, skin tear on R thigh 2/2 LLE contracture with erythema and edema on medical aspect of L foot  turn in bed, reposition q 4  keep towel between abrasion and LLE   wound care nurse consulted    consult wound care MD tomorrow AM (spoke with on call physician, no consult line, advised to call in AM).     Diabetes mellitus type 2, noninsulin dependent.  hold home metformin  start low dose ISS, fingersticks, hypoglycemia protocol       Dementia.  baseline bedbound, at baseline knows her name, can identify her family, forgets their names, eats well (regular solid food)  cont donepezil   cont paroxetine.    Iron deficiency anemia.  Hb 11    hold  home iron  f/u ferritin, TIBC, iron panel b12 studies and folic acid.    HLD (hyperlipidemia).  atorvastatin interchange for lovastatin.    HTN (hypertension).    cont carvedilol, amlodipine with hold parameters. Hold losartan with uptrending Cr.    GERD Patient on omeprazole: will use protonix in house      depression:c/w paroxetine    dvt ppx :heparin subq 84 yo F with PMH of HTN, HLD, Diabetes Mellitus Type 2 not on home insulin, GERD, Dementia, sent by ER from wound care [facility] for evaluation for concern of left metatarsal of foot.      Diabetic foot ulcer. Concern for osteomyelitis  s/p vanc and zosyn  cont vanc and zosyn, f/u vanc trough   check  ESR, f/u CRP   f/u blood cx x2, urine culture    consult podiatry Dr. Mullins  c/w vanc and zosyn  f/u ESR/CRP  F/u MRI    Pulmonary vascular congestion and left pleural effusion r/o occult CHF  check trops / ekg  echo    Stage 1 sacral wound, skin tear on R thigh 2/2 LLE contracture with erythema and edema on medical aspect of L foot  turn in bed, reposition q 4  keep towel between abrasion and LLE   wound care nurse consulted      Diabetes mellitus type 2, noninsulin dependent.  hold home metformin  start low dose ISS, fingersticks,       Dementia.  baseline bedbound, at baseline knows her name, can identify her family, forgets their names, eats well (regular solid food)  cont donepezil   cont paroxetine.    Iron deficiency anemia.  Hb 11    hold  home iron  f/u ferritin, TIBC, iron panel b12 studies and folic acid.    HLD (hyperlipidemia).  atorvastatin interchange for lovastatin.    HTN (hypertension).    cont carvedilol, amlodipine with hold parameters. Hold losartan with uptrending Cr.    GERD Patient on omeprazole: will use protonix in house      depression:c/w paroxetine    dvt ppx :heparin subq

## 2020-10-24 NOTE — ED PROVIDER NOTE - CROS ED SKIN ALL NEG
Physician Discharge Summary     Patient ID:  Randy Adams  [id number]  48 year old  1972  Admit date: 6/19/2020    Discharge date and time:  6/21/2020     PMD: Farshad Mosqueda MD    Consultants:   IP Consult Orders (From admission, onward)     Start     Ordered    06/19/20 1018  Inpatient consult to General Surgery  ONE TIME     Provider:  Isrrael Monae MD    06/19/20 1017                Discharge Diagnoses:      Acute diverticulitis with contained perforation  History of nicotine abuse  History of anal abscess in the past  Hematuria    Concurrent Diagnoses:  Patient Active Problem List   Diagnosis   • Abscess of anal or rectal region       Significant Diagnostic Studies and Procedures:  Ct Abdomen Pelvis W Contrast    Result Date: 6/19/2020  Narrative: EXAM: CT ABDOMEN PELVIS W CONTRAST HISTORY: Abdominal pain for the past week. Left lower quadrant tenderness. COMPARISON: None. TECHNIQUE: 25 mL of Omnipaque 350 diluted with water was administered orally. IV contrast was administered using 100 mL of Isovue-300 nonionic contrast without a reaction. High-resolution helical CT imaging includes the superior margin of the liver through the symphysis pubis, with multiplanar imaging reviewed. FINDINGS: Visualized lung bases show minor atelectasis in the right posterior lung base. There is 4 mm low-attenuation focus seen in the medial aspect of the posterior segment of the right lobe of liver as seen in image #28. The appearance favors a small hepatic cyst. There are 2 additional tiny low-attenuation foci in the anterior left lobe of liver as seen on image #23 of series 2. These are also likely related to tiny hepatic cysts. These are too small to definitively evaluate on CT. Gallbladder shows several tiny stones, but there is no significant wall thickening or pericholecystic fluid/inflammatory change. Spleen appears normal. No adrenal masses. Pancreas is unremarkable. No gross pathologic adenopathy is present. Small  bowel shows minor fluid-filled dilatation. The appendix shows no inflammatory change. Kidneys show no hydronephrosis, masses, renal stones or perirenal renal inflammatory change. There are moderate inflammatory changes present in the sigmoid colon with evidence of a small localized perforation with a small amount of localized extraluminal air as seen in image #113 of series #2 and image #67 of series #5. There is a moderate amount of pericolonic inflammation. There are several scattered colonic air-fluid levels. I do not see a large number of colonic diverticula. There are no acute fractures or aggressive lesions. There is a fixation pin/screw in the left femoral neck.     Impression: IMPRESSION: 1. There are moderate acute inflammatory changes involving the sigmoid colon with abnormal thickening, pericolonic inflammatory change and evidence of a localized perforation where there is a small amount of adjacent extraluminal air. I do not see a significant number of diverticula, but diverticulitis is certainly a potential etiology. Alternative etiologies such as colitis are considerations, malignancy would be another etiology. 2. There are several circumscribed low-attenuation small hepatic foci which are suggestive of hepatic cysts. These are too small to definitively evaluate on CT. 3. Cholelithiasis with no inflammatory change to suggest acute cholecystitis.      Hospital Course:   48 yr old male with a past medical history of nicotine abuse/anal abscess admitted with pain in the lower abdomen for the past 2 weeks on output more over the past 1 day.  Workup revealing acute diverticulitis with contained perforation.  Patient was treated with IV Zosyn.  Pain improved.  Patient remained afebrile.  White count was normal at discharge.  Patient was transition to p.o. Augmentin for 2 more weeks and is to follow-up with surgery in 1 week.Plan for colonoscopy as an outpatient in 4 weeks.  Strong consideration for elective  surgical resection given diverticulitis with microperforation.    UA x2 revealed microscopic hematuria.  Patient advised to follow-up with urology as an outpatient.        Discharge Medications:     Summary of your Discharge Medications      Take these Medications      Details   amoxicillin-clavulanate 875-125 MG per tablet  Commonly known as:  Augmentin   Take 1 tablet by mouth every 12 hours for 14 days. Take with food.     ibuprofen 200 MG tablet  Commonly known as:  MOTRIN   Take 400 mg by mouth every 6 hours as needed for Pain.            Discharge Labs:  Recent Labs   Lab 06/21/20  0541 06/20/20  0512 06/19/20  0806   SODIUM 135 134* 133*   POTASSIUM 3.5 3.8 4.0   CHLORIDE 102 101 99   CO2 22 25 25   BUN 6 8 8   CREATININE 0.72 0.84 0.88   GLUCOSE 95 99 106*   WBC 6.4 9.6 15.6*   HGB 14.6 14.5 16.2   HCT 41.3 42.2 46.2    171 213     No results found for: INR    Discharge Exam:  Blood pressure 128/81, pulse 65, temperature 96.8 °F (36 °C), temperature source Temporal, resp. rate 18, height 6' (1.829 m), weight 107.9 kg, SpO2 96 %.  General appearance - no acute distress  Heart - S1, S2 auscultated  Lungs - clear bilaterally  Abd - BS audible. Non-tender, non-distended  Ext - no edema    Disposition: Home    Patient Instructions:   Activity: activity as tolerated and as PT/OT recommendations  Diet: resume prior diet and as recommended by surgery  Wound Care: none needed    Code status: Full Resuscitation    Follow up: Farshad Mosqueda MD  [id number] [address] [place] [address] [id number] [phone]    In 1 week      Isrrael Monae MD  [id number]   [facility] [id number] [phone]              All questions were answered to the best of my ability .  Time spent on discharge was greater then 30 minutes  Signed:  Tina Acharya MD  6/21/2020  12:43 PM   - - -

## 2020-10-24 NOTE — H&P ADULT - NSHPLABSRESULTS_GEN_ALL_CORE
Labs:                          11.1   6.11  )-----------( 218      ( 24 Oct 2020 12:00 )             35.3     10-24    143  |  111<H>  |  32<H>  ----------------------------<  97  5.1   |  26  |  1.00    Ca    9.0      24 Oct 2020 12:00    TPro  7.2  /  Alb  3.0<L>  /  TBili  0.4  /  DBili  x   /  AST  28  /  ALT  14  /  AlkPhos  50  10-24    LIVER FUNCTIONS - ( 24 Oct 2020 12:00 )  Alb: 3.0 g/dL / Pro: 7.2 g/dL / ALK PHOS: 50 U/L / ALT: 14 U/L / AST: 28 U/L / GGT: x           PT/INR - ( 24 Oct 2020 12:00 )   PT: 11.4 sec;   INR: 0.97 ratio         PTT - ( 24 Oct 2020 12:00 )  PTT:32.9 sec      Active Medications  MEDICATIONS  (STANDING):    MEDICATIONS  (PRN):

## 2020-10-24 NOTE — H&P ADULT - HISTORY OF PRESENT ILLNESS
*****Patient unable to provide a subjective history due to underlying dementia, history obtained from medical records, ER, and son*****  84 yo F with PMH of HTN, HLD, Diabetes Mellitus Type 2 not on home insulin, GERD, Dementia, sent by ER from wound care [facility] for evaluation for concern of left metatarsal of foot.  As per clinical hx The patient has been getting routine dressing changes but her wound has worsen to the point to probe to bone.     On Patient interview: patient Denies any fever, chills, nausea, vomiting, chest pain, shortness of breath, or calf pain at this time.  ER Course:  vitals stable afebrile, HR 72, /65, 99% on RA. Pt received zosyn 3.375 x1, vanc IV x1,   labs: Hgb 11,CR 1 (increased from ,75)   Imaging CXR: small left effusion + vascular congestion   X ray bilateral foot: negative    Podiatry called to evaluate; Patient is now pending MRI of foot 84 yo F with PMH of HTN, HLD, Diabetes Mellitus Type 2 not on home insulin, GERD, Dementia, sent by ER from wound care [facility] for evaluation for concern of left metatarsal of foot.  As per clinical hx The patient has been getting routine dressing changes but her wound has worsen to the point to probe to bone.     On Patient interview: patient Denies any fever, chills, nausea, vomiting, chest pain, shortness of breath, or calf pain at this time.  ER Course:  vitals stable afebrile,   labs: Hgb 11,CR 1 (increased from ,75)   Imaging CXR: small left effusion + vascular congestion   X ray bilateral foot: negative  Pt received zosyn 3.375 x1, vanc IV x1,    Podiatry called to evaluate; Patient is now pending MRI of foot

## 2020-10-24 NOTE — ED ADULT NURSE NOTE - NSIMPLEMENTINTERV_GEN_ALL_ED
Implemented All Fall with Harm Risk Interventions:  [place] to call system. Call bell, personal items and telephone within reach. Instruct patient to call for assistance. Room bathroom lighting operational. Non-slip footwear when patient is off stretcher. Physically safe environment: no spills, clutter or unnecessary equipment. Stretcher in lowest position, wheels locked, appropriate side rails in place. Provide visual cue, wrist band, yellow gown, etc. Monitor gait and stability. Monitor for mental status changes and reorient to person, place, and time. Review medications for side effects contributing to fall risk. Reinforce activity limits and safety measures with patient and family. Provide visual clues: red socks.

## 2020-10-24 NOTE — CONSULT NOTE ADULT - PROBLEM SELECTOR RECOMMENDATION 9
- Patient seen and evaluated  - Wound is dressed with silver alginate and DSD  - Will order Foot Xrays b/l and MRI of the Right Foot  - Podiatry team will continue to follow patient while in house

## 2020-10-24 NOTE — H&P ADULT - NSHPPHYSICALEXAM_GEN_ALL_CORE
Objective:    Vitals:  T(C): 36.7 (10-24-20 @ 11:14), Max: 36.7 (10-24-20 @ 11:14)  HR: 60 (10-24-20 @ 11:14) (60 - 60)  BP: 130/70 (10-24-20 @ 11:14) (130/70 - 130/70)  RR: 14 (10-24-20 @ 11:14) (14 - 14)  SpO2: 96% (10-24-20 @ 11:14) (96% - 96%)    Physical Exam:  General: comfortable, no acute distress, well nourished  HEENT: Atraumatic, no LAD, trachea midline, PERRLA  Cardiovascular: normal s1s2, no murmurs, gallops or fricition rubs  Pulmonary: clear to ausculation Bilaterally, no wheezing , rhonchi  Gastrointestinal: soft non tender non distended, no masses felt, no organomegally  Muscloskeletal: no lower extremity edema, intact bilateral lower extremity pulses  Neurological: CN II-12 intact. No focal weakness  Psychiatrical: normal mood, cooperative  SKIN: no rash, lesions or ulcers

## 2020-10-25 ENCOUNTER — TRANSCRIPTION ENCOUNTER (OUTPATIENT)
Age: 85
End: 2020-10-25

## 2020-10-25 LAB
ANION GAP SERPL CALC-SCNC: 5 MMOL/L — SIGNIFICANT CHANGE UP (ref 5–17)
BASOPHILS # BLD AUTO: 0.06 K/UL — SIGNIFICANT CHANGE UP (ref 0–0.2)
BASOPHILS NFR BLD AUTO: 0.9 % — SIGNIFICANT CHANGE UP (ref 0–2)
BUN SERPL-MCNC: 27 MG/DL — HIGH (ref 7–23)
CALCIUM SERPL-MCNC: 8.6 MG/DL — SIGNIFICANT CHANGE UP (ref 8.5–10.1)
CHLORIDE SERPL-SCNC: 111 MMOL/L — HIGH (ref 96–108)
CO2 SERPL-SCNC: 29 MMOL/L — SIGNIFICANT CHANGE UP (ref 22–31)
CREAT SERPL-MCNC: 0.92 MG/DL — SIGNIFICANT CHANGE UP (ref 0.5–1.3)
EOSINOPHIL # BLD AUTO: 0.15 K/UL — SIGNIFICANT CHANGE UP (ref 0–0.5)
EOSINOPHIL NFR BLD AUTO: 2.2 % — SIGNIFICANT CHANGE UP (ref 0–6)
GLUCOSE SERPL-MCNC: 117 MG/DL — HIGH (ref 70–99)
HCT VFR BLD CALC: 33.3 % — LOW (ref 34.5–45)
HGB BLD-MCNC: 10.6 G/DL — LOW (ref 11.5–15.5)
IMM GRANULOCYTES NFR BLD AUTO: 0.3 % — SIGNIFICANT CHANGE UP (ref 0–1.5)
LYMPHOCYTES # BLD AUTO: 1.58 K/UL — SIGNIFICANT CHANGE UP (ref 1–3.3)
LYMPHOCYTES # BLD AUTO: 23.4 % — SIGNIFICANT CHANGE UP (ref 13–44)
MAGNESIUM SERPL-MCNC: 1.7 MG/DL — SIGNIFICANT CHANGE UP (ref 1.6–2.6)
MCHC RBC-ENTMCNC: 26.7 PG — LOW (ref 27–34)
MCHC RBC-ENTMCNC: 31.8 GM/DL — LOW (ref 32–36)
MCV RBC AUTO: 83.9 FL — SIGNIFICANT CHANGE UP (ref 80–100)
MONOCYTES # BLD AUTO: 0.56 K/UL — SIGNIFICANT CHANGE UP (ref 0–0.9)
MONOCYTES NFR BLD AUTO: 8.3 % — SIGNIFICANT CHANGE UP (ref 2–14)
NEUTROPHILS # BLD AUTO: 4.38 K/UL — SIGNIFICANT CHANGE UP (ref 1.8–7.4)
NEUTROPHILS NFR BLD AUTO: 64.9 % — SIGNIFICANT CHANGE UP (ref 43–77)
NRBC # BLD: 0 /100 WBCS — SIGNIFICANT CHANGE UP (ref 0–0)
PLATELET # BLD AUTO: 252 K/UL — SIGNIFICANT CHANGE UP (ref 150–400)
POTASSIUM SERPL-MCNC: 3.9 MMOL/L — SIGNIFICANT CHANGE UP (ref 3.5–5.3)
POTASSIUM SERPL-SCNC: 3.9 MMOL/L — SIGNIFICANT CHANGE UP (ref 3.5–5.3)
PREALB SERPL-MCNC: 22 MG/DL — SIGNIFICANT CHANGE UP (ref 20–40)
RBC # BLD: 3.97 M/UL — SIGNIFICANT CHANGE UP (ref 3.8–5.2)
RBC # FLD: 14.3 % — SIGNIFICANT CHANGE UP (ref 10.3–14.5)
SODIUM SERPL-SCNC: 145 MMOL/L — SIGNIFICANT CHANGE UP (ref 135–145)
WBC # BLD: 6.75 K/UL — SIGNIFICANT CHANGE UP (ref 3.8–10.5)
WBC # FLD AUTO: 6.75 K/UL — SIGNIFICANT CHANGE UP (ref 3.8–10.5)

## 2020-10-25 PROCEDURE — 93306 TTE W/DOPPLER COMPLETE: CPT | Mod: 26

## 2020-10-25 PROCEDURE — 99233 SBSQ HOSP IP/OBS HIGH 50: CPT | Mod: GC

## 2020-10-25 RX ORDER — MULTIVIT-MIN/FERROUS GLUCONATE 9 MG/15 ML
1 LIQUID (ML) ORAL DAILY
Refills: 0 | Status: CANCELLED | OUTPATIENT
Start: 2020-10-25 | End: 2020-10-28

## 2020-10-25 RX ORDER — ASCORBIC ACID 60 MG
500 TABLET,CHEWABLE ORAL
Refills: 0 | Status: CANCELLED | OUTPATIENT
Start: 2020-10-25 | End: 2020-10-28

## 2020-10-25 RX ORDER — LOSARTAN POTASSIUM 100 MG/1
100 TABLET, FILM COATED ORAL DAILY
Refills: 0 | Status: DISCONTINUED | OUTPATIENT
Start: 2020-10-25 | End: 2020-10-28

## 2020-10-25 RX ADMIN — AMLODIPINE BESYLATE 10 MILLIGRAM(S): 2.5 TABLET ORAL at 05:31

## 2020-10-25 RX ADMIN — ATORVASTATIN CALCIUM 20 MILLIGRAM(S): 80 TABLET, FILM COATED ORAL at 21:35

## 2020-10-25 RX ADMIN — PIPERACILLIN AND TAZOBACTAM 25 GRAM(S): 4; .5 INJECTION, POWDER, LYOPHILIZED, FOR SOLUTION INTRAVENOUS at 06:42

## 2020-10-25 RX ADMIN — DONEPEZIL HYDROCHLORIDE 5 MILLIGRAM(S): 10 TABLET, FILM COATED ORAL at 22:10

## 2020-10-25 RX ADMIN — Medication 81 MILLIGRAM(S): at 12:48

## 2020-10-25 RX ADMIN — Medication 250 MILLIGRAM(S): at 17:27

## 2020-10-25 RX ADMIN — Medication 10 MILLIGRAM(S): at 12:48

## 2020-10-25 RX ADMIN — HEPARIN SODIUM 5000 UNIT(S): 5000 INJECTION INTRAVENOUS; SUBCUTANEOUS at 05:32

## 2020-10-25 RX ADMIN — Medication 250 MILLIGRAM(S): at 05:32

## 2020-10-25 RX ADMIN — PIPERACILLIN AND TAZOBACTAM 25 GRAM(S): 4; .5 INJECTION, POWDER, LYOPHILIZED, FOR SOLUTION INTRAVENOUS at 21:35

## 2020-10-25 RX ADMIN — PIPERACILLIN AND TAZOBACTAM 25 GRAM(S): 4; .5 INJECTION, POWDER, LYOPHILIZED, FOR SOLUTION INTRAVENOUS at 13:56

## 2020-10-25 RX ADMIN — Medication 2: at 12:48

## 2020-10-25 RX ADMIN — CARVEDILOL PHOSPHATE 6.25 MILLIGRAM(S): 80 CAPSULE, EXTENDED RELEASE ORAL at 05:31

## 2020-10-25 RX ADMIN — CARVEDILOL PHOSPHATE 6.25 MILLIGRAM(S): 80 CAPSULE, EXTENDED RELEASE ORAL at 17:29

## 2020-10-25 RX ADMIN — HEPARIN SODIUM 5000 UNIT(S): 5000 INJECTION INTRAVENOUS; SUBCUTANEOUS at 17:27

## 2020-10-25 NOTE — DISCHARGE NOTE PROVIDER - NSDCMRMEDTOKEN_GEN_ALL_CORE_FT
amLODIPine 10 mg oral tablet: 1 tab(s) orally once a day  aspirin 81 mg oral tablet, chewable: 1 tab(s) orally once a day  carvedilol 6.25 mg oral tablet: 1 tab(s) orally 2 times a day  donepezil 5 mg oral tablet: 1 tab(s) orally once a day (at bedtime)  ferrous gluconate 240 mg (27 mg elemental iron) oral tablet: 1 tab(s) orally once a day  Januvia 100 mg oral tablet: 1 tab(s) orally once a day  lactobacillus acidophilus oral capsule: 1 tab(s) orally 2 times a day  losartan 100 mg oral tablet: 1 tab(s) orally once a day  lovastatin 40 mg oral tablet: 1 tab(s) orally once a day  metFORMIN 500 mg oral tablet: 1 tab(s) orally 2 times a day  omeprazole 20 mg oral delayed release capsule: 1 cap(s) orally once a day  PARoxetine 20 mg oral tablet: 1 tab(s) orally once a day  Vitamin B12 500 mcg oral tablet: 1 tab(s) orally once a day  Vitamin D3 2000 intl units oral tablet: 1 tab(s) orally once a day   amLODIPine 10 mg oral tablet: 1 tab(s) orally once a day  aspirin 81 mg oral tablet, chewable: 1 tab(s) orally once a day  carvedilol 6.25 mg oral tablet: 1 tab(s) orally 2 times a day  cefepime 1 g intravenous injection: 1 gram(s) intravenous every 12 hours  donepezil 5 mg oral tablet: 1 tab(s) orally once a day (at bedtime)  ferrous gluconate 240 mg (27 mg elemental iron) oral tablet: 1 tab(s) orally once a day  Januvia 100 mg oral tablet: 1 tab(s) orally once a day  lactobacillus acidophilus oral capsule: 1 tab(s) orally 2 times a day  losartan 100 mg oral tablet: 1 tab(s) orally once a day  lovastatin 40 mg oral tablet: 1 tab(s) orally once a day  metFORMIN 500 mg oral tablet: 1 tab(s) orally 2 times a day  omeprazole 20 mg oral delayed release capsule: 1 cap(s) orally once a day  PARoxetine 20 mg oral tablet: 1 tab(s) orally once a day  Vitamin B12 500 mcg oral tablet: 1 tab(s) orally once a day  Vitamin D3 2000 intl units oral tablet: 1 tab(s) orally once a day   amLODIPine 10 mg oral tablet: 1 tab(s) orally once a day  aspirin 81 mg oral tablet, chewable: 1 tab(s) orally once a day  carvedilol 6.25 mg oral tablet: 1 tab(s) orally 2 times a day  cefepime 1 g intravenous injection: 1 gram(s) intravenous every 12 hours, Will need CBC, BMP and CRP weekly while on Abx, watch for side effects especially diarrhea/cdiff.     donepezil 5 mg oral tablet: 1 tab(s) orally once a day (at bedtime)  ferrous gluconate 240 mg (27 mg elemental iron) oral tablet: 1 tab(s) orally once a day  Januvia 100 mg oral tablet: 1 tab(s) orally once a day  lactobacillus acidophilus oral capsule: 1 tab(s) orally 2 times a day  losartan 100 mg oral tablet: 1 tab(s) orally once a day  lovastatin 40 mg oral tablet: 1 tab(s) orally once a day  metFORMIN 500 mg oral tablet: 1 tab(s) orally 2 times a day  omeprazole 20 mg oral delayed release capsule: 1 cap(s) orally once a day  PARoxetine 20 mg oral tablet: 1 tab(s) orally once a day  Vitamin B12 500 mcg oral tablet: 1 tab(s) orally once a day  Vitamin D3 2000 intl units oral tablet: 1 tab(s) orally once a day

## 2020-10-25 NOTE — SWALLOW BEDSIDE ASSESSMENT ADULT - COMMENTS
Per charting, the patient is an "84 yo F with PMH of HTN, HLD, Diabetes Mellitus Type 2 not on home insulin, GERD, Dementia, sent by ER from [facility] care [facility] for evaluation for concern of left metatarsal of foot. As per clinical hx The patient has been getting routine dressing changes but her wound has worsen to the point to probe to bone."    XR CHEST 10/24: "Left lower chest infiltrate with effusion."    Consult received and chart reviewed. The patient was seen this morning for an assessment of swallow function, at which time she was awake/alert and oriented to self and place. The patient was able to follow simple directives and answer yes/no questions. RN reported no dysphagia.

## 2020-10-25 NOTE — DISCHARGE NOTE PROVIDER - CARE PROVIDERS DIRECT ADDRESSES
,DirectAddress_Unknown ,DirectAddress_Unknown,[email or web address] ,DirectAddress_Unknown,[email or web address]

## 2020-10-25 NOTE — DIETITIAN INITIAL EVALUATION ADULT. - SIGNS/SYMPTOMS
as evidenced by mild depletion to muscle regions along w/ ~26% wt loss/2 yrs (stable for past 2 mos) as evidenced by diabetic foot ulcer and Stage I pressure ulcer to coccyx.

## 2020-10-25 NOTE — DISCHARGE NOTE PROVIDER - HOSPITAL COURSE
HPI:  84 yo F with PMH of HTN, HLD, Diabetes Mellitus Type 2 not on home insulin, GERD, Dementia, sent by ER from wound care [facility] for evaluation for concern of left metatarsal of foot.  As per clinical hx The patient has been getting routine dressing changes but her wound has worsen to the point to probe to bone.     On Patient interview: patient Denies any fever, chills, nausea, vomiting, chest pain, shortness of breath, or calf pain at this time.  ER Course:  vitals stable afebrile,   labs: Hgb 11,CR 1 (increased from ,75)   Imaging CXR: small left effusion + vascular congestion   X ray bilateral foot: negative  Pt received zosyn 3.375 x1, vanc IV x1,    Podiatry called to evaluate; Patient is now pending MRI of foot (24 Oct 2020 14:04)      ---  HOSPITAL COURSE:   Patient was admitted for diabetic foot ulcer and r/o osteomyelitis. Pa      Diabetic foot ulcer. Concern for osteomyelitis  s/p vanc and zosyn  ESR 26, CRP WNL   Podiatry, Dr. Mullins consulted, f/u recs  c/w vanc and zosyn, f/u trough  f/u blood cx x2, urine culture    F/u MRI foot    Pulmonary vascular congestion and left pleural effusion r/o occult CHF  CXR showed L infiltrate w/ effusion  Patient appears euvolemic on exam  Trops neg, BMP 1100  F/u TTE    Stage 1 sacral wound, skin tear on R thigh 2/2 LLE contracture with erythema and edema on medical aspect of L foot  turn in bed, reposition q 4  keep towel between abrasion and LLE   wound care nurse consulted      Diabetes mellitus type 2, noninsulin dependent.  hold home metformin  start low dose ISS, fingersticks,       Dementia.  baseline bedbound, at baseline knows her name, can identify her family, forgets their names, eats well (regular solid food)  AAOx1 this AM  cont donepezil   cont paroxetine.    Iron deficiency anemia.  Hb 11.1-->10.6, normocytic  hold home iron  f/u ferritin, TIBC, iron panel b12 studies and folic acid.    HLD (hyperlipidemia).  atorvastatin interchange for lovastatin.    HTN (hypertension).    cont carvedilol, amlodipine, and losartan with hold parameters.    GERD Patient on omeprazole: will use protonix in house      depression:c/w paroxetine    dvt ppx :heparin subq    ---  CONSULTANTS:     ---  TIME SPENT:  I, the attending physician, was physically present for the key portions of the evaluation and management (E/M) service provided. The total amount of time spent reviewing the hospital notes, laboratory values, imaging findings, assessing/counseling the patient, discussing with consultant physicians, social work, nursing staff was -- minutes    ---  Primary care provider was made aware of plan for discharge:      [  ] NO     [  ] YES   HPI:  84 yo F with PMH of HTN, HLD, Diabetes Mellitus Type 2 not on home insulin, GERD, Dementia, sent by ER from wound care [facility] for evaluation for concern of left metatarsal of foot.  As per clinical hx The patient has been getting routine dressing changes but her wound has worsen to the point to probe to bone.     On Patient interview: patient Denies any fever, chills, nausea, vomiting, chest pain, shortness of breath, or calf pain at this time.  ER Course:  vitals stable afebrile,   labs: Hgb 11,CR 1 (increased from ,75)   Imaging CXR: small left effusion + vascular congestion   X ray bilateral foot: negative  Pt received zosyn 3.375 x1, vanc IV x1,    Podiatry called to evaluate; Patient is now pending MRI of foot (24 Oct 2020 14:04)      ---  HOSPITAL COURSE:   Patient was admitted for diabetic foot ulcer and r/o osteomyelitis. X ray b/l feet was negative. An MRI foot was ordered, which showed ________. Podiatry, Dr. Mullins consulted, who recommended _________. Patient was continued on vanc and zosyn. Blood cx showed ____. Patient was also found to have a small left effusion + vascular congestion on CXR. Patient appears euvolemic on exam, trops negs, BMP 1100. TTE was ordered, which showed __________.  Patient is medically stable for discharge to ____ w/ PCP follow up      ---  CONSULTANTS:   Podiatry-- Dr. Mullins    ---  TIME SPENT:  I, the attending physician, was physically present for the key portions of the evaluation and management (E/M) service provided. The total amount of time spent reviewing the hospital notes, laboratory values, imaging findings, assessing/counseling the patient, discussing with consultant physicians, social work, nursing staff was -- minutes    ---  Primary care provider was made aware of plan for discharge:      [  ] NO     [  ] YES   HPI:  84 yo F with PMH of HTN, HLD, Diabetes Mellitus Type 2 not on home insulin, GERD, Dementia, sent by ER from wound care [facility] for evaluation for concern of left metatarsal of foot.  As per clinical hx The patient has been getting routine dressing changes but her wound has worsen to the point to probe to bone.     On Patient interview: patient Denies any fever, chills, nausea, vomiting, chest pain, shortness of breath, or calf pain at this time.  ER Course:  vitals stable afebrile,   labs: Hgb 11,CR 1 (increased from ,75)   Imaging CXR: small left effusion + vascular congestion   X ray bilateral foot: negative  Pt received zosyn 3.375 x1, vanc IV x1,    Podiatry called to evaluate; Patient is now pending MRI of foot (24 Oct 2020 14:04)      ---  HOSPITAL COURSE:   Patient was admitted for diabetic foot ulcer and r/o osteomyelitis. X ray b/l feet was negative. An MRI foot was ordered, which showed ________. Podiatry, Dr. Mullins consulted, who recommended _________. Patient was continued on vanc and zosyn. Blood cx showed ____. Patient was also found to have a small left effusion + vascular congestion on CXR. Patient appears euvolemic on exam, trops negs, BMP 1100. TTE was ordered, which showed __________.  Patient is medically stable for discharge to ____ w/ PCP follow up.      ---  CONSULTANTS:   Podiatry-- Dr. Mullins    ---  TIME SPENT:  I, the attending physician, was physically present for the key portions of the evaluation and management (E/M) service provided. The total amount of time spent reviewing the hospital notes, laboratory values, imaging findings, assessing/counseling the patient, discussing with consultant physicians, social work, nursing staff was -- minutes    ---  Primary care provider was made aware of plan for discharge:      [  ] NO     [  ] YES   HPI:  84 yo F with PMH of HTN, HLD, Diabetes Mellitus Type 2 not on home insulin, GERD, Dementia, sent by ER from wound care [facility] for evaluation for concern of left metatarsal of foot.  As per clinical hx The patient has been getting routine dressing changes but her wound has worsen to the point to probe to bone.     On Patient interview: patient Denies any fever, chills, nausea, vomiting, chest pain, shortness of breath, or calf pain at this time.  ER Course:  vitals stable afebrile,   labs: Hgb 11,CR 1 (increased from ,75)   Imaging CXR: small left effusion + vascular congestion   X ray bilateral foot: negative  Pt received zosyn 3.375 x1, vanc IV x1,    Podiatry called to evaluate; Patient is now pending MRI of foot (24 Oct 2020 14:04)      ---  HOSPITAL COURSE:   Patient was admitted for diabetic foot ulcer and r/o osteomyelitis. X ray b/l feet was negative. An MRI foot was ordered, which showed ________. Podiatry, Dr. 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 Patient was treated with vanc and zosyn. Blood cx showed ____. Patient was also found to have a small left effusion + vascular congestion on CXR. Patient appears euvolemic on exam, trops negs, BMP 1100. TTE was ordered, which showed __________.  Patient is medically stable for discharge to ____ w/ PCP follow up.      ---  CONSULTANTS:   Podiatry-- Dr. Mullins    ---  TIME SPENT:  I, the attending physician, was physically present for the key portions of the evaluation and management (E/M) service provided. The total amount of time spent reviewing the hospital notes, laboratory values, imaging findings, assessing/counseling the patient, discussing with consultant physicians, social work, nursing staff was -- minutes    ---  Primary care provider was made aware of plan for discharge:      [  ] NO     [  ] YES   HPI:  84 yo F with PMH of HTN, HLD, Diabetes Mellitus Type 2 not on home insulin, GERD, Dementia, sent by ER from wound care [facility] for evaluation for concern of left metatarsal of foot.  As per clinical hx The patient has been getting routine dressing changes but her wound has worsen to the point to probe to bone.     On Patient interview: patient Denies any fever, chills, nausea, vomiting, chest pain, shortness of breath, or calf pain at this time.  ER Course:  vitals stable afebrile,   labs: Hgb 11,CR 1 (increased from ,75)   Imaging CXR: small left effusion + vascular congestion   X ray bilateral foot: negative  Pt received zosyn 3.375 x1, vanc IV x1,    Podiatry called to evaluate; Patient is now pending MRI of foot (24 Oct 2020 14:04)      ---  HOSPITAL COURSE:   Patient was admitted for diabetic foot ulcer and r/o osteomyelitis. X ray b/l feet was negative. Podiatry, Dr. 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 Procedure was performed without any complications. Patient was treated with IV Vanc and IV Zosyn and switched to IV Cefepime. Patient was transitioned to oral ciprofloxacin 500mg q12 to complete 6 weeks from the day of start of cefepime (10/28-12/8/2020). Blood cx showed no growth. Patient was also found to have a small left effusion + vascular congestion on CXR. Patient appears euvolemic on exam, trops negs, BMP 1100. TTE showed normal left ventricular internal dimensions and systolic function, estimated LVEF of 65%, grossly normal RV size and systolic function, trace A, mild MR and TR. Patient is medically stable for discharge to home w/ PCP follow up.      ---  CONSULTANTS:   Podiatry - Dr. Mullins  ID - Dr Yip  Cardio - [facility]    ---  TIME SPENT:  I, the attending physician, was physically present for the key portions of the evaluation and management (E/M) service provided. The total amount of time spent reviewing the hospital notes, laboratory values, imaging findings, assessing/counseling the patient, discussing with consultant physicians, social work, nursing staff was 55 minutes     HPI:  86 yo F with PMH of HTN, HLD, Diabetes Mellitus Type 2 not on home insulin, GERD, Dementia, sent by ER from wound care [facility] for evaluation for concern of left metatarsal of foot.  As per clinical hx The patient has been getting routine dressing changes but her wound has worsen to the point to probe to bone.     On Patient interview: patient Denies any fever, chills, nausea, vomiting, chest pain, shortness of breath, or calf pain at this time.  ER Course:  vitals stable afebrile,   labs: Hgb 11,CR 1 (increased from ,75)   Imaging CXR: small left effusion + vascular congestion   X ray bilateral foot: negative  Pt received zosyn 3.375 x1, vanc IV x1,    Podiatry called to evaluate; Patient is now pending MRI of foot      ---  HOSPITAL COURSE:   Patient was admitted for diabetic foot ulcer and r/o osteomyelitis. X ray b/l feet was negative. Podiatry, Dr. 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 Procedure was performed without any complications. Patient was treated with IV Vanc and IV Zosyn and switched to IV Cefepime. Proximal margin of amputation showed OM. Patient had PICC line placed will continue Cefepime 1gm q12 to complete 6 week course (10/28-12/8/2020).  Blood cx showed no growth. Patient was also found to have a small left effusion + vascular congestion on CXR. Patient appears euvolemic on exam, trops negs, BMP 1100. TTE showed normal left ventricular internal dimensions and systolic function, estimated LVEF of 65%, grossly normal RV size and systolic function, trace A, mild MR and TR. Patient is medically stable for discharge to home w/ PCP follow up.      ---  CONSULTANTS:   Podiatry - Dr. Mullins  ID - Dr Yip  Cardio - [facility]    ---  TIME SPENT:  I, the attending physician, was physically present for the key portions of the evaluation and management (E/M) service provided. The total amount of time spent reviewing the hospital notes, laboratory values, imaging findings, assessing/counseling the patient, discussing with consultant physicians, social work, nursing staff was 55 minutes     HPI:  84 yo F with PMH of HTN, HLD, Diabetes Mellitus Type 2 not on home insulin, GERD, Dementia, sent by ER from wound care [facility] for evaluation for concern of left metatarsal of foot.  As per clinical hx The patient has been getting routine dressing changes but her wound has worsen to the point to probe to bone.     On Patient interview: patient Denies any fever, chills, nausea, vomiting, chest pain, shortness of breath, or calf pain at this time.  ER Course:  vitals stable afebrile,   labs: Hgb 11,CR 1 (increased from ,75)   Imaging CXR: small left effusion + vascular congestion   X ray bilateral foot: negative  Pt received zosyn 3.375 x1, vanc IV x1,    Podiatry called to evaluate; Patient is now pending MRI of foot      ---  HOSPITAL COURSE:   Patient was admitted for diabetic foot ulcer and concern for osteomyelitis. X ray b/l feet were negative. Podiatry, Dr. 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 Procedure was performed without any complications. Patient was treated with IV Vanc and IV Zosyn and switched to IV Cefepime. Proximal margin of amputation showed OM. Patient had PICC line placed will continue Cefepime 1gm q12 to complete 6 week course (10/28-12/8/2020).  Blood cx showed no growth. Patient was also found to have a small left effusion + vascular congestion on CXR. Patient appears euvolemic on exam, trops negs, BMP 1100. TTE showed normal left ventricular internal dimensions and systolic function, estimated LVEF of 65%, grossly normal RV size and systolic function, trace A, mild MR and TR. Patient is medically stable for discharge to home w/ PCP follow up. Patient to follow up with podiatry/ wound care within 1 week of discharge.      ---  CONSULTANTS:   Podiatry - Dr. Mullins  ID - Dr Yip  Cardio - [facility] group    ---  TIME SPENT:  I, the attending physician, was physically present for the key portions of the evaluation and management (E/M) service provided. The total amount of time spent reviewing the hospital notes, laboratory values, imaging findings, assessing/counseling the patient, discussing with consultant physicians, social work, nursing staff was 55 minutes     HPI:  84 yo F with PMH of HTN, HLD, Diabetes Mellitus Type 2 not on home insulin, GERD, Dementia, sent by ER from wound care [facility] for evaluation for concern of left metatarsal of foot.  As per clinical hx The patient has been getting routine dressing changes but her wound has worsen to the point to probe to bone.     On Patient interview: patient Denies any fever, chills, nausea, vomiting, chest pain, shortness of breath, or calf pain at this time.  ER Course:  vitals stable afebrile,   labs: Hgb 11,CR 1 (increased from ,75)   Imaging CXR: small left effusion + vascular congestion   X ray bilateral foot: negative  Pt received zosyn 3.375 x1, vanc IV x1,    Podiatry called to evaluate; Patient is now pending MRI of foot      ---  HOSPITAL COURSE:   Patient was admitted for diabetic foot ulcer and concern for osteomyelitis. X ray b/l feet were negative. Podiatry, Dr. 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 Procedure was performed without any complications. Patient was treated with IV Vanc and IV Zosyn and switched to IV Cefepime. Proximal margin of amputation showed OM. Patient had PICC line placed will continue Cefepime 1gm q12 to complete 6 week course (10/28-12/8/2020).  Blood cx showed no growth. Patient was also found to have a small left effusion + vascular congestion on CXR. Patient appears euvolemic on exam, trops negs, BMP 1100. TTE showed normal left ventricular internal dimensions and systolic function, estimated LVEF of 65%, grossly normal RV size and systolic function, trace A, mild MR and TR. Patient is medically stable for discharge to home w/ PCP follow up. Patient to follow up with podiatry/ wound care within 1 week of discharge.      ---  CONSULTANTS:   Podiatry - Dr. Mullins  ID - Dr Yip  Cardio - [facility] group    Physical exam on day of discharge   VITAL SIGNS OVER LAST 24 HOURS:  T(C): 36.9 (11-04-20 @ 05:13), Max: 36.9 (11-04-20 @ 05:13)  T(F): 98.5 (11-04-20 @ 05:13), Max: 98.5 (11-04-20 @ 05:13)  HR: 62 (11-04-20 @ 05:13) (59 - 63)  BP: 133/58 (11-04-20 @ 05:13) (128/55 - 147/69)  BP(mean): --  RR: 17 (11-04-20 @ 05:13) (17 - 18)  SpO2: 96% (11-04-20 @ 05:13) (96% - 97%)    PHYSICAL EXAM:  GENERAL: no acute distress  HEENT: anicteric, no pharyngeal exudates  LUNGS: clear to auscultation, no wheezing or rhonchi appreciated  HEART: S1, S2, regular rate and rhythm, murmur noted, no edema to b/l LE  GASTROINTESTINAL: abdomen is soft, nontender, nondistended, normoactive bowel sounds  EXTREMITIES: contracted LLE and LUE, L foot dry clean dressing. no edema, cyanosis, or calf tenderness.  NEUROLOGIC: answering simple questions and following simple commands appropriately             ---  TIME SPENT:  I, the attending physician, was physically present for the key portions of the evaluation and management (E/M) service provided. The total amount of time spent reviewing the hospital notes, laboratory values, imaging findings, assessing/counseling the patient, discussing with consultant physicians, social work, nursing staff was 55 minutes

## 2020-10-25 NOTE — SWALLOW BEDSIDE ASSESSMENT ADULT - SWALLOW EVAL: DIAGNOSIS
1. The patient demonstrated functional oral management of puree, regular solids and thin liquid textures marked by adequate bolus collection, transfer and posterior transport. 2. The patient demonstrated functional pharyngeal skills for all consistencies trialed marked by timely initiation of swallow trigger with adequate hyolaryngeal elevation upon digital palpation without evidence of laryngeal penetration. 3. Recommend maintain regular solid and thin liquid diet + aspiration precautions. Discussed with RN and MD.

## 2020-10-25 NOTE — PHYSICAL THERAPY INITIAL EVALUATION ADULT - ADDITIONAL COMMENTS
Son at bedside gives information due to patient history of confusion. Patient lives in a private home with 3 steps to enter. Pt does not negotiate steps inside. Son states he lives less than 2 miles away; so does his brother. (10/25/20) pt lives home and has 24/7 HHA through [facility] insurance.

## 2020-10-25 NOTE — DIETITIAN INITIAL EVALUATION ADULT. - ETIOLOGY
related to probable inadequate energy intake in the past related to increased nutrient needs for wound healing

## 2020-10-25 NOTE — DISCHARGE NOTE PROVIDER - PROVIDER TOKENS
PROVIDER:[TOKEN:[[id number]]] PROVIDER:[TOKEN:[[id number]]],PROVIDER:[TOKEN:[[id number]],FOLLOWUP:[1 week]] PROVIDER:[TOKEN:[[id number]]],PROVIDER:[TOKEN:[[id number]],FOLLOWUP:[1 week]],PROVIDER:[TOKEN:[[id number]],FOLLOWUP:[Routine]]

## 2020-10-25 NOTE — DISCHARGE NOTE PROVIDER - NSDCFUADDINST_GEN_ALL_CORE_FT
- Please make an appointment for follow up with your primary doctor in 1-3 days  - Please make an appointment for follow up with  Dr. Mullins (information above)  - Patient or caretaker is responsible for making all appointments  - Please return to the ED if you develop worsening symptoms or fever   - Please make an appointment for follow up with your primary doctor in 1-3 days  - Please have weekly CBC, BMP, CRP done with your primary doctor and fax it to Dr. Almonte, Fax [phone]  - Please make an appointment for follow up with  Dr. Mullins (information above)  - Patient or caretaker is responsible for making all appointments  - Please return to the ED if you develop worsening symptoms or fever

## 2020-10-25 NOTE — PROGRESS NOTE ADULT - ATTENDING COMMENTS
86 yo F with PMH of HTN, HLD, Diabetes Mellitus Type 2 not on home insulin, GERD, Dementia, sent by ER from wound care [facility] for evaluation for concern of left metatarsal of foot. PLan: consult ID, apprec podiatry recs, monitor clinical course, cont iv anitbx, f/u vanco trough, consider cardio for optimizaiton if needs OR, poss MR foot tomorrow

## 2020-10-25 NOTE — PROGRESS NOTE ADULT - ASSESSMENT
84 yo F with PMH of HTN, HLD, Diabetes Mellitus Type 2 not on home insulin, GERD, Dementia, sent by ER from wound care [facility] for evaluation for concern of left metatarsal of foot.      Diabetic foot ulcer. Concern for osteomyelitis  s/p vanc and zosyn  ESR 26, CRP WNL   Podiatry, Dr. Mullins consulted, f/u recs  c/w vanc and zosyn, f/u trough  f/u blood cx x2, urine culture    F/u MRI foot    Pulmonary vascular congestion and left pleural effusion r/o occult CHF  CXR showed L infiltrate w/ effusion  Patient appears euvolemic on exam  Trops neg, BMP 1100  F/u TTE    Stage 1 sacral wound, skin tear on R thigh 2/2 LLE contracture with erythema and edema on medical aspect of L foot  turn in bed, reposition q 4  keep towel between abrasion and LLE   wound care nurse consulted      Diabetes mellitus type 2, noninsulin dependent.  hold home metformin  start low dose ISS, fingersticks,       Dementia.  baseline bedbound, at baseline knows her name, can identify her family, forgets their names, eats well (regular solid food)  AAOx1 this AM  cont donepezil   cont paroxetine.    Iron deficiency anemia.  Hb 11.1-->10.6, normocytic  hold home iron  f/u ferritin, TIBC, iron panel b12 studies and folic acid.    HLD (hyperlipidemia).  atorvastatin interchange for lovastatin.    HTN (hypertension).    cont carvedilol, amlodipine, and losartan with hold parameters.    GERD Patient on omeprazole: will use protonix in house      depression:c/w paroxetine    dvt ppx :heparin subq

## 2020-10-25 NOTE — DIETITIAN INITIAL EVALUATION ADULT. - OTHER INFO
87 y/o female adm with Type 2 DM with foot ulcer. [facility]. PMH dementia, DM, HLD, HTN, GERD. Pt confused, pleasantly. Pt states that her Dad prepares her food at home. Pt noted to live home with a 24hr aide. Son allows pt to have what ever food that she likes. A1c noted to be 6.2. Regular diet is ordered. Pt was seen by this RD in August at which time her appetite was very good however, ~35# wt loss was noted over 2 yrs. Moderate temporal, clavicle and shoulder wasting noted. At this time, it appears that pt has mild depletion to these areas. Wt has been stable over past 2 months.

## 2020-10-25 NOTE — PHARMACOTHERAPY INTERVENTION NOTE - COMMENTS
Patient receiving Vancomycin 1g Q12h and Zosyn Q8h. Spoke to MD Correa) to discuss increased risk of AVE with the combination of Zosyn and Vancomycin. MD aware and will further evaluate and optimize therapies once cultures return.

## 2020-10-25 NOTE — DISCHARGE NOTE PROVIDER - CARE PROVIDER_API CALL
[facility]  [address] [place] [address]  Phone: [phone]  Fax: [phone]  Follow Up Time:    Tramaine Robles  FAMILY MEDICINE  [address] [place] [address]  Phone: [phone]  Fax: [phone]  Follow Up Time:     Vidal Mullins  PODIATRIC MEDICINE AND SURGERY  [address] [place] [address]  Phone: [phone]  Fax: [phone]  Follow Up Time: 1 week   Tramaine Robles  FAMILY MEDICINE  [address] [place] [address]  Phone: [phone]  Fax: [phone]  Follow Up Time:     Vidal Mullins  PODIATRIC MEDICINE AND SURGERY  [address] [place] [address]  Phone: [phone]  Fax: [phone]  Follow Up Time: 1 week    Mohit Almonte  INFECTIOUS DISEASE  [address] [facility] [address]  [place] [address]  Phone: [phone]  Fax: [phone]  Follow Up Time: Routine

## 2020-10-25 NOTE — DISCHARGE NOTE PROVIDER - NSDCCPCAREPLAN_GEN_ALL_CORE_FT
PRINCIPAL DISCHARGE DIAGNOSIS  Diagnosis: Diabetic foot ulcer  Assessment and Plan of Treatment: You were found to have an ulcer in your left foot. And MRI was ordered, which showed ???osteo/no osteo???      SECONDARY DISCHARGE DIAGNOSES  Diagnosis: Pneumonia of left lower lobe due to infectious organism  Assessment and Plan of Treatment:      PRINCIPAL DISCHARGE DIAGNOSIS  Diagnosis: Diabetic foot ulcer  Assessment and Plan of Treatment: - You were found to have an ulcer in your left foot.  - Left first metatarsal amputation by podiatry on 10/28  - You were treated with IV antibiotics  - START Ciprofloxacin 500 mg twice a day until 12/8/2020, a prescription was sent to your pharmacy  - Follow up with PCP within 1 week of discharge      SECONDARY DISCHARGE DIAGNOSES  Diagnosis: Depression  Assessment and Plan of Treatment: - Continue home medication Paroxetine    Diagnosis: GERD (gastroesophageal reflux disease)  Assessment and Plan of Treatment: - Continue home medication Omeprazole    Diagnosis: Hypertension  Assessment and Plan of Treatment: - Continue home medication Carvedilol, Amlodipine, Losartan    Diagnosis: Hyperlipidemia  Assessment and Plan of Treatment: - Continue home medication Atorvastatin    Diagnosis: Dementia  Assessment and Plan of Treatment: - Continue home medication Donepezil, Paroxetine    Diagnosis: Diabetes  Assessment and Plan of Treatment: - Continue home diabetes medication     PRINCIPAL DISCHARGE DIAGNOSIS  Diagnosis: Diabetic foot ulcer  Assessment and Plan of Treatment: - You were found to have an ulcer in your left foot.  - Left first metatarsal amputation by podiatry on 10/28  - You were treated with IV antibiotics  - Continue Cefepime 1gm twice a day until 12/8/2020, a prescription was sent to your pharmacy  - Follow up with PCP and Dr. Mullins within 1 week of discharge      SECONDARY DISCHARGE DIAGNOSES  Diagnosis: Depression  Assessment and Plan of Treatment: - Continue home medication Paroxetine    Diagnosis: GERD (gastroesophageal reflux disease)  Assessment and Plan of Treatment: - Continue home medication Omeprazole    Diagnosis: Hypertension  Assessment and Plan of Treatment: - Continue home medication Carvedilol, Amlodipine, Losartan    Diagnosis: Hyperlipidemia  Assessment and Plan of Treatment: - Continue home medication Atorvastatin    Diagnosis: Dementia  Assessment and Plan of Treatment: - Continue home medication Donepezil, Paroxetine    Diagnosis: Diabetes  Assessment and Plan of Treatment: - Continue home diabetes medication     PRINCIPAL DISCHARGE DIAGNOSIS  Diagnosis: Diabetic foot ulcer  Assessment and Plan of Treatment: - You were found to have an ulcer in your left foot.  - Left first metatarsal amputation by podiatry on 10/28  - You were treated with IV antibiotics  - Continue Cefepime 1gm twice a day until 12/8/2020, a prescription was sent to your pharmacy  -Will need CBC, BMP and CRP weekly with primary doctor while on Abx, watch for side effects especially diarrhea/cdiff.   - Follow up with PCP and Dr. Mullins within 1 week of discharge      SECONDARY DISCHARGE DIAGNOSES  Diagnosis: Depression  Assessment and Plan of Treatment: - Continue home medication Paroxetine    Diagnosis: GERD (gastroesophageal reflux disease)  Assessment and Plan of Treatment: - Continue home medication Omeprazole    Diagnosis: Hypertension  Assessment and Plan of Treatment: - Continue home medication Carvedilol, Amlodipine, Losartan    Diagnosis: Hyperlipidemia  Assessment and Plan of Treatment: - Continue home medication Atorvastatin    Diagnosis: Dementia  Assessment and Plan of Treatment: - Continue home medication Donepezil, Paroxetine    Diagnosis: Diabetes  Assessment and Plan of Treatment: - Continue home diabetes medication     PRINCIPAL DISCHARGE DIAGNOSIS  Diagnosis: Diabetic foot ulcer  Assessment and Plan of Treatment: - You were found to have an ulcer in your left foot.  - Left first metatarsal amputation by podiatry on 10/28  - You were treated with IV antibiotics  - Continue Cefepime 1gm twice a day until 12/8/2020, a prescription was sent to your pharmacy  -Will need CBC, BMP and CRP weekly with primary doctor while on Abx, watch for side effects especially diarrhea/cdiff. Fax [phone]  - Follow up with PCP and Dr. Mullins within 1 week of discharge      SECONDARY DISCHARGE DIAGNOSES  Diagnosis: Depression  Assessment and Plan of Treatment: - Continue home medication Paroxetine    Diagnosis: GERD (gastroesophageal reflux disease)  Assessment and Plan of Treatment: - Continue home medication Omeprazole    Diagnosis: Hypertension  Assessment and Plan of Treatment: - Continue home medication Carvedilol, Amlodipine, Losartan    Diagnosis: Hyperlipidemia  Assessment and Plan of Treatment: - Continue home medication Atorvastatin    Diagnosis: Dementia  Assessment and Plan of Treatment: - Continue home medication Donepezil, Paroxetine    Diagnosis: Diabetes  Assessment and Plan of Treatment: - Continue home diabetes medication

## 2020-10-25 NOTE — SWALLOW BEDSIDE ASSESSMENT ADULT - ASR SWALLOW ASPIRATION MONITOR
gurgly voice/pneumonia/upper respiratory infection/oral hygiene/change of breathing pattern/position upright (90Y)/cough/throat clearing/fever

## 2020-10-25 NOTE — PHARMACOTHERAPY INTERVENTION NOTE - COMMENTS
Patient med rec completed. Taking Paroxetine 20 mg daily at home. Paroxetine 10 mg daily was ordered. Spoke with MD (Felipe) and recommended increasing to home dose of Paroxetine 20 mg. MD agreed.

## 2020-10-25 NOTE — PROGRESS NOTE ADULT - SUBJECTIVE AND OBJECTIVE BOX
Patient is a 88y old  Female who presents with a chief complaint of Osteomyletis (24 Oct 2020 14:04)      INTERVAL HPI/OVERNIGHT EVENTS: Patient seen and examined at bedside. No events overnight. Patient w/ hx of dementia, AAOx1 this AM. Denies any pain in her foot. No complaints this AM, denies any HA, CP, SOB, cough, abdominal pain, n/v, or edema.    MEDICATIONS  (STANDING):  amLODIPine   Tablet 10 milliGRAM(s) Oral daily  aspirin  chewable 81 milliGRAM(s) Oral daily  atorvastatin 20 milliGRAM(s) Oral at bedtime  carvedilol 6.25 milliGRAM(s) Oral two times a day  dextrose 5%. 1000 milliLiter(s) (50 mL/Hr) IV Continuous <Continuous>  dextrose 50% Injectable 12.5 Gram(s) IV Push once  dextrose 50% Injectable 25 Gram(s) IV Push once  dextrose 50% Injectable 25 Gram(s) IV Push once  donepezil 5 milliGRAM(s) Oral at bedtime  heparin   Injectable 5000 Unit(s) SubCutaneous every 12 hours  insulin lispro (ADMELOG) corrective regimen sliding scale   SubCutaneous three times a day before meals  PARoxetine 10 milliGRAM(s) Oral daily  piperacillin/tazobactam IVPB.. 3.375 Gram(s) IV Intermittent every 8 hours  vancomycin  IVPB 1000 milliGRAM(s) IV Intermittent every 12 hours    MEDICATIONS  (PRN):  dextrose 40% Gel 15 Gram(s) Oral once PRN Blood Glucose LESS THAN 70 milliGRAM(s)/deciliter  glucagon  Injectable 1 milliGRAM(s) IntraMuscular once PRN Glucose LESS THAN 70 milligrams/deciliter      Allergies    sulfa drugs (Unknown)    Intolerances        REVIEW OF SYSTEMS:  CONSTITUTIONAL: No fever or chills  HEENT:  No headache, no sore throat  RESPIRATORY: No cough, wheezing, or shortness of breath  CARDIOVASCULAR: No chest pain, palpitations  GASTROINTESTINAL: No abd pain, nausea, vomiting, or diarrhea  GENITOURINARY: No dysuria, frequency, or hematuria  NEUROLOGICAL: no focal weakness or dizziness  MUSCULOSKELETAL: no myalgias   SKIN: Positive for ulcer of L foot    Vital Signs Last 24 Hrs  T(C): 36.4 (25 Oct 2020 05:07), Max: 36.6 (24 Oct 2020 19:48)  T(F): 97.6 (25 Oct 2020 05:07), Max: 97.8 (24 Oct 2020 19:48)  HR: 63 (25 Oct 2020 05:07) (62 - 66)  BP: 156/62 (25 Oct 2020 05:07) (126/66 - 156/62)  BP(mean): --  RR: 18 (24 Oct 2020 19:48) (16 - 18)  SpO2: 94% (24 Oct 2020 19:48) (94% - 98%)    PHYSICAL EXAM:  GENERAL: NAD, laying in bed comfortably  HEENT:  anicteric, moist mucous membranes  CHEST/LUNG:  CTA b/l, no rales, wheezes, or rhonchi  HEART:  RRR, S1, S2  ABDOMEN:  BS+, soft, nontender, nondistended  EXTREMITIES: no edema, cyanosis, or calf tenderness. L foot wrapped in gauze dressing, clean dry and intact  NERVOUS SYSTEM: answers questions and follows commands appropriately, AAOx1    LABS:                        10.6   6.75  )-----------( 252      ( 25 Oct 2020 06:25 )             33.3     CBC Full  -  ( 25 Oct 2020 06:25 )  WBC Count : 6.75 K/uL  Hemoglobin : 10.6 g/dL  Hematocrit : 33.3 %  Platelet Count - Automated : 252 K/uL  Mean Cell Volume : 83.9 fl  Mean Cell Hemoglobin : 26.7 pg  Mean Cell Hemoglobin Concentration : 31.8 gm/dL  Auto Neutrophil # : 4.38 K/uL  Auto Lymphocyte # : 1.58 K/uL  Auto Monocyte # : 0.56 K/uL  Auto Eosinophil # : 0.15 K/uL  Auto Basophil # : 0.06 K/uL  Auto Neutrophil % : 64.9 %  Auto Lymphocyte % : 23.4 %  Auto Monocyte % : 8.3 %  Auto Eosinophil % : 2.2 %  Auto Basophil % : 0.9 %    25 Oct 2020 06:25    145    |  111    |  27     ----------------------------<  117    3.9     |  29     |  0.92     Ca    8.6        25 Oct 2020 06:25  Mg     1.7       25 Oct 2020 06:25    TPro  7.2    /  Alb  3.0    /  TBili  0.4    /  DBili  x      /  AST  28     /  ALT  14     /  AlkPhos  50     24 Oct 2020 12:00    PT/INR - ( 24 Oct 2020 12:00 )   PT: 11.4 sec;   INR: 0.97 ratio         PTT - ( 24 Oct 2020 12:00 )  PTT:32.9 sec    CAPILLARY BLOOD GLUCOSE      POCT Blood Glucose.: 119 mg/dL (25 Oct 2020 08:16)  POCT Blood Glucose.: 183 mg/dL (24 Oct 2020 21:19)  POCT Blood Glucose.: 76 mg/dL (24 Oct 2020 17:37)          RADIOLOGY & ADDITIONAL TESTS:  < from: Xray Chest 1 View- PORTABLE-Urgent (Xray Chest 1 View- PORTABLE-Urgent .) (10.24.20 @ 12:18) >  EXAM:  XR CHEST PORTABLE URGENT 1V                            PROCEDURE DATE:  10/24/2020          INTERPRETATION:  Chest one view    HISTORY: Preop    COMPARISON STUDY: 8/29/2020    Frontal expiratory view of the chest shows the heart to be borderline in size. The lungs show mild to moderate central congestion with small left infiltrate/effusion and there is no evidence of pneumothorax nor right pleural effusion. Right upper quadrant clips are present.    IMPRESSION:  Left lower chest infiltrate with effusion.    Thank you for the courtesy of this referral.    < end of copied text >  < from: Xray Foot AP + Lateral + Oblique, Bilat (10.24.20 @ 12:17) >  XAM:  FOOT BILATERAL (MINIMUM 3 V)                            PROCEDURE DATE:  10/24/2020          INTERPRETATION:  Bilateral feet    HISTORY: Osteomyelitis     Three views of the right foot and three views of the left foot show no evidence of fracture nor destructive change. The joint spaces show left hallux valgus deformity. Surgical hardware projects in the lower left tibia and fibula.    IMPRESSION: No evidence of bony destruction.    < end of copied text >      Personally reviewed.     Consultant(s) Notes Reviewed:  [x] YES  [ ] NO

## 2020-10-25 NOTE — PHYSICAL THERAPY INITIAL EVALUATION ADULT - PERTINENT HX OF CURRENT PROBLEM, REHAB EVAL
84 yo F with PMH of HTN, HLD, Diabetes Mellitus Type 2 not on home insulin, GERD, Dementia, sent by ER from wound care [facility] for evaluation for concern of left metatarsal of foot.

## 2020-10-26 DIAGNOSIS — E11.621 TYPE 2 DIABETES MELLITUS WITH FOOT ULCER: ICD-10-CM

## 2020-10-26 LAB
ANION GAP SERPL CALC-SCNC: 6 MMOL/L — SIGNIFICANT CHANGE UP (ref 5–17)
BUN SERPL-MCNC: 25 MG/DL — HIGH (ref 7–23)
CALCIUM SERPL-MCNC: 9.3 MG/DL — SIGNIFICANT CHANGE UP (ref 8.5–10.1)
CHLORIDE SERPL-SCNC: 110 MMOL/L — HIGH (ref 96–108)
CO2 SERPL-SCNC: 27 MMOL/L — SIGNIFICANT CHANGE UP (ref 22–31)
CREAT SERPL-MCNC: 0.89 MG/DL — SIGNIFICANT CHANGE UP (ref 0.5–1.3)
GLUCOSE SERPL-MCNC: 108 MG/DL — HIGH (ref 70–99)
GRAM STN FLD: SIGNIFICANT CHANGE UP
HCT VFR BLD CALC: 35.6 % — SIGNIFICANT CHANGE UP (ref 34.5–45)
HGB BLD-MCNC: 11.6 G/DL — SIGNIFICANT CHANGE UP (ref 11.5–15.5)
MCHC RBC-ENTMCNC: 27 PG — SIGNIFICANT CHANGE UP (ref 27–34)
MCHC RBC-ENTMCNC: 32.6 GM/DL — SIGNIFICANT CHANGE UP (ref 32–36)
MCV RBC AUTO: 83 FL — SIGNIFICANT CHANGE UP (ref 80–100)
NRBC # BLD: 0 /100 WBCS — SIGNIFICANT CHANGE UP (ref 0–0)
PLATELET # BLD AUTO: 267 K/UL — SIGNIFICANT CHANGE UP (ref 150–400)
POTASSIUM SERPL-MCNC: 4.1 MMOL/L — SIGNIFICANT CHANGE UP (ref 3.5–5.3)
POTASSIUM SERPL-SCNC: 4.1 MMOL/L — SIGNIFICANT CHANGE UP (ref 3.5–5.3)
RBC # BLD: 4.29 M/UL — SIGNIFICANT CHANGE UP (ref 3.8–5.2)
RBC # FLD: 14.5 % — SIGNIFICANT CHANGE UP (ref 10.3–14.5)
SODIUM SERPL-SCNC: 143 MMOL/L — SIGNIFICANT CHANGE UP (ref 135–145)
SPECIMEN SOURCE: SIGNIFICANT CHANGE UP
VANCOMYCIN TROUGH SERPL-MCNC: 19.4 UG/ML — SIGNIFICANT CHANGE UP (ref 10–20)
VANCOMYCIN TROUGH SERPL-MCNC: 25.8 UG/ML — CRITICAL HIGH (ref 10–20)
WBC # BLD: 7.27 K/UL — SIGNIFICANT CHANGE UP (ref 3.8–10.5)
WBC # FLD AUTO: 7.27 K/UL — SIGNIFICANT CHANGE UP (ref 3.8–10.5)

## 2020-10-26 PROCEDURE — 93922 UPR/L XTREMITY ART 2 LEVELS: CPT | Mod: 26

## 2020-10-26 PROCEDURE — 99232 SBSQ HOSP IP/OBS MODERATE 35: CPT | Mod: 57

## 2020-10-26 PROCEDURE — 99232 SBSQ HOSP IP/OBS MODERATE 35: CPT

## 2020-10-26 PROCEDURE — 99222 1ST HOSP IP/OBS MODERATE 55: CPT

## 2020-10-26 PROCEDURE — 99233 SBSQ HOSP IP/OBS HIGH 50: CPT | Mod: GC

## 2020-10-26 RX ORDER — DEXTROSE 50 % IN WATER 50 %
12.5 SYRINGE (ML) INTRAVENOUS ONCE
Refills: 0 | Status: DISCONTINUED | OUTPATIENT
Start: 2020-10-26 | End: 2020-10-28

## 2020-10-26 RX ORDER — VANCOMYCIN HCL 1 G
750 VIAL (EA) INTRAVENOUS ONCE
Refills: 0 | Status: COMPLETED | OUTPATIENT
Start: 2020-10-26 | End: 2020-10-26

## 2020-10-26 RX ORDER — DEXTROSE 50 % IN WATER 50 %
15 SYRINGE (ML) INTRAVENOUS ONCE
Refills: 0 | Status: DISCONTINUED | OUTPATIENT
Start: 2020-10-26 | End: 2020-10-28

## 2020-10-26 RX ORDER — GLUCAGON INJECTION, SOLUTION 0.5 MG/.1ML
1 INJECTION, SOLUTION SUBCUTANEOUS ONCE
Refills: 0 | Status: DISCONTINUED | OUTPATIENT
Start: 2020-10-26 | End: 2020-10-28

## 2020-10-26 RX ORDER — INSULIN LISPRO 100/ML
VIAL (ML) SUBCUTANEOUS EVERY 6 HOURS
Refills: 0 | Status: DISCONTINUED | OUTPATIENT
Start: 2020-10-26 | End: 2020-10-27

## 2020-10-26 RX ORDER — DEXTROSE 50 % IN WATER 50 %
25 SYRINGE (ML) INTRAVENOUS ONCE
Refills: 0 | Status: DISCONTINUED | OUTPATIENT
Start: 2020-10-26 | End: 2020-10-28

## 2020-10-26 RX ORDER — VANCOMYCIN HCL 1 G
VIAL (EA) INTRAVENOUS
Refills: 0 | Status: DISCONTINUED | OUTPATIENT
Start: 2020-10-26 | End: 2020-10-27

## 2020-10-26 RX ORDER — VANCOMYCIN HCL 1 G
750 VIAL (EA) INTRAVENOUS EVERY 24 HOURS
Refills: 0 | Status: DISCONTINUED | OUTPATIENT
Start: 2020-10-27 | End: 2020-10-27

## 2020-10-26 RX ORDER — VANCOMYCIN HCL 1 G
1000 VIAL (EA) INTRAVENOUS EVERY 24 HOURS
Refills: 0 | Status: DISCONTINUED | OUTPATIENT
Start: 2020-10-26 | End: 2020-10-26

## 2020-10-26 RX ORDER — SODIUM CHLORIDE 9 MG/ML
1000 INJECTION, SOLUTION INTRAVENOUS
Refills: 0 | Status: DISCONTINUED | OUTPATIENT
Start: 2020-10-26 | End: 2020-10-28

## 2020-10-26 RX ADMIN — CARVEDILOL PHOSPHATE 6.25 MILLIGRAM(S): 80 CAPSULE, EXTENDED RELEASE ORAL at 17:28

## 2020-10-26 RX ADMIN — Medication 250 MILLIGRAM(S): at 21:59

## 2020-10-26 RX ADMIN — Medication 20 MILLIGRAM(S): at 11:26

## 2020-10-26 RX ADMIN — PIPERACILLIN AND TAZOBACTAM 25 GRAM(S): 4; .5 INJECTION, POWDER, LYOPHILIZED, FOR SOLUTION INTRAVENOUS at 05:53

## 2020-10-26 RX ADMIN — Medication 2: at 11:32

## 2020-10-26 RX ADMIN — PIPERACILLIN AND TAZOBACTAM 25 GRAM(S): 4; .5 INJECTION, POWDER, LYOPHILIZED, FOR SOLUTION INTRAVENOUS at 22:42

## 2020-10-26 RX ADMIN — AMLODIPINE BESYLATE 10 MILLIGRAM(S): 2.5 TABLET ORAL at 05:52

## 2020-10-26 RX ADMIN — Medication 81 MILLIGRAM(S): at 11:27

## 2020-10-26 RX ADMIN — PIPERACILLIN AND TAZOBACTAM 25 GRAM(S): 4; .5 INJECTION, POWDER, LYOPHILIZED, FOR SOLUTION INTRAVENOUS at 16:05

## 2020-10-26 RX ADMIN — HEPARIN SODIUM 5000 UNIT(S): 5000 INJECTION INTRAVENOUS; SUBCUTANEOUS at 05:52

## 2020-10-26 RX ADMIN — DONEPEZIL HYDROCHLORIDE 5 MILLIGRAM(S): 10 TABLET, FILM COATED ORAL at 21:59

## 2020-10-26 RX ADMIN — CARVEDILOL PHOSPHATE 6.25 MILLIGRAM(S): 80 CAPSULE, EXTENDED RELEASE ORAL at 05:52

## 2020-10-26 RX ADMIN — LOSARTAN POTASSIUM 100 MILLIGRAM(S): 100 TABLET, FILM COATED ORAL at 05:52

## 2020-10-26 RX ADMIN — ATORVASTATIN CALCIUM 20 MILLIGRAM(S): 80 TABLET, FILM COATED ORAL at 21:59

## 2020-10-26 NOTE — PROGRESS NOTE ADULT - ASSESSMENT
84 yo F with PMH of HTN, HLD, Diabetes Mellitus Type 2 not on home insulin, GERD, Dementia, sent by ER from wound care [facility] for evaluation for concern of left metatarsal of foot.      Diabetic foot ulcer. Concern for osteomyelitis  Podiatry, Dr. Mullins consulted, f/u recs  C/W vanc and zosyn, f/u trough (target 15-24)  Decreased Vanc to q24 given high vanc trough (25)  ESR 26, CRP WNL   F/u Blood cx x2 10/24 NGTD  F/u Ucx?    MRI foot - unable to obtain 2/2 contracture  ID Consulted  Palliative Case Consulted    Pulmonary vascular congestion and left pleural effusion r/o occult CHF  CXR showed L infiltrate w/ effusion  Patient appears euvolemic on exam  Trops neg, BMP 1100  TTE EF65% mild valvular disease    Stage 1 sacral wound, skin tear on R thigh 2/2 LLE contracture with erythema and edema on medical aspect of L foot  turn in bed, reposition q 4  keep towel between abrasion and LLE   wound care nurse consulted      Diabetes mellitus type 2, noninsulin dependent.  hold home metformin  start low dose ISS, fingersticks,       Dementia.  baseline bedbound, at baseline knows her name, can identify her family, forgets their names, eats well (regular solid food)  AAOx1 this AM  cont donepezil   cont paroxetine.    Iron deficiency anemia.  Hb 10-11, normocytic  hold home iron  f/u iron studies/b12/folate?    HLD (hyperlipidemia).  atorvastatin interchange for lovastatin.    HTN (hypertension).    cont carvedilol, amlodipine, and losartan with hold parameters.    GERD Patient on omeprazole: will use protonix in house    Depression:c/w paroxetine    Dvt ppx: heparin subq

## 2020-10-26 NOTE — PROGRESS NOTE ADULT - ATTENDING COMMENTS
84 yo F with PMH of HTN, HLD, Diabetes Mellitus Type 2 not on home insulin, GERD, Dementia, sent by ER from wound care [facility] for evaluation for concern of left metatarsal of foot. Plan: apprec intrerdiscplinary collaboration and consultation, plan is for poss OR wed, will have cardio optimize, medical optimization underway, apprec ID recs, monitor clinical course

## 2020-10-26 NOTE — PROGRESS NOTE ADULT - SUBJECTIVE AND OBJECTIVE BOX
NAME: NELLIE NICOLE  MRN: [id number]  LOCATION: [facility] 3WES 362 W1    Patient is a 88y old  Female who presents with a chief complaint of Osteomyelitis (26 Oct 2020 10:59)      HPI:  84 yo F with PMH of HTN, HLD, Diabetes Mellitus Type 2 not on home insulin, GERD, Dementia, sent by ER from wound care [facility] for evaluation for concern of left metatarsal of foot.  As per clinical hx The patient has been getting routine dressing changes but her wound has worsen to the point to probe to bone.     On Patient interview: patient Denies any fever, chills, nausea, vomiting, chest pain, shortness of breath, or calf pain at this time.  ER Course:  vitals stable afebrile,   labs: Hgb 11,CR 1 (increased from ,75)   Imaging CXR: small left effusion + vascular congestion   X ray bilateral foot: negative  Pt received zosyn 3.375 x1, vanc IV x1,    Podiatry called to evaluate; Patient is now pending MRI of foot (24 Oct 2020 14:04)      OVERNIGHT EVENTS: JACINTO overnight  INTERVAL HPI: Patient seen and examined at bedside. Vanc trough 25. Patient has no complaints at this time, only notes being in a lousy mood. Denies foot pain, fevers, chills, headache, lightheadedness, chest pain, dyspnea, abdominal pain, n/v/d/c.    T(F): 98.6 (10-26-20 @ 05:18), Max: 98.7 (10-25-20 @ 21:53)  HR: 67 (10-26-20 @ 05:18) (61 - 67)  BP: 159/68 (10-26-20 @ 05:18) (146/60 - 159/68)  RR: 17 (10-26-20 @ 05:18) (17 - 18)  SpO2: 96% (10-26-20 @ 05:18) (96% - 100%)  I&O's Summary    25 Oct 2020 07:01  -  26 Oct 2020 07:00  --------------------------------------------------------  IN: 1150 mL / OUT: 300 mL / NET: 850 mL        REVIEW OF SYSTEMS:  CONSTITUTIONAL: No fever  EYES: No eye pain, visual disturbances, or discharge  RESPIRATORY: No cough, wheezing No shortness of breath  CARDIOVASCULAR: No chest pain, palpitations, dizziness  GASTROINTESTINAL: No abdominal or epigastric pain. No nausea, vomiting, or hematemesis; No diarrhea or constipation. No melena or hematochezia.  GENITOURINARY: No dysuria, frequency,   NEUROLOGICAL: No headaches  MUSCULOSKELETAL: No muscle, back, or extremity pain    PHYSICAL EXAM:  GENERAL: NAD, laying in bed comfortably  HEENT:  anicteric, moist mucous membranes  CHEST/LUNG:  CTA b/l, no rales, wheezes, or rhonchi  HEART:  RRR, S1, S2  ABDOMEN:  BS+, soft, nontender, nondistended  EXTREMITIES:  contracted LLE and LUE, L foot wrapped in gauze dressing, clean dry and intact. no edema, cyanosis, or calf tenderness.  NERVOUS SYSTEM: answers questions and follows commands appropriately, AOx1    LABS:                        11.6   7.27  )-----------( 267      ( 26 Oct 2020 04:44 )             35.6     10-26    143  |  110<H>  |  25<H>  ----------------------------<  108<H>  4.1   |  27  |  0.89    Ca    9.3      26 Oct 2020 05:04  Mg     1.7     10-25          CAPILLARY BLOOD GLUCOSE      POCT Blood Glucose.: 237 mg/dL (26 Oct 2020 11:29)  POCT Blood Glucose.: 130 mg/dL (26 Oct 2020 08:24)  POCT Blood Glucose.: 164 mg/dL (25 Oct 2020 21:45)  POCT Blood Glucose.: 106 mg/dL (25 Oct 2020 17:22)      10-24 @ 21:09   No growth to date.  --  --      MEDICATIONS  (STANDING):  amLODIPine   Tablet 10 milliGRAM(s) Oral daily  aspirin  chewable 81 milliGRAM(s) Oral daily  atorvastatin 20 milliGRAM(s) Oral at bedtime  carvedilol 6.25 milliGRAM(s) Oral two times a day  dextrose 5%. 1000 milliLiter(s) (50 mL/Hr) IV Continuous <Continuous>  dextrose 50% Injectable 12.5 Gram(s) IV Push once  dextrose 50% Injectable 25 Gram(s) IV Push once  dextrose 50% Injectable 25 Gram(s) IV Push once  donepezil 5 milliGRAM(s) Oral at bedtime  heparin   Injectable 5000 Unit(s) SubCutaneous every 12 hours  insulin lispro (ADMELOG) corrective regimen sliding scale   SubCutaneous three times a day before meals  losartan 100 milliGRAM(s) Oral daily  PARoxetine 20 milliGRAM(s) Oral daily  piperacillin/tazobactam IVPB.. 3.375 Gram(s) IV Intermittent every 8 hours  vancomycin  IVPB 1000 milliGRAM(s) IV Intermittent every 24 hours    MEDICATIONS  (PRN):  dextrose 40% Gel 15 Gram(s) Oral once PRN Blood Glucose LESS THAN 70 milliGRAM(s)/deciliter  glucagon  Injectable 1 milliGRAM(s) IntraMuscular once PRN Glucose LESS THAN 70 milligrams/deciliter

## 2020-10-26 NOTE — CONSULT NOTE ADULT - CONVERSATION DETAILS
Writer spoke with pts son Marcio. He and his brother are surrogates and share responsibility of caring for pt. Reviewed patient's medical and social history as well as events leading to patient's hospitalization. He states that pt quality of life was poor that she is bedbound and does not always recognize him.  Writer discussed patient's current  medical condition and management, She is here for a wound on her foot. pts prognosis is probably poor due to advancing dementia, her age, immobility and comorbidities. Her son is aware of this and limited life expectancy. Inquired about patient's wishes regarding extent of medical care to be provided including escalation of medical care into the ICU In addition, the writer inquired about thoughts regarding cardiopulmonary resuscitation, artificial nutrition and hydration including use of feeding tubes and IVF, antibiotics, he showed good  insight into medical condition and seems to have realistic expectations. All questions answered. Writer recommended that he complete MOLST  Patient son consented to DNR/DNI  status stating he does not want to try to prolong her life with extraordinary measures MOLST form filled out and placed in chart.  Psychosocial support provided.

## 2020-10-26 NOTE — PROVIDER CONTACT NOTE (CRITICAL VALUE NOTIFICATION) - ACTION/TREATMENT ORDERED:
IV Vancomycin changed from Q12 to Q24 hrs by Sarah Zarco trough level to be drawn immediately prior to next dose  Do not administer next dose of vancomycin until trough level is drawn and resulted as per Dr Lerma

## 2020-10-26 NOTE — CONSULT NOTE ADULT - SUBJECTIVE AND OBJECTIVE BOX
[facility]  INFECTIOUS DISEASES   [address] [place] [address]  Tel: [phone]     Fax: [phone]  =======================================================    MRN-[id number]  NELLIE NICOLE     CC: foot ulcer and OM    HPI:  84 y/o woman with PMH of HTN, Diabetes Mellitus 2, GERD and Dementia was sent from ER wound center for evaluation of left foot ulcer and possible Osteomyelitis.  Patient has dementia unable to answer questions.   She goes regularly to wound center for a chronic left metatarsal head ulcer (plantar side). She had negative xray bilaterally.   No complaint but when changing dressing she is complaining of pain in left foot.  She received zosyn and vancomycin one dose in ED.     PAST MEDICAL & SURGICAL HISTORY:  Dementia  DM (diabetes mellitus)  HLD (hyperlipidemia)  HTN (hypertension)  GERD (gastroesophageal reflux disease)  History of cholecystectomy  Ankle injury  s/p ankle surgery, pt doesn&#x27;t remember which ankle    Social Hx: no smoking or toxic habits     FAMILY HISTORY:  FH: type 2 diabetes  brother    FH: colon cancer  sister    Allergies  sulfa drugs (Unknown)    Antibiotics:  piperacillin/tazobactam IVPB.. 3.375 Gram(s) IV Intermittent every 8 hours  vancomycin  IVPB 1000 milliGRAM(s) IV Intermittent every 24 hours     REVIEW OF SYSTEMS:  Limited due to dementia but states that has left foot pain.     Physical Exam:  Vital Signs Last 24 Hrs  T(C): 37 (26 Oct 2020 05:18), Max: 37.1 (25 Oct 2020 21:53)  T(F): 98.6 (26 Oct 2020 05:18), Max: 98.7 (25 Oct 2020 21:53)  HR: 67 (26 Oct 2020 05:18) (61 - 67)  BP: 159/68 (26 Oct 2020 05:18) (146/60 - 159/68)  RR: 17 (26 Oct 2020 05:18) (17 - 18)  SpO2: 96% (26 Oct 2020 05:18) (96% - 100%)  GEN: NAD  HEENT: normocephalic and atraumatic. EOMI. PERRL.    NECK: Supple.  No lymphadenopathy   LUNGS: Clear to auscultation.  HEART: Regular rate and rhythm   ABDOMEN: Soft, nontender, and nondistended.  Positive bowel sounds.    : No CVA tenderness  EXTREMITIES: Without edema. left leg in a contracted position, able to extend partially   Left plantar metatarsal head area with a deep ulcer with some swelling and erythema in surrounding, no discharge    NEUROLOGIC: grossly intact.  PSYCHIATRIC: dementia unable to evaluate   SKIN: No rash    Labs:  10-26    143  |  110<H>  |  25<H>  ----------------------------<  108<H>  4.1   |  27  |  0.89    Ca    9.3      26 Oct 2020 05:04  Mg     1.7     10-25    TPro  7.2  /  Alb  3.0<L>  /  TBili  0.4  /  DBili  x   /  AST  28  /  ALT  14  /  AlkPhos  50  10-24                        11.6   7.27  )-----------( 267      ( 26 Oct 2020 04:44 )             35.6     PT/INR - ( 24 Oct 2020 12:00 )   PT: 11.4 sec;   INR: 0.97 ratio    PTT - ( 24 Oct 2020 12:00 )  PTT:32.9 sec    LIVER FUNCTIONS - ( 24 Oct 2020 12:00 )  Alb: 3.0 g/dL / Pro: 7.2 g/dL / ALK PHOS: 50 U/L / ALT: 14 U/L / AST: 28 U/L / GGT: x           CARDIAC MARKERS ( 24 Oct 2020 15:06 )  <.015 ng/mL / x     / x     / x     / x        Procalcitonin, Serum: <0.05 (10-24-20 @ 15:06)    C-Reactive Protein, Serum: <0.10 mg/dL (10-24-20 @ 20:48)  C-Reactive Protein, Serum: <0.10 mg/dL (10-24-20 @ 16:19)    Sedimentation Rate, Erythrocyte: 26 mm/hr (10-24-20 @ 15:06)  Sedimentation Rate, Erythrocyte: 26 mm/hr (10-24-20 @ 12:00)    COVID-19 PCR: NotDetec (10-24-20 @ 11:56)    RECENT CULTURES:  10-24 @ 21:09 .Blood Blood     No growth to date.    All imaging and other data have been reviewed.  < from: Xray Foot AP + Lateral + Oblique, Bilat (10.24.20 @ 12:17) >  EXAM:  FOOT BILATERAL (MINIMUM 3 V)                        PROCEDURE DATE:  10/24/2020    INTERPRETATION:  Bilateral feet  HISTORY: Osteomyelitis   Three views of the right foot and three views of the left foot show no evidence of fracture nor destructive change. The joint spaces show left hallux valgus deformity. Surgical hardware projects in the lower left tibia and fibula.  IMPRESSION: No evidence of bony destruction.    Assessment and Plan:   84 y/o woman with PMH of HTN, Diabetes Mellitus 2, GERD and Dementia was sent from ER wound center for evaluation of left foot ulcer and possible Osteomyelitis.  Mild cellulitis in area, unclear if there is OM without MRI to plan for treatment.     Left foot chronic ulcer r/o OM  - Blood culture neg  - No wound cutlure   - Xray negative for OM  - MRI today  - Can continue zosyn and vancomycin until MRI result is back.     Thank you for courtesy of this consult.     Will follow.    Mohit Almonte MD  Division of Infectious Diseases   Cell [phone] between 8am and 6pm   After 6pm and weekends please call ID service at [phone].

## 2020-10-26 NOTE — CONSULT NOTE ADULT - SUBJECTIVE AND OBJECTIVE BOX
SURGERY PA CONSULT NOTE, ON BEHALF OF DR. CROW / VASCULAR SURGERY:    CHIEF COMPLAINT:  Patient is a 88y old  Female who presents with a chief complaint of Osteomyelitis (26 Oct 2020 10:59)      INTAKE HPI:  86 yo F with PMH of HTN, HLD, Diabetes Mellitus Type 2 not on home insulin, GERD, Dementia, sent by ER from wound care [facility] for evaluation for concern of left metatarsal of foot.  As per clinical hx The patient has been getting routine dressing changes but her wound has worsen to the point to probe to bone.   On Patient interview: patient Denies any fever, chills, nausea, vomiting, chest pain, shortness of breath, or calf pain at this time.      INTERVAL HPI:  We were asked to evaluate patient to assess for possible vascular compromise of the left foot.  Interview limited due to patient's baseline mental status.  Aware of the ulceration on the foot, but unsure how long she's had it, or what treatments she's undergone thus far.      PAST MEDICAL HISTORY:  Dementia  DM (diabetes mellitus)  HLD (hyperlipidemia)  HTN (hypertension)  GERD (gastroesophageal reflux disease)    PAST SURGICAL HISTORY:  History of cholecystectomy  S/P Left ankle ORIF     REVIEW OF SYSTEMS:  General: No acute distress, awake and alert  Unable to obtain meaningful ROS 2/2 baseline mental status     MEDICATIONS:  MEDICATIONS  (STANDING):  amLODIPine   Tablet 10 milliGRAM(s) Oral daily  aspirin  chewable 81 milliGRAM(s) Oral daily  atorvastatin 20 milliGRAM(s) Oral at bedtime  carvedilol 6.25 milliGRAM(s) Oral two times a day  dextrose 5%. 1000 milliLiter(s) (50 mL/Hr) IV Continuous <Continuous>  dextrose 50% Injectable 12.5 Gram(s) IV Push once  dextrose 50% Injectable 25 Gram(s) IV Push once  dextrose 50% Injectable 25 Gram(s) IV Push once  donepezil 5 milliGRAM(s) Oral at bedtime  heparin   Injectable 5000 Unit(s) SubCutaneous every 12 hours  insulin lispro (ADMELOG) corrective regimen sliding scale   SubCutaneous three times a day before meals  losartan 100 milliGRAM(s) Oral daily  PARoxetine 20 milliGRAM(s) Oral daily  piperacillin/tazobactam IVPB.. 3.375 Gram(s) IV Intermittent every 8 hours  vancomycin  IVPB 1000 milliGRAM(s) IV Intermittent every 24 hours    MEDICATIONS  (PRN):  dextrose 40% Gel 15 Gram(s) Oral once PRN Blood Glucose LESS THAN 70 milliGRAM(s)/deciliter  glucagon  Injectable 1 milliGRAM(s) IntraMuscular once PRN Glucose LESS THAN 70 milligrams/deciliter    HOME MEDICATIONS:  amLODIPine 10 mg oral tablet: 1 tab(s) orally once a day (24 Oct 2020 14:06)  aspirin 81 mg oral tablet, chewable: 1 tab(s) orally once a day (24 Oct 2020 12:52)  carvedilol 6.25 mg oral tablet: 1 tab(s) orally 2 times a day (24 Oct 2020 12:52)  donepezil 5 mg oral tablet: 1 tab(s) orally once a day (at bedtime) (24 Oct 2020 12:52)  ferrous gluconate 240 mg (27 mg elemental iron) oral tablet: 1 tab(s) orally once a day (24 Oct 2020 14:06)  Januvia 100 mg oral tablet: 1 tab(s) orally once a day (24 Oct 2020 14:06)  lactobacillus acidophilus oral capsule: 1 tab(s) orally 2 times a day (24 Oct 2020 12:52)  losartan 100 mg oral tablet: 1 tab(s) orally once a day (24 Oct 2020 14:06)  lovastatin 40 mg oral tablet: 1 tab(s) orally once a day (24 Oct 2020 12:52)  metFORMIN 500 mg oral tablet: 1 tab(s) orally 2 times a day (24 Oct 2020 12:52)  omeprazole 20 mg oral delayed release capsule: 1 cap(s) orally once a day (24 Oct 2020 12:52)  PARoxetine 20 mg oral tablet: 1 tab(s) orally once a day (24 Oct 2020 14:06)  Vitamin B12 500 mcg oral tablet: 1 tab(s) orally once a day (24 Oct 2020 12:52)  Vitamin D3 2000 intl units oral tablet: 1 tab(s) orally once a day (24 Oct 2020 12:52)    ALLERGIES:  sulfa drugs (Reaction unknown)    SOCIAL HISTORY:  Social History:  Patient has dementia. poor historian unable to illicit    FAMILY HISTORY:  FH: type 2 diabetes  brother  FH: colon cancer  sister    VITAL SIGNS:  T(C): 37 (26 Oct 2020 05:18), Max: 37.1 (25 Oct 2020 21:53)  T(F): 98.6 (26 Oct 2020 05:18), Max: 98.7 (25 Oct 2020 21:53)  HR: 67 (26 Oct 2020 05:18) (61 - 67)  BP: 159/68 (26 Oct 2020 05:18) (146/60 - 159/68)  BP(mean): --  RR: 17 (26 Oct 2020 05:18) (17 - 18)  SpO2: 96% (26 Oct 2020 05:18) (96% - 100%)    PHYSICAL EXAM:  General:  Appears stated age, well-groomed, well-nourished, no distress  HENT:  WNL, no JVD  Chest:  Clear to auscultation bilaterally without W/R/R  Cardiovascular:  Regular rate & rhythm, S1S2  Abdomen:  Soft, NT, ND.  Normal BS x 4.  No rebound/guarding.    Extremities:  Calves soft, NT bilat without edema  Bilat feet: 2+ palpable DP/PT pulses.  Warm, with good turgor and cap refill less than 2 seconds throughout.  Left foot: ulceration noted at medial aspect of great toe MTP joint, with erythema and tenderness of skin overlying entire joint.  Difficult to assess depth 2/2 patient contracture, and patient becoming somewhat agitated/combative during exam.   No LAG/LAD, some serous staining on dressings - no active bleeding or purulence.    Skin:  No rash  Musculoskeletal:  Normal strength.  Left LE hip/knee contractures noted.    Neuro/Psych:  Somewhat confused and agitated, A&O x 1      LABS:                        11.6   7.27  )-----------( 267      ( 26 Oct 2020 04:44 )             35.6     10-26    143  |  110<H>  |  25<H>  ----------------------------<  108<H>  4.1   |  27  |  0.89    Ca    9.3      26 Oct 2020 05:04  Mg     1.7     10-25            RADIOLOGY & ADDITIONAL STUDIES:  MRI foot not performed 2/2 contracture    EXAM:  FOOT BILATERAL (MINIMUM 3 V)                        PROCEDURE DATE:  10/24/2020    INTERPRETATION:  Bilateral feet  HISTORY: Osteomyelitis  Three views of the right foot and three views of the left foot show no evidence of fracture nor destructive change. The joint spaces show left hallux valgus deformity. Surgical hardware projects in the lower left tibia and fibula.  IMPRESSION: No evidence of bony destruction.  Thank you for this referral.  TEMO GUTIERREZ MD; Attending Interventional Radiologist  This document has been electronically signed. Oct 24 2020  1:16PM    ASSESSMENT:  86 yo F with PMH of HTN, HLD, Diabetes Mellitus Type 2 not on home insulin, GERD, Dementia, sent to ED from [facility] for eval of left foot ulcer - r/o OM    PLAN:  Discussed with Dr. Crow  Will order BIRE for eval of art. supply to left foot  Continue care per primary team  Will follow

## 2020-10-26 NOTE — CONSULT NOTE ADULT - SUBJECTIVE AND OBJECTIVE BOX
HPI:  86 yo F with PMH of HTN, HLD, Diabetes Mellitus Type 2 not on home insulin, GERD, Dementia, sent by ER from wound care [facility] for evaluation for concern of left metatarsal of foot.  As per clinical hx The patient has been getting routine dressing changes but her wound has worsen to the point to probe to bone.     On Patient interview: patient Denies any fever, chills, nausea, vomiting, chest pain, shortness of breath, or calf pain at this time.  ER Course:  vitals stable afebrile,   labs: Hgb 11,CR 1 (increased from ,75)   Imaging CXR: small left effusion + vascular congestion   X ray bilateral foot: negative  Pt received zosyn 3.375 x1, vanc IV x1,    Podiatry called to evaluate; Patient is now pending MRI of foot (24 Oct 2020 14:04)    PERTINENT PM/SXH:   Dementia    DM (diabetes mellitus)    HLD (hyperlipidemia)    HTN (hypertension)    GERD (gastroesophageal reflux disease)      History of cholecystectomy    Ankle injury      FAMILY HISTORY:  FH: type 2 diabetes  brother    FH: colon cancer  sister      ITEMS NOT CHECKED ARE NOT PRESENT    SOCIAL HISTORY:   Significant other/partner[ ]  Children[ ]  Caodaism/Spirituality:  Substance hx:  [ ]   Tobacco hx:  [ ]   Alcohol hx: [ ]   Home Opioid hx:  [ ] I-Stop Reference No:  Living Situation: [ ]Home  [ ]Long term care  [ ]Rehab [ ]Other    ADVANCE DIRECTIVES:    DNR  Yes    MOLST  [ ]  Living Will  [ ]   DECISION MAKER(s):  [ ] Health Care Proxy(s)  [ ] Surrogate(s)  [ ] Guardian           Name(s): Phone Number(s):    BASELINE (I)ADL(s) (prior to admission):  [place]: [ ]Total  [ ] Moderate [ ]Dependent    Allergies    sulfa drugs (Unknown)    Intolerances    MEDICATIONS  (STANDING):  amLODIPine   Tablet 10 milliGRAM(s) Oral daily  aspirin  chewable 81 milliGRAM(s) Oral daily  atorvastatin 20 milliGRAM(s) Oral at bedtime  carvedilol 6.25 milliGRAM(s) Oral two times a day  dextrose 5%. 1000 milliLiter(s) (50 mL/Hr) IV Continuous <Continuous>  dextrose 50% Injectable 12.5 Gram(s) IV Push once  dextrose 50% Injectable 25 Gram(s) IV Push once  dextrose 50% Injectable 25 Gram(s) IV Push once  donepezil 5 milliGRAM(s) Oral at bedtime  insulin lispro (ADMELOG) corrective regimen sliding scale   SubCutaneous three times a day before meals  losartan 100 milliGRAM(s) Oral daily  PARoxetine 20 milliGRAM(s) Oral daily  piperacillin/tazobactam IVPB.. 3.375 Gram(s) IV Intermittent every 8 hours  vancomycin  IVPB 1000 milliGRAM(s) IV Intermittent every 24 hours    MEDICATIONS  (PRN):  dextrose 40% Gel 15 Gram(s) Oral once PRN Blood Glucose LESS THAN 70 milliGRAM(s)/deciliter  glucagon  Injectable 1 milliGRAM(s) IntraMuscular once PRN Glucose LESS THAN 70 milligrams/deciliter    PRESENT SYMPTOMS: [ ]Unable to obtain due to poor mentation   Source if other than patient:  [ ]Family   [ ]Team     Pain: [ ]yes [ ]no  QOL impact -   Location -                    Aggravating factors -  Quality -  Radiation -  Timing-  Severity (0-10 scale):  Minimal acceptable level (0-10 scale):     CPOT:    https://www.[facility].org/getattachment/rbm51e43-4c3m-[email or web address]/Critical-Care-Pain-Observation-Tool-(CPOT)      PAIN AD Score:     http://geriatrictoolkit.[place].[facility]/cog/painad.pdf (press ctrl +  left click to view)    Dyspnea:                           [ ]Mild [ ]Moderate [ ]Severe  Anxiety:                             [ ]Mild [ ]Moderate [ ]Severe  Fatigue:                             [ ]Mild [ ]Moderate [ ]Severe  Nausea:                             [ ]Mild [ ]Moderate [ ]Severe  Loss of appetite:              [ ]Mild [ ]Moderate [ ]Severe  Constipation:                    [ ]Mild [ ]Moderate [ ]Severe    Other Symptoms:  [ ]All other review of systems negative     Palliative Performance Status Version 2:         %    http://npcrc.org/files/news/palliative_performance_scale_ppsv2.pdf  PHYSICAL EXAM:  Vital Signs Last 24 Hrs  T(C): 36.6 (26 Oct 2020 13:03), Max: 37.1 (25 Oct 2020 21:53)  T(F): 97.9 (26 Oct 2020 13:03), Max: 98.7 (25 Oct 2020 21:53)  HR: 72 (26 Oct 2020 17:28) (63 - 72)  BP: 150/69 (26 Oct 2020 17:28) (131/56 - 159/68)  BP(mean): --  RR: 18 (26 Oct 2020 13:03) (17 - 18)  SpO2: 98% (26 Oct 2020 13:03) (96% - 99%) I&O's Summary    25 Oct 2020 07:01  -  26 Oct 2020 07:00  --------------------------------------------------------  IN: 1150 mL / OUT: 300 mL / NET: 850 mL      GENERAL:  [ ]Alert  [ ]Oriented x   [ ]Lethargic  [ ]Cachexia  [ ]Unarousable  [ ]Verbal  [ ]Non-Verbal  Behavioral:   [ ] Anxiety  [ ] Delirium [ ] Agitation [ ] Other  HEENT:  [ ]Normal   [ ]Dry mouth   [ ]ET Tube/Trach  [ ]Oral lesions  PULMONARY:   [ ]Clear [ ]Tachypnea  [ ]Audible excessive secretions   [ ]Rhonchi        [ ]Right [ ]Left [ ]Bilateral  [ ]Crackles        [ ]Right [ ]Left [ ]Bilateral  [ ]Wheezing     [ ]Right [ ]Left [ ]Bilateral  [ ]Diminished breath sounds [ ]right [ ]left [ ]bilateral  CARDIOVASCULAR:    [ ]Regular [ ]Irregular [ ]Tachy  [ ]Derrick [ ]Murmur [ ]Other  GASTROINTESTINAL:  [ ]Soft  [ ]Distended   [ ]+BS  [ ]Non tender [ ]Tender  [ ]PEG [ ]OGT/ NGT  Last BM:     GENITOURINARY:  [ ]Normal [ ] Incontinent   [ ]Oliguria/Anuria   [ ]Chu  MUSCULOSKELETAL:   [ ]Normal   [ ]Weakness  [ ]Bed/Wheelchair bound [ ]Edema  NEUROLOGIC:   [ ]No focal deficits  [ ]Cognitive impairment  [ ]Dysphagia [ ]Dysarthria [ ]Paresis [ ]Other   SKIN:   [ ]Normal    [ ]Rash  [ ]Pressure ulcer(s)       Present on admission [ ]y [ ]n    CRITICAL CARE:  [ ] Shock Present  [ ]Septic [ ]Cardiogenic [ ]Neurologic [ ]Hypovolemic  [ ]  Vasopressors [ ]  Inotropes   [ ]Respiratory failure present [ ]Mechanical ventilation [ ]Non-invasive ventilatory support [ ]High flow    [ ]Acute  [ ]Chronic [ ]Hypoxic  [ ]Hypercarbic [ ]Other  [ ]Other organ failure     LABS:                        11.6   7.27  )-----------( 267      ( 26 Oct 2020 04:44 )             35.6   10-26    143  |  110<H>  |  25<H>  ----------------------------<  108<H>  4.1   |  27  |  0.89    Ca    9.3      26 Oct 2020 05:04  Mg     1.7     10-25          RADIOLOGY & ADDITIONAL STUDIES:    PROTEIN CALORIE MALNUTRITION PRESENT: [ ]mild [ ]moderate [ ]severe [ ]underweight [ ]morbid obesity  https://www.andeal.org/vault/2440/web/files/ONC/Table_Clinical%20Characteristics%20to%20Document%20Malnutrition-White%20JV%20et%20al%110354.pdf    Height (cm): 165.1 (10-24-20 @ 11:14), 165.1 (08-29-20 @ 20:47)  Weight (kg): 45.4 (10-24-20 @ 11:14), 40.8 (08-29-20 @ 20:47)  BMI (kg/m2): 16.7 (10-24-20 @ 11:14), 15 (08-29-20 @ 20:47)    [ ]PPSV2 < or = to 30% [ ]significant weight loss  [ ]poor nutritional intake  [ ]anasarca     Prealbumin, Serum: 22 mg/dL (10-25-20 @ 11:44)   [ ]Artificial Nutrition      REFERRALS:   [ ]Chaplaincy  [ ]Hospice  [ ]Child Life  [ ]Social Work  [ ]Case management [ ]Holistic Therapy     Goals of Care Document: CLOTILDE eRes (10-26-20 @ 12:51)  Goals of Care Conversation:   Advance Directives   · Caregiver:  yes  · Name  marcio sanabria  · Phone Number  [phone]    Conversation Discussion   · Conversation  Diagnosis; Prognosis; MOLST Discussed; Treatment Options  · Conversation Details  Writer spoke with pts son Marcio. He and his brother are surrogates and share responsibility of caring for pt. Reviewed patient's medical and social history as well as events leading to patient's hospitalization. He states that pt quality of life was poor that she is bedbound and does not always recognize him.  Writer discussed patient's current  medical condition and management, She is here for a wound on her foot. pts prognosis is probably poor due to advancing dementia, her age, immobility and comorbidities. Her son is aware of this and limited life expectancy. Inquired about patient's wishes regarding extent of medical care to be provided including escalation of medical care into the ICU In addition, the writer inquired about thoughts regarding cardiopulmonary resuscitation, artificial nutrition and hydration including use of feeding tubes and IVF, antibiotics, he showed good  insight into medical condition and seems to have realistic expectations. All questions answered. Writer recommended that he complete MOLST  Patient son consented to DNR/DNI  status stating he does not want to try to prolong her life with extraordinary measures MOLST form filled out and placed in chart.  Psychosocial support provided.    Personal Advance Directives Treatment Guidelines:   Treatment Guidelines   · Decision Maker  Surrogate  · Treatment Guidelines  DNR Order; No artificial nutrition; Antibiotic trial; IV fluid trial; do not intubate  · Future Hospitalization/Transfer  Send to hospital, if necessary, based on MOLST orders    MOLST   · Completed  26-Oct-2020    Location of Discussion:   Duration of Advanced Care Planning Meeting   · Time spent (in minutes)  30    Location of Discussion   · Location of discussion  Telephone    Electronic Signatures for Addendum Section:   Silverio Pace) (Signed Addendum 26-Oct-2020 20:45)    I attest that the writer and palliative care team RN discussed pt's current medical condition, hospital stay, prognosis, disease trajectory, and life expectancy. (refer to ACP/GOC note from palliative care team RN for more details on conversation).    Electronic Signatures:  Silverio Pace)  (Signed 26-Oct-2020 20:45)  	Co-Signer: Goals of Care Conversation, Personal Advance Directives Treatment Guidelines, Location of Discussion  Laura Contreras (Administration)  (Signed 26-Oct-2020 13:09)  	Authored: Goals of Care Conversation, Personal Advance Directives Treatment Guidelines, Location of Discussion      Last Updated: 26-Oct-2020 20:45 by Silverio Pace)

## 2020-10-26 NOTE — PROGRESS NOTE ADULT - ASSESSMENT
Grade 3 wound of the 1st left metatarsal head- unable to confirm with MRI but due to patient delayed wound healing and because the wound probes to bone, there is a port of entry for bacteria to reach the wound and be able to result in osteomyelitis. Patient was unable to get MRI secondary to her contracted position.   Per attending, spoke to the patient's son and discussed surgical intervention, risks, benefits, alternatives of osteomyelitis of the left 1st metatarsal head- the patient son agreed to podiatric surgical intervention of Left 1st metatarsal head resection. Will scheduled Left 1st metatarsal head resection with Dr. Mullins on 10/26/20. Patient will need medical clearance for surgical intervention of Left 1st metatarsal head resection secondary to osteomyelitis.

## 2020-10-26 NOTE — ADVANCED PRACTICE NURSE CONSULT - ASSESSMENT
Patients injury is being cared for by podiatry  RN assessed patient with stage one pressure injury RN educated in the care of a patient with stage one and stage two pressure injury

## 2020-10-26 NOTE — PROGRESS NOTE ADULT - SUBJECTIVE AND OBJECTIVE BOX
88y year old Female seen at [facility] 3WES 362 W1 for Grade 3 wound along the medial aspect of the 1st metatarsal head of left foot. The patient is currently too contracted to get a left foot MRI to r/o osteomyelitis. Dressing is clean, dry and intact. Patient is able to answer some questions, states that wound causes her pain sometimes. Denies any fever, chills, nausea, vomiting, chest pain, shortness of breath, or calf pain at this time.    Allergies    sulfa drugs (Unknown)    Intolerances      REVIEW OF SYSTEMS    PAST MEDICAL & SURGICAL HISTORY:  Dementia    DM (diabetes mellitus)    HLD (hyperlipidemia)    HTN (hypertension)    GERD (gastroesophageal reflux disease)    History of cholecystectomy    Ankle injury  s/p ankle surgery, pt doesn&#x27;t remember which ankle        Allergies    sulfa drugs (Unknown)    Intolerances        MEDICATIONS  (STANDING):  piperacillin/tazobactam IVPB. 3.375 Gram(s) IV Intermittent Once  vancomycin  IVPB. 1000 milliGRAM(s) IV Intermittent once    MEDICATIONS  (PRN):      Social History:      FAMILY HISTORY:  FH: type 2 diabetes  brother    FH: colon cancer  sister        MEDICATIONS  (STANDING):  amLODIPine   Tablet 10 milliGRAM(s) Oral daily  aspirin  chewable 81 milliGRAM(s) Oral daily  atorvastatin 20 milliGRAM(s) Oral at bedtime  carvedilol 6.25 milliGRAM(s) Oral two times a day  dextrose 5%. 1000 milliLiter(s) (50 mL/Hr) IV Continuous <Continuous>  dextrose 50% Injectable 12.5 Gram(s) IV Push once  dextrose 50% Injectable 25 Gram(s) IV Push once  dextrose 50% Injectable 25 Gram(s) IV Push once  donepezil 5 milliGRAM(s) Oral at bedtime  insulin lispro (ADMELOG) corrective regimen sliding scale   SubCutaneous three times a day before meals  losartan 100 milliGRAM(s) Oral daily  PARoxetine 20 milliGRAM(s) Oral daily  piperacillin/tazobactam IVPB.. 3.375 Gram(s) IV Intermittent every 8 hours  vancomycin  IVPB 1000 milliGRAM(s) IV Intermittent every 24 hours    MEDICATIONS  (PRN):  dextrose 40% Gel 15 Gram(s) Oral once PRN Blood Glucose LESS THAN 70 milliGRAM(s)/deciliter  glucagon  Injectable 1 milliGRAM(s) IntraMuscular once PRN Glucose LESS THAN 70 milligrams/deciliter      Vital Signs Last 24 Hrs  T(C): 36.6 (26 Oct 2020 13:03), Max: 37.1 (25 Oct 2020 21:53)  T(F): 97.9 (26 Oct 2020 13:03), Max: 98.7 (25 Oct 2020 21:53)  HR: 67 (26 Oct 2020 13:03) (63 - 67)  BP: 131/56 (26 Oct 2020 13:03) (131/56 - 159/68)  BP(mean): --  RR: 18 (26 Oct 2020 13:03) (17 - 18)  SpO2: 98% (26 Oct 2020 13:03) (96% - 99%)    PHYSICAL EXAM:  Vascular: DP & PT nonpalpable bilaterally, Capillary refill 3 seconds, Minimal edema along the left 1st metatarsal   Neurological: Light touch sensation intact bilaterally  Musculoskeletal: Unable to test patient muscle strength, patient is bedbound   Dermatological: -  1. Grade 3 wound of the left 1st metatarsal- size 0.3cmx0.3cmx0.3cm- probes to bone-minimal purulent drainage     CBC Full  -  ( 26 Oct 2020 04:44 )  WBC Count : 7.27 K/uL  RBC Count : 4.29 M/uL  Hemoglobin : 11.6 g/dL  Hematocrit : 35.6 %  Platelet Count - Automated : 267 K/uL  Mean Cell Volume : 83.0 fl  Mean Cell Hemoglobin : 27.0 pg  Mean Cell Hemoglobin Concentration : 32.6 gm/dL  Auto Neutrophil # : x  Auto Lymphocyte # : x  Auto Monocyte # : x  Auto Eosinophil # : x  Auto Basophil # : x  Auto Neutrophil % : x  Auto Lymphocyte % : x  Auto Monocyte % : x  Auto Eosinophil % : x  Auto Basophil % : x      ----------CHEM PANEL----------  10-26    143  |  110<H>  |  25<H>  ----------------------------<  108<H>  4.1   |  27  |  0.89    Ca    9.3      26 Oct 2020 05:04  Mg     1.7     10-25            Culture - Blood (collected 24 Oct 2020 21:09)  Source: .Blood Blood  Preliminary Report (25 Oct 2020 22:01):    No growth to date.        Imaging:   Cortical Erosion along the Medial aspect of the left 1st metatarsal head consistent with osteomyelitis - with what appears to be some hypertrophy of the 1st metatarsal head- also mild hallux abductovalgus deformity of the left foot

## 2020-10-26 NOTE — INPATIENT CERTIFICATION FOR MEDICARE PATIENTS - IN ORDER TO MEET MEDICARE REQUIREMENTS.
In order to meet Medicare requirements, the clinical documentation must support the information cited in the admission order.  Please be sure to provide detailed and clear documentation about the following in the admitting note/history and physical: None known

## 2020-10-27 ENCOUNTER — TRANSCRIPTION ENCOUNTER (OUTPATIENT)
Age: 85
End: 2020-10-27

## 2020-10-27 ENCOUNTER — NON-APPOINTMENT (OUTPATIENT)
Age: 85
End: 2020-10-27

## 2020-10-27 LAB
ANION GAP SERPL CALC-SCNC: 7 MMOL/L — SIGNIFICANT CHANGE UP (ref 5–17)
BUN SERPL-MCNC: 30 MG/DL — HIGH (ref 7–23)
CALCIUM SERPL-MCNC: 8.6 MG/DL — SIGNIFICANT CHANGE UP (ref 8.5–10.1)
CHLORIDE SERPL-SCNC: 112 MMOL/L — HIGH (ref 96–108)
CO2 SERPL-SCNC: 27 MMOL/L — SIGNIFICANT CHANGE UP (ref 22–31)
CREAT SERPL-MCNC: 1.1 MG/DL — SIGNIFICANT CHANGE UP (ref 0.5–1.3)
GLUCOSE SERPL-MCNC: 105 MG/DL — HIGH (ref 70–99)
HCT VFR BLD CALC: 28.7 % — LOW (ref 34.5–45)
HGB BLD-MCNC: 9.5 G/DL — LOW (ref 11.5–15.5)
MCHC RBC-ENTMCNC: 27.2 PG — SIGNIFICANT CHANGE UP (ref 27–34)
MCHC RBC-ENTMCNC: 33.1 GM/DL — SIGNIFICANT CHANGE UP (ref 32–36)
MCV RBC AUTO: 82.2 FL — SIGNIFICANT CHANGE UP (ref 80–100)
NRBC # BLD: 0 /100 WBCS — SIGNIFICANT CHANGE UP (ref 0–0)
PLATELET # BLD AUTO: 215 K/UL — SIGNIFICANT CHANGE UP (ref 150–400)
POTASSIUM SERPL-MCNC: 4.1 MMOL/L — SIGNIFICANT CHANGE UP (ref 3.5–5.3)
POTASSIUM SERPL-SCNC: 4.1 MMOL/L — SIGNIFICANT CHANGE UP (ref 3.5–5.3)
RBC # BLD: 3.49 M/UL — LOW (ref 3.8–5.2)
RBC # FLD: 14.8 % — HIGH (ref 10.3–14.5)
SODIUM SERPL-SCNC: 146 MMOL/L — HIGH (ref 135–145)
WBC # BLD: 7.47 K/UL — SIGNIFICANT CHANGE UP (ref 3.8–10.5)
WBC # FLD AUTO: 7.47 K/UL — SIGNIFICANT CHANGE UP (ref 3.8–10.5)

## 2020-10-27 PROCEDURE — 99232 SBSQ HOSP IP/OBS MODERATE 35: CPT | Mod: 57

## 2020-10-27 PROCEDURE — 99222 1ST HOSP IP/OBS MODERATE 55: CPT

## 2020-10-27 PROCEDURE — 99233 SBSQ HOSP IP/OBS HIGH 50: CPT | Mod: GC

## 2020-10-27 PROCEDURE — 99232 SBSQ HOSP IP/OBS MODERATE 35: CPT

## 2020-10-27 PROCEDURE — 99497 ADVNCD CARE PLAN 30 MIN: CPT

## 2020-10-27 RX ORDER — POLYETHYLENE GLYCOL 3350 17 G/17G
17 POWDER, FOR SOLUTION ORAL DAILY
Refills: 0 | Status: DISCONTINUED | OUTPATIENT
Start: 2020-10-27 | End: 2020-10-28

## 2020-10-27 RX ORDER — INSULIN LISPRO 100/ML
VIAL (ML) SUBCUTANEOUS AT BEDTIME
Refills: 0 | Status: DISCONTINUED | OUTPATIENT
Start: 2020-10-27 | End: 2020-10-28

## 2020-10-27 RX ORDER — SENNA PLUS 8.6 MG/1
2 TABLET ORAL AT BEDTIME
Refills: 0 | Status: DISCONTINUED | OUTPATIENT
Start: 2020-10-27 | End: 2020-10-28

## 2020-10-27 RX ORDER — INSULIN LISPRO 100/ML
VIAL (ML) SUBCUTANEOUS
Refills: 0 | Status: DISCONTINUED | OUTPATIENT
Start: 2020-10-27 | End: 2020-10-28

## 2020-10-27 RX ORDER — CEFTRIAXONE 500 MG/1
2000 INJECTION, POWDER, FOR SOLUTION INTRAMUSCULAR; INTRAVENOUS EVERY 24 HOURS
Refills: 0 | Status: DISCONTINUED | OUTPATIENT
Start: 2020-10-27 | End: 2020-10-28

## 2020-10-27 RX ADMIN — CARVEDILOL PHOSPHATE 6.25 MILLIGRAM(S): 80 CAPSULE, EXTENDED RELEASE ORAL at 17:12

## 2020-10-27 RX ADMIN — DONEPEZIL HYDROCHLORIDE 5 MILLIGRAM(S): 10 TABLET, FILM COATED ORAL at 22:29

## 2020-10-27 RX ADMIN — CARVEDILOL PHOSPHATE 6.25 MILLIGRAM(S): 80 CAPSULE, EXTENDED RELEASE ORAL at 05:53

## 2020-10-27 RX ADMIN — ATORVASTATIN CALCIUM 20 MILLIGRAM(S): 80 TABLET, FILM COATED ORAL at 22:29

## 2020-10-27 RX ADMIN — CEFTRIAXONE 100 MILLIGRAM(S): 500 INJECTION, POWDER, FOR SOLUTION INTRAMUSCULAR; INTRAVENOUS at 15:02

## 2020-10-27 RX ADMIN — LOSARTAN POTASSIUM 100 MILLIGRAM(S): 100 TABLET, FILM COATED ORAL at 05:53

## 2020-10-27 RX ADMIN — SENNA PLUS 2 TABLET(S): 8.6 TABLET ORAL at 22:29

## 2020-10-27 RX ADMIN — AMLODIPINE BESYLATE 10 MILLIGRAM(S): 2.5 TABLET ORAL at 05:53

## 2020-10-27 RX ADMIN — PIPERACILLIN AND TAZOBACTAM 25 GRAM(S): 4; .5 INJECTION, POWDER, LYOPHILIZED, FOR SOLUTION INTRAVENOUS at 05:53

## 2020-10-27 RX ADMIN — Medication 20 MILLIGRAM(S): at 12:04

## 2020-10-27 RX ADMIN — Medication 81 MILLIGRAM(S): at 12:04

## 2020-10-27 RX ADMIN — Medication 1: at 00:22

## 2020-10-27 NOTE — GOALS OF CARE CONVERSATION - ADVANCED CARE PLANNING - CONVERSATION DETAILS
Writer spoke with pts son Marcio. He and his brother are surrogates and share responsibility of caring for pt. Reviewed patient's medical and social history as well as events leading to patient's hospitalization. He states that pt quality of life was poor that she is bedbound and does not always recognize him.  Writer discussed patient's current  medical condition and management, She is here for a wound on her foot. pts prognosis is probably poor due to advancing dementia, her age, immobility and comorbidities. Her son is aware of this and limited life expectancy. Inquired about patient's wishes regarding extent of medical care to be provided including escalation of medical care into the ICU In addition, the writer inquired about thoughts regarding cardiopulmonary resuscitation, artificial nutrition and hydration including use of feeding tubes and IVF, antibiotics, he showed good  insight into medical condition and seems to have realistic expectations. All questions answered. Writer recommended that he complete MOLST  Patient son consented to DNR/DNI  status stating he does not want to try to prolong her life with extraordinary measures MOLST form filled out and placed in chart.  Psychosocial support provided.
pt son, Garfield contacted MAXIMO RN on phone, wanted to review and understand the molst form that was completed by his brother, Marcio. Emailed copy of blank Molst form to pt, then reviewed on phone with the explanation of each section of the molst form and decisions made by his brother, Marcio.  Simple lay terms, regarding resuscitation decisions for pt.  Pt is a DNR,( no chest complressions if pt dies)   DNI, ( more conservative measures , to give pt oxygen if has respiratory difficulty, but not place a tube in her windpipe )  Garfield with questions regarding treatment for pt, reassured that if pt has any respiratory difficulty, pt will have a rapid response to address her symptoms and treat her.  Garfield has better understanding molst.  No tube feeding for pt, agrees to IV fluids and antibiotics for pt. Concerns heard regarding having procedure tomorrow, aware the anesthesiologist will contact sons for clarifying DNR for procedure.  Garfield wants attempt to save pt, but not prolong her life, again, will have time to discuss his concerns with physicians. Garfield agrees with the molst as completed with his brother, Marcio   MAXIMO RN contact # given

## 2020-10-27 NOTE — GOALS OF CARE CONVERSATION - ADVANCED CARE PLANNING - TREATMENT GUIDELINES
IV fluid trial/DNR Order/No artificial nutrition/Antibiotic trial/do not intubate
Antibiotic trial/do not intubate/DNR Order/No artificial nutrition/IV fluid trial

## 2020-10-27 NOTE — PROGRESS NOTE ADULT - ATTENDING COMMENTS
86 yo F with PMH of HTN, HLD, Diabetes Mellitus Type 2 not on home insulin, GERD, Dementia, sent by ER from wound care [facility] for evaluation, c/w osteomyelitis of left metatarsal of foot, pending OR for resection 10/28 Plan: apprec cardio optimization, medical optimization to follow in AM, apprec podiatry recs, monitor clinical course, aprec ID cont iv [id number]

## 2020-10-27 NOTE — CONSULT NOTE ADULT - SUBJECTIVE AND OBJECTIVE BOX
CHIEF COMPLAINT: Patient is a 88y old  Female who presents with a chief complaint of Osteomyletis       HPI:  84 yo F with PMH of HTN, HLD, Diabetes Mellitus Type 2 not on home insulin, GERD, Dementia, sent by ER from wound care [facility] for evaluation for concern of left metatarsal of foot.  As per clinical hx The patient has been getting routine dressing changes but her wound has worsen to the point to probe to bone.       PAST MEDICAL & SURGICAL HISTORY:  Dementia    DM (diabetes mellitus)    HLD (hyperlipidemia)    HTN (hypertension)    GERD (gastroesophageal reflux disease)    History of cholecystectomy    Ankle injury  s/p ankle surgery, pt doesn&#x27;t remember which ankle        SOCIAL HISTORY:  No tobacco, ethanol, or drug abuse.    FAMILY HISTORY:  FH: type 2 diabetes  brother    FH: colon cancer  sister      No family history of acute MI or sudden cardiac death.    MEDICATIONS  (STANDING):  amLODIPine   Tablet 10 milliGRAM(s) Oral daily  aspirin  chewable 81 milliGRAM(s) Oral daily  atorvastatin 20 milliGRAM(s) Oral at bedtime  carvedilol 6.25 milliGRAM(s) Oral two times a day  dextrose 5%. 1000 milliLiter(s) (50 mL/Hr) IV Continuous <Continuous>  dextrose 50% Injectable 12.5 Gram(s) IV Push once  dextrose 50% Injectable 25 Gram(s) IV Push once  dextrose 50% Injectable 25 Gram(s) IV Push once  donepezil 5 milliGRAM(s) Oral at bedtime  insulin lispro (ADMELOG) corrective regimen sliding scale   SubCutaneous three times a day before meals  insulin lispro (ADMELOG) corrective regimen sliding scale   SubCutaneous at bedtime  losartan 100 milliGRAM(s) Oral daily  PARoxetine 20 milliGRAM(s) Oral daily  piperacillin/tazobactam IVPB.. 3.375 Gram(s) IV Intermittent every 8 hours  senna 2 Tablet(s) Oral at bedtime  vancomycin  IVPB 750 milliGRAM(s) IV Intermittent every 24 hours  vancomycin  IVPB        MEDICATIONS  (PRN):  dextrose 40% Gel 15 Gram(s) Oral once PRN Blood Glucose LESS THAN 70 milliGRAM(s)/deciliter  glucagon  Injectable 1 milliGRAM(s) IntraMuscular once PRN Glucose LESS THAN 70 milligrams/deciliter  polyethylene glycol 3350 17 Gram(s) Oral daily PRN Constipation      Allergies    sulfa drugs (Unknown)    Intolerances        REVIEW OF SYSTEMS:    CONSTITUTIONAL: No weakness, fevers or chills  EYES/ENT: No visual changes;  No vertigo or throat pain   NECK: No pain or stiffness  RESPIRATORY: No cough, wheezing, hemoptysis; No shortness of breath  CARDIOVASCULAR: No chest pain or palpitations  GASTROINTESTINAL: No abdominal pain. No nausea, vomiting, or hematemesis; No diarrhea or constipation. No melena or hematochezia.  GENITOURINARY: No dysuria, frequency or hematuria  NEUROLOGICAL: No numbness or weakness  SKIN: No itching or rash  All other review of systems is negative unless indicated above    VITAL SIGNS:   Vital Signs Last 24 Hrs  T(C): 36.7 (27 Oct 2020 05:33), Max: 36.7 (27 Oct 2020 05:33)  T(F): 98 (27 Oct 2020 05:33), Max: 98 (27 Oct 2020 05:33)  HR: 72 (27 Oct 2020 05:33) (67 - 72)  BP: 120/63 (27 Oct 2020 05:33) (120/63 - 150/69)  BP(mean): --  RR: 18 (27 Oct 2020 05:33) (18 - 18)  SpO2: 97% (27 Oct 2020 05:33) (97% - 98%)    I&O's Summary    26 Oct 2020 07:01  -  27 Oct 2020 07:00  --------------------------------------------------------  IN: 470 mL / OUT: 500 mL / NET: -30 mL        On Exam:    Constitutional: NAD, alert and oriented x 3  Lungs:  Non-labored, breath sounds are clear bilaterally, No wheezing, rales or rhonchi  Cardiovascular: RRR.  S1 and S2 positive.  No murmurs, rubs, gallops or clicks  Gastrointestinal: Bowel Sounds present, soft, nontender.   Lymph: No peripheral edema. No cervical lymphadenopathy.  Neurological: Lethargic but arousable, A&Ox2 no focal deficits  Skin: No rashes or ulcers   Psych:  Mood & affect appropriate.    LABS: All Labs Reviewed:                        9.5    7.47  )-----------( 215      ( 27 Oct 2020 09:20 )             28.7                         11.6   7.27  )-----------( 267      ( 26 Oct 2020 04:44 )             35.6                         10.6   6.75  )-----------( 252      ( 25 Oct 2020 06:25 )             33.3     27 Oct 2020 09:20    146    |  112    |  30     ----------------------------<  105    4.1     |  27     |  1.10   26 Oct 2020 05:04    143    |  110    |  25     ----------------------------<  108    4.1     |  27     |  0.89   25 Oct 2020 06:25    145    |  111    |  27     ----------------------------<  117    3.9     |  29     |  0.92     Ca    8.6        27 Oct 2020 09:20  Ca    9.3        26 Oct 2020 05:04  Ca    8.6        25 Oct 2020 06:25  Mg     1.7       25 Oct 2020 06:25            Blood Culture: Organism --  Gram Stain Blood -- Gram Stain   Rare polymorphonuclear leukocytes per low power field  Few Gram positive cocci in pairs per oil power field  Few Gram Variable Rods per oil power field  Specimen Source .Tissue Other, Left  medial 1st Metatarsal  Culture-Blood --    Organism --  Gram Stain Blood -- Gram Stain --  Specimen Source .Blood Blood  Culture-Blood --      10-24 @ 15:06  Pro Bnp 1182        RADIOLOGY:    EKG:  < from: 12 Lead ECG (10.24.20 @ 11:41) >  Ventricular Rate 60 BPM    Atrial Rate 60 BPM    P-R Interval 172 ms    QRS Duration 78 ms    Q-T Interval 432 ms    QTC Calculation(Bazett) 432 ms    P Axis 63 degrees    R Axis 35 degrees    T Axis 48 degrees    Diagnosis Line Poor data quality  Normal sinus rhythm  Normal ECG  When compared with ECG of 20-SEP-2018 13:45,  No significant change was found    Confirmed by Ralph Austin MD ([id number]) on 10/25/2020 11:39:49 AM    < end of copied text >  < from: TTE Echo Complete w/o Contrast w/ Doppler (10.25.20 @ 11:18) >   EXAM:  ECHO TTE WO CON COMP W DOPP         PROCEDURE DATE:  10/25/2020        INTERPRETATION:  INDICATION: Dyspnea  Sonographer AS    Blood Pressure 156/62    Height 165 cm     Weight 45.4 kg       BSA 1.4 sq cm    Dimensions:  LA 3.4       NormalValues: 2.0 - 4.0 cm  Ao 2.9        Normal Values: 2.0 - 3.8 cm  SEPTUM 1.2       Normal Values: 0.6 - 1.2 cm  PWT 1.1       Normal Values: 0.6 - 1.1 cm  LVIDd 3.1         Normal Values: 3.0 - 5.6 cm  LVIDs 1.7         Normal Values: 1.8 - 4.0 cm      OBSERVATIONS:  Technically difficult study, patient uncooperative  Mitral Valve: Thickened leaflets, mild MR.  Aortic Valve/Aorta: Calcified trileaflet aortic valve with grossly normal opening. Trace AI  Tricuspid Valve: Mild TR.  Pulmonic Valve: Not well-visualized  Left Atrium: normal  Right Atrium: Not well-visualized  Left Ventricle: normal LV size and systolic function, estimated LVEF of 65%.  Right Ventricle: Grossly normal size and systolic function.  Pericardium/Pleura: normal, no significant pericardial effusion.  Pulmonary/RV Pressure: estimated PA systolic pressure of 37 mmHg assuming an RA pressure of 3 mmHg.      Conclusion:  Technically difficult study  Normal left ventricular internal dimensions and systolic function, estimated LVEF of 65%.  Grossly normal RV size and systolic function.  Calcified trileaflet aortic valve with grossly normal opening. Trace AI  Mild MR and TR.  No significant pericardial effusion.              WILEY BROWN MD; Attending Cardiologist  This document has been electronically signed. Oct 26 2020  8:10AM    < end of copied text >  < from: [facility] Physiol Extremity Lower 3+ Level, LT (10.26.20 @ 13:45) >  EXAM:  PHYSIOL EXTREM LOW 3+ LEV LT                            PROCEDURE DATE:  10/26/2020          INTERPRETATION:  History:80-year-old female with diabetes and left foot ulcer.    Technique: Left lower extremity BRIE/PVR    Comparison: None    Findings:        LEFT  BRIE: 1.4  Flow study: Good flow from the high thigh through the great toe. Pulsatile waveforms      Impression:    Findings compatible calcified lower extremity arteries.                    DIONE AMBROSE MD; Attending Interventional Radiologist  This document has been electronically signed. Oct 26 2020  1:58PM    < end of copied text >  < from: Xray Chest 1 View- PORTABLE-Urgent (Xray Chest 1 View- PORTABLE-Urgent .) (10.24.20 @ 12:18) >    EXAM:  XR CHEST PORTABLE URGENT 1V                            PROCEDURE DATE:  10/24/2020          INTERPRETATION:  Chest one view    HISTORY: Preop    COMPARISON STUDY: 8/29/2020    Frontal expiratory view of the chest shows the heart to be borderline in size. The lungs show mild to moderate central congestion with small left infiltrate/effusion and there is no evidence of pneumothorax nor right pleural effusion. Right upper quadrant clips are present.    IMPRESSION:  Left lower chest infiltrate with effusion.    Thank you for the courtesy of this referral.            TEMO GUTIERREZ MD; Attending Interventional Radiologist  This document has been electronically signed. Oct 24 2020  1:18PM    < end of copied text >    TTE:

## 2020-10-27 NOTE — PROGRESS NOTE ADULT - SUBJECTIVE AND OBJECTIVE BOX
88y year old Female seen at [facility] 3WES 362 W1 for Grade 3 wound along the medial aspect of the 1st metatarsal head of left foot. Patient's NOK agreed to Left 1st metatarsal head resection with Dr. Mullins tomorrow. Denies any fever, chills, nausea, vomiting, chest pain, shortness of breath, or calf pain at this time.    Allergies    sulfa drugs (Unknown)    Intolerances    REVIEW OF SYSTEMS    PAST MEDICAL & SURGICAL HISTORY:  Dementia    DM (diabetes mellitus)    HLD (hyperlipidemia)    HTN (hypertension)    GERD (gastroesophageal reflux disease)    History of cholecystectomy    Ankle injury  s/p ankle surgery, pt doesn&#x27;t remember which ankle        Allergies    sulfa drugs (Unknown)    Intolerances        MEDICATIONS  (STANDING):  piperacillin/tazobactam IVPB. 3.375 Gram(s) IV Intermittent Once  vancomycin  IVPB. 1000 milliGRAM(s) IV Intermittent once    MEDICATIONS  (PRN):      Social History:      FAMILY HISTORY:  FH: type 2 diabetes  brother    FH: colon cancer  sister        MEDICATIONS  (STANDING):  amLODIPine   Tablet 10 milliGRAM(s) Oral daily  aspirin  chewable 81 milliGRAM(s) Oral daily  atorvastatin 20 milliGRAM(s) Oral at bedtime  carvedilol 6.25 milliGRAM(s) Oral two times a day  cefTRIAXone   IVPB 2000 milliGRAM(s) IV Intermittent every 24 hours  dextrose 5%. 1000 milliLiter(s) (50 mL/Hr) IV Continuous <Continuous>  dextrose 50% Injectable 12.5 Gram(s) IV Push once  dextrose 50% Injectable 25 Gram(s) IV Push once  dextrose 50% Injectable 25 Gram(s) IV Push once  donepezil 5 milliGRAM(s) Oral at bedtime  insulin lispro (ADMELOG) corrective regimen sliding scale   SubCutaneous three times a day before meals  insulin lispro (ADMELOG) corrective regimen sliding scale   SubCutaneous at bedtime  losartan 100 milliGRAM(s) Oral daily  PARoxetine 20 milliGRAM(s) Oral daily  senna 2 Tablet(s) Oral at bedtime    MEDICATIONS  (PRN):  dextrose 40% Gel 15 Gram(s) Oral once PRN Blood Glucose LESS THAN 70 milliGRAM(s)/deciliter  glucagon  Injectable 1 milliGRAM(s) IntraMuscular once PRN Glucose LESS THAN 70 milligrams/deciliter  polyethylene glycol 3350 17 Gram(s) Oral daily PRN Constipation      Vital Signs Last 24 Hrs  T(C): 36.9 (27 Oct 2020 13:44), Max: 36.9 (27 Oct 2020 13:44)  T(F): 98.5 (27 Oct 2020 13:44), Max: 98.5 (27 Oct 2020 13:44)  HR: 79 (27 Oct 2020 16:55) (64 - 79)  BP: 132/56 (27 Oct 2020 16:55) (120/63 - 151/73)  BP(mean): --  RR: 16 (27 Oct 2020 13:44) (16 - 18)  SpO2: 100% (27 Oct 2020 13:44) (97% - 100%)    PHYSICAL EXAM:  Vascular: DP & PT nonpalpable bilaterally, Capillary refill 3 seconds, Minimal edema along the left 1st metatarsal   Neurological: Light touch sensation intact bilaterally  Musculoskeletal: Unable to test patient muscle strength, patient is bedbound   Dermatological: -  1. Grade 3 wound of the left 1st metatarsal- size 0.3cmx0.3cmx0.3cm- probes to bone-minimal purulent drainage     CBC Full  -  ( 27 Oct 2020 09:20 )  WBC Count : 7.47 K/uL  RBC Count : 3.49 M/uL  Hemoglobin : 9.5 g/dL  Hematocrit : 28.7 %  Platelet Count - Automated : 215 K/uL  Mean Cell Volume : 82.2 fl  Mean Cell Hemoglobin : 27.2 pg  Mean Cell Hemoglobin Concentration : 33.1 gm/dL  Auto Neutrophil # : x  Auto Lymphocyte # : x  Auto Monocyte # : x  Auto Eosinophil # : x  Auto Basophil # : x  Auto Neutrophil % : x  Auto Lymphocyte % : x  Auto Monocyte % : x  Auto Eosinophil % : x  Auto Basophil % : x      ----------CHEM PANEL----------          Culture - Tissue with Gram Stain (collected 26 Oct 2020 15:17)  Source: .Tissue Other, Left  medial 1st Metatarsal  Gram Stain (26 Oct 2020 19:49):    Rare polymorphonuclear leukocytes per low power field    Few Gram positive cocci in pairs per oil power field    Few Gram Variable Rods per oil power field  Preliminary Report (27 Oct 2020 13:24):    Culture yields >4 types of aerobic and/or anaerobic bacteria    Call client services within 7 days if further workup is clinically    indicated. Culture includes    Pseudomonas aeruginosa Susceptibility to follow.    Culture - Blood (collected 24 Oct 2020 21:09)  Source: .Blood Blood  Preliminary Report (25 Oct 2020 22:01):    No growth to date.        Imaging:   Cortical Erosion along the Medial aspect of the left 1st metatarsal head consistent with osteomyelitis - with what appears to be some hypertrophy of the 1st metatarsal head- also mild hallux abductovalgus deformity of the left foot

## 2020-10-27 NOTE — PROGRESS NOTE ADULT - SUBJECTIVE AND OBJECTIVE BOX
NAME: NELLIE NICOLE  MRN: [id number]  LOCATION: [facility] 3WES 362 W1    Patient is a 88y old  Female who presents with a chief complaint of Osteomyletis (26 Oct 2020 20:46)      HPI:  86 yo F with PMH of HTN, HLD, Diabetes Mellitus Type 2 not on home insulin, GERD, Dementia, sent by ER from wound care [facility] for evaluation for concern of left metatarsal of foot.  As per clinical hx The patient has been getting routine dressing changes but her wound has worsen to the point to probe to bone.     On Patient interview: patient Denies any fever, chills, nausea, vomiting, chest pain, shortness of breath, or calf pain at this time.  ER Course:  vitals stable afebrile,   labs: Hgb 11,CR 1 (increased from ,75)   Imaging CXR: small left effusion + vascular congestion   X ray bilateral foot: negative  Pt received zosyn 3.375 x1, vanc IV x1,    Podiatry called to evaluate; Patient is now pending MRI of foot (24 Oct 2020 14:04)      OVERNIGHT EVENTS: JACINTO overnight  INTERVAL HPI: Patient seen and examined at bedside. Vanc trough 19.4. Patient has no complaints at this time. Denies foot pain, fevers, chills, headache, lightheadedness, chest pain, dyspnea, abdominal pain, n/v/d/c. Asked by RN to adjust diabetes orders, completed.     T(F): 98 (10-27-20 @ 05:33), Max: 98 (10-27-20 @ 05:33)  HR: 72 (10-27-20 @ 05:33) (67 - 72)  BP: 120/63 (10-27-20 @ 05:33) (120/63 - 150/69)  RR: 18 (10-27-20 @ 05:33) (18 - 18)  SpO2: 97% (10-27-20 @ 05:33) (97% - 98%)  I&O's Summary    26 Oct 2020 07:01  -  27 Oct 2020 07:00  --------------------------------------------------------  IN: 470 mL / OUT: 500 mL / NET: -30 mL        REVIEW OF SYSTEMS:  CONSTITUTIONAL: No fever  EYES: No eye pain, visual disturbances, or discharge  RESPIRATORY: No cough, wheezing No shortness of breath  CARDIOVASCULAR: No chest pain, palpitations, dizziness  GASTROINTESTINAL: No abdominal or epigastric pain. No nausea, vomiting, or hematemesis; No diarrhea or constipation. No melena or hematochezia.  GENITOURINARY: No dysuria, frequency,   NEUROLOGICAL: No headaches  MUSCULOSKELETAL: No muscle, back, or extremity pain    PHYSICAL EXAM:  GENERAL: NAD, laying in bed comfortably  HEENT:  anicteric, moist mucous membranes  CHEST/LUNG:  CTA b/l, no rales, wheezes, or rhonchi  HEART:  RRR, S1, S2  ABDOMEN:  BS+, soft, nontender, nondistended  SKIN: DTI noted on Sacrum and R scapula - asked nursing assistant to lay patient on side  EXTREMITIES: contracted LLE and LUE, L foot open to air. no edema, cyanosis, or calf tenderness.  NERVOUS SYSTEM: answers questions and follows commands appropriately      LABS:                        9.5    7.47  )-----------( 215      ( 27 Oct 2020 09:20 )             28.7     10-27    146<H>  |  112<H>  |  30<H>  ----------------------------<  105<H>  4.1   |  27  |  1.10    Ca    8.6      27 Oct 2020 09:20          CAPILLARY BLOOD GLUCOSE      POCT Blood Glucose.: 129 mg/dL (27 Oct 2020 12:16)  POCT Blood Glucose.: 91 mg/dL (27 Oct 2020 06:20)  POCT Blood Glucose.: 176 mg/dL (27 Oct 2020 00:07)  POCT Blood Glucose.: 176 mg/dL (26 Oct 2020 22:22)  POCT Blood Glucose.: 130 mg/dL (26 Oct 2020 17:07)      10-24 @ 21:09   No growth to date.  --  --          MEDICATIONS  (STANDING):  amLODIPine   Tablet 10 milliGRAM(s) Oral daily  aspirin  chewable 81 milliGRAM(s) Oral daily  atorvastatin 20 milliGRAM(s) Oral at bedtime  carvedilol 6.25 milliGRAM(s) Oral two times a day  dextrose 5%. 1000 milliLiter(s) (50 mL/Hr) IV Continuous <Continuous>  dextrose 50% Injectable 12.5 Gram(s) IV Push once  dextrose 50% Injectable 25 Gram(s) IV Push once  dextrose 50% Injectable 25 Gram(s) IV Push once  donepezil 5 milliGRAM(s) Oral at bedtime  insulin lispro (ADMELOG) corrective regimen sliding scale   SubCutaneous three times a day before meals  insulin lispro (ADMELOG) corrective regimen sliding scale   SubCutaneous at bedtime  losartan 100 milliGRAM(s) Oral daily  PARoxetine 20 milliGRAM(s) Oral daily  piperacillin/tazobactam IVPB.. 3.375 Gram(s) IV Intermittent every 8 hours  senna 2 Tablet(s) Oral at bedtime  vancomycin  IVPB 750 milliGRAM(s) IV Intermittent every 24 hours  vancomycin  IVPB        MEDICATIONS  (PRN):  dextrose 40% Gel 15 Gram(s) Oral once PRN Blood Glucose LESS THAN 70 milliGRAM(s)/deciliter  glucagon  Injectable 1 milliGRAM(s) IntraMuscular once PRN Glucose LESS THAN 70 milligrams/deciliter  polyethylene glycol 3350 17 Gram(s) Oral daily PRN Constipation       NAME: NELLIE NICOLE  MRN: [id number]  LOCATION: [facility] 3WES 362 W1    Patient is a 88y old  Female who presents with a chief complaint of Osteomyletis (26 Oct 2020 20:46)      HPI:  86 yo F with PMH of HTN, HLD, Diabetes Mellitus Type 2 not on home insulin, GERD, Dementia, sent by ER from wound care [facility] for evaluation for concern of left metatarsal of foot.  As per clinical hx The patient has been getting routine dressing changes but her wound has worsen to the point to probe to bone.     On Patient interview: patient Denies any fever, chills, nausea, vomiting, chest pain, shortness of breath, or calf pain at this time.  ER Course:  vitals stable afebrile,   labs: Hgb 11,CR 1 (increased from ,75)   Imaging CXR: small left effusion + vascular congestion   X ray bilateral foot: negative  Pt received zosyn 3.375 x1, vanc IV x1,    Podiatry called to evaluate; Patient is now pending MRI of foot (24 Oct 2020 14:04)      OVERNIGHT EVENTS: JACINTO overnight  INTERVAL HPI: Patient seen and examined at bedside. Vanc trough 19.4. Patient has no complaints at this time. Denies foot pain, fevers, chills, headache, lightheadedness, chest pain, dyspnea, abdominal pain, n/v/d/c. Asked by RN to adjust diabetes orders, completed.     T(F): 98 (10-27-20 @ 05:33), Max: 98 (10-27-20 @ 05:33)  HR: 72 (10-27-20 @ 05:33) (67 - 72)  BP: 120/63 (10-27-20 @ 05:33) (120/63 - 150/69)  RR: 18 (10-27-20 @ 05:33) (18 - 18)  SpO2: 97% (10-27-20 @ 05:33) (97% - 98%)  I&O's Summary    26 Oct 2020 07:01  -  27 Oct 2020 07:00  --------------------------------------------------------  IN: 470 mL / OUT: 500 mL / NET: -30 mL        REVIEW OF SYSTEMS:  CONSTITUTIONAL: No fever  EYES: No eye pain, visual disturbances, or discharge  RESPIRATORY: No cough, wheezing No shortness of breath  CARDIOVASCULAR: No chest pain, palpitations, dizziness  GASTROINTESTINAL: No abdominal or epigastric pain. No nausea, vomiting, or hematemesis; No diarrhea or constipation. No melena or hematochezia.  GENITOURINARY: No dysuria, frequency,   NEUROLOGICAL: No headaches  MUSCULOSKELETAL: No muscle, back, or extremity pain    PHYSICAL EXAM:  GENERAL: NAD, laying in bed comfortably, frail elder  HEENT:  anicteric, moist mucous membranes  CHEST/LUNG:  CTA b/l, no rales, wheezes, or rhonchi  HEART:  RRR, S1, S2  ABDOMEN:  BS+, soft, nontender, nondistended  SKIN: DTI noted on Sacrum and R scapula - asked nursing assistant to lay patient on side  EXTREMITIES: contracted LLE and LUE, L foot open to air. no edema, cyanosis, or calf tenderness.  NERVOUS SYSTEM: answers questions and follows commands appropriately      LABS:                        9.5    7.47  )-----------( 215      ( 27 Oct 2020 09:20 )             28.7     10-27    146<H>  |  112<H>  |  30<H>  ----------------------------<  105<H>  4.1   |  27  |  1.10    Ca    8.6      27 Oct 2020 09:20          CAPILLARY BLOOD GLUCOSE      POCT Blood Glucose.: 129 mg/dL (27 Oct 2020 12:16)  POCT Blood Glucose.: 91 mg/dL (27 Oct 2020 06:20)  POCT Blood Glucose.: 176 mg/dL (27 Oct 2020 00:07)  POCT Blood Glucose.: 176 mg/dL (26 Oct 2020 22:22)  POCT Blood Glucose.: 130 mg/dL (26 Oct 2020 17:07)      10-24 @ 21:09   No growth to date.  --  --          MEDICATIONS  (STANDING):  amLODIPine   Tablet 10 milliGRAM(s) Oral daily  aspirin  chewable 81 milliGRAM(s) Oral daily  atorvastatin 20 milliGRAM(s) Oral at bedtime  carvedilol 6.25 milliGRAM(s) Oral two times a day  dextrose 5%. 1000 milliLiter(s) (50 mL/Hr) IV Continuous <Continuous>  dextrose 50% Injectable 12.5 Gram(s) IV Push once  dextrose 50% Injectable 25 Gram(s) IV Push once  dextrose 50% Injectable 25 Gram(s) IV Push once  donepezil 5 milliGRAM(s) Oral at bedtime  insulin lispro (ADMELOG) corrective regimen sliding scale   SubCutaneous three times a day before meals  insulin lispro (ADMELOG) corrective regimen sliding scale   SubCutaneous at bedtime  losartan 100 milliGRAM(s) Oral daily  PARoxetine 20 milliGRAM(s) Oral daily  piperacillin/tazobactam IVPB.. 3.375 Gram(s) IV Intermittent every 8 hours  senna 2 Tablet(s) Oral at bedtime  vancomycin  IVPB 750 milliGRAM(s) IV Intermittent every 24 hours  vancomycin  IVPB        MEDICATIONS  (PRN):  dextrose 40% Gel 15 Gram(s) Oral once PRN Blood Glucose LESS THAN 70 milliGRAM(s)/deciliter  glucagon  Injectable 1 milliGRAM(s) IntraMuscular once PRN Glucose LESS THAN 70 milligrams/deciliter  polyethylene glycol 3350 17 Gram(s) Oral daily PRN Constipation

## 2020-10-27 NOTE — CONSULT NOTE ADULT - ATTENDING COMMENTS
Patient evaluated at the bedside. LLE is contracted. Non invasive vascular studies do not show PVD. No contraindication for podiatric surgery.
I personally saw and examined the patient in detail.  I have spoken to the above provider regarding the assessment and plan of care.  I reviewed the above assessment and plan of care, and agree.  I have made changes in the body of the note where appropriate.  Optimized for the OR from a cardiac point of view with no evidence of active ischemic heart disease, decompensated heart failure, severe obstructive valvular disease, or uncontrolled arrhythmia.

## 2020-10-27 NOTE — PROGRESS NOTE ADULT - ASSESSMENT
Osteomyelitis of the left 1st metatarsal head. Patient is scheduled Left 1st metatarsal head resection with Dr. Mullins on 10/28/20. Patient to be NPO past midnight for surgical procedure tomorrow

## 2020-10-27 NOTE — CONSULT NOTE ADULT - ASSESSMENT
86 yo F with PMH of HTN, HLD, DM2 on home insulin, GERD, Dementia, sent by ER from wound care [facility] for evaluation for concern of left metatarsal of foot. Cardiology consultation called for surgical clearance         Pt has been asymptomatic   -there is no evidence of acute ischemia.  -Ecg w/o ischemic changes, pt w/o anginal compliants  -Trop negative    -there is no evidence of significant arrhythmia.  -ECG NSR @ 60    -there is no evidence for meaningful  volume overload.  -TTE Technically difficult study nL LV & RV size and function no significant valvular dysfunction EF 65%    HTN  - Controlled  - CW amlodipine, carvedilol, losartan  - monitor routine hemodynamics.     HLD  - CW statin     DM2  -CW asa, statin  -per primary    Osteo  - For 1st metatarsal head resection on 10/28  -Care per ortho/primary  - Optimized for the OR from a cardiac point of view with no evidence of active ischemic heart disease, decompensated heart failure, severe obstructive valvular disease, or uncontrolled arrhythmia.  - BP well controlled, monitor routine hemodynamic     -Pt is hemodynamically stable  -Monitor and replete lytes, keep K>4 and Mg >2  Thank you for the consult  Will continue to follow  Raffy Starr [facility]  Cardiology   Spectra #[phone]

## 2020-10-27 NOTE — PROGRESS NOTE ADULT - SUBJECTIVE AND OBJECTIVE BOX
[facility]  INFECTIOUS DISEASES   [address] [place] [address]  Tel: [phone]     Fax: [phone]  =======================================================    MRN-[id number]  NELLIE NICOLE     Follow up: foot ulcer and OM    No new changes, since left leg is contracted, won't be able to perform MRI.  Podiatry will proceed with amputation of 1st metatarsal head.     PAST MEDICAL & SURGICAL HISTORY:  Dementia  DM (diabetes mellitus)  HLD (hyperlipidemia)  HTN (hypertension)  GERD (gastroesophageal reflux disease)  History of cholecystectomy  Ankle injury  s/p ankle surgery, pt doesn&#x27;t remember which ankle    Social Hx: no smoking or toxic habits     FAMILY HISTORY:  FH: type 2 diabetes  brother    FH: colon cancer  sister    Allergies  sulfa drugs (Unknown)    Antibiotics:  piperacillin/tazobactam IVPB.. 3.375 Gram(s) IV Intermittent every 8 hours  vancomycin  IVPB 1000 milliGRAM(s) IV Intermittent every 24 hours     REVIEW OF SYSTEMS:  Limited due to dementia but states that has left foot pain.     Physical Exam:  Vital Signs Last 24 Hrs  T(C): 36.7 (27 Oct 2020 05:33), Max: 36.7 (27 Oct 2020 05:33)  T(F): 98 (27 Oct 2020 05:33), Max: 98 (27 Oct 2020 05:33)  HR: 72 (27 Oct 2020 05:33) (72 - 72)  BP: 120/63 (27 Oct 2020 05:33) (120/63 - 150/69)  BP(mean): --  RR: 18 (27 Oct 2020 05:33) (18 - 18)  SpO2: 97% (27 Oct 2020 05:33) (97% - 97%)  GEN: NAD  HEENT: normocephalic and atraumatic. EOMI. PERRL.    NECK: Supple.  No lymphadenopathy   LUNGS: Clear to auscultation.  HEART: Regular rate and rhythm   ABDOMEN: Soft, nontender, and nondistended.  Positive bowel sounds.    : No CVA tenderness  EXTREMITIES: Without edema. left leg in a contracted position, able to extend partially   Left plantar metatarsal head area with a deep ulcer with mild swelling and erythema in surrounding, no discharge    NEUROLOGIC: grossly intact.  PSYCHIATRIC: dementia unable to evaluate   SKIN: No rash    Labs:                   9.5    7.47  )-----------( 215      ( 27 Oct 2020 09:20 )             28.7    10-27    146<H>  |  112<H>  |  30<H>  ----------------------------<  105<H>  4.1   |  27  |  1.10    Ca    8.6      27 Oct 2020 09:20    Culture - Tissue with Gram Stain (collected 10-26-20 @ 15:17)  Source: .Tissue Other, Left  medial 1st Metatarsal  Gram Stain (10-26-20 @ 19:49):    Rare polymorphonuclear leukocytes per low power field    Few Gram positive cocci in pairs per oil power field    Few Gram Variable Rods per oil power field    Culture - Blood (collected 10-24-20 @ 21:09)  Source: .Blood Blood    WBC Count: 7.47 K/uL (10-27-20 @ 09:20)  WBC Count: 7.27 K/uL (10-26-20 @ 04:44)  WBC Count: 6.75 K/uL (10-25-20 @ 06:25)  WBC Count: 6.11 K/uL (10-24-20 @ 12:00)    Creatinine, Serum: 1.10 mg/dL (10-27-20 @ 09:20)  Creatinine, Serum: 0.89 mg/dL (10-26-20 @ 05:04)  Creatinine, Serum: 0.92 mg/dL (10-25-20 @ 06:25)  Creatinine, Serum: 1.00 mg/dL (10-24-20 @ 12:00)    C-Reactive Protein, Serum: <0.10 mg/dL (10-24-20 @ 20:48)  C-Reactive Protein, Serum: <0.10 mg/dL (10-24-20 @ 16:19)    Sedimentation Rate, Erythrocyte: 26 mm/hr (10-24-20 @ 15:06)  Sedimentation Rate, Erythrocyte: 26 mm/hr (10-24-20 @ 12:00)    Procalcitonin, Serum: <0.05 (10-24-20 @ 15:06)     COVID-19 PCR: NotDetec (10-24-20 @ 11:56)    All imaging and other data have been reviewed.  < from: Xray Foot AP + Lateral + Oblique, Bilat (10.24.20 @ 12:17) >  EXAM:  FOOT BILATERAL (MINIMUM 3 V)                        PROCEDURE DATE:  10/24/2020    INTERPRETATION:  Bilateral feet  HISTORY: Osteomyelitis   Three views of the right foot and three views of the left foot show no evidence of fracture nor destructive change. The joint spaces show left hallux valgus deformity. Surgical hardware projects in the lower left tibia and fibula.  IMPRESSION: No evidence of bony destruction.    Assessment and Plan:   86 y/o woman with PMH of HTN, Diabetes Mellitus 2, GERD and Dementia was sent from ER wound center for evaluation of left foot ulcer and possible Osteomyelitis.  Mild cellulitis in area, unclear if there is OM without MRI to plan for treatment.     Left foot chronic ulcer r/o OM  - Blood culture neg  - Wound culture in the past with strep and this time also growing GPC in pairs and gram variable rods, could be contamination though  - Xray negative for OM  - MRI difficult to perform due to contracted LLE  - Podiatry planning for Left first metatarsal amputation   - Will stop zosyn and vancomycin and start Ceftriaxone 2gm daily for now.     Will follow.    Mohit Almonte MD  Division of Infectious Diseases   Cell [phone] between 8am and 6pm   After 6pm and weekends please call ID service at [phone].    Pt. pain not relieved with 1 tab percocet, pt.states he is ok for now and will notify RN if he needs pain medicine Pt. PTT result 100.7. Pt. ordered for coumadin based on yesterdays INR result of 1.34 DISCHARGE

## 2020-10-27 NOTE — PROGRESS NOTE ADULT - ASSESSMENT
86 yo F with PMH of HTN, HLD, Diabetes Mellitus Type 2 not on home insulin, GERD, Dementia, sent by ER from wound care [facility] for evaluation, c/w osteomyelitis of left metatarsal of foot, pending OR for resection 10/28      Diabetic foot ulcer, underlying osteomyelitis  Podiatry, Dr. Mullins consulted  C/W vanc and zosyn, f/u trough (target 15-24)  Decreased Vanc to 750mg q24-  ECHO today prior to OR in AM  OR 10/28 for L metatarsal resection by podiatry; holding heparin; NPO at midnight  Tissue Cx 10/26 L med 1st met - few G+ cocci pairs, G+ jenny rods, PMLs  F/u Blood cx x2 10/24 NGTD  Good vascular flow per L leg ABIs  MRI foot - unable to obtain 2/2 contracture  ID Consulted  Palliative Case Consulted    Deep Tissue Injury  1) sacral 2) L scapular DTIs, Stage 1 pressure ulcer  3) Skin tear R thigh 2/2 LLE contracture with erythema and edema on medical aspect of L foot  Wound care RN consulted  Turn in bed, reposition q 4  keep towel between abrasion and LLE     Pulmonary vascular congestion and left pleural effusion r/o occult CHF  CXR showed L infiltrate w/ effusion  Patient appears euvolemic on exam  Trops neg, BMP 1100  TTE EF65% mild valvular disease    DM2, noninsulin dependent.  hold home metformin  start low dose ISS, fingersticks,     Dementia  baseline bedbound, at baseline knows her name, can identify her family, forgets their names, eats well (regular solid food)  cont donepezil   cont paroxetine.    Iron deficiency anemia  Hb 10-11, normocytic  hold home iron    HLD  atorvastatin interchange for lovastatin.    HTN  cont carvedilol, amlodipine, and losartan with hold parameters.    GERD  Patient on omeprazole: will use protonix in house    Depression  c/w paroxetine    PPx  heparin subq  bowel regimen    DISPO  DNR DNI, PC consulted  PT consulted  SW consulted

## 2020-10-28 ENCOUNTER — RESULT REVIEW (OUTPATIENT)
Age: 85
End: 2020-10-28

## 2020-10-28 DIAGNOSIS — E11.621 TYPE 2 DIABETES MELLITUS WITH FOOT ULCER: ICD-10-CM

## 2020-10-28 DIAGNOSIS — Z79.82 LONG TERM (CURRENT) USE OF ASPIRIN: ICD-10-CM

## 2020-10-28 DIAGNOSIS — Z79.899 OTHER LONG TERM (CURRENT) DRUG THERAPY: ICD-10-CM

## 2020-10-28 DIAGNOSIS — Z80.0 FAMILY HISTORY OF MALIGNANT NEOPLASM OF DIGESTIVE ORGANS: ICD-10-CM

## 2020-10-28 DIAGNOSIS — Z79.4 LONG TERM (CURRENT) USE OF INSULIN: ICD-10-CM

## 2020-10-28 DIAGNOSIS — Z87.891 PERSONAL HISTORY OF NICOTINE DEPENDENCE: ICD-10-CM

## 2020-10-28 DIAGNOSIS — Z96.653 PRESENCE OF ARTIFICIAL KNEE JOINT, BILATERAL: ICD-10-CM

## 2020-10-28 DIAGNOSIS — Z88.2 ALLERGY STATUS TO SULFONAMIDES: ICD-10-CM

## 2020-10-28 DIAGNOSIS — E78.5 HYPERLIPIDEMIA, UNSPECIFIED: ICD-10-CM

## 2020-10-28 DIAGNOSIS — L97.426 NON-PRESSURE CHRONIC ULCER OF LEFT HEEL AND MIDFOOT WITH BONE INVOLVEMENT WITHOUT EVIDENCE OF NECROSIS: ICD-10-CM

## 2020-10-28 DIAGNOSIS — F03.90 UNSPECIFIED DEMENTIA WITHOUT BEHAVIORAL DISTURBANCE: ICD-10-CM

## 2020-10-28 DIAGNOSIS — Z90.49 ACQUIRED ABSENCE OF OTHER SPECIFIED PARTS OF DIGESTIVE TRACT: ICD-10-CM

## 2020-10-28 DIAGNOSIS — K21.9 GASTRO-ESOPHAGEAL REFLUX DISEASE WITHOUT ESOPHAGITIS: ICD-10-CM

## 2020-10-28 DIAGNOSIS — I10 ESSENTIAL (PRIMARY) HYPERTENSION: ICD-10-CM

## 2020-10-28 PROBLEM — L97.422: Status: ACTIVE | Noted: 2020-09-22

## 2020-10-28 LAB
-  AMIKACIN: SIGNIFICANT CHANGE UP
-  AZTREONAM: SIGNIFICANT CHANGE UP
-  CEFEPIME: SIGNIFICANT CHANGE UP
-  CEFTAZIDIME: SIGNIFICANT CHANGE UP
-  CIPROFLOXACIN: SIGNIFICANT CHANGE UP
-  GENTAMICIN: SIGNIFICANT CHANGE UP
-  IMIPENEM: SIGNIFICANT CHANGE UP
-  LEVOFLOXACIN: SIGNIFICANT CHANGE UP
-  MEROPENEM: SIGNIFICANT CHANGE UP
-  PIPERACILLIN/TAZOBACTAM: SIGNIFICANT CHANGE UP
-  TOBRAMYCIN: SIGNIFICANT CHANGE UP
ANION GAP SERPL CALC-SCNC: 6 MMOL/L — SIGNIFICANT CHANGE UP (ref 5–17)
BUN SERPL-MCNC: 29 MG/DL — HIGH (ref 7–23)
CALCIUM SERPL-MCNC: 8.5 MG/DL — SIGNIFICANT CHANGE UP (ref 8.5–10.1)
CHLORIDE SERPL-SCNC: 113 MMOL/L — HIGH (ref 96–108)
CO2 SERPL-SCNC: 25 MMOL/L — SIGNIFICANT CHANGE UP (ref 22–31)
CREAT SERPL-MCNC: 1 MG/DL — SIGNIFICANT CHANGE UP (ref 0.5–1.3)
GLUCOSE SERPL-MCNC: 118 MG/DL — HIGH (ref 70–99)
GRAM STN FLD: SIGNIFICANT CHANGE UP
HCT VFR BLD CALC: 30.6 % — LOW (ref 34.5–45)
HGB BLD-MCNC: 9.9 G/DL — LOW (ref 11.5–15.5)
MCHC RBC-ENTMCNC: 27.5 PG — SIGNIFICANT CHANGE UP (ref 27–34)
MCHC RBC-ENTMCNC: 32.4 GM/DL — SIGNIFICANT CHANGE UP (ref 32–36)
MCV RBC AUTO: 85 FL — SIGNIFICANT CHANGE UP (ref 80–100)
METHOD TYPE: SIGNIFICANT CHANGE UP
NRBC # BLD: 0 /100 WBCS — SIGNIFICANT CHANGE UP (ref 0–0)
PLATELET # BLD AUTO: 213 K/UL — SIGNIFICANT CHANGE UP (ref 150–400)
POTASSIUM SERPL-MCNC: 3.7 MMOL/L — SIGNIFICANT CHANGE UP (ref 3.5–5.3)
POTASSIUM SERPL-SCNC: 3.7 MMOL/L — SIGNIFICANT CHANGE UP (ref 3.5–5.3)
RBC # BLD: 3.6 M/UL — LOW (ref 3.8–5.2)
RBC # FLD: 15.1 % — HIGH (ref 10.3–14.5)
SODIUM SERPL-SCNC: 144 MMOL/L — SIGNIFICANT CHANGE UP (ref 135–145)
SPECIMEN SOURCE: SIGNIFICANT CHANGE UP
WBC # BLD: 6.31 K/UL — SIGNIFICANT CHANGE UP (ref 3.8–10.5)
WBC # FLD AUTO: 6.31 K/UL — SIGNIFICANT CHANGE UP (ref 3.8–10.5)

## 2020-10-28 PROCEDURE — 88304 TISSUE EXAM BY PATHOLOGIST: CPT | Mod: 26

## 2020-10-28 PROCEDURE — 99232 SBSQ HOSP IP/OBS MODERATE 35: CPT

## 2020-10-28 PROCEDURE — 73620 X-RAY EXAM OF FOOT: CPT | Mod: 26,LT

## 2020-10-28 PROCEDURE — 28111 PART REMOVAL OF METATARSAL: CPT | Mod: TA

## 2020-10-28 PROCEDURE — 88311 DECALCIFY TISSUE: CPT | Mod: 26

## 2020-10-28 PROCEDURE — 99233 SBSQ HOSP IP/OBS HIGH 50: CPT | Mod: GC

## 2020-10-28 PROCEDURE — 28111 PART REMOVAL OF METATARSAL: CPT | Mod: LT

## 2020-10-28 RX ORDER — ASPIRIN/CALCIUM CARB/MAGNESIUM 324 MG
81 TABLET ORAL DAILY
Refills: 0 | Status: DISCONTINUED | OUTPATIENT
Start: 2020-10-28 | End: 2020-11-04

## 2020-10-28 RX ORDER — DONEPEZIL HYDROCHLORIDE 10 MG/1
5 TABLET, FILM COATED ORAL AT BEDTIME
Refills: 0 | Status: DISCONTINUED | OUTPATIENT
Start: 2020-10-28 | End: 2020-11-04

## 2020-10-28 RX ORDER — MULTIVIT-MIN/FERROUS GLUCONATE 9 MG/15 ML
1 LIQUID (ML) ORAL DAILY
Refills: 0 | Status: DISCONTINUED | OUTPATIENT
Start: 2020-10-28 | End: 2020-11-04

## 2020-10-28 RX ORDER — SODIUM CHLORIDE 9 MG/ML
1000 INJECTION, SOLUTION INTRAVENOUS
Refills: 0 | Status: DISCONTINUED | OUTPATIENT
Start: 2020-10-28 | End: 2020-10-28

## 2020-10-28 RX ORDER — DEXTROSE 50 % IN WATER 50 %
12.5 SYRINGE (ML) INTRAVENOUS ONCE
Refills: 0 | Status: DISCONTINUED | OUTPATIENT
Start: 2020-10-28 | End: 2020-11-04

## 2020-10-28 RX ORDER — POLYETHYLENE GLYCOL 3350 17 G/17G
17 POWDER, FOR SOLUTION ORAL DAILY
Refills: 0 | Status: DISCONTINUED | OUTPATIENT
Start: 2020-10-28 | End: 2020-11-04

## 2020-10-28 RX ORDER — HYDROMORPHONE HYDROCHLORIDE 2 MG/ML
0.5 INJECTION INTRAMUSCULAR; INTRAVENOUS; SUBCUTANEOUS
Refills: 0 | Status: DISCONTINUED | OUTPATIENT
Start: 2020-10-28 | End: 2020-10-28

## 2020-10-28 RX ORDER — ATORVASTATIN CALCIUM 80 MG/1
20 TABLET, FILM COATED ORAL AT BEDTIME
Refills: 0 | Status: DISCONTINUED | OUTPATIENT
Start: 2020-10-28 | End: 2020-11-04

## 2020-10-28 RX ORDER — ASCORBIC ACID 60 MG
500 TABLET,CHEWABLE ORAL
Refills: 0 | Status: DISCONTINUED | OUTPATIENT
Start: 2020-10-28 | End: 2020-11-04

## 2020-10-28 RX ORDER — GLUCAGON INJECTION, SOLUTION 0.5 MG/.1ML
1 INJECTION, SOLUTION SUBCUTANEOUS ONCE
Refills: 0 | Status: DISCONTINUED | OUTPATIENT
Start: 2020-10-28 | End: 2020-11-04

## 2020-10-28 RX ORDER — ONDANSETRON 8 MG/1
4 TABLET, FILM COATED ORAL ONCE
Refills: 0 | Status: DISCONTINUED | OUTPATIENT
Start: 2020-10-28 | End: 2020-10-28

## 2020-10-28 RX ORDER — DEXTROSE 50 % IN WATER 50 %
25 SYRINGE (ML) INTRAVENOUS ONCE
Refills: 0 | Status: DISCONTINUED | OUTPATIENT
Start: 2020-10-28 | End: 2020-11-04

## 2020-10-28 RX ORDER — HEPARIN SODIUM 5000 [USP'U]/ML
5000 INJECTION INTRAVENOUS; SUBCUTANEOUS EVERY 12 HOURS
Refills: 0 | Status: DISCONTINUED | OUTPATIENT
Start: 2020-10-29 | End: 2020-11-04

## 2020-10-28 RX ORDER — INSULIN LISPRO 100/ML
VIAL (ML) SUBCUTANEOUS EVERY 6 HOURS
Refills: 0 | Status: DISCONTINUED | OUTPATIENT
Start: 2020-10-28 | End: 2020-10-28

## 2020-10-28 RX ORDER — GLUCAGON INJECTION, SOLUTION 0.5 MG/.1ML
1 INJECTION, SOLUTION SUBCUTANEOUS ONCE
Refills: 0 | Status: DISCONTINUED | OUTPATIENT
Start: 2020-10-28 | End: 2020-10-28

## 2020-10-28 RX ORDER — SODIUM CHLORIDE 9 MG/ML
1000 INJECTION, SOLUTION INTRAVENOUS
Refills: 0 | Status: DISCONTINUED | OUTPATIENT
Start: 2020-10-28 | End: 2020-11-04

## 2020-10-28 RX ORDER — DEXTROSE 50 % IN WATER 50 %
25 SYRINGE (ML) INTRAVENOUS ONCE
Refills: 0 | Status: DISCONTINUED | OUTPATIENT
Start: 2020-10-28 | End: 2020-10-28

## 2020-10-28 RX ORDER — INSULIN LISPRO 100/ML
VIAL (ML) SUBCUTANEOUS AT BEDTIME
Refills: 0 | Status: DISCONTINUED | OUTPATIENT
Start: 2020-10-28 | End: 2020-11-04

## 2020-10-28 RX ORDER — DEXTROSE 50 % IN WATER 50 %
15 SYRINGE (ML) INTRAVENOUS ONCE
Refills: 0 | Status: DISCONTINUED | OUTPATIENT
Start: 2020-10-28 | End: 2020-11-04

## 2020-10-28 RX ORDER — DEXTROSE 50 % IN WATER 50 %
12.5 SYRINGE (ML) INTRAVENOUS ONCE
Refills: 0 | Status: DISCONTINUED | OUTPATIENT
Start: 2020-10-28 | End: 2020-10-28

## 2020-10-28 RX ORDER — SENNA PLUS 8.6 MG/1
2 TABLET ORAL AT BEDTIME
Refills: 0 | Status: DISCONTINUED | OUTPATIENT
Start: 2020-10-28 | End: 2020-11-04

## 2020-10-28 RX ORDER — OXYCODONE HYDROCHLORIDE 5 MG/1
5 TABLET ORAL ONCE
Refills: 0 | Status: DISCONTINUED | OUTPATIENT
Start: 2020-10-28 | End: 2020-10-28

## 2020-10-28 RX ORDER — LOSARTAN POTASSIUM 100 MG/1
100 TABLET, FILM COATED ORAL DAILY
Refills: 0 | Status: DISCONTINUED | OUTPATIENT
Start: 2020-10-28 | End: 2020-11-04

## 2020-10-28 RX ORDER — CEFEPIME 1 G/1
2000 INJECTION, POWDER, FOR SOLUTION INTRAMUSCULAR; INTRAVENOUS EVERY 12 HOURS
Refills: 0 | Status: DISCONTINUED | OUTPATIENT
Start: 2020-10-29 | End: 2020-10-29

## 2020-10-28 RX ORDER — CARVEDILOL PHOSPHATE 80 MG/1
6.25 CAPSULE, EXTENDED RELEASE ORAL
Refills: 0 | Status: DISCONTINUED | OUTPATIENT
Start: 2020-10-28 | End: 2020-11-04

## 2020-10-28 RX ORDER — CEFEPIME 1 G/1
INJECTION, POWDER, FOR SOLUTION INTRAMUSCULAR; INTRAVENOUS
Refills: 0 | Status: DISCONTINUED | OUTPATIENT
Start: 2020-10-28 | End: 2020-10-29

## 2020-10-28 RX ORDER — CEFEPIME 1 G/1
INJECTION, POWDER, FOR SOLUTION INTRAMUSCULAR; INTRAVENOUS
Refills: 0 | Status: DISCONTINUED | OUTPATIENT
Start: 2020-10-28 | End: 2020-10-28

## 2020-10-28 RX ORDER — INSULIN LISPRO 100/ML
VIAL (ML) SUBCUTANEOUS
Refills: 0 | Status: DISCONTINUED | OUTPATIENT
Start: 2020-10-28 | End: 2020-11-04

## 2020-10-28 RX ORDER — CEFEPIME 1 G/1
2000 INJECTION, POWDER, FOR SOLUTION INTRAMUSCULAR; INTRAVENOUS ONCE
Refills: 0 | Status: COMPLETED | OUTPATIENT
Start: 2020-10-28 | End: 2020-10-28

## 2020-10-28 RX ORDER — CEFEPIME 1 G/1
2000 INJECTION, POWDER, FOR SOLUTION INTRAMUSCULAR; INTRAVENOUS ONCE
Refills: 0 | Status: DISCONTINUED | OUTPATIENT
Start: 2020-10-28 | End: 2020-10-28

## 2020-10-28 RX ORDER — DEXTROSE 50 % IN WATER 50 %
15 SYRINGE (ML) INTRAVENOUS ONCE
Refills: 0 | Status: DISCONTINUED | OUTPATIENT
Start: 2020-10-28 | End: 2020-10-28

## 2020-10-28 RX ORDER — CEFEPIME 1 G/1
2000 INJECTION, POWDER, FOR SOLUTION INTRAMUSCULAR; INTRAVENOUS EVERY 12 HOURS
Refills: 0 | Status: DISCONTINUED | OUTPATIENT
Start: 2020-10-28 | End: 2020-10-28

## 2020-10-28 RX ORDER — AMLODIPINE BESYLATE 2.5 MG/1
10 TABLET ORAL DAILY
Refills: 0 | Status: DISCONTINUED | OUTPATIENT
Start: 2020-10-28 | End: 2020-11-04

## 2020-10-28 RX ADMIN — CARVEDILOL PHOSPHATE 6.25 MILLIGRAM(S): 80 CAPSULE, EXTENDED RELEASE ORAL at 05:34

## 2020-10-28 RX ADMIN — Medication 500 MILLIGRAM(S): at 18:18

## 2020-10-28 RX ADMIN — AMLODIPINE BESYLATE 10 MILLIGRAM(S): 2.5 TABLET ORAL at 05:34

## 2020-10-28 RX ADMIN — DONEPEZIL HYDROCHLORIDE 5 MILLIGRAM(S): 10 TABLET, FILM COATED ORAL at 21:07

## 2020-10-28 RX ADMIN — SODIUM CHLORIDE 75 MILLILITER(S): 9 INJECTION, SOLUTION INTRAVENOUS at 13:19

## 2020-10-28 RX ADMIN — SENNA PLUS 2 TABLET(S): 8.6 TABLET ORAL at 21:07

## 2020-10-28 RX ADMIN — ATORVASTATIN CALCIUM 20 MILLIGRAM(S): 80 TABLET, FILM COATED ORAL at 21:07

## 2020-10-28 RX ADMIN — Medication 1: at 17:53

## 2020-10-28 RX ADMIN — LOSARTAN POTASSIUM 100 MILLIGRAM(S): 100 TABLET, FILM COATED ORAL at 05:34

## 2020-10-28 RX ADMIN — CARVEDILOL PHOSPHATE 6.25 MILLIGRAM(S): 80 CAPSULE, EXTENDED RELEASE ORAL at 18:01

## 2020-10-28 RX ADMIN — CEFEPIME 100 MILLIGRAM(S): 1 INJECTION, POWDER, FOR SOLUTION INTRAMUSCULAR; INTRAVENOUS at 13:40

## 2020-10-28 NOTE — PROGRESS NOTE ADULT - ASSESSMENT
84 yo F with PMH of HTN, HLD, Diabetes Mellitus Type 2 not on home insulin, GERD, Dementia, sent by ER from wound care [facility] for evaluation, c/w osteomyelitis of left metatarsal of foot, pending OR for resection 10/28      Diabetic foot ulcer, underlying osteomyelitis  Podiatry, Dr. Mullins consulted  C/W vanc and zosyn, f/u trough (target 15-24)  Decreased Vanc to 750mg q24-  ECHO today prior to OR in AM  OR 10/28 for L metatarsal resection by podiatry; holding heparin; NPO at midnight  Tissue Cx 10/26 L med 1st met - few G+ cocci pairs, G+ jenny rods, PMLs  F/u Blood cx x2 10/24 NGTD  Good vascular flow per L leg ABIs  MRI foot - unable to obtain 2/2 contracture  Patient is medically optimized  Pre-op cardiac eval  Patient is medically optimized for procedure. Currently no active cardiac conditions. No signs of ischemia, ADHF, clinical exam not consistent with stenotic valvular disease, no unstable arrhythmias noted. Therefore, able to proceed with surgery without any further workup.  - Able to walk >4 METS without any anginal symptoms.   - HR and BP acceptable  - Further cardiac workup will depend on clinical course  - All other workup per primary team. Will continue to follow.   ID Consulted  Palliative Case Consulted    Deep Tissue Injury  1) sacral 2) L scapular DTIs, Stage 1 pressure ulcer  3) Skin tear R thigh 2/2 LLE contracture with erythema and edema on medical aspect of L foot  Wound care RN consulted  Turn in bed, reposition q 4  keep towel between abrasion and LLE     Pulmonary vascular congestion and left pleural effusion r/o occult CHF  CXR showed L infiltrate w/ effusion  Patient appears euvolemic on exam  Trops neg, BMP 1100  TTE EF65% mild valvular disease    DM2, noninsulin dependent.  hold home metformin  start low dose ISS, fingersticks,     Dementia  baseline bedbound, at baseline knows her name, can identify her family, forgets their names, eats well (regular solid food)  cont donepezil   cont paroxetine.    Iron deficiency anemia  Hb 10-11, normocytic  hold home iron    HLD  atorvastatin interchange for lovastatin.    HTN  cont carvedilol, amlodipine, and losartan with hold parameters.    GERD  Patient on omeprazole: will use protonix in house    Depression  c/w paroxetine    PPx  heparin subq  bowel regimen    DISPO  DNR DNI, PC consulted  PT consulted  SW consulted   84 yo F with PMH of HTN, HLD, Diabetes Mellitus Type 2 not on home insulin, GERD, Dementia, sent by ER from wound care [facility] for evaluation, c/w osteomyelitis of left metatarsal of foot, pending OR for resection 10/28      Diabetic foot ulcer, underlying osteomyelitis  Podiatry, Dr. Mullins consulted  Now on cefepime 2gm q8h, per ID  ECHO DC'd as recent available, wnl  OR 10/28 for L metatarsal resection by podiatry; held heparin; NPO since midnight  Tissue Cx 10/26 L med 1st met - few G+ cocci pairs, G+ jenny rods, PMLs  F/u Blood cx x2 10/24 NGTD  Good vascular flow per L leg ABIs  MRI foot - unable to obtain 2/2 contracture  Patient is medically optimized  Cardio cleared for procedure in OR  Patient is medically optimized for procedure. Currently no active cardiac conditions. No signs of ischemia, ADHF, clinical exam not consistent with stenotic valvular disease, no unstable arrhythmias noted. Therefore, able to proceed with surgery without any further workup.  - Able to walk >4 METS without any anginal symptoms.   - HR and BP acceptable  - Further cardiac workup will depend on clinical course  - All other workup per primary team. Will continue to follow.   ID Consulted  Palliative Case Consulted    Deep Tissue Injury  1) sacral 2) L scapular DTIs, Stage 1 pressure ulcer  3) Skin tear R thigh 2/2 LLE contracture with erythema and edema on medical aspect of L foot  Wound care RN consulted - DC'd by wound care RN as no longer warranted  Turn in bed, reposition q 4  keep towel between abrasion and LLE     Pulmonary vascular congestion and left pleural effusion r/o occult CHF  CXR showed L infiltrate w/ effusion  Patient appears euvolemic on exam  Trops neg, BMP 1100  TTE EF65% mild valvular disease    DM2, noninsulin dependent.  hold home metformin  start low dose ISS, fingersticks,     Dementia  baseline bedbound, at baseline knows her name, can identify her family, forgets their names, eats well (regular solid food)  cont donepezil   cont paroxetine.    Iron deficiency anemia  Hb 10-11, normocytic  hold home iron    HLD  atorvastatin interchange for lovastatin.    HTN  cont carvedilol, amlodipine, and losartan with hold parameters.    GERD  Patient on omeprazole: will use protonix in house    Depression  c/w paroxetine    PPx  heparin subq  bowel regimen    DISPO  DNR DNI, PC consulted  PT consulted  SW consulted

## 2020-10-28 NOTE — PHYSICAL EXAM
[0] : left 0 [1+] : left 1+ [Skin Ulcer] : ulcer [Calm] : calm [2 x 2] : 2 x 2  [Purpura] : no purpura  [Petechiae] : no petechiae [Alert] : not alert [Oriented to Person] : disoriented to person [Oriented to Place] : disoriented to place [de-identified] : comfortable  [de-identified] : HTN [de-identified] : non ambulatory , forefoot deformities with bunions  [de-identified] : DFU 3 medial left 1st metatarsal  [de-identified] : Dementia [FreeTextEntry1] : Left medial 1st Metatarsal  [FreeTextEntry2] : 0.3 [FreeTextEntry3] : 0.3 [FreeTextEntry4] : 0.3 [de-identified] : Serous/sanguinous [de-identified] : 2cm circumferential  [de-identified] : Silver alginate [de-identified] : Cleansed with NS\par Cloth tape \par \par *** Bandage applied by DPM one time. transferred to ER.  [TWNoteComboBox4] : Small [de-identified] : Yes [de-identified] : Normal [de-identified] : None [de-identified] : None [de-identified] : >75% [de-identified] : No [de-identified] : Primary Dressing

## 2020-10-28 NOTE — PROGRESS NOTE ADULT - SUBJECTIVE AND OBJECTIVE BOX
HPI:  86 yo F with PMH of HTN, HLD, Diabetes Mellitus Type 2 not on home insulin, GERD, Dementia, sent by ER from wound care [facility] for evaluation for concern of left metatarsal of foot.  As per clinical hx The patient has been getting routine dressing changes but her wound has worsen to the point to probe to bone.     On Patient interview: patient Denies any fever, chills, nausea, vomiting, chest pain, shortness of breath, or calf pain at this time.  ER Course:  vitals stable afebrile,   labs: Hgb 11,CR 1 (increased from ,75)   Imaging CXR: small left effusion + vascular congestion   X ray bilateral foot: negative  Pt received zosyn 3.375 x1, vanc IV x1,    Podiatry called to evaluate; Patient is now pending MRI of foot (24 Oct 2020 14:04)    REVIEW OF SYSTEMS:    CONSTITUTIONAL: No weakness, fevers or chills  EYES/ENT: No visual changes, no throat pain   RESPIRATORY: No cough, wheezing, hemoptysis; No shortness of breath  CARDIOVASCULAR: No chest pain or palpitations  GASTROINTESTINAL: No abdominal, nausea, vomiting, or hematemesis; No diarrhea or constipation. No melena or hematochezia.  GENITOURINARY: No dysuria, frequency or hematuria  NEUROLOGICAL: No dizziness, numbness, or weakness  SKIN: No itching, burning, rashes, or lesions   All other review of systems is negative unless indicated above.    VITAL SIGNS:        PHYSICAL EXAM:     GENERAL: no acute distress  HEENT: NC/AT, EOMI, neck supple, MMM  RESPIRATORY: LCTAB/L, no rhonchi, rales, or wheezing  CARDIOVASCULAR: RRR, no murmurs, gallops, rubs  ABDOMINAL: soft, non-tender, non-distended, positive bowel sounds   EXTREMITIES: no clubbing, cyanosis, or edema  NEUROLOGICAL: alert and oriented x 3, non-focal  SKIN: no rashes or lesions   MUSCULOSKELETAL: no gross joint deformity                          9.9    6.31  )-----------( 213      ( 28 Oct 2020 09:13 )             30.6     10-28    144  |  113<H>  |  29<H>  ----------------------------<  118<H>  3.7   |  25  |  1.00    Ca    8.5      28 Oct 2020 09:08        CAPILLARY BLOOD GLUCOSE      POCT Blood Glucose.: 101 mg/dL (28 Oct 2020 05:59)  POCT Blood Glucose.: 145 mg/dL (28 Oct 2020 00:35)  POCT Blood Glucose.: 158 mg/dL (27 Oct 2020 21:44)  POCT Blood Glucose.: 129 mg/dL (27 Oct 2020 17:12)  POCT Blood Glucose.: 129 mg/dL (27 Oct 2020 12:16)      MEDICATIONS  (STANDING):  amLODIPine   Tablet 10 milliGRAM(s) Oral daily  aspirin  chewable 81 milliGRAM(s) Oral daily  atorvastatin 20 milliGRAM(s) Oral at bedtime  carvedilol 6.25 milliGRAM(s) Oral two times a day  cefepime   IVPB      cefepime   IVPB 2000 milliGRAM(s) IV Intermittent once  cefepime   IVPB 2000 milliGRAM(s) IV Intermittent every 12 hours  dextrose 5%. 1000 milliLiter(s) (50 mL/Hr) IV Continuous <Continuous>  dextrose 50% Injectable 12.5 Gram(s) IV Push once  dextrose 50% Injectable 25 Gram(s) IV Push once  dextrose 50% Injectable 25 Gram(s) IV Push once  donepezil 5 milliGRAM(s) Oral at bedtime  insulin lispro (ADMELOG) corrective regimen sliding scale   SubCutaneous every 6 hours  losartan 100 milliGRAM(s) Oral daily  PARoxetine 20 milliGRAM(s) Oral daily  senna 2 Tablet(s) Oral at bedtime       HPI:  86 yo F with PMH of HTN, HLD, Diabetes Mellitus Type 2 not on home insulin, GERD, Dementia, sent by ER from wound care [facility] for evaluation for concern of left metatarsal of foot.  As per clinical hx The patient has been getting routine dressing changes but her wound has worsen to the point to probe to bone.     On Patient interview: patient Denies any fever, chills, nausea, vomiting, chest pain, shortness of breath, or calf pain at this time.  ER Course:  vitals stable afebrile,   labs: Hgb 11,CR 1 (increased from ,75)   Imaging CXR: small left effusion + vascular congestion   X ray bilateral foot: negative  Pt received zosyn 3.375 x1, vanc IV x1,    Podiatry called to evaluate; Patient is now pending MRI of foot (24 Oct 2020 14:04)    REVIEW OF SYSTEMS:  CONSTITUTIONAL: No weakness, fevers or chills  EYES/ENT: No visual changes, no throat pain   RESPIRATORY: No cough, wheezing, hemoptysis; No shortness of breath  CARDIOVASCULAR: No chest pain or palpitations  GASTROINTESTINAL: No abdominal, nausea, vomiting, or hematemesis; No diarrhea or constipation. No melena or hematochezia.  GENITOURINARY: No dysuria, frequency or hematuria  NEUROLOGICAL: No dizziness, numbness, or weakness  SKIN: No itching, burning, rashes, or lesions   All other review of systems is negative unless indicated above.    VITAL SIGNS:  Vital Signs Last 24 Hrs  T(C): 36.5 (28 Oct 2020 12:35), Max: 37.2 (27 Oct 2020 20:35)  T(F): 97.7 (28 Oct 2020 12:35), Max: 98.9 (27 Oct 2020 20:35)  HR: 62 (28 Oct 2020 12:50) (60 - 81)  BP: 135/58 (28 Oct 2020 12:50) (108/60 - 151/73)  BP(mean): --  RR: 13 (28 Oct 2020 12:50) (12 - 18)  SpO2: 97% (28 Oct 2020 12:50) (96% - 100%)    PHYSICAL EXAM:  GENERAL: NAD, laying in bed comfortably, frail elder  HEENT:  anicteric, moist mucous membranes  CHEST/LUNG:  CTA b/l, no rales, wheezes, or rhonchi  HEART:  RRR, S1, S2  ABDOMEN:  BS+, soft, nontender, nondistended  SKIN: DTI noted on Sacrum and R scapula - asked nursing assistant to lay patient on side  EXTREMITIES: contracted LLE and LUE, L foot open to air. no edema, cyanosis, or calf tenderness.  NERVOUS SYSTEM: answers questions and follows commands appropriately                            9.9    6.31  )-----------( 213      ( 28 Oct 2020 09:13 )             30.6     10-28    144  |  113<H>  |  29<H>  ----------------------------<  118<H>  3.7   |  25  |  1.00    Ca    8.5      28 Oct 2020 09:08        CAPILLARY BLOOD GLUCOSE      POCT Blood Glucose.: 101 mg/dL (28 Oct 2020 05:59)  POCT Blood Glucose.: 145 mg/dL (28 Oct 2020 00:35)  POCT Blood Glucose.: 158 mg/dL (27 Oct 2020 21:44)  POCT Blood Glucose.: 129 mg/dL (27 Oct 2020 17:12)  POCT Blood Glucose.: 129 mg/dL (27 Oct 2020 12:16)      MEDICATIONS  (STANDING):  amLODIPine   Tablet 10 milliGRAM(s) Oral daily  aspirin  chewable 81 milliGRAM(s) Oral daily  atorvastatin 20 milliGRAM(s) Oral at bedtime  carvedilol 6.25 milliGRAM(s) Oral two times a day  cefepime   IVPB      cefepime   IVPB 2000 milliGRAM(s) IV Intermittent once  cefepime   IVPB 2000 milliGRAM(s) IV Intermittent every 12 hours  dextrose 5%. 1000 milliLiter(s) (50 mL/Hr) IV Continuous <Continuous>  dextrose 50% Injectable 12.5 Gram(s) IV Push once  dextrose 50% Injectable 25 Gram(s) IV Push once  dextrose 50% Injectable 25 Gram(s) IV Push once  donepezil 5 milliGRAM(s) Oral at bedtime  insulin lispro (ADMELOG) corrective regimen sliding scale   SubCutaneous every 6 hours  losartan 100 milliGRAM(s) Oral daily  PARoxetine 20 milliGRAM(s) Oral daily  senna 2 Tablet(s) Oral at bedtime         Overnight event : no overnight events. scheduled for resection today.     REVIEW OF SYSTEMS:  CONSTITUTIONAL: No weakness, fevers or chills  EYES/ENT: No visual changes, no throat pain   RESPIRATORY: No cough, wheezing, hemoptysis; No shortness of breath  CARDIOVASCULAR: No chest pain or palpitations  GASTROINTESTINAL: No abdominal, nausea, vomiting, or hematemesis; No diarrhea or constipation. No melena or hematochezia.  GENITOURINARY: No dysuria, frequency or hematuria  NEUROLOGICAL: No dizziness, numbness, or weakness  SKIN: No itching, burning, rashes, or lesions   All other review of systems is negative unless indicated above.    VITAL SIGNS:  Vital Signs Last 24 Hrs  T(C): 36.5 (28 Oct 2020 12:35), Max: 37.2 (27 Oct 2020 20:35)  T(F): 97.7 (28 Oct 2020 12:35), Max: 98.9 (27 Oct 2020 20:35)  HR: 62 (28 Oct 2020 12:50) (60 - 81)  BP: 135/58 (28 Oct 2020 12:50) (108/60 - 151/73)  BP(mean): --  RR: 13 (28 Oct 2020 12:50) (12 - 18)  SpO2: 97% (28 Oct 2020 12:50) (96% - 100%)    PHYSICAL EXAM:  GENERAL: NAD, laying in bed comfortably, frail elder  HEENT:  anicteric, moist mucous membranes  CHEST/LUNG:  CTA b/l, no rales, wheezes, or rhonchi  HEART:  RRR, S1, S2  ABDOMEN:  BS+, soft, nontender, nondistended  SKIN: DTI noted on Sacrum and R scapula - asked nursing assistant to lay patient on side  EXTREMITIES: contracted LLE and LUE, L foot open to air. no edema, cyanosis, or calf tenderness.  NERVOUS SYSTEM: answers questions and follows commands appropriately                            9.9    6.31  )-----------( 213      ( 28 Oct 2020 09:13 )             30.6     10-28    144  |  113<H>  |  29<H>  ----------------------------<  118<H>  3.7   |  25  |  1.00    Ca    8.5      28 Oct 2020 09:08        CAPILLARY BLOOD GLUCOSE      POCT Blood Glucose.: 101 mg/dL (28 Oct 2020 05:59)  POCT Blood Glucose.: 145 mg/dL (28 Oct 2020 00:35)  POCT Blood Glucose.: 158 mg/dL (27 Oct 2020 21:44)  POCT Blood Glucose.: 129 mg/dL (27 Oct 2020 17:12)  POCT Blood Glucose.: 129 mg/dL (27 Oct 2020 12:16)      MEDICATIONS  (STANDING):  amLODIPine   Tablet 10 milliGRAM(s) Oral daily  aspirin  chewable 81 milliGRAM(s) Oral daily  atorvastatin 20 milliGRAM(s) Oral at bedtime  carvedilol 6.25 milliGRAM(s) Oral two times a day  cefepime   IVPB      cefepime   IVPB 2000 milliGRAM(s) IV Intermittent once  cefepime   IVPB 2000 milliGRAM(s) IV Intermittent every 12 hours  dextrose 5%. 1000 milliLiter(s) (50 mL/Hr) IV Continuous <Continuous>  dextrose 50% Injectable 12.5 Gram(s) IV Push once  dextrose 50% Injectable 25 Gram(s) IV Push once  dextrose 50% Injectable 25 Gram(s) IV Push once  donepezil 5 milliGRAM(s) Oral at bedtime  insulin lispro (ADMELOG) corrective regimen sliding scale   SubCutaneous every 6 hours  losartan 100 milliGRAM(s) Oral daily  PARoxetine 20 milliGRAM(s) Oral daily  senna 2 Tablet(s) Oral at bedtime

## 2020-10-28 NOTE — BRIEF OPERATIVE NOTE - PRIMARY SURGEON
Dr. Mullins Quality 431: Preventive Care And Screening: Unhealthy Alcohol Use - Screening: Patient screened for unhealthy alcohol use using a single question and scores less than 2 times per year Detail Level: Detailed Quality 226: Preventive Care And Screening: Tobacco Use: Screening And Cessation Intervention: Patient screened for tobacco use and is an ex/non-smoker

## 2020-10-28 NOTE — PROGRESS NOTE ADULT - SUBJECTIVE AND OBJECTIVE BOX
[facility] -- Sarahi Meraz, Norman Barnard, Alejandro Pulliam Savella  Office # [phone]      Follow Up:    pre op evaluation  Subjective/Observations:     Seen at bedside in no acute distress  REVIEW OF SYSTEMS  unable to provide any meaningful information     PAST MEDICAL & SURGICAL HISTORY:  Dementia    DM (diabetes mellitus)    HLD (hyperlipidemia)    HTN (hypertension)    GERD (gastroesophageal reflux disease)    History of cholecystectomy    Ankle injury  s/p ankle surgery, pt doesn&#x27;t remember which ankle        MEDICATIONS  (STANDING):  amLODIPine   Tablet 10 milliGRAM(s) Oral daily  aspirin  chewable 81 milliGRAM(s) Oral daily  atorvastatin 20 milliGRAM(s) Oral at bedtime  carvedilol 6.25 milliGRAM(s) Oral two times a day  cefTRIAXone   IVPB 2000 milliGRAM(s) IV Intermittent every 24 hours  dextrose 5%. 1000 milliLiter(s) (50 mL/Hr) IV Continuous <Continuous>  dextrose 50% Injectable 12.5 Gram(s) IV Push once  dextrose 50% Injectable 25 Gram(s) IV Push once  dextrose 50% Injectable 25 Gram(s) IV Push once  donepezil 5 milliGRAM(s) Oral at bedtime  insulin lispro (ADMELOG) corrective regimen sliding scale   SubCutaneous every 6 hours  losartan 100 milliGRAM(s) Oral daily  PARoxetine 20 milliGRAM(s) Oral daily  senna 2 Tablet(s) Oral at bedtime    MEDICATIONS  (PRN):  dextrose 40% Gel 15 Gram(s) Oral once PRN Blood Glucose LESS THAN 70 milliGRAM(s)/deciliter  glucagon  Injectable 1 milliGRAM(s) IntraMuscular once PRN Glucose LESS THAN 70 milligrams/deciliter  polyethylene glycol 3350 17 Gram(s) Oral daily PRN Constipation      Allergies    sulfa drugs (Unknown)    Intolerances        Vital Signs Last 24 Hrs  T(C): 36.9 (28 Oct 2020 05:27), Max: 37.2 (27 Oct 2020 20:35)  T(F): 98.5 (28 Oct 2020 05:27), Max: 98.9 (27 Oct 2020 20:35)  HR: 81 (28 Oct 2020 05:27) (64 - 81)  BP: 123/61 (28 Oct 2020 05:27) (123/61 - 151/73)  BP(mean): --  RR: 17 (28 Oct 2020 05:27) (16 - 18)  SpO2: 100% (28 Oct 2020 05:27) (96% - 100%)    I&O's Summary    27 Oct 2020 07:01  -  28 Oct 2020 07:00  --------------------------------------------------------  IN: 0 mL / OUT: 700 mL / NET: -700 mL          PHYSICAL EXAM:  TELE: not on tele   Constitutional: NAD, awake and alert, frail  HEENT: Moist Mucous Membranes, Anicteric  Pulmonary: Non-labored, breath sounds are clear bilaterally, No wheezing, crackles or rhonchi  Cardiovascular: Regular, S1 and S2 nl, No murmurs, rubs, gallops or clicks  Gastrointestinal: Bowel Sounds present, soft, nontender.   Lymph: No lymphadenopathy. No peripheral edema.  Skin: No visible rashes or ulcers.  Psych:  Mood & affect appropriate    LABS: All Labs Reviewed:                        9.5    7.47  )-----------( 215      ( 27 Oct 2020 09:20 )             28.7                         11.6   7.27  )-----------( 267      ( 26 Oct 2020 04:44 )             35.6     27 Oct 2020 09:20    146    |  112    |  30     ----------------------------<  105    4.1     |  27     |  1.10   26 Oct 2020 05:04    143    |  110    |  25     ----------------------------<  108    4.1     |  27     |  0.89     Ca    8.6        27 Oct 2020 09:20  Ca    9.3        26 Oct 2020 05:04               ECG:  < from: 12 Lead ECG (10.24.20 @ 11:41) >    Ventricular Rate 60 BPM    Atrial Rate 60 BPM    P-R Interval 172 ms    QRS Duration 78 ms    Q-T Interval 432 ms    QTC Calculation(Bazett) 432 ms    P Axis 63 degrees    R Axis 35 degrees    T Axis 48 degrees    Diagnosis Line Poor data quality  Normal sinus rhythm  Normal ECG  When compared with ECG of 20-SEP-2018 13:45,  No significant change was found    < end of copied text >    Echo:  < from: TTE Echo Complete w/o Contrast w/ Doppler (10.25.20 @ 11:18) >  OBSERVATIONS:  Technically difficult study, patient uncooperative  Mitral Valve: Thickened leaflets, mild MR.  Aortic Valve/Aorta: Calcified trileaflet aortic valve with grossly normal opening. Trace AI  Tricuspid Valve: Mild TR.  Pulmonic Valve: Not well-visualized  Left Atrium: normal  Right Atrium: Not well-visualized  Left Ventricle: normal LV size and systolic function, estimated LVEF of 65%.  Right Ventricle: Grossly normal size and systolic function.  Pericardium/Pleura: normal, no significant pericardial effusion.  Pulmonary/RV Pressure: estimated PA systolic pressure of 37 mmHg assuming an RA pressure of 3 mmHg.      Conclusion:  Technically difficult study  Normal left ventricular internal dimensions and systolic function, estimated LVEF of 65%.  Grossly normal RV size and systolic function.  Calcified trileaflet aortic valve with grossly normal opening. Trace AI  Mild MR and TR.  No significant pericardial effusion.      < end of copied text >    Radiology:

## 2020-10-28 NOTE — HISTORY OF PRESENT ILLNESS
[FreeTextEntry1] : patient presents with DFU 3 medial left metatarsal head 1st , left foot . It probes to bone , drains and has kiley wound erythema . She will be admitted thru the ER

## 2020-10-28 NOTE — PROGRESS NOTE ADULT - ASSESSMENT
85 year old female with PMH of HTN, HLD, DM2 on home insulin, GERD, Dementia, sent by ER from wound care [facility] for evaluation for concern of left metatarsal of foot. Cardiology consultation called for surgical clearance     -there is no evidence of acute ischemia.  -Ecg w/o ischemic changes, pt w/o anginal compliants  -Trop negative  -there is no evidence of significant arrhythmia.  -ECG NSR @ 60  -there is no evidence for meaningful  volume overload.  -TTE Technically difficult study nL LV & RV size and function no significant valvular dysfunction EF 65%    HTN  - Controlled 123/61   - CW amlodipine, carvedilol, losartan  - monitor routine hemodynamics.     HLD  - CW statin     DM2  -CW asa, statin  -per primary    Osteo  - For 1st metatarsal head resection   -Care per ortho/primary  - Optimized for the OR from a cardiac point of view with no evidence of active ischemic heart disease, decompensated heart failure, severe obstructive valvular disease, or uncontrolled arrhythmia.  - BP well controlled, monitor routine hemodynamic     Mesha CARRP-C  Cardiology NP  [phone]

## 2020-10-28 NOTE — PLAN
[FreeTextEntry1] : Patient to be admitted thru ER for left foot infected DFU 3 , probes to bone . Patient will need IV antibiotics , MRI , X rays , vascular studies and probable bone resection .

## 2020-10-28 NOTE — BRIEF OPERATIVE NOTE - SPECIMENS
1. [id number] 2.[id number] 3.ZH5ujBnrBfwsNdxocNmoujsl 4.CI5neLfrkJbptxyjKrkaVlisAnbx 5. EP6zdQzdbVenwxgeBrppGquvKb

## 2020-10-28 NOTE — BRIEF OPERATIVE NOTE - COMMENTS
Patient tolerated the procedure and anesthesia well and was transferred to the reovery room with vital signs stable and naueovascular status intact to the left foot. Patient to be discharged to floor when stable per anesthesia1

## 2020-10-28 NOTE — PROGRESS NOTE ADULT - SUBJECTIVE AND OBJECTIVE BOX
POST OP CHECK  Patient is s/p left 1st metatarsal head resection secondary to osteomyelitis with Dr. Mullins 10/28/20. Patient dressing clean, dry and intact. Podiatry team will continue to follow while in house

## 2020-10-28 NOTE — ASSESSMENT
[Verbal] : Verbal [Written] : Written [Demo] : Demo [Patient] : Patient [Family member] : Family member [Caregiver] : Caregiver [Fair - mild discomfort, physical impairment, low acceptance] : Fair - mild discomfort, physical impairment, low acceptance [Needs reinforcement] : needs reinforcement [Dressing changes] : dressing changes [Foot Care] : foot care [Skin Care] : skin care [Pressure relief] : pressure relief [Signs and symptoms of infection] : sign and symptoms of infection [Nutrition] : nutrition [How and When to Call] : how and when to call [Labs and Tests] : labs and tests [Pain Management] : pain management [Off-loading] : off-loading [Home Health] : home health [Hospitalization] : hospitalization [Patient responsibility to plan of care] : patient responsibility to plan of care [Glycemic Control] : glycemic control [Stable] : stable [Emergency Room] : Emergency Room [Stretcher] : Stretcher [Not Applicable - Long Term Care/Home Health Agency] : Long Term Care/Home Health Agency: Not Applicable [] : No [FreeTextEntry2] : Infection prevention\par Localized wound care \par Goal of remaining pain free regarding wounds.\par Pressure relief  [FreeTextEntry3] : Wound larger in size.  [FreeTextEntry4] : Tissue culture sent to lab\par Patient transferred to [facility] ER to be admitted for IV antibiotics and further testing.

## 2020-10-28 NOTE — PROGRESS NOTE ADULT - SUBJECTIVE AND OBJECTIVE BOX
[facility]  INFECTIOUS DISEASES   [address] [place] [address]  Tel: [phone]     Fax: [phone]  =======================================================    [id number]  NELLIE NICOLE     Follow up: foot ulcer and OM    No new changes, since left leg is contracted, won't be able to perform MRI.  Podiatry will proceed with amputation of 1st metatarsal head today.  Awake and alert, no complaint, asking for food. No fever.     PAST MEDICAL & SURGICAL HISTORY:  Dementia  DM (diabetes mellitus)  HLD (hyperlipidemia)  HTN (hypertension)  GERD (gastroesophageal reflux disease)  History of cholecystectomy  Ankle injury  s/p ankle surgery, pt doesn&#x27;t remember which ankle    Social Hx: no smoking or toxic habits     FAMILY HISTORY:  FH: type 2 diabetes  brother    FH: colon cancer  sister    Allergies  sulfa drugs (Unknown)    Antibiotics:  piperacillin/tazobactam IVPB.. 3.375 Gram(s) IV Intermittent every 8 hours  vancomycin  IVPB 1000 milliGRAM(s) IV Intermittent every 24 hours     REVIEW OF SYSTEMS:  Limited due to dementia but states that has left foot pain.     Physical Exam:  Vital Signs Last 24 Hrs  T(C): 36.8 (28 Oct 2020 09:36), Max: 37.2 (27 Oct 2020 20:35)  T(F): 98.2 (28 Oct 2020 09:36), Max: 98.9 (27 Oct 2020 20:35)  HR: 62 (28 Oct 2020 09:36) (62 - 81)  BP: 108/60 (28 Oct 2020 09:36) (108/60 - 151/73)  RR: 16 (28 Oct 2020 09:36) (16 - 18)  SpO2: 100% (28 Oct 2020 09:36) (96% - 100%)  GEN: NAD  HEENT: normocephalic and atraumatic. EOMI. PERRL.    NECK: Supple.  No lymphadenopathy   LUNGS: Clear to auscultation.  HEART: Regular rate and rhythm   ABDOMEN: Soft, nontender, and nondistended.  Positive bowel sounds.    : No CVA tenderness  EXTREMITIES: Without edema. left leg in a contracted position, able to extend partially   Left plantar metatarsal head area with a deep ulcer with mild swelling and erythema in surrounding, no discharge    NEUROLOGIC: grossly intact.  PSYCHIATRIC: dementia unable to evaluate   SKIN: No rash    Labs:                      9.9    6.31  )-----------( 213      ( 28 Oct 2020 09:13 )             30.6      10-28    144  |  113<H>  |  29<H>  ----------------------------<  118<H>  3.7   |  25  |  1.00    Ca    8.5      28 Oct 2020 09:08    Culture - Tissue with Gram Stain (collected 10-26-20 @ 15:17)  Source: .Tissue Other, Left  medial 1st Metatarsal  Gram Stain (10-26-20 @ 19:49):    Rare polymorphonuclear leukocytes per low power field    Few Gram positive cocci in pairs per oil power field    Few Gram Variable Rods per oil power field  Organism: Pseudomonas aeruginosa (10-28-20 @ 08:24)  Organism: Pseudomonas aeruginosa (10-28-20 @ 08:24)    Sensitivities:      -  Amikacin: S <=16      -  Aztreonam: S <=4      -  Cefepime: S <=2      -  Ceftazidime: S 4      -  Ciprofloxacin: S <=0.25      -  Gentamicin: S 4      -  Imipenem: S <=1      -  Levofloxacin: S <=0.5      -  Meropenem: S <=1      -  Piperacillin/Tazobactam: S <=8      -  Tobramycin: S <=2      Method Type: MALATHI    Culture - Blood (collected 10-24-20 @ 21:09)  Source: .Blood Blood    WBC Count: 6.31 K/uL (10-28-20 @ 09:13)  WBC Count: 7.47 K/uL (10-27-20 @ 09:20)  WBC Count: 7.27 K/uL (10-26-20 @ 04:44)  WBC Count: 6.75 K/uL (10-25-20 @ 06:25)  WBC Count: 6.11 K/uL (10-24-20 @ 12:00)    Creatinine, Serum: 1.00 mg/dL (10-28-20 @ 09:08)  Creatinine, Serum: 1.10 mg/dL (10-27-20 @ 09:20)  Creatinine, Serum: 0.89 mg/dL (10-26-20 @ 05:04)  Creatinine, Serum: 0.92 mg/dL (10-25-20 @ 06:25)  Creatinine, Serum: 1.00 mg/dL (10-24-20 @ 12:00)    C-Reactive Protein, Serum: <0.10 mg/dL (10-24-20 @ 20:48)  C-Reactive Protein, Serum: <0.10 mg/dL (10-24-20 @ 16:19)    Sedimentation Rate, Erythrocyte: 26 mm/hr (10-24-20 @ 15:06)  Sedimentation Rate, Erythrocyte: 26 mm/hr (10-24-20 @ 12:00)    Procalcitonin, Serum: <0.05 (10-24-20 @ 15:06)     COVID-19 IgG Antibody Index: 0.08 Index (10-24-20 @ 20:52)  COVID-19 IgG Antibody Interpretation: Negative (10-24-20 @ 20:52)  COVID-19 PCR: NotDetec (10-24-20 @ 11:56)    All imaging and other data have been reviewed.  < from: Xray Foot AP + Lateral + Oblique, Bilat (10.24.20 @ 12:17) >  EXAM:  FOOT BILATERAL (MINIMUM 3 V)                        PROCEDURE DATE:  10/24/2020    INTERPRETATION:  Bilateral feet  HISTORY: Osteomyelitis   Three views of the right foot and three views of the left foot show no evidence of fracture nor destructive change. The joint spaces show left hallux valgus deformity. Surgical hardware projects in the lower left tibia and fibula.  IMPRESSION: No evidence of bony destruction.    Assessment and Plan:   84 y/o woman with PMH of HTN, Diabetes Mellitus 2, GERD and Dementia was sent from ER wound center for evaluation of left foot ulcer and possible Osteomyelitis.  Mild cellulitis in area, unclear if there is OM without MRI to plan for treatment.     Left foot chronic ulcer r/o OM  - Blood culture neg  - Wound culture in the past with strep and this time also growing a pansensitive pseudomonas.    - Xray negative for OM  - MRI difficult to perform due to contracted LLE  - Podiatry planning for Left first metatarsal amputation today  - Will switch to cefepime 2gm q8h     Will follow.    Mohit Almonte MD  Division of Infectious Diseases   Cell [phone] between 8am and 6pm   After 6pm and weekends please call ID service at [phone].

## 2020-10-28 NOTE — REVIEW OF SYSTEMS
[Skin Wound] : skin wound [Confused] : confusion [Limb Weakness] : limb weakness [Difficulty Walking] : difficulty walking [Fever] : no fever [Chills] : no chills [Eye Pain] : no eye pain [Shortness Of Breath] : no shortness of breath [Abdominal Pain] : no abdominal pain [Anxiety] : no anxiety [Easy Bleeding] : no tendency for easy bleeding [FreeTextEntry4] : accompanied by son and aid  [FreeTextEntry5] : HTN [FreeTextEntry9] : bilateral forefoot deformity if toes with bunions , contracture of limbs  [de-identified] : DFU 3 medial left 1st metatarsal head left foot , skin ,subcutaneous , fat fascia to bone  [de-identified] : Dementia  [de-identified] : NIDDM

## 2020-10-29 LAB
ANION GAP SERPL CALC-SCNC: 7 MMOL/L — SIGNIFICANT CHANGE UP (ref 5–17)
BUN SERPL-MCNC: 32 MG/DL — HIGH (ref 7–23)
CALCIUM SERPL-MCNC: 8.8 MG/DL — SIGNIFICANT CHANGE UP (ref 8.5–10.1)
CHLORIDE SERPL-SCNC: 111 MMOL/L — HIGH (ref 96–108)
CO2 SERPL-SCNC: 26 MMOL/L — SIGNIFICANT CHANGE UP (ref 22–31)
CREAT SERPL-MCNC: 1 MG/DL — SIGNIFICANT CHANGE UP (ref 0.5–1.3)
CULTURE RESULTS: SIGNIFICANT CHANGE UP
GLUCOSE SERPL-MCNC: 154 MG/DL — HIGH (ref 70–99)
HCT VFR BLD CALC: 27.2 % — LOW (ref 34.5–45)
HGB BLD-MCNC: 9 G/DL — LOW (ref 11.5–15.5)
MCHC RBC-ENTMCNC: 27.1 PG — SIGNIFICANT CHANGE UP (ref 27–34)
MCHC RBC-ENTMCNC: 33.1 GM/DL — SIGNIFICANT CHANGE UP (ref 32–36)
MCV RBC AUTO: 81.9 FL — SIGNIFICANT CHANGE UP (ref 80–100)
NRBC # BLD: 0 /100 WBCS — SIGNIFICANT CHANGE UP (ref 0–0)
PLATELET # BLD AUTO: 202 K/UL — SIGNIFICANT CHANGE UP (ref 150–400)
POTASSIUM SERPL-MCNC: 3.4 MMOL/L — LOW (ref 3.5–5.3)
POTASSIUM SERPL-SCNC: 3.4 MMOL/L — LOW (ref 3.5–5.3)
RBC # BLD: 3.32 M/UL — LOW (ref 3.8–5.2)
RBC # FLD: 14.8 % — HIGH (ref 10.3–14.5)
SODIUM SERPL-SCNC: 144 MMOL/L — SIGNIFICANT CHANGE UP (ref 135–145)
SPECIMEN SOURCE: SIGNIFICANT CHANGE UP
WBC # BLD: 11.96 K/UL — HIGH (ref 3.8–10.5)
WBC # FLD AUTO: 11.96 K/UL — HIGH (ref 3.8–10.5)

## 2020-10-29 PROCEDURE — 99233 SBSQ HOSP IP/OBS HIGH 50: CPT | Mod: GC

## 2020-10-29 PROCEDURE — 99232 SBSQ HOSP IP/OBS MODERATE 35: CPT

## 2020-10-29 PROCEDURE — 99024 POSTOP FOLLOW-UP VISIT: CPT

## 2020-10-29 RX ORDER — POTASSIUM CHLORIDE 20 MEQ
40 PACKET (EA) ORAL EVERY 4 HOURS
Refills: 0 | Status: DISCONTINUED | OUTPATIENT
Start: 2020-10-29 | End: 2020-10-29

## 2020-10-29 RX ORDER — CEFEPIME 1 G/1
2000 INJECTION, POWDER, FOR SOLUTION INTRAMUSCULAR; INTRAVENOUS EVERY 8 HOURS
Refills: 0 | Status: DISCONTINUED | OUTPATIENT
Start: 2020-10-29 | End: 2020-10-29

## 2020-10-29 RX ORDER — CEFEPIME 1 G/1
2000 INJECTION, POWDER, FOR SOLUTION INTRAMUSCULAR; INTRAVENOUS EVERY 12 HOURS
Refills: 0 | Status: DISCONTINUED | OUTPATIENT
Start: 2020-10-29 | End: 2020-10-30

## 2020-10-29 RX ORDER — POTASSIUM CHLORIDE 20 MEQ
40 PACKET (EA) ORAL ONCE
Refills: 0 | Status: COMPLETED | OUTPATIENT
Start: 2020-10-29 | End: 2020-10-29

## 2020-10-29 RX ORDER — ACETAMINOPHEN 500 MG
650 TABLET ORAL EVERY 6 HOURS
Refills: 0 | Status: DISCONTINUED | OUTPATIENT
Start: 2020-10-29 | End: 2020-11-04

## 2020-10-29 RX ADMIN — Medication 500 MILLIGRAM(S): at 06:16

## 2020-10-29 RX ADMIN — HEPARIN SODIUM 5000 UNIT(S): 5000 INJECTION INTRAVENOUS; SUBCUTANEOUS at 06:16

## 2020-10-29 RX ADMIN — CARVEDILOL PHOSPHATE 6.25 MILLIGRAM(S): 80 CAPSULE, EXTENDED RELEASE ORAL at 18:38

## 2020-10-29 RX ADMIN — Medication 20 MILLIGRAM(S): at 12:26

## 2020-10-29 RX ADMIN — ATORVASTATIN CALCIUM 20 MILLIGRAM(S): 80 TABLET, FILM COATED ORAL at 22:34

## 2020-10-29 RX ADMIN — Medication 81 MILLIGRAM(S): at 12:26

## 2020-10-29 RX ADMIN — Medication 40 MILLIEQUIVALENT(S): at 12:36

## 2020-10-29 RX ADMIN — Medication 3: at 12:27

## 2020-10-29 RX ADMIN — Medication 500 MILLIGRAM(S): at 18:38

## 2020-10-29 RX ADMIN — LOSARTAN POTASSIUM 100 MILLIGRAM(S): 100 TABLET, FILM COATED ORAL at 06:16

## 2020-10-29 RX ADMIN — CEFEPIME 100 MILLIGRAM(S): 1 INJECTION, POWDER, FOR SOLUTION INTRAMUSCULAR; INTRAVENOUS at 18:37

## 2020-10-29 RX ADMIN — CARVEDILOL PHOSPHATE 6.25 MILLIGRAM(S): 80 CAPSULE, EXTENDED RELEASE ORAL at 06:16

## 2020-10-29 RX ADMIN — Medication 1 TABLET(S): at 12:26

## 2020-10-29 RX ADMIN — AMLODIPINE BESYLATE 10 MILLIGRAM(S): 2.5 TABLET ORAL at 06:16

## 2020-10-29 RX ADMIN — Medication 3: at 19:14

## 2020-10-29 RX ADMIN — SENNA PLUS 2 TABLET(S): 8.6 TABLET ORAL at 22:34

## 2020-10-29 RX ADMIN — CEFEPIME 100 MILLIGRAM(S): 1 INJECTION, POWDER, FOR SOLUTION INTRAMUSCULAR; INTRAVENOUS at 06:18

## 2020-10-29 RX ADMIN — Medication 1: at 08:09

## 2020-10-29 RX ADMIN — DONEPEZIL HYDROCHLORIDE 5 MILLIGRAM(S): 10 TABLET, FILM COATED ORAL at 22:34

## 2020-10-29 RX ADMIN — HEPARIN SODIUM 5000 UNIT(S): 5000 INJECTION INTRAVENOUS; SUBCUTANEOUS at 18:37

## 2020-10-29 NOTE — PROGRESS NOTE ADULT - SUBJECTIVE AND OBJECTIVE BOX
DEPARTMENT OF ANESTHESIA  POST ANESTHETIC EVALUATION    The Patient was evaluated    Vital Signs Last 24 Hrs  T(C): 37.6 (29 Oct 2020 05:13), Max: 37.6 (29 Oct 2020 05:13)  T(F): 99.7 (29 Oct 2020 05:13), Max: 99.7 (29 Oct 2020 05:13)  HR: 74 (29 Oct 2020 05:13) (60 - 74)  BP: 148/65 (29 Oct 2020 05:13) (108/60 - 148/65)  BP(mean): --  RR: 17 (29 Oct 2020 05:13) (12 - 17)  SpO2: 96% (29 Oct 2020 05:13) (95% - 100%)    Evaluation:      (x ) No apparent complications  regarding anesthesia care at this time    Condition:  (x ) Stable      ( ) Guarded      ( ) Critical    Recommendations:  (x ) None     ( ) Other:

## 2020-10-29 NOTE — PROGRESS NOTE ADULT - SUBJECTIVE AND OBJECTIVE BOX
[facility] -- Sarahi Meraz, Akil, Norman, Alejandro Pulliam Savella  Office # [phone]      Follow Up:    Preop eval/Post follow up   Subjective/Observations:   No events overnight resting comfortably in bed.  No complaints of chest pain, dyspnea, or palpitations reported. No signs of orthopnea or PND.     REVIEW OF SYSTEMS: All other review of systems is negative unless indicated above    PAST MEDICAL & SURGICAL HISTORY:  Dementia    DM (diabetes mellitus)    HLD (hyperlipidemia)    HTN (hypertension)    GERD (gastroesophageal reflux disease)    History of cholecystectomy    Ankle injury  s/p ankle surgery, pt doesnt remember which ankle        MEDICATIONS  (STANDING):  amLODIPine   Tablet 10 milliGRAM(s) Oral daily  ascorbic acid 500 milliGRAM(s) Oral two times a day  aspirin  chewable 81 milliGRAM(s) Oral daily  atorvastatin 20 milliGRAM(s) Oral at bedtime  carvedilol 6.25 milliGRAM(s) Oral two times a day  cefepime   IVPB 2000 milliGRAM(s) IV Intermittent every 12 hours  cefepime   IVPB      dextrose 5%. 1000 milliLiter(s) (50 mL/Hr) IV Continuous <Continuous>  dextrose 50% Injectable 25 Gram(s) IV Push once  dextrose 50% Injectable 25 Gram(s) IV Push once  dextrose 50% Injectable 12.5 Gram(s) IV Push once  donepezil 5 milliGRAM(s) Oral at bedtime  heparin   Injectable 5000 Unit(s) SubCutaneous every 12 hours  insulin lispro (ADMELOG) corrective regimen sliding scale   SubCutaneous three times a day before meals  insulin lispro (ADMELOG) corrective regimen sliding scale   SubCutaneous at bedtime  losartan 100 milliGRAM(s) Oral daily  multivitamin/minerals 1 Tablet(s) Oral daily  PARoxetine 20 milliGRAM(s) Oral daily  senna 2 Tablet(s) Oral at bedtime    MEDICATIONS  (PRN):  dextrose 40% Gel 15 Gram(s) Oral once PRN Blood Glucose LESS THAN 70 milliGRAM(s)/deciliter  glucagon  Injectable 1 milliGRAM(s) IntraMuscular once PRN Glucose LESS THAN 70 milligrams/deciliter  glucagon  Injectable 1 milliGRAM(s) IntraMuscular once PRN Glucose LESS THAN 70 milligrams/deciliter  polyethylene glycol 3350 17 Gram(s) Oral daily PRN Constipation      Allergies    sulfa drugs (Unknown)    Intolerances        Vital Signs Last 24 Hrs  T(C): 37.6 (29 Oct 2020 05:13), Max: 37.6 (29 Oct 2020 05:13)  T(F): 99.7 (29 Oct 2020 05:13), Max: 99.7 (29 Oct 2020 05:13)  HR: 74 (29 Oct 2020 05:13) (60 - 74)  BP: 148/65 (29 Oct 2020 05:13) (108/60 - 148/65)  BP(mean): --  RR: 17 (29 Oct 2020 05:13) (12 - 17)  SpO2: 96% (29 Oct 2020 05:13) (95% - 100%)    I&O's Summary    28 Oct 2020 07:01  -  29 Oct 2020 07:00  --------------------------------------------------------  IN: 320 mL / OUT: 340 mL / NET: -20 mL      Weight (kg): 45.4 (10-28 @ 11:10)    PHYSICAL EXAM:  TELE: Off tele   Constitutional: NAD, awake and alert, well-developed  HEENT: Moist Mucous Membranes, Anicteric  Pulmonary: Non-labored, breath sounds are clear bilaterally, No wheezing, crackles or rhonchi  Cardiovascular: Regular, S1 and S2 nl, + murmur No rubs, gallops or clicks   Gastrointestinal: Bowel Sounds present, soft, nontender.   Lymph: No lymphadenopathy. No peripheral edema.  Skin: No visible rashes or ulcers.  Psych:  Mood & affect appropriate    LABS: All Labs Reviewed:                        9.9    6.31  )-----------( 213      ( 28 Oct 2020 09:13 )             30.6                         9.5    7.47  )-----------( 215      ( 27 Oct 2020 09:20 )             28.7     28 Oct 2020 09:08    144    |  113    |  29     ----------------------------<  118    3.7     |  25     |  1.00   27 Oct 2020 09:20    146    |  112    |  30     ----------------------------<  105    4.1     |  27     |  1.10     Ca    8.5        28 Oct 2020 09:08  Ca    8.6        27 Oct 2020 09:20        ECG:  < from: 12 Lead ECG (10.24.20 @ 11:41) >    Ventricular Rate 60 BPM    Atrial Rate 60 BPM    P-R Interval 172 ms    QRS Duration 78 ms    Q-T Interval 432 ms    QTC Calculation(Bazett) 432 ms    P Axis 63 degrees    R Axis 35 degrees    T Axis 48 degrees    Diagnosis Line Poor data quality  Normal sinus rhythm  Normal ECG  When compared with ECG of 20-SEP-2018 13:45,  No significant change was found    < end of copied text >    Echo:  < from: TTE Echo Complete w/o Contrast w/ Doppler (10.25.20 @ 11:18) >  OBSERVATIONS:  Technically difficult study, patient uncooperative  Mitral Valve: Thickened leaflets, mild MR.  Aortic Valve/Aorta: Calcified trileaflet aortic valve with grossly normal opening. Trace AI  Tricuspid Valve: Mild TR.  Pulmonic Valve: Not well-visualized  Left Atrium: normal  Right Atrium: Not well-visualized  Left Ventricle: normal LV size and systolic function, estimated LVEF of 65%.  Right Ventricle: Grossly normal size and systolic function.  Pericardium/Pleura: normal, no significant pericardial effusion.  Pulmonary/RV Pressure: estimated PA systolic pressure of 37 mmHg assuming an RA pressure of 3 mmHg.      Conclusion:  Technically difficult study  Normal left ventricular internal dimensions and systolic function, estimated LVEF of 65%.  Grossly normal RV size and systolic function.  Calcified trileaflet aortic valve with grossly normal opening. Trace AI  Mild MR and TR.  No significant pericardial effusion.      < end of copied text >    Radiology:                     [facility] -- Sarahi Meraz, Akil, Norman, Alejandro Pulliam Savella  Office # [phone]      Follow Up:    Preop eval/Post follow up   Subjective/Observations:   No events overnight resting comfortably in bed.  No complaints of chest pain, dyspnea, or palpitations reported. No signs of orthopnea or PND.     REVIEW OF SYSTEMS: All other review of systems is negative unless indicated above    PAST MEDICAL & SURGICAL HISTORY:  Dementia    DM (diabetes mellitus)    HLD (hyperlipidemia)    HTN (hypertension)    GERD (gastroesophageal reflux disease)    History of cholecystectomy    Ankle injury  s/p ankle surgery, pt doesnt remember which ankle        MEDICATIONS  (STANDING):  amLODIPine   Tablet 10 milliGRAM(s) Oral daily  ascorbic acid 500 milliGRAM(s) Oral two times a day  aspirin  chewable 81 milliGRAM(s) Oral daily  atorvastatin 20 milliGRAM(s) Oral at bedtime  carvedilol 6.25 milliGRAM(s) Oral two times a day  cefepime   IVPB 2000 milliGRAM(s) IV Intermittent every 12 hours  cefepime   IVPB      dextrose 5%. 1000 milliLiter(s) (50 mL/Hr) IV Continuous <Continuous>  dextrose 50% Injectable 25 Gram(s) IV Push once  dextrose 50% Injectable 25 Gram(s) IV Push once  dextrose 50% Injectable 12.5 Gram(s) IV Push once  donepezil 5 milliGRAM(s) Oral at bedtime  heparin   Injectable 5000 Unit(s) SubCutaneous every 12 hours  insulin lispro (ADMELOG) corrective regimen sliding scale   SubCutaneous three times a day before meals  insulin lispro (ADMELOG) corrective regimen sliding scale   SubCutaneous at bedtime  losartan 100 milliGRAM(s) Oral daily  multivitamin/minerals 1 Tablet(s) Oral daily  PARoxetine 20 milliGRAM(s) Oral daily  senna 2 Tablet(s) Oral at bedtime    MEDICATIONS  (PRN):  dextrose 40% Gel 15 Gram(s) Oral once PRN Blood Glucose LESS THAN 70 milliGRAM(s)/deciliter  glucagon  Injectable 1 milliGRAM(s) IntraMuscular once PRN Glucose LESS THAN 70 milligrams/deciliter  glucagon  Injectable 1 milliGRAM(s) IntraMuscular once PRN Glucose LESS THAN 70 milligrams/deciliter  polyethylene glycol 3350 17 Gram(s) Oral daily PRN Constipation      Allergies    sulfa drugs (Unknown)    Intolerances        Vital Signs Last 24 Hrs  T(C): 37.6 (29 Oct 2020 05:13), Max: 37.6 (29 Oct 2020 05:13)  T(F): 99.7 (29 Oct 2020 05:13), Max: 99.7 (29 Oct 2020 05:13)  HR: 74 (29 Oct 2020 05:13) (60 - 74)  BP: 148/65 (29 Oct 2020 05:13) (108/60 - 148/65)  BP(mean): --  RR: 17 (29 Oct 2020 05:13) (12 - 17)  SpO2: 96% (29 Oct 2020 05:13) (95% - 100%)    I&O's Summary    28 Oct 2020 07:01  -  29 Oct 2020 07:00  --------------------------------------------------------  IN: 320 mL / OUT: 340 mL / NET: -20 mL      Weight (kg): 45.4 (10-28 @ 11:10)    PHYSICAL EXAM:  TELE: Off tele   Constitutional: NAD, awake and alert, well-developed  HEENT: Moist Mucous Membranes, Anicteric  Pulmonary: Non-labored, breath sounds are clear bilaterally, No wheezing, crackles or rhonchi  Cardiovascular: Regular, S1 and S2 nl, + murmur No rubs, gallops or clicks   Gastrointestinal: Bowel Sounds present, soft, nontender.   Lymph: No lymphadenopathy. No peripheral edema.  Skin: No visible rashes or ulcers.  Psych:  Mood & affect appropriate  LABS: All Labs Reviewed:                        9.0    11.96 )-----------( 202      ( 29 Oct 2020 08:55 )             27.2                         9.9    6.31  )-----------( 213      ( 28 Oct 2020 09:13 )             30.6                         9.5    7.47  )-----------( 215      ( 27 Oct 2020 09:20 )             28.7     28 Oct 2020 09:08    144    |  113    |  29     ----------------------------<  118    3.7     |  25     |  1.00   27 Oct 2020 09:20    146    |  112    |  30     ----------------------------<  105    4.1     |  27     |  1.10     Ca    8.5        28 Oct 2020 09:08  Ca    8.6        27 Oct 2020 09:20                  ECG:  < from: 12 Lead ECG (10.24.20 @ 11:41) >    Ventricular Rate 60 BPM    Atrial Rate 60 BPM    P-R Interval 172 ms    QRS Duration 78 ms    Q-T Interval 432 ms    QTC Calculation(Bazett) 432 ms    P Axis 63 degrees    R Axis 35 degrees    T Axis 48 degrees    Diagnosis Line Poor data quality  Normal sinus rhythm  Normal ECG  When compared with ECG of 20-SEP-2018 13:45,  No significant change was found    < end of copied text >    Echo:  < from: TTE Echo Complete w/o Contrast w/ Doppler (10.25.20 @ 11:18) >  OBSERVATIONS:  Technically difficult study, patient uncooperative  Mitral Valve: Thickened leaflets, mild MR.  Aortic Valve/Aorta: Calcified trileaflet aortic valve with grossly normal opening. Trace AI  Tricuspid Valve: Mild TR.  Pulmonic Valve: Not well-visualized  Left Atrium: normal  Right Atrium: Not well-visualized  Left Ventricle: normal LV size and systolic function, estimated LVEF of 65%.  Right Ventricle: Grossly normal size and systolic function.  Pericardium/Pleura: normal, no significant pericardial effusion.  Pulmonary/RV Pressure: estimated PA systolic pressure of 37 mmHg assuming an RA pressure of 3 mmHg.      Conclusion:  Technically difficult study  Normal left ventricular internal dimensions and systolic function, estimated LVEF of 65%.  Grossly normal RV size and systolic function.  Calcified trileaflet aortic valve with grossly normal opening. Trace AI  Mild MR and TR.  No significant pericardial effusion.      < end of copied text >    Radiology:

## 2020-10-29 NOTE — PROGRESS NOTE ADULT - SUBJECTIVE AND OBJECTIVE BOX
88y year old Female seen at [facility] 3WES 362 W1 for s/p Left 1st metatarsal head resection secondary to osteomyelitis with Dr. Mullins, DOS 10/28/20. The patient is severely contracted along the left lower extremity. The patient states that she isnt experiencing any pain along the left foot or the surgical site. Denies any fever, chills, nausea, vomiting, chest pain, shortness of breath, or calf pain at this time.    Allergies    sulfa drugs (Unknown)    Intolerances    REVIEW OF SYSTEMS    PAST MEDICAL & SURGICAL HISTORY:  Dementia    DM (diabetes mellitus)    HLD (hyperlipidemia)    HTN (hypertension)    GERD (gastroesophageal reflux disease)    History of cholecystectomy    Ankle injury  s/p ankle surgery, pt doesn&#x27;t remember which ankle          MEDICATIONS  (STANDING):  amLODIPine   Tablet 10 milliGRAM(s) Oral daily  ascorbic acid 500 milliGRAM(s) Oral two times a day  aspirin  chewable 81 milliGRAM(s) Oral daily  atorvastatin 20 milliGRAM(s) Oral at bedtime  carvedilol 6.25 milliGRAM(s) Oral two times a day  cefepime   IVPB 2000 milliGRAM(s) IV Intermittent every 12 hours  dextrose 5%. 1000 milliLiter(s) (50 mL/Hr) IV Continuous <Continuous>  dextrose 50% Injectable 25 Gram(s) IV Push once  dextrose 50% Injectable 25 Gram(s) IV Push once  dextrose 50% Injectable 12.5 Gram(s) IV Push once  donepezil 5 milliGRAM(s) Oral at bedtime  heparin   Injectable 5000 Unit(s) SubCutaneous every 12 hours  insulin lispro (ADMELOG) corrective regimen sliding scale   SubCutaneous three times a day before meals  insulin lispro (ADMELOG) corrective regimen sliding scale   SubCutaneous at bedtime  losartan 100 milliGRAM(s) Oral daily  multivitamin/minerals 1 Tablet(s) Oral daily  PARoxetine 20 milliGRAM(s) Oral daily  senna 2 Tablet(s) Oral at bedtime    MEDICATIONS  (PRN):  acetaminophen   Tablet .. 650 milliGRAM(s) Oral every 6 hours PRN Mild Pain (1 - 3)  dextrose 40% Gel 15 Gram(s) Oral once PRN Blood Glucose LESS THAN 70 milliGRAM(s)/deciliter  glucagon  Injectable 1 milliGRAM(s) IntraMuscular once PRN Glucose LESS THAN 70 milligrams/deciliter  glucagon  Injectable 1 milliGRAM(s) IntraMuscular once PRN Glucose LESS THAN 70 milligrams/deciliter  polyethylene glycol 3350 17 Gram(s) Oral daily PRN Constipation      Vital Signs Last 24 Hrs  T(C): 37.6 (29 Oct 2020 15:16), Max: 37.9 (29 Oct 2020 12:45)  T(F): 99.6 (29 Oct 2020 15:16), Max: 100.2 (29 Oct 2020 12:45)  HR: 70 (29 Oct 2020 12:45) (64 - 74)  BP: 124/54 (29 Oct 2020 12:45) (124/54 - 148/65)  BP(mean): --  RR: 17 (29 Oct 2020 12:45) (16 - 17)  SpO2: 96% (29 Oct 2020 12:45) (95% - 98%)    PHYSICAL EXAM:  Vascular: DP & PT nonpalpable bilaterally, Capillary refill 3 seconds, Minimal edema along the left 1st metatarsal   Neurological: Light touch sensation intact bilaterally  Musculoskeletal: Severely contracted left lower extremity, s/p Left 1st metatarsal head resection   Dermatological: -  1. s/p Left 1st metatarsal head resection- sutures intact, skin well approximated, dried sanguineous drainage, minimal edema, no erythema, no mal odor( dorsal incision)  2. Grade 3- 0.3cmx0.3x0.3 cm along the medial aspect of the left 1st metatarsal- sanguineous drainage       CBC Full  -  ( 29 Oct 2020 08:55 )  WBC Count : 11.96 K/uL  RBC Count : 3.32 M/uL  Hemoglobin : 9.0 g/dL  Hematocrit : 27.2 %  Platelet Count - Automated : 202 K/uL  Mean Cell Volume : 81.9 fl  Mean Cell Hemoglobin : 27.1 pg  Mean Cell Hemoglobin Concentration : 33.1 gm/dL  Auto Neutrophil # : x  Auto Lymphocyte # : x  Auto Monocyte # : x  Auto Eosinophil # : x  Auto Basophil # : x  Auto Neutrophil % : x  Auto Lymphocyte % : x  Auto Monocyte % : x  Auto Eosinophil % : x  Auto Basophil % : x      ----------CHEM PANEL----------  10-29    144  |  111<H>  |  32<H>  ----------------------------<  154<H>  3.4<L>   |  26  |  1.00    Ca    8.8      29 Oct 2020 08:55            Culture - Tissue with Gram Stain (collected 28 Oct 2020 21:04)  Source: .Tissue Other, ist metatarsal head left  Gram Stain (28 Oct 2020 23:46):    No polymorphonuclear cells seen per low power field    No organisms seen        Left Foot Xray:  Post surgical changes- s/p Left 1st metatarsal head resection

## 2020-10-29 NOTE — PROGRESS NOTE ADULT - ASSESSMENT
84 yo F with PMH of HTN, HLD, Diabetes Mellitus Type 2 not on home insulin, GERD, Dementia, sent by ER from wound care [facility] for evaluation, c/w osteomyelitis of left metatarsal of foot, pending OR for resection 10/28      Diabetic foot ulcer, underlying osteomyelitis  Podiatry, Dr. Mullins following  Now on cefepime 2gm q8h, per ID  Leukocytosis, likely reactive post-op, will monitor  OR 10/28 for L metatarsal resection by podiatry; resumed heparin;  Tissue Cx 10/26 L med 1st met: pansensitive Pseudomonas, few G+ cocci pairs, G+ jenny rods, PMLs;   Tissue biopsy 10/28 intra-Op No organisms, final  Xray foot w/o pathology  F/u Blood cx x2 10/24 NGTD  Good vascular flow per L leg ABIs  MRI foot - unable to obtain 2/2 contracture  ID Consulted  Palliative Case Consulted    Deep Tissue Injury  1) sacral 2) L scapular DTIs, Stage 1 pressure ulcer  3) Skin tear R thigh 2/2 LLE contracture with erythema and edema on medical aspect of L foot  Wound care RN consulted - DC'd by wound care RN as no longer warranted  Turn in bed, reposition q 4  keep towel between abrasion and LLE     Pulmonary vascular congestion and left pleural effusion r/o occult CHF  CXR showed L infiltrate w/ effusion  Patient appears euvolemic on exam  Trops neg, BMP 1100  TTE EF65% mild valvular disease    DM2, noninsulin dependent.  hold home metformin  start low dose ISS, fingersticks,     Dementia  baseline bedbound, at baseline knows her name, can identify her family, forgets their names, eats well (regular solid food)  cont donepezil   cont paroxetine.    Iron deficiency anemia  Hb stable  hold home iron    HLD  atorvastatin interchange for lovastatin.    HTN  cont carvedilol, amlodipine, and losartan with hold parameters.    GERD  Patient on omeprazole: will use protonix in house    Depression  c/w paroxetine    PPx  heparin subq, resumed post op  bowel regimen    DISPO  DNR DNI, PC consulted  PT consulted  SW consulted

## 2020-10-29 NOTE — PROGRESS NOTE ADULT - SUBJECTIVE AND OBJECTIVE BOX
[facility]  INFECTIOUS DISEASES   [address] [place] [address]  Tel: [phone]     Fax: [phone]  =======================================================    [id number]  NELLIE NICOLE     Follow up: foot ulcer and OM    Had left 1st metatarsal head amputation.   Awake and alert, no complaint. No fever.     PAST MEDICAL & SURGICAL HISTORY:  Dementia  DM (diabetes mellitus)  HLD (hyperlipidemia)  HTN (hypertension)  GERD (gastroesophageal reflux disease)  History of cholecystectomy  Ankle injury  s/p ankle surgery, pt doesn&#x27;t remember which ankle    Social Hx: no smoking or toxic habits     FAMILY HISTORY:  FH: type 2 diabetes  brother    FH: colon cancer  sister    Allergies  sulfa drugs (Unknown)    Antibiotics:  piperacillin/tazobactam IVPB.. 3.375 Gram(s) IV Intermittent every 8 hours  vancomycin  IVPB 1000 milliGRAM(s) IV Intermittent every 24 hours     REVIEW OF SYSTEMS:  Limited due to dementia but states that has left foot pain.     Physical Exam:  Vital Signs Last 24 Hrs  T(C): 37.9 (29 Oct 2020 12:45), Max: 37.9 (29 Oct 2020 12:45)  T(F): 100.2 (29 Oct 2020 12:45), Max: 100.2 (29 Oct 2020 12:45)  HR: 70 (29 Oct 2020 12:45) (64 - 74)  BP: 124/54 (29 Oct 2020 12:45) (124/54 - 148/65)  BP(mean): --  RR: 17 (29 Oct 2020 12:45) (16 - 17)  SpO2: 96% (29 Oct 2020 12:45) (95% - 98%)  GEN: NAD  HEENT: normocephalic and atraumatic. EOMI. PERRL.    NECK: Supple.  No lymphadenopathy   LUNGS: Clear to auscultation.  HEART: Regular rate and rhythm   ABDOMEN: Soft, nontender, and nondistended.  Positive bowel sounds.    : No CVA tenderness  EXTREMITIES: Without edema. left leg in a contracted position, able to extend partially   Now foot is dressed after surgery ( before surgery: Left plantar metatarsal head area with a deep ulcer with mild swelling and erythema in surrounding, no discharge)   NEUROLOGIC: grossly intact.  PSYCHIATRIC: dementia unable to evaluate   SKIN: No rash    Labs:                        9.0    11.96 )-----------( 202      ( 29 Oct 2020 08:55 )             27.2      10-29    144  |  111<H>  |  32<H>  ----------------------------<  154<H>  3.4<L>   |  26  |  1.00    Ca    8.8      29 Oct 2020 08:55    Culture - Tissue with Gram Stain (collected 10-28-20 @ 21:04)  Source: .Tissue Other, ist metatarsal head left  Gram Stain (10-28-20 @ 23:46):    No polymorphonuclear cells seen per low power field    No organisms seen    Culture - Tissue with Gram Stain (collected 10-26-20 @ 15:17)  Source: .Tissue Other, Left  medial 1st Metatarsal  Gram Stain (10-26-20 @ 19:49):    Rare polymorphonuclear leukocytes per low power field    Few Gram positive cocci in pairs per oil power field    Few Gram Variable Rods per oil power field  Organism: Pseudomonas aeruginosa (10-28-20 @ 08:24)  Organism: Pseudomonas aeruginosa (10-28-20 @ 08:24)    Sensitivities:      -  Amikacin: S <=16      -  Aztreonam: S <=4      -  Cefepime: S <=2      -  Ceftazidime: S 4      -  Ciprofloxacin: S <=0.25      -  Gentamicin: S 4      -  Imipenem: S <=1      -  Levofloxacin: S <=0.5      -  Meropenem: S <=1      -  Piperacillin/Tazobactam: S <=8      -  Tobramycin: S <=2      Method Type: MALATHI    Culture - Blood (collected 10-24-20 @ 21:09)  Source: .Blood Blood    WBC Count: 11.96 K/uL (10-29-20 @ 08:55)  WBC Count: 6.31 K/uL (10-28-20 @ 09:13)  WBC Count: 7.47 K/uL (10-27-20 @ 09:20)  WBC Count: 7.27 K/uL (10-26-20 @ 04:44)  WBC Count: 6.75 K/uL (10-25-20 @ 06:25)    Creatinine, Serum: 1.00 mg/dL (10-29-20 @ 08:55)  Creatinine, Serum: 1.00 mg/dL (10-28-20 @ 09:08)  Creatinine, Serum: 1.10 mg/dL (10-27-20 @ 09:20)  Creatinine, Serum: 0.89 mg/dL (10-26-20 @ 05:04)  Creatinine, Serum: 0.92 mg/dL (10-25-20 @ 06:25)    C-Reactive Protein, Serum: <0.10 mg/dL (10-24-20 @ 20:48)  C-Reactive Protein, Serum: <0.10 mg/dL (10-24-20 @ 16:19)    Sedimentation Rate, Erythrocyte: 26 mm/hr (10-24-20 @ 15:06)  Sedimentation Rate, Erythrocyte: 26 mm/hr (10-24-20 @ 12:00)    Procalcitonin, Serum: <0.05 (10-24-20 @ 15:06)     COVID-19 IgG Antibody Index: 0.08 Index (10-24-20 @ 20:52)  COVID-19 IgG Antibody Interpretation: Negative (10-24-20 @ 20:52)  COVID-19 PCR: NotDetec (10-24-20 @ 11:56)      All imaging and other data have been reviewed.  < from: Xray Foot AP + Lateral + Oblique, Bilat (10.24.20 @ 12:17) >  EXAM:  FOOT BILATERAL (MINIMUM 3 V)                        PROCEDURE DATE:  10/24/2020    INTERPRETATION:  Bilateral feet  HISTORY: Osteomyelitis   Three views of the right foot and three views of the left foot show no evidence of fracture nor destructive change. The joint spaces show left hallux valgus deformity. Surgical hardware projects in the lower left tibia and fibula.  IMPRESSION: No evidence of bony destruction.    Assessment and Plan:   86 y/o woman with PMH of HTN, Diabetes Mellitus 2, GERD and Dementia was sent from ER wound center for evaluation of left foot ulcer and possible Osteomyelitis.  Mild cellulitis in area, unclear if there is OM without MRI to plan for treatment.   10/29 low grade fever and leukocytosis possibly post surgical.     Left foot chronic ulcer r/o OM  - Blood culture neg  - Wound culture in the past with strep and this time also growing a pansensitive pseudomonas.    - Xray negative for OM  - MRI difficult to perform due to contracted LLE  - S/p Left first metatarsal amputation by podiatry on 10/28  - Will follow OR culture and pathology.   - Continue cefepime 2gm q8h     Will follow.    Mohit Almonte MD  Division of Infectious Diseases   Cell [phone] between 8am and 6pm   After 6pm and weekends please call ID service at [phone].    [facility]  INFECTIOUS DISEASES   [address] [place] [address]  Tel: [phone]     Fax: [phone]  =======================================================    [id number]  NELLIE NICOLE     Follow up: foot ulcer and OM    Had left 1st metatarsal head amputation.   Awake and alert, no complaint. No fever.     PAST MEDICAL & SURGICAL HISTORY:  Dementia  DM (diabetes mellitus)  HLD (hyperlipidemia)  HTN (hypertension)  GERD (gastroesophageal reflux disease)  History of cholecystectomy  Ankle injury  s/p ankle surgery, pt doesn&#x27;t remember which ankle    Social Hx: no smoking or toxic habits     FAMILY HISTORY:  FH: type 2 diabetes  brother    FH: colon cancer  sister    Allergies  sulfa drugs (Unknown)    Antibiotics:  piperacillin/tazobactam IVPB.. 3.375 Gram(s) IV Intermittent every 8 hours  vancomycin  IVPB 1000 milliGRAM(s) IV Intermittent every 24 hours     REVIEW OF SYSTEMS:  Limited due to dementia but states that has left foot pain.     Physical Exam:  Vital Signs Last 24 Hrs  T(C): 37.9 (29 Oct 2020 12:45), Max: 37.9 (29 Oct 2020 12:45)  T(F): 100.2 (29 Oct 2020 12:45), Max: 100.2 (29 Oct 2020 12:45)  HR: 70 (29 Oct 2020 12:45) (64 - 74)  BP: 124/54 (29 Oct 2020 12:45) (124/54 - 148/65)  BP(mean): --  RR: 17 (29 Oct 2020 12:45) (16 - 17)  SpO2: 96% (29 Oct 2020 12:45) (95% - 98%)  GEN: NAD  HEENT: normocephalic and atraumatic. EOMI. PERRL.    NECK: Supple.  No lymphadenopathy   LUNGS: Clear to auscultation.  HEART: Regular rate and rhythm   ABDOMEN: Soft, nontender, and nondistended.  Positive bowel sounds.    : No CVA tenderness  EXTREMITIES: Without edema. left leg in a contracted position, able to extend partially   Now foot is dressed after surgery ( before surgery: Left plantar metatarsal head area with a deep ulcer with mild swelling and erythema in surrounding, no discharge)   NEUROLOGIC: grossly intact.  PSYCHIATRIC: dementia unable to evaluate   SKIN: No rash    Labs:                        9.0    11.96 )-----------( 202      ( 29 Oct 2020 08:55 )             27.2      10-29    144  |  111<H>  |  32<H>  ----------------------------<  154<H>  3.4<L>   |  26  |  1.00    Ca    8.8      29 Oct 2020 08:55    Culture - Tissue with Gram Stain (collected 10-28-20 @ 21:04)  Source: .Tissue Other, ist metatarsal head left  Gram Stain (10-28-20 @ 23:46):    No polymorphonuclear cells seen per low power field    No organisms seen    Culture - Tissue with Gram Stain (collected 10-26-20 @ 15:17)  Source: .Tissue Other, Left  medial 1st Metatarsal  Gram Stain (10-26-20 @ 19:49):    Rare polymorphonuclear leukocytes per low power field    Few Gram positive cocci in pairs per oil power field    Few Gram Variable Rods per oil power field  Organism: Pseudomonas aeruginosa (10-28-20 @ 08:24)  Organism: Pseudomonas aeruginosa (10-28-20 @ 08:24)    Sensitivities:      -  Amikacin: S <=16      -  Aztreonam: S <=4      -  Cefepime: S <=2      -  Ceftazidime: S 4      -  Ciprofloxacin: S <=0.25      -  Gentamicin: S 4      -  Imipenem: S <=1      -  Levofloxacin: S <=0.5      -  Meropenem: S <=1      -  Piperacillin/Tazobactam: S <=8      -  Tobramycin: S <=2      Method Type: MALATHI    Culture - Blood (collected 10-24-20 @ 21:09)  Source: .Blood Blood    WBC Count: 11.96 K/uL (10-29-20 @ 08:55)  WBC Count: 6.31 K/uL (10-28-20 @ 09:13)  WBC Count: 7.47 K/uL (10-27-20 @ 09:20)  WBC Count: 7.27 K/uL (10-26-20 @ 04:44)  WBC Count: 6.75 K/uL (10-25-20 @ 06:25)    Creatinine, Serum: 1.00 mg/dL (10-29-20 @ 08:55)  Creatinine, Serum: 1.00 mg/dL (10-28-20 @ 09:08)  Creatinine, Serum: 1.10 mg/dL (10-27-20 @ 09:20)  Creatinine, Serum: 0.89 mg/dL (10-26-20 @ 05:04)  Creatinine, Serum: 0.92 mg/dL (10-25-20 @ 06:25)    C-Reactive Protein, Serum: <0.10 mg/dL (10-24-20 @ 20:48)  C-Reactive Protein, Serum: <0.10 mg/dL (10-24-20 @ 16:19)    Sedimentation Rate, Erythrocyte: 26 mm/hr (10-24-20 @ 15:06)  Sedimentation Rate, Erythrocyte: 26 mm/hr (10-24-20 @ 12:00)    Procalcitonin, Serum: <0.05 (10-24-20 @ 15:06)     COVID-19 IgG Antibody Index: 0.08 Index (10-24-20 @ 20:52)  COVID-19 IgG Antibody Interpretation: Negative (10-24-20 @ 20:52)  COVID-19 PCR: NotDetec (10-24-20 @ 11:56)      All imaging and other data have been reviewed.  < from: Xray Foot AP + Lateral + Oblique, Bilat (10.24.20 @ 12:17) >  EXAM:  FOOT BILATERAL (MINIMUM 3 V)                        PROCEDURE DATE:  10/24/2020    INTERPRETATION:  Bilateral feet  HISTORY: Osteomyelitis   Three views of the right foot and three views of the left foot show no evidence of fracture nor destructive change. The joint spaces show left hallux valgus deformity. Surgical hardware projects in the lower left tibia and fibula.  IMPRESSION: No evidence of bony destruction.    Assessment and Plan:   86 y/o woman with PMH of HTN, Diabetes Mellitus 2, GERD and Dementia was sent from ER wound center for evaluation of left foot ulcer and possible Osteomyelitis.  Mild cellulitis in area, unclear if there is OM without MRI to plan for treatment.   10/29 low grade fever and leukocytosis possibly post surgical.     Left foot chronic ulcer r/o OM  - Blood culture neg  - Wound culture in the past with strep and this time also growing a pansensitive pseudomonas.    - Xray negative for OM  - MRI difficult to perform due to contracted LLE  - S/p Left first metatarsal amputation by podiatry on 10/28  - Will follow OR culture and pathology.   - Continue cefepime 2gm q12h     Will follow.    Mohit Almonte MD  Division of Infectious Diseases   Cell [phone] between 8am and 6pm   After 6pm and weekends please call ID service at [phone].

## 2020-10-29 NOTE — PROGRESS NOTE ADULT - ASSESSMENT
85 year old female with PMH of HTN, HLD, DM2 on home insulin, GERD, Dementia, sent by ER from wound care [facility] for evaluation for concern of left metatarsal of foot. Cardiology consultation called for surgical clearance     -there is no evidence of acute ischemia.  -Ecg w/o ischemic changes, pt w/o anginal compliants  -Trop negative  -there is no evidence of significant arrhythmia.  -ECG NSR @ 60  -there is no evidence for meaningful  volume overload.  -TTE Technically difficult study nL LV & RV size and function no significant valvular dysfunction EF 65%    HTN  - Controlled   - CW amlodipine, carvedilol, losartan  - monitor routine hemodynamics.     HLD  - CW statin     DM2  -CW asa, statin  -per primary    Osteo  - S/p 1st metatarsal head resection w/o cardiac complications  -Care per ortho/primary  - BP well controlled, monitor routine hemodynamic     Raffy Starr DNP, ANP-c  Cardiology   Spectra #[id number]  [phone]      85 year old female with PMH of HTN, HLD, DM2 on home insulin, GERD, Dementia, sent by ER from wound care [facility] for evaluation for concern of left metatarsal of foot. Cardiology consultation called for surgical clearance     -there is no evidence of acute ischemia.  -Ecg w/o ischemic changes, pt w/o anginal compliants  -Trop negative  -there is no evidence of significant arrhythmia.  -ECG NSR @ 60  -there is no evidence for meaningful  volume overload.  -TTE Technically difficult study nL LV & RV size and function no significant valvular dysfunction EF 65%    HTN  - Controlled   - CW amlodipine, carvedilol, losartan  - monitor routine hemodynamics.     HLD  - CW statin     DM2  -CW asa, statin  -per primary    Osteo  - S/p 1st metatarsal head resection w/o cardiac complications  - Care per ortho/primary  - BP well controlled, monitor routine hemodynamic     Raffy Starr DNP, ANP-c  Cardiology   Spectra #[id number]  [phone]

## 2020-10-29 NOTE — CHART NOTE - NSCHARTNOTEFT_GEN_A_CORE
Assessment: Pt seen for malnutrition follow-up. As per chart pt is HTN, HLD, Diabetes Mellitus Type 2 not on home insulin, GERD, Dementia, sent by ER from wound care [facility] for evaluation, c/w osteomyelitis of left metatarsal of foot, s/p OR for resection 10/28.     Pt seen at bedside however sleeping. Spoke to RN who reports pt with good appetite and PO intake, states that she eats mostly everything. No GI distress noted at this time, +BM 10/28.      Factors impacting intake: [x ] none [ ] nausea  [ ] vomiting [ ] diarrhea [ ] constipation  [ ]chewing problems [ ] swallowing issues  [ ] other:     Diet Presciption: Diet, Consistent Carbohydrate/No Snacks (10-28-20 @ 13:48)    Intake: good     Current Weight:(10/28) 100.5lbs  Admission Weight: 100.1lbs   % Weight Change- pt's weight has remained stable     Pertinent Medications: MEDICATIONS  (STANDING):  amLODIPine   Tablet 10 milliGRAM(s) Oral daily  ascorbic acid 500 milliGRAM(s) Oral two times a day  aspirin  chewable 81 milliGRAM(s) Oral daily  atorvastatin 20 milliGRAM(s) Oral at bedtime  carvedilol 6.25 milliGRAM(s) Oral two times a day  cefepime   IVPB 2000 milliGRAM(s) IV Intermittent every 12 hours  dextrose 5%. 1000 milliLiter(s) (50 mL/Hr) IV Continuous <Continuous>  dextrose 50% Injectable 25 Gram(s) IV Push once  dextrose 50% Injectable 25 Gram(s) IV Push once  dextrose 50% Injectable 12.5 Gram(s) IV Push once  donepezil 5 milliGRAM(s) Oral at bedtime  heparin   Injectable 5000 Unit(s) SubCutaneous every 12 hours  insulin lispro (ADMELOG) corrective regimen sliding scale   SubCutaneous three times a day before meals  insulin lispro (ADMELOG) corrective regimen sliding scale   SubCutaneous at bedtime  losartan 100 milliGRAM(s) Oral daily  multivitamin/minerals 1 Tablet(s) Oral daily  PARoxetine 20 milliGRAM(s) Oral daily  senna 2 Tablet(s) Oral at bedtime    MEDICATIONS  (PRN):  acetaminophen   Tablet .. 650 milliGRAM(s) Oral every 6 hours PRN Mild Pain (1 - 3)  dextrose 40% Gel 15 Gram(s) Oral once PRN Blood Glucose LESS THAN 70 milliGRAM(s)/deciliter  glucagon  Injectable 1 milliGRAM(s) IntraMuscular once PRN Glucose LESS THAN 70 milligrams/deciliter  glucagon  Injectable 1 milliGRAM(s) IntraMuscular once PRN Glucose LESS THAN 70 milligrams/deciliter  polyethylene glycol 3350 17 Gram(s) Oral daily PRN Constipation    Pertinent Labs: 10-29 Na144 mmol/L Glu 154 mg/dL<H> K+ 3.4 mmol/L<L> Cr  1.00 mg/dL BUN 32 mg/dL<H>, Hgb 9.0, Hct 27.2, Chloride 111,10-24 Alb 3.0 g/dL<L> 10-25 PAB 22 mg/dL     CAPILLARY BLOOD GLUCOSE    POCT Blood Glucose.: 271 mg/dL (29 Oct 2020 12:05)  POCT Blood Glucose.: 155 mg/dL (29 Oct 2020 07:58)  POCT Blood Glucose.: 133 mg/dL (28 Oct 2020 21:40)  POCT Blood Glucose.: 114 mg/dL (28 Oct 2020 21:24)  POCT Blood Glucose.: 174 mg/dL (28 Oct 2020 17:07)    Skin: No edema or pressure injuries noted at this time     Estimated Needs:   [x ] no change since previous assessment  [ ] recalculated:     Previous Nutrition Diagnosis:   [ x] Increased Nutrient Needs  [ x] Malnutrition     Nutrition Diagnosis is [x ] ongoing-being addressed with good PO intake     New Nutrition Diagnosis: [x ] not applicable       Interventions: Continue with current Consistent CHO diet   Recommend  [ ] Change Diet To:  [ ] Nutrition Supplement  [ ] Nutrition Support  [x ] Other:   1) Encourage to continue adequate PO intake  2) Continue with Vitamin C and MVI supplementation   3) Monitor pt's PO intake, weight, skin, edema, GI distress     Monitoring and Evaluation:   [x ] PO intake [ x ] Tolerance to diet prescription [ x ] weights [ x ] labs[ x ] follow up per protocol  [ x] other: RD to remain available

## 2020-10-29 NOTE — PROGRESS NOTE ADULT - SUBJECTIVE AND OBJECTIVE BOX
Patient is a 88y old  Female who presents with a chief complaint of Osteomyletis (29 Oct 2020 08:37)      FROM ADMISSION H+P:   HPI:  84 yo F with PMH of HTN, HLD, Diabetes Mellitus Type 2 not on home insulin, GERD, Dementia, sent by ER from wound care [facility] for evaluation for concern of left metatarsal of foot.  As per clinical hx The patient has been getting routine dressing changes but her wound has worsen to the point to probe to bone.     On Patient interview: patient Denies any fever, chills, nausea, vomiting, chest pain, shortness of breath, or calf pain at this time.  ER Course:  vitals stable afebrile,   labs: Hgb 11,CR 1 (increased from ,75)   Imaging CXR: small left effusion + vascular congestion   X ray bilateral foot: negative  Pt received zosyn 3.375 x1, vanc IV x1,    Podiatry called to evaluate; Patient is now pending MRI of foot (24 Oct 2020 14:04)      ----  INTERVAL HPI/OVERNIGHT EVENTS: Pt seen and evaluated at the bedside. No acute overnight events occurred. s/p metatarsal resection. Patient seen yesterday PM and again this AM. Spoke w son yesterday PM. In PM patient was unaware that she was in the operating room or that she had a procedure. Patient appears clinically stable this AM, AOx2, following commands and able to communicate however appears less interactive than prior. Will monitor throughout the day. D/w RN regarding frequent turning to prevent progression of her DTIs. Now on Cefepime. Denies ongoing pain, however unable to assess ROS reliably. Per Rn eating well.     ----  PAST MEDICAL & SURGICAL HISTORY:  Dementia    DM (diabetes mellitus)    HLD (hyperlipidemia)    HTN (hypertension)    GERD (gastroesophageal reflux disease)    History of cholecystectomy    Ankle injury  s/p ankle surgery, pt doesn&#x27;t remember which ankle        FAMILY HISTORY:  FH: type 2 diabetes  brother    FH: colon cancer  sister        Allergies    sulfa drugs (Unknown)    Intolerances        ----  REVIEW OF SYSTEMS:  Denies pain, however unable to assess reliably as patient is somewhat less interactive    ----  PHYSICAL EXAM:  GENERAL: patient appears clinically stable, no acute distress, more interactive prior   EYES: sclera clear, no exudates  NECK: supple, soft, no thyromegaly noted  LUNGS: clear to auscultation, no wheezing or rhonchi appreciated  HEART: S1/S2, regular rate and rhythm, murmur noted, no edema to b/l LE  GASTROINTESTINAL: abdomen is soft, nontender, nondistended, normoactive bowel sounds  SKIN: DTI noted on Sacrum and R scapula - asked nursing assistant to lay patient on side  EXTREMITIES: contracted LLE and LUE, L foot open to air. no edema, cyanosis, or calf tenderness.  NEUROLOGIC: awake, alert, oriented x2, less interactive  HEME/LYMPH:  no obvious ecchymosis , lympadenopathy    T(C): 37.6 (10-29-20 @ 05:13), Max: 37.6 (10-29-20 @ 05:13)  HR: 74 (10-29-20 @ 05:13) (60 - 74)  BP: 148/65 (10-29-20 @ 05:13) (108/60 - 148/65)  RR: 17 (10-29-20 @ 05:13) (12 - 17)  SpO2: 96% (10-29-20 @ 05:13) (95% - 100%)  Wt(kg): --    ----  I&O's Summary    28 Oct 2020 07:01  -  29 Oct 2020 07:00  --------------------------------------------------------  IN: 320 mL / OUT: 340 mL / NET: -20 mL        LABS:                        9.0    11.96 )-----------( 202      ( 29 Oct 2020 08:55 )             27.2     10-28    144  |  113<H>  |  29<H>  ----------------------------<  118<H>  3.7   |  25  |  1.00    Ca    8.5      28 Oct 2020 09:08          CAPILLARY BLOOD GLUCOSE      POCT Blood Glucose.: 155 mg/dL (29 Oct 2020 07:58)  POCT Blood Glucose.: 133 mg/dL (28 Oct 2020 21:40)  POCT Blood Glucose.: 114 mg/dL (28 Oct 2020 21:24)  POCT Blood Glucose.: 174 mg/dL (28 Oct 2020 17:07)  POCT Blood Glucose.: 114 mg/dL (28 Oct 2020 12:42)      10-26 @ 15:17   Culture yields >4 types of aerobic and/or anaerobic bacteria  Call client services within 7 days if further workup is clinically  indicated. Culture includes  Numerous Pseudomonas aeruginosa  --  Pseudomonas aeruginosa          Culture - Tissue with Gram Stain (collected 10-28-20 @ 21:04)  Source: .Tissue Other, ist metatarsal head left  Gram Stain (10-28-20 @ 23:46):    No polymorphonuclear cells seen per low power field    No organisms seen        ----  Personally reviewed:  Vital sign trends: [  ] yes    [  ] no     [  ] n/a  Laboratory results: [  ] yes    [  ] no     [  ] n/a  Radiology results: [  ] yes    [  ] no     [  ] n/a  Culture results: [  ] yes    [  ] no     [  ] n/a  Consultant recommendations: [  ] yes    [  ] no     [  ] n/a

## 2020-10-29 NOTE — PHARMACOTHERAPY INTERVENTION NOTE - COMMENTS
Patient receiving Cefepime 2g Q12h for wound infection. MD (Gage) noted to have been interested in Q8h frequency. Spoke with MD (Gage) to clarify frequency administration. Renal function dictates Cefepime 2g Q12h. MD agrees and would like to keep dose as Cefepime 2g Q12h. Patient receiving Cefepime 2g Q12h for wound infection. MD (Gage) noted to have been interested in Q8h frequency. Spoke with MD (Gage) to clarify frequency administration. Renal function dictates Cefepime 2g Q12h. MD agrees and would like to keep dose as Cefepime 2g Q12h. MD will include approved frequency in her note.

## 2020-10-29 NOTE — PROGRESS NOTE ADULT - ASSESSMENT
S/p Left 1st metatarsal head resection secondary to osteomyelitis, DOS 10/28/20- iodoform packing removed, area dressed with xeroform and dsd. Will continue to follow up with patient OR pathologies and cultures. Wound Care Instructions listed below

## 2020-10-30 LAB
ANION GAP SERPL CALC-SCNC: 6 MMOL/L — SIGNIFICANT CHANGE UP (ref 5–17)
BUN SERPL-MCNC: 34 MG/DL — HIGH (ref 7–23)
CALCIUM SERPL-MCNC: 9.3 MG/DL — SIGNIFICANT CHANGE UP (ref 8.5–10.1)
CHLORIDE SERPL-SCNC: 112 MMOL/L — HIGH (ref 96–108)
CO2 SERPL-SCNC: 26 MMOL/L — SIGNIFICANT CHANGE UP (ref 22–31)
CREAT SERPL-MCNC: 1 MG/DL — SIGNIFICANT CHANGE UP (ref 0.5–1.3)
GLUCOSE SERPL-MCNC: 153 MG/DL — HIGH (ref 70–99)
HCT VFR BLD CALC: 27.4 % — LOW (ref 34.5–45)
HGB BLD-MCNC: 8.7 G/DL — LOW (ref 11.5–15.5)
MCHC RBC-ENTMCNC: 26.9 PG — LOW (ref 27–34)
MCHC RBC-ENTMCNC: 31.8 GM/DL — LOW (ref 32–36)
MCV RBC AUTO: 84.8 FL — SIGNIFICANT CHANGE UP (ref 80–100)
NRBC # BLD: 0 /100 WBCS — SIGNIFICANT CHANGE UP (ref 0–0)
PLATELET # BLD AUTO: 198 K/UL — SIGNIFICANT CHANGE UP (ref 150–400)
POTASSIUM SERPL-MCNC: 4.1 MMOL/L — SIGNIFICANT CHANGE UP (ref 3.5–5.3)
POTASSIUM SERPL-SCNC: 4.1 MMOL/L — SIGNIFICANT CHANGE UP (ref 3.5–5.3)
RBC # BLD: 3.23 M/UL — LOW (ref 3.8–5.2)
RBC # FLD: 15.1 % — HIGH (ref 10.3–14.5)
SODIUM SERPL-SCNC: 144 MMOL/L — SIGNIFICANT CHANGE UP (ref 135–145)
SURGICAL PATHOLOGY STUDY: SIGNIFICANT CHANGE UP
WBC # BLD: 9.61 K/UL — SIGNIFICANT CHANGE UP (ref 3.8–10.5)
WBC # FLD AUTO: 9.61 K/UL — SIGNIFICANT CHANGE UP (ref 3.8–10.5)

## 2020-10-30 PROCEDURE — 99232 SBSQ HOSP IP/OBS MODERATE 35: CPT

## 2020-10-30 PROCEDURE — 99233 SBSQ HOSP IP/OBS HIGH 50: CPT | Mod: GC

## 2020-10-30 PROCEDURE — 99024 POSTOP FOLLOW-UP VISIT: CPT

## 2020-10-30 RX ORDER — CEFEPIME 1 G/1
1000 INJECTION, POWDER, FOR SOLUTION INTRAMUSCULAR; INTRAVENOUS EVERY 12 HOURS
Refills: 0 | Status: DISCONTINUED | OUTPATIENT
Start: 2020-10-30 | End: 2020-11-04

## 2020-10-30 RX ADMIN — Medication 20 MILLIGRAM(S): at 11:49

## 2020-10-30 RX ADMIN — Medication 1: at 12:35

## 2020-10-30 RX ADMIN — CEFEPIME 100 MILLIGRAM(S): 1 INJECTION, POWDER, FOR SOLUTION INTRAMUSCULAR; INTRAVENOUS at 06:05

## 2020-10-30 RX ADMIN — Medication 500 MILLIGRAM(S): at 17:48

## 2020-10-30 RX ADMIN — HEPARIN SODIUM 5000 UNIT(S): 5000 INJECTION INTRAVENOUS; SUBCUTANEOUS at 06:05

## 2020-10-30 RX ADMIN — Medication 500 MILLIGRAM(S): at 06:06

## 2020-10-30 RX ADMIN — CARVEDILOL PHOSPHATE 6.25 MILLIGRAM(S): 80 CAPSULE, EXTENDED RELEASE ORAL at 17:48

## 2020-10-30 RX ADMIN — SENNA PLUS 2 TABLET(S): 8.6 TABLET ORAL at 21:10

## 2020-10-30 RX ADMIN — Medication 81 MILLIGRAM(S): at 11:49

## 2020-10-30 RX ADMIN — LOSARTAN POTASSIUM 100 MILLIGRAM(S): 100 TABLET, FILM COATED ORAL at 06:05

## 2020-10-30 RX ADMIN — Medication 1: at 08:28

## 2020-10-30 RX ADMIN — ATORVASTATIN CALCIUM 20 MILLIGRAM(S): 80 TABLET, FILM COATED ORAL at 21:10

## 2020-10-30 RX ADMIN — DONEPEZIL HYDROCHLORIDE 5 MILLIGRAM(S): 10 TABLET, FILM COATED ORAL at 21:10

## 2020-10-30 RX ADMIN — CEFEPIME 100 MILLIGRAM(S): 1 INJECTION, POWDER, FOR SOLUTION INTRAMUSCULAR; INTRAVENOUS at 18:12

## 2020-10-30 RX ADMIN — CARVEDILOL PHOSPHATE 6.25 MILLIGRAM(S): 80 CAPSULE, EXTENDED RELEASE ORAL at 06:05

## 2020-10-30 RX ADMIN — Medication 1 TABLET(S): at 11:49

## 2020-10-30 RX ADMIN — HEPARIN SODIUM 5000 UNIT(S): 5000 INJECTION INTRAVENOUS; SUBCUTANEOUS at 18:13

## 2020-10-30 RX ADMIN — AMLODIPINE BESYLATE 10 MILLIGRAM(S): 2.5 TABLET ORAL at 06:05

## 2020-10-30 NOTE — PROGRESS NOTE ADULT - ASSESSMENT
S/p Left 1st metatarsal head resection secondary to osteomyelitis, DOS 10/28/20- Dorsal incision dressed with xeroform and DSD and the medial aspect dressed with Silver Alginate, Surgicel Nu-Knit applied to the area to prevent any sanguineous strike through. The left hallux also splinted in the correct rectus position. Will continue to follow up with patient OR pathologies and cultures. Wound Care Instructions listed below

## 2020-10-30 NOTE — PROGRESS NOTE ADULT - SUBJECTIVE AND OBJECTIVE BOX
[facility]  INFECTIOUS DISEASES   [address] [place] [address]  Tel: [phone]     Fax: [phone]  =======================================================    [id number]  NELLIE NICOLE     Follow up: foot ulcer and OM    Had left 1st metatarsal head amputation.   Awake and alert, no complaint. No fever. Tmax 100.2 yesterday.     PAST MEDICAL & SURGICAL HISTORY:  Dementia  DM (diabetes mellitus)  HLD (hyperlipidemia)  HTN (hypertension)  GERD (gastroesophageal reflux disease)  History of cholecystectomy  Ankle injury  s/p ankle surgery, pt doesn&#x27;t remember which ankle    Social Hx: no smoking or toxic habits     FAMILY HISTORY:  FH: type 2 diabetes  brother    FH: colon cancer  sister    Allergies  sulfa drugs (Unknown)    Antibiotics:  piperacillin/tazobactam IVPB.. 3.375 Gram(s) IV Intermittent every 8 hours  vancomycin  IVPB 1000 milliGRAM(s) IV Intermittent every 24 hours     REVIEW OF SYSTEMS:  Limited due to dementia but states that has left foot pain.     Physical Exam:  Vital Signs Last 24 Hrs  T(C): 36.9 (30 Oct 2020 05:09), Max: 37.9 (29 Oct 2020 12:45)  T(F): 98.4 (30 Oct 2020 05:09), Max: 100.2 (29 Oct 2020 12:45)  HR: 64 (30 Oct 2020 06:04) (61 - 71)  BP: 132/57 (30 Oct 2020 05:09) (121/53 - 136/61)  BP(mean): --  RR: 17 (30 Oct 2020 05:09) (17 - 17)  SpO2: 93% (30 Oct 2020 05:09) (93% - 96%)  GEN: NAD  HEENT: normocephalic and atraumatic. EOMI. PERRL.    NECK: Supple.  No lymphadenopathy   LUNGS: Clear to auscultation.  HEART: Regular rate and rhythm   ABDOMEN: Soft, nontender, and nondistended.  Positive bowel sounds.    : No CVA tenderness  EXTREMITIES: Without edema. left leg in a contracted position, able to extend partially   Now foot is dressed after surgery ( before surgery: Left plantar metatarsal head area with a deep ulcer with mild swelling and erythema in surrounding, no discharge)   NEUROLOGIC: grossly intact.  PSYCHIATRIC: dementia unable to evaluate   SKIN: No rash    Labs:   10-30    144  |  112<H>  |  34<H>  ----------------------------<  153<H>  4.1   |  26  |  1.00    Ca    9.3      30 Oct 2020 08:57                        8.7    9.61  )-----------( 198      ( 30 Oct 2020 08:57 )             27.4     RECENT CULTURES:  10-28 @ 21:04 .Tissue Other, ist metatarsal head left     No growth  No polymorphonuclear cells seen per low power field  No organisms seen    10-26 @ 15:17 .Tissue Other, Left  medial 1st Metatarsal Pseudomonas aeruginosa    Culture yields >4 types of aerobic and/or anaerobic bacteria  Call client services within 7 days if further workup is clinically  indicated. Culture includes  Numerous Pseudomonas aeruginosa    Rare polymorphonuclear leukocytes per low power field  Few Gram positive cocci in pairs per oil power field  Few Gram Variable Rods per oil power field    10-24 @ 21:09 .Blood Blood     No Growth Final    All imaging and other data have been reviewed.  < from: Xray Foot AP + Lateral + Oblique, Bilat (10.24.20 @ 12:17) >  EXAM:  FOOT BILATERAL (MINIMUM 3 V)                        PROCEDURE DATE:  10/24/2020    INTERPRETATION:  Bilateral feet  HISTORY: Osteomyelitis   Three views of the right foot and three views of the left foot show no evidence of fracture nor destructive change. The joint spaces show left hallux valgus deformity. Surgical hardware projects in the lower left tibia and fibula.  IMPRESSION: No evidence of bony destruction.    Assessment and Plan:   86 y/o woman with PMH of HTN, Diabetes Mellitus 2, GERD and Dementia was sent from ER wound center for evaluation of left foot ulcer and possible Osteomyelitis.  Mild cellulitis in area, unclear if there is OM without MRI to plan for treatment.   10/29 low grade fever and leukocytosis possibly post surgical.     Left foot chronic ulcer r/o OM  - Blood culture neg  - Wound culture in the past with strep and this admission pansensitive pseudomonas.    - OR culture negative to date   - Xray negative for OM  - S/p Left first metatarsal amputation by podiatry on 10/28  - Will follow pathology  - Continue cefepime 2gm q12h   - If margines are clean can stop antibiotics 5-7days post op.   - If plan for discharge can switch to oral Cipro.     Will follow.    Mohit Almonte MD  Division of Infectious Diseases   Cell [phone] between 8am and 6pm   After 6pm and weekends please call ID service at [phone].      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   Awake and alert, no complaint. No fever. Tmax 100.2 yesterday.     PAST MEDICAL & SURGICAL HISTORY:  Dementia  DM (diabetes mellitus)  HLD (hyperlipidemia)  HTN (hypertension)  GERD (gastroesophageal reflux disease)  History of cholecystectomy  Ankle injury  s/p ankle surgery, pt doesn&#x27;t remember which ankle    Social Hx: no smoking or toxic habits     FAMILY HISTORY:  FH: type 2 diabetes  brother    FH: colon cancer  sister    Allergies  sulfa drugs (Unknown)    Antibiotics:  piperacillin/tazobactam IVPB.. 3.375 Gram(s) IV Intermittent every 8 hours  vancomycin  IVPB 1000 milliGRAM(s) IV Intermittent every 24 hours     REVIEW OF SYSTEMS:  Limited due to dementia but states that has left foot pain.     Physical Exam:  Vital Signs Last 24 Hrs  T(C): 36.9 (30 Oct 2020 05:09), Max: 37.9 (29 Oct 2020 12:45)  T(F): 98.4 (30 Oct 2020 05:09), Max: 100.2 (29 Oct 2020 12:45)  HR: 64 (30 Oct 2020 06:04) (61 - 71)  BP: 132/57 (30 Oct 2020 05:09) (121/53 - 136/61)  BP(mean): --  RR: 17 (30 Oct 2020 05:09) (17 - 17)  SpO2: 93% (30 Oct 2020 05:09) (93% - 96%)  GEN: NAD  HEENT: normocephalic and atraumatic. EOMI. PERRL.    NECK: Supple.  No lymphadenopathy   LUNGS: Clear to auscultation.  HEART: Regular rate and rhythm   ABDOMEN: Soft, nontender, and nondistended.  Positive bowel sounds.    : No CVA tenderness  EXTREMITIES: Without edema. left leg in a contracted position, able to extend partially   Now foot is dressed after surgery ( before surgery: Left plantar metatarsal head area with a deep ulcer with mild swelling and erythema in surrounding, no discharge)   NEUROLOGIC: grossly intact.  PSYCHIATRIC: dementia unable to evaluate   SKIN: No rash    Labs:   10-30    144  |  112<H>  |  34<H>  ----------------------------<  153<H>  4.1   |  26  |  1.00    Ca    9.3      30 Oct 2020 08:57                        8.7    9.61  )-----------( 198      ( 30 Oct 2020 08:57 )             27.4     RECENT CULTURES:  10-28 @ 21:04 .Tissue Other, ist metatarsal head left     No growth  No polymorphonuclear cells seen per low power field  No organisms seen    10-26 @ 15:17 .Tissue Other, Left  medial 1st Metatarsal Pseudomonas aeruginosa    Culture yields >4 types of aerobic and/or anaerobic bacteria  Call client services within 7 days if further workup is clinically  indicated. Culture includes  Numerous Pseudomonas aeruginosa    Rare polymorphonuclear leukocytes per low power field  Few Gram positive cocci in pairs per oil power field  Few Gram Variable Rods per oil power field    10-24 @ 21:09 .Blood Blood     No Growth Final    All imaging and other data have been reviewed.  < from: Xray Foot AP + Lateral + Oblique, Bilat (10.24.20 @ 12:17) >  EXAM:  FOOT BILATERAL (MINIMUM 3 V)                        PROCEDURE DATE:  10/24/2020    INTERPRETATION:  Bilateral feet  HISTORY: Osteomyelitis   Three views of the right foot and three views of the left foot show no evidence of fracture nor destructive change. The joint spaces show left hallux valgus deformity. Surgical hardware projects in the lower left tibia and fibula.  IMPRESSION: No evidence of bony destruction.    Assessment and Plan:   84 y/o woman with PMH of HTN, Diabetes Mellitus 2, GERD and Dementia was sent from ER wound center for evaluation of left foot ulcer and possible Osteomyelitis.  Mild cellulitis in area, unclear if there is OM without MRI to plan for treatment.   10/29 low grade fever and leukocytosis possibly post surgical.     Left foot chronic ulcer r/o OM  - Blood culture neg  - Wound culture in the past with strep and this admission pansensitive pseudomonas.    - OR culture negative to date   - Xray negative for OM  - S/p Left first metatarsal amputation by podiatry on 10/28  - Will follow pathology  - Continue cefepime 2gm q12h   - Proximal margin of amputation showed OM so will need treatment for 6 weeks.   - If plan for discharge to a facility that can get IV, prefer Cefepime 2gm q12 otherwise will switch to oral ciprofloxacin 500mg q12 to complete 6 weeks from the day of start of cefepime. While on Cipro needs cQT monitoring. Also will need to check for possible c diff if diarrhea.     Will follow PRN.     Mohit Almonte MD  Division of Infectious Diseases   Cell [phone] between 8am and 6pm   After 6pm and weekends please call ID service at [phone].

## 2020-10-30 NOTE — PROGRESS NOTE ADULT - SUBJECTIVE AND OBJECTIVE BOX
[facility] -- Sarahi Meraz Grossman, [place], Alejandro Pulliam Savella, Goodger: Office # [phone]    Follow Up: Cardiac optimization pre/post op      Subjective/Observations: Patient seen and examined. Patient awake, alert, resting in bed. No complaints of chest pain, dyspnea, palpitations or dizziness. No signs of orthopnea or PND. Tolerating room air.     REVIEW OF SYSTEMS: All review of systems is negative for eye, ENT, GI, , allergic, dermatologic, musculoskeletal and neurologic except as described above    PAST MEDICAL & SURGICAL HISTORY:  Dementia  DM (diabetes mellitus)  HLD (hyperlipidemia)  HTN (hypertension)  GERD (gastroesophageal reflux disease)  History of cholecystectomy  Ankle injury  s/p ankle surgery, pt doesn&#x27;t remember which ankle    MEDICATIONS  (STANDING):  amLODIPine   Tablet 10 milliGRAM(s) Oral daily  ascorbic acid 500 milliGRAM(s) Oral two times a day  aspirin  chewable 81 milliGRAM(s) Oral daily  atorvastatin 20 milliGRAM(s) Oral at bedtime  carvedilol 6.25 milliGRAM(s) Oral two times a day  cefepime   IVPB 2000 milliGRAM(s) IV Intermittent every 12 hours  dextrose 5%. 1000 milliLiter(s) (50 mL/Hr) IV Continuous <Continuous>  dextrose 50% Injectable 25 Gram(s) IV Push once  dextrose 50% Injectable 25 Gram(s) IV Push once  dextrose 50% Injectable 12.5 Gram(s) IV Push once  donepezil 5 milliGRAM(s) Oral at bedtime  heparin   Injectable 5000 Unit(s) SubCutaneous every 12 hours  insulin lispro (ADMELOG) corrective regimen sliding scale   SubCutaneous three times a day before meals  insulin lispro (ADMELOG) corrective regimen sliding scale   SubCutaneous at bedtime  losartan 100 milliGRAM(s) Oral daily  multivitamin/minerals 1 Tablet(s) Oral daily  PARoxetine 20 milliGRAM(s) Oral daily  senna 2 Tablet(s) Oral at bedtime    MEDICATIONS  (PRN):  acetaminophen   Tablet .. 650 milliGRAM(s) Oral every 6 hours PRN Mild Pain (1 - 3)  dextrose 40% Gel 15 Gram(s) Oral once PRN Blood Glucose LESS THAN 70 milliGRAM(s)/deciliter  glucagon  Injectable 1 milliGRAM(s) IntraMuscular once PRN Glucose LESS THAN 70 milligrams/deciliter  glucagon  Injectable 1 milliGRAM(s) IntraMuscular once PRN Glucose LESS THAN 70 milligrams/deciliter  polyethylene glycol 3350 17 Gram(s) Oral daily PRN Constipation    Allergies  sulfa drugs (Unknown)    Vital Signs Last 24 Hrs  T(C): 36.9 (30 Oct 2020 05:09), Max: 37.9 (29 Oct 2020 12:45)  T(F): 98.4 (30 Oct 2020 05:09), Max: 100.2 (29 Oct 2020 12:45)  HR: 64 (30 Oct 2020 06:04) (61 - 71)  BP: 132/57 (30 Oct 2020 05:09) (121/53 - 136/61)  BP(mean): --  RR: 17 (30 Oct 2020 05:09) (17 - 17)  SpO2: 93% (30 Oct 2020 05:09) (93% - 96%)  I&O's Summary    29 Oct 2020 07:01  -  30 Oct 2020 07:00  --------------------------------------------------------  IN: 600 mL / OUT: 550 mL / NET: 50 mL    TELE: Not on telemetry   PHYSICAL EXAM:  Appearance: NAD, no distress, alert, Frail   HEENT: Moist Mucous Membranes, Anicteric  Cardiovascular: Regular rate and rhythm, Normal S1 S2, No JVD, + murmurs, No rubs, gallops or clicks  Respiratory: Non-labored, Clear to auscultation, No rales, No rhonchi, No wheezing.   Gastrointestinal:  Soft, Non-tender, + BS  Neurologic: Non-focal  Skin: Warm and dry, Left foot DSD intact.  No ecchymosis, No cyanosis  Musculoskeletal: No clubbing, No cyanosis, No joint swelling/tenderness  Psychiatry: Mood & affect appropriate  Lymph: No peripheral edema.     LABS: All Labs Reviewed:                        8.7    9.61  )-----------( 198      ( 30 Oct 2020 08:57 )             27.4                         9.0    11.96 )-----------( 202      ( 29 Oct 2020 08:55 )             27.2                         9.9    6.31  )-----------( 213      ( 28 Oct 2020 09:13 )             30.6     30 Oct 2020 08:57    144    |  112    |  34     ----------------------------<  153    4.1     |  26     |  1.00   29 Oct 2020 08:55    144    |  111    |  32     ----------------------------<  154    3.4     |  26     |  1.00   28 Oct 2020 09:08    144    |  113    |  29     ----------------------------<  118    3.7     |  25     |  1.00     Ca    9.3        30 Oct 2020 08:57  Ca    8.8        29 Oct 2020 08:55  Ca    8.5        28 Oct 2020 09:08  Troponin I, Serum: <.015 ng/mL (10-24-20 @ 15:06)    12 Lead ECG:   Ventricular Rate 60 BPM  Atrial Rate 60 BPM  P-R Interval 172 ms  QRS Duration 78 ms  Q-T Interval 432 ms  QTC Calculation(Bazett) 432 ms  P Axis 63 degrees  R Axis 35 degrees  T Axis 48 degrees  Diagnosis Line Poor data quality  Normal sinus rhythm  Normal ECG  When compared with ECG of 20-SEP-2018 13:45,  No significant change was found  Confirmed by Ralph Austin MD ([id number]) on 10/25/2020 11:39:49 AM (10-24-20 @ 11:41)    < from: TTE Echo Complete w/o Contrast w/ Doppler (10.25.20 @ 11:18) >  Dimensions:  LA 3.4       NormalValues: 2.0 - 4.0 cm  Ao 2.9        Normal Values: 2.0 - 3.8 cm  SEPTUM 1.2       Normal Values: 0.6 - 1.2 cm  PWT 1.1       Normal Values: 0.6 - 1.1 cm  LVIDd 3.1         Normal Values: 3.0 - 5.6 cm  LVIDs 1.7         Normal Values: 1.8 - 4.0 cm    OBSERVATIONS:  Technically difficult study, patient uncooperative  Mitral Valve: Thickened leaflets, mild MR.  Aortic Valve/Aorta: Calcified trileaflet aortic valve with grossly normal opening. Trace AI  Tricuspid Valve: Mild TR.  Pulmonic Valve: Not well-visualized  Left Atrium: normal  Right Atrium: Not well-visualized  Left Ventricle: normal LV size and systolic function, estimated LVEF of 65%.  Right Ventricle: Grossly normal size and systolic function.  Pericardium/Pleura: normal, no significant pericardial effusion.  Pulmonary/RV Pressure: estimated PA systolic pressure of 37 mmHg assuming an RA pressure of 3 mmHg.    Conclusion:  Technically difficult study  Normal left ventricular internal dimensions and systolic function, estimated LVEF of 65%.  Grossly normal RV size and systolic function.  Calcified trileaflet aortic valve with grossly normal opening. Trace AI  Mild MR and TR.  No significant pericardial effusion.    < end of copied text >    < from: Xray Chest 1 View- PORTABLE-Urgent (Xray Chest 1 View- PORTABLE-Urgent .) (10.24.20 @ 12:18) >  Frontal expiratory view of the chest shows the heart to be borderline in size. The lungs show mild to moderate central congestion with small left infiltrate/effusion and there is no evidence of pneumothorax nor right pleural effusion. Right upper quadrant clips are present.  IMPRESSION:  Left lower chest infiltrate with effusion.  < end of copied text >

## 2020-10-30 NOTE — PHARMACOTHERAPY INTERVENTION NOTE - COMMENTS
Patient receiving Cefepime 2g Q12h for wound infection/osteomyelitis. Spoke with MD (Gage) regarding renal dose adjustment. MD would like Cefepime 1g Q12h to be ordered. Order of Cefepime 1g Q12h was entered for the patient.

## 2020-10-30 NOTE — PROGRESS NOTE ADULT - SUBJECTIVE AND OBJECTIVE BOX
88y year old Female seen at [facility] 3WES 362 W1 for s/p Left 1st metatarsal head resection secondary to osteomyelitis with Dr. Mullins, DOS 10/28/20. The patient is severely contracted along the left lower extremity. The patient states that she isnt experiencing any pain along the left foot or the surgical site. The patient dressing was slightly wet secondary to the patient mistakenly urinating on her dressing secondary to her severe contraction.  in ur Denies any fever, chills, nausea, vomiting, chest pain, shortness of breath, or calf pain at this time.    Allergies    sulfa drugs (Unknown)    Intolerances    REVIEW OF SYSTEMS    PAST MEDICAL & SURGICAL HISTORY:  Dementia    DM (diabetes mellitus)    HLD (hyperlipidemia)    HTN (hypertension)    GERD (gastroesophageal reflux disease)    History of cholecystectomy    Ankle injury      MEDICATIONS  (STANDING):  amLODIPine   Tablet 10 milliGRAM(s) Oral daily  ascorbic acid 500 milliGRAM(s) Oral two times a day  aspirin  chewable 81 milliGRAM(s) Oral daily  atorvastatin 20 milliGRAM(s) Oral at bedtime  carvedilol 6.25 milliGRAM(s) Oral two times a day  cefepime   IVPB 1000 milliGRAM(s) IV Intermittent every 12 hours  dextrose 5%. 1000 milliLiter(s) (50 mL/Hr) IV Continuous <Continuous>  dextrose 50% Injectable 25 Gram(s) IV Push once  dextrose 50% Injectable 25 Gram(s) IV Push once  dextrose 50% Injectable 12.5 Gram(s) IV Push once  donepezil 5 milliGRAM(s) Oral at bedtime  heparin   Injectable 5000 Unit(s) SubCutaneous every 12 hours  insulin lispro (ADMELOG) corrective regimen sliding scale   SubCutaneous three times a day before meals  insulin lispro (ADMELOG) corrective regimen sliding scale   SubCutaneous at bedtime  losartan 100 milliGRAM(s) Oral daily  multivitamin/minerals 1 Tablet(s) Oral daily  PARoxetine 20 milliGRAM(s) Oral daily  senna 2 Tablet(s) Oral at bedtime    MEDICATIONS  (PRN):  acetaminophen   Tablet .. 650 milliGRAM(s) Oral every 6 hours PRN Mild Pain (1 - 3)  dextrose 40% Gel 15 Gram(s) Oral once PRN Blood Glucose LESS THAN 70 milliGRAM(s)/deciliter  glucagon  Injectable 1 milliGRAM(s) IntraMuscular once PRN Glucose LESS THAN 70 milligrams/deciliter  glucagon  Injectable 1 milliGRAM(s) IntraMuscular once PRN Glucose LESS THAN 70 milligrams/deciliter  polyethylene glycol 3350 17 Gram(s) Oral daily PRN Constipation      Vital Signs Last 24 Hrs  T(C): 36.9 (30 Oct 2020 14:38), Max: 37.3 (29 Oct 2020 20:13)  T(F): 98.4 (30 Oct 2020 14:38), Max: 99.1 (29 Oct 2020 20:13)  HR: 61 (30 Oct 2020 14:38) (61 - 71)  BP: 110/60 (30 Oct 2020 14:38) (110/60 - 136/61)  BP(mean): --  RR: 16 (30 Oct 2020 14:38) (16 - 17)  SpO2: 99% (30 Oct 2020 14:38) (93% - 99%)    PHYSICAL EXAM:  Vascular: DP & PT nonpalpable bilaterally, Capillary refill 3 seconds, Minimal edema along the left 1st metatarsal   Neurological: Light touch sensation intact bilaterally  Musculoskeletal: Severely contracted left lower extremity, s/p Left 1st metatarsal head resection, left hallux in good alignment and rectus position  Dermatological: -  1. s/p Left 1st metatarsal head resection- sutures intact, skin well approximated, dried sanguineous drainage, minimal edema, no erythema, no mal odor( dorsal incision)  2. Grade 3- 0.3cmx0.3x0.3 cm along the medial aspect of the left 1st metatarsal- sanguineous drainage     CBC Full  -  ( 30 Oct 2020 08:57 )  WBC Count : 9.61 K/uL  RBC Count : 3.23 M/uL  Hemoglobin : 8.7 g/dL  Hematocrit : 27.4 %  Platelet Count - Automated : 198 K/uL  Mean Cell Volume : 84.8 fl  Mean Cell Hemoglobin : 26.9 pg  Mean Cell Hemoglobin Concentration : 31.8 gm/dL  Auto Neutrophil # : x  Auto Lymphocyte # : x  Auto Monocyte # : x  Auto Eosinophil # : x  Auto Basophil # : x  Auto Neutrophil % : x  Auto Lymphocyte % : x  Auto Monocyte % : x  Auto Eosinophil % : x  Auto Basophil % : x      ----------CHEM PANEL----------    10-30    144  |  112<H>  |  34<H>  ----------------------------<  153<H>  4.1   |  26  |  1.00    Ca    9.3      30 Oct 2020 08:57          Culture - Tissue with Gram Stain (collected 28 Oct 2020 21:04)  Source: .Tissue Other, ist metatarsal head left  Gram Stain (28 Oct 2020 23:46):    No polymorphonuclear cells seen per low power field    No organisms seen  Preliminary Report (29 Oct 2020 17:02):    No growth        Left Foot Xray:  Post surgical changes- s/p Left 1st metatarsal head resection

## 2020-10-30 NOTE — PROGRESS NOTE ADULT - ASSESSMENT
84 yo F with PMH of HTN, HLD, Diabetes Mellitus Type 2 not on home insulin, GERD, Dementia, sent by ER from wound care [facility] for evaluation, c/w osteomyelitis of left metatarsal of foot, pending OR for resection 10/28      Diabetic foot ulcer, underlying osteomyelitis  s/p L metatarsal resection  Now on cefepime 2gm q12h renally dosed, per ID and pharm  Proximal margin of amputation showed OM so will need treatment for 6 weeks.   Tissue Cx 10/26 L med 1st met: pansensitive Pseudomonas, few G+ cocci pairs, G+ jenny rods, PMLs;   Tissue Cx from OR 10/28 No organisms  DC Abx plan per ID: "If plan for discharge to a facility that can get IV, prefer Cefepime 2gm q12 otherwise will switch to oral ciprofloxacin 500mg q12 to complete 6 weeks from the day of start of cefepime. While on Cipro needs cQT monitoring. Also will need to check for possible c diff if diarrhea."  Xray foot w/o pathology  F/u Blood cx x2 10/24 NGTD  Good vascular flow per L leg ABIs  MRI foot - unable to obtain 2/2 contracture  ID Consulted  Palliative Case Consulted  Podiatry, Dr. Mullins following    Deep Tissue Injury  1) sacral 2) L scapular DTIs, Stage 1 pressure ulcer  3) Skin tear R thigh 2/2 LLE contracture with erythema and edema on medical aspect of L foot  Wound care RN consulted - DC'd by wound care RN as no longer warranted  Turn in bed, reposition q 4  keep towel between abrasion and LLE     Pulmonary vascular congestion and left pleural effusion r/o occult CHF  CXR showed L infiltrate w/ effusion  Patient appears euvolemic on exam  Trops neg, BMP 1100  TTE EF65% mild valvular disease    DM2, noninsulin dependent.  hold home metformin  start low dose ISS, fingersticks,     Dementia  baseline bedbound, at baseline knows her name, can identify her family, forgets their names, eats well (regular solid food)  cont donepezil   cont paroxetine.    Iron deficiency anemia  Hb stable  hold home iron    HLD  atorvastatin interchange for lovastatin.    HTN  cont carvedilol, amlodipine, and losartan with hold parameters.    GERD  Patient on omeprazole: will use protonix in house    Depression  c/w paroxetine    PPx  heparin subq, resumed post op  bowel regimen    DISPO  DNR DNI, PC consulted  PT consulted: Home w 24 hr care  SW consulted   86 yo F with PMH of HTN, HLD, Diabetes Mellitus Type 2 not on home insulin, GERD, Dementia, sent by ER from wound care [facility] for evaluation, c/w osteomyelitis of left metatarsal of foot, pending OR for resection 10/28      Diabetic foot ulcer, underlying osteomyelitis  s/p L metatarsal resection  Now on cefepime 2gm q12h renally dosed, per ID and pharm  Proximal margin of amputation showed OM so will need treatment for 6 weeks.   Tissue Cx 10/26 L med 1st met: pansensitive Pseudomonas, few G+ cocci pairs, G+ jenny rods, PMLs;   Tissue Cx from OR 10/28 No organisms  DC Abx plan per ID: "If plan for discharge to a facility that can get IV, prefer Cefepime 2gm q12 otherwise will switch to oral ciprofloxacin 500mg q12 to complete 6 weeks from the day of start of cefepime. While on Cipro needs cQT monitoring. Also will need to check for possible c diff if diarrhea."  Xray foot w/o pathology  F/u Blood cx x2 10/24 NGTD  Good vascular flow per L leg ABIs  MRI foot - unable to obtain 2/2 contracture  ID Consulted  Palliative Case Consulted  Podiatry, Dr. Mullins following    Deep Tissue Injury  1) sacral 2) L scapular DTIs, Stage 1 pressure ulcer  3) Skin tear R thigh 2/2 LLE contracture with erythema and edema on medical aspect of L foot  Wound care RN consulted - DC'd by wound care RN as no longer warranted  Turn in bed, reposition q 4  keep towel between abrasion and LLE     Pulmonary vascular congestion and left pleural effusion r/o occult CHF  CXR showed L infiltrate w/ effusion  Patient appears euvolemic on exam  Trops neg, BMP 1100  TTE EF65% mild valvular disease    DM2, noninsulin dependent.  hold home metformin  start low dose ISS, fingersticks,     Dementia  baseline bedbound, at baseline knows her name, can identify her family, forgets their names, eats well (regular solid food)  cont donepezil   cont paroxetine.    Iron deficiency anemia  Hb stable  hold home iron    HLD  atorvastatin interchange for lovastatin.    HTN  cont carvedilol, amlodipine, and losartan with hold parameters.    GERD  Patient on omeprazole: will use protonix in house    Depression  c/w paroxetine    PPx  heparin subq, resumed post op  bowel regimen    DISPO  DNR DNI, PC consulted  PT consulted: Home w 24 hr care  SW consulted  Wound care clinic f/u within 1 week

## 2020-10-30 NOTE — PROGRESS NOTE ADULT - SUBJECTIVE AND OBJECTIVE BOX
Patient is a 88y old  Female who presents with a chief complaint of Osteomyletis (30 Oct 2020 11:57)      FROM ADMISSION H+P:   HPI:  86 yo F with PMH of HTN, HLD, Diabetes Mellitus Type 2 not on home insulin, GERD, Dementia, sent by ER from wound care [facility] for evaluation for concern of left metatarsal of foot.  As per clinical hx The patient has been getting routine dressing changes but her wound has worsen to the point to probe to bone.     On Patient interview: patient Denies any fever, chills, nausea, vomiting, chest pain, shortness of breath, or calf pain at this time.  ER Course:  vitals stable afebrile,   labs: Hgb 11,CR 1 (increased from ,75)   Imaging CXR: small left effusion + vascular congestion   X ray bilateral foot: negative  Pt received zosyn 3.375 x1, vanc IV x1,    Podiatry called to evaluate; Patient is now pending MRI of foot (24 Oct 2020 14:04)      ----  INTERVAL HPI/OVERNIGHT EVENTS: Pt seen and evaluated at the bedside. No acute overnight events occurred. s/p metatarsal resection. Patient appears clinically stable this AM, sleeping most of the time throughout the day, AOx2 upon waking, following commands and able to communicate, baseline severe dementia. On Cefepime. Denies ongoing pain, however unable to assess ROS reliably.    ----  PAST MEDICAL & SURGICAL HISTORY:  Dementia    DM (diabetes mellitus)    HLD (hyperlipidemia)    HTN (hypertension)    GERD (gastroesophageal reflux disease)    History of cholecystectomy    Ankle injury  s/p ankle surgery, pt doesn&#x27;t remember which ankle        FAMILY HISTORY:  FH: type 2 diabetes  brother    FH: colon cancer  sister        Allergies    sulfa drugs (Unknown)    Intolerances        ----  REVIEW OF SYSTEMS:  Denies pain, however unable to assess reliably as patient is somewhat less interactive    PHYSICAL EXAM:  GENERAL: patient appears clinically stable, no acute distress, more interactive prior   EYES: sclera clear, no exudates  NECK: supple, soft, no thyromegaly noted  LUNGS: clear to auscultation, no wheezing or rhonchi appreciated  HEART: S1/S2, regular rate and rhythm, murmur noted, no edema to b/l LE  GASTROINTESTINAL: abdomen is soft, nontender, nondistended, normoactive bowel sounds  SKIN: DTI noted on Sacrum and R scapula - asked nursing assistant to lay patient on side  EXTREMITIES: contracted LLE and LUE, L foot open to air. no edema, cyanosis, or calf tenderness.  NEUROLOGIC: awake, alert, oriented x2, less interactive  HEME/LYMPH:  no obvious ecchymosis , lympadenopathy    T(C): 36.9 (10-30-20 @ 05:09), Max: 37.6 (10-29-20 @ 15:16)  HR: 64 (10-30-20 @ 06:04) (61 - 71)  BP: 132/57 (10-30-20 @ 05:09) (121/53 - 136/61)  RR: 17 (10-30-20 @ 05:09) (17 - 17)  SpO2: 93% (10-30-20 @ 05:09) (93% - 93%)  Wt(kg): --    ----  I&O's Summary    29 Oct 2020 07:01  -  30 Oct 2020 07:00  --------------------------------------------------------  IN: 600 mL / OUT: 550 mL / NET: 50 mL        LABS:                        8.7    9.61  )-----------( 198      ( 30 Oct 2020 08:57 )             27.4     10-30    144  |  112<H>  |  34<H>  ----------------------------<  153<H>  4.1   |  26  |  1.00    Ca    9.3      30 Oct 2020 08:57          CAPILLARY BLOOD GLUCOSE      POCT Blood Glucose.: 198 mg/dL (30 Oct 2020 12:07)  POCT Blood Glucose.: 160 mg/dL (30 Oct 2020 08:09)  POCT Blood Glucose.: 212 mg/dL (29 Oct 2020 22:40)  POCT Blood Glucose.: 270 mg/dL (29 Oct 2020 19:07)  POCT Blood Glucose.: 179 mg/dL (29 Oct 2020 16:51)      10-28 @ 21:04   No growth  --  --  10-26 @ 15:17   Culture yields >4 types of aerobic and/or anaerobic bacteria  Call client services within 7 days if further workup is clinically  indicated. Culture includes  Numerous Pseudomonas aeruginosa  --  Pseudomonas aeruginosa            ----  Personally reviewed:  Vital sign trends: [ x ] yes    [  ] no     [  ] n/a  Laboratory results: [ x ] yes    [  ] no     [  ] n/a  Radiology results: [x  ] yes    [  ] no     [  ] n/a  Culture results: [x  ] yes    [  ] no     [  ] n/a  Consultant recommendations: [x  ] yes    [  ] no     [  ] n/a

## 2020-10-30 NOTE — PROGRESS NOTE ADULT - ASSESSMENT
85 year old female with PMH of HTN, HLD, DM2 on home insulin, GERD, Dementia, sent by ER from [facility] care [facility] for evaluation for concern of left metatarsal of foot. Cardiology consultation called for surgical clearance     -ECG NSR @ 60  -TTE Technically difficult study nL LV & RV size and function no significant valvular dysfunction EF 65%    HTN  - Controlled - BP: 132/57 (10-30-20 @ 05:09) (121/53 - 136/61)  - Continue amlodipine 10, carvedilol 6.25, losartan 100  - Monitor routine hemodynamics.     HLD  - Continue Lipitor 20 mg PO QHS      Osteo  - S/p 1st metatarsal head resection w/o cardiac complications  - Care per ortho/primary  - BP well controlled, monitor routine hemodynamic     - Monitor and replete lytes, keep K>4, Mg>2.  - All other medical needs as per primary team.  - Other cardiovascular workup will depend on clinical course.  - Will continue to follow.    Alana Merrill, MS FNP, AGACNP  Nurse Practitioner- Cardiology   Spectra #[phone]

## 2020-10-31 LAB
-  AMPICILLIN/SULBACTAM: SIGNIFICANT CHANGE UP
-  CEFAZOLIN: SIGNIFICANT CHANGE UP
-  CLINDAMYCIN: SIGNIFICANT CHANGE UP
-  ERYTHROMYCIN: SIGNIFICANT CHANGE UP
-  GENTAMICIN: SIGNIFICANT CHANGE UP
-  OXACILLIN: SIGNIFICANT CHANGE UP
-  PENICILLIN: SIGNIFICANT CHANGE UP
-  RIFAMPIN: SIGNIFICANT CHANGE UP
-  TETRACYCLINE: SIGNIFICANT CHANGE UP
-  TRIMETHOPRIM/SULFAMETHOXAZOLE: SIGNIFICANT CHANGE UP
-  VANCOMYCIN: SIGNIFICANT CHANGE UP
ANION GAP SERPL CALC-SCNC: 9 MMOL/L — SIGNIFICANT CHANGE UP (ref 5–17)
BUN SERPL-MCNC: 37 MG/DL — HIGH (ref 7–23)
CALCIUM SERPL-MCNC: 8.8 MG/DL — SIGNIFICANT CHANGE UP (ref 8.5–10.1)
CHLORIDE SERPL-SCNC: 111 MMOL/L — HIGH (ref 96–108)
CO2 SERPL-SCNC: 24 MMOL/L — SIGNIFICANT CHANGE UP (ref 22–31)
CREAT SERPL-MCNC: 0.95 MG/DL — SIGNIFICANT CHANGE UP (ref 0.5–1.3)
CULTURE RESULTS: SIGNIFICANT CHANGE UP
GLUCOSE SERPL-MCNC: 166 MG/DL — HIGH (ref 70–99)
HCT VFR BLD CALC: 26.4 % — LOW (ref 34.5–45)
HGB BLD-MCNC: 8.6 G/DL — LOW (ref 11.5–15.5)
MCHC RBC-ENTMCNC: 27.1 PG — SIGNIFICANT CHANGE UP (ref 27–34)
MCHC RBC-ENTMCNC: 32.6 GM/DL — SIGNIFICANT CHANGE UP (ref 32–36)
MCV RBC AUTO: 83.3 FL — SIGNIFICANT CHANGE UP (ref 80–100)
METHOD TYPE: SIGNIFICANT CHANGE UP
NRBC # BLD: 0 /100 WBCS — SIGNIFICANT CHANGE UP (ref 0–0)
ORGANISM # SPEC MICROSCOPIC CNT: SIGNIFICANT CHANGE UP
ORGANISM # SPEC MICROSCOPIC CNT: SIGNIFICANT CHANGE UP
PLATELET # BLD AUTO: 201 K/UL — SIGNIFICANT CHANGE UP (ref 150–400)
POTASSIUM SERPL-MCNC: 4.1 MMOL/L — SIGNIFICANT CHANGE UP (ref 3.5–5.3)
POTASSIUM SERPL-SCNC: 4.1 MMOL/L — SIGNIFICANT CHANGE UP (ref 3.5–5.3)
RBC # BLD: 3.17 M/UL — LOW (ref 3.8–5.2)
RBC # FLD: 15.1 % — HIGH (ref 10.3–14.5)
SODIUM SERPL-SCNC: 144 MMOL/L — SIGNIFICANT CHANGE UP (ref 135–145)
SPECIMEN SOURCE: SIGNIFICANT CHANGE UP
WBC # BLD: 7.22 K/UL — SIGNIFICANT CHANGE UP (ref 3.8–10.5)
WBC # FLD AUTO: 7.22 K/UL — SIGNIFICANT CHANGE UP (ref 3.8–10.5)

## 2020-10-31 PROCEDURE — 99232 SBSQ HOSP IP/OBS MODERATE 35: CPT

## 2020-10-31 PROCEDURE — 99024 POSTOP FOLLOW-UP VISIT: CPT

## 2020-10-31 PROCEDURE — 99233 SBSQ HOSP IP/OBS HIGH 50: CPT | Mod: GC

## 2020-10-31 RX ORDER — MOXIFLOXACIN HYDROCHLORIDE TABLETS, 400 MG 400 MG/1
1 TABLET, FILM COATED ORAL
Qty: 84 | Refills: 0
Start: 2020-10-31 | End: 2020-12-11

## 2020-10-31 RX ADMIN — SENNA PLUS 2 TABLET(S): 8.6 TABLET ORAL at 21:17

## 2020-10-31 RX ADMIN — Medication 500 MILLIGRAM(S): at 17:24

## 2020-10-31 RX ADMIN — Medication 20 MILLIGRAM(S): at 11:36

## 2020-10-31 RX ADMIN — Medication 81 MILLIGRAM(S): at 11:36

## 2020-10-31 RX ADMIN — CEFEPIME 100 MILLIGRAM(S): 1 INJECTION, POWDER, FOR SOLUTION INTRAMUSCULAR; INTRAVENOUS at 05:17

## 2020-10-31 RX ADMIN — LOSARTAN POTASSIUM 100 MILLIGRAM(S): 100 TABLET, FILM COATED ORAL at 05:18

## 2020-10-31 RX ADMIN — CEFEPIME 100 MILLIGRAM(S): 1 INJECTION, POWDER, FOR SOLUTION INTRAMUSCULAR; INTRAVENOUS at 18:31

## 2020-10-31 RX ADMIN — Medication 1 TABLET(S): at 11:36

## 2020-10-31 RX ADMIN — Medication 500 MILLIGRAM(S): at 05:19

## 2020-10-31 RX ADMIN — CARVEDILOL PHOSPHATE 6.25 MILLIGRAM(S): 80 CAPSULE, EXTENDED RELEASE ORAL at 05:17

## 2020-10-31 RX ADMIN — Medication 1: at 21:16

## 2020-10-31 RX ADMIN — AMLODIPINE BESYLATE 10 MILLIGRAM(S): 2.5 TABLET ORAL at 05:17

## 2020-10-31 RX ADMIN — DONEPEZIL HYDROCHLORIDE 5 MILLIGRAM(S): 10 TABLET, FILM COATED ORAL at 21:17

## 2020-10-31 RX ADMIN — Medication 1: at 17:30

## 2020-10-31 RX ADMIN — HEPARIN SODIUM 5000 UNIT(S): 5000 INJECTION INTRAVENOUS; SUBCUTANEOUS at 05:18

## 2020-10-31 RX ADMIN — HEPARIN SODIUM 5000 UNIT(S): 5000 INJECTION INTRAVENOUS; SUBCUTANEOUS at 17:24

## 2020-10-31 RX ADMIN — ATORVASTATIN CALCIUM 20 MILLIGRAM(S): 80 TABLET, FILM COATED ORAL at 21:17

## 2020-10-31 RX ADMIN — Medication 1: at 08:39

## 2020-10-31 RX ADMIN — CARVEDILOL PHOSPHATE 6.25 MILLIGRAM(S): 80 CAPSULE, EXTENDED RELEASE ORAL at 17:24

## 2020-10-31 NOTE — PROGRESS NOTE ADULT - SUBJECTIVE AND OBJECTIVE BOX
88y year old Female seen at [facility] 3WES 362 W1 for s/p Left 1st metatarsal head resection secondary to osteomyelitis with Dr. Mullins, DOS 10/28/20. Denies any fever, chills, nausea, vomiting, chest pain, shortness of breath, or calf pain at this time.    Allergies    sulfa drugs (Unknown)    Intolerances    REVIEW OF SYSTEMS    PAST MEDICAL & SURGICAL HISTORY:  Dementia    DM (diabetes mellitus)    HLD (hyperlipidemia)    HTN (hypertension)    GERD (gastroesophageal reflux disease)    History of cholecystectomy    Ankle injury        MEDICATIONS  (STANDING):  amLODIPine   Tablet 10 milliGRAM(s) Oral daily  ascorbic acid 500 milliGRAM(s) Oral two times a day  aspirin  chewable 81 milliGRAM(s) Oral daily  atorvastatin 20 milliGRAM(s) Oral at bedtime  carvedilol 6.25 milliGRAM(s) Oral two times a day  cefepime   IVPB 1000 milliGRAM(s) IV Intermittent every 12 hours  dextrose 5%. 1000 milliLiter(s) (50 mL/Hr) IV Continuous <Continuous>  dextrose 50% Injectable 25 Gram(s) IV Push once  dextrose 50% Injectable 25 Gram(s) IV Push once  dextrose 50% Injectable 12.5 Gram(s) IV Push once  donepezil 5 milliGRAM(s) Oral at bedtime  heparin   Injectable 5000 Unit(s) SubCutaneous every 12 hours  insulin lispro (ADMELOG) corrective regimen sliding scale   SubCutaneous three times a day before meals  insulin lispro (ADMELOG) corrective regimen sliding scale   SubCutaneous at bedtime  losartan 100 milliGRAM(s) Oral daily  multivitamin/minerals 1 Tablet(s) Oral daily  PARoxetine 20 milliGRAM(s) Oral daily  senna 2 Tablet(s) Oral at bedtime    MEDICATIONS  (PRN):  acetaminophen   Tablet .. 650 milliGRAM(s) Oral every 6 hours PRN Mild Pain (1 - 3)  dextrose 40% Gel 15 Gram(s) Oral once PRN Blood Glucose LESS THAN 70 milliGRAM(s)/deciliter  glucagon  Injectable 1 milliGRAM(s) IntraMuscular once PRN Glucose LESS THAN 70 milligrams/deciliter  glucagon  Injectable 1 milliGRAM(s) IntraMuscular once PRN Glucose LESS THAN 70 milligrams/deciliter  polyethylene glycol 3350 17 Gram(s) Oral daily PRN Constipation      Vital Signs Last 24 Hrs  T(C): 36.9 (31 Oct 2020 05:05), Max: 37.2 (30 Oct 2020 20:01)  T(F): 98.5 (31 Oct 2020 05:05), Max: 99 (30 Oct 2020 20:01)  HR: 72 (31 Oct 2020 05:05) (61 - 72)  BP: 127/66 (31 Oct 2020 05:05) (110/60 - 130/58)  BP(mean): --  RR: 17 (31 Oct 2020 05:05) (16 - 17)  SpO2: 98% (31 Oct 2020 05:05) (92% - 99%)    PHYSICAL EXAM:  Vascular: DP & PT nonpalpable bilaterally, Capillary refill 3 seconds, Minimal edema along the left 1st metatarsal   Neurological: Light touch sensation intact bilaterally  Musculoskeletal: Severely contracted left lower extremity, s/p Left 1st metatarsal head resection, left hallux in good alignment and rectus position  Dermatological: -  1. s/p Left 1st metatarsal head resection- sutures intact, skin well approximated, dried sanguineous drainage, minimal edema, no erythema, no mal odor( dorsal incision)  2. Grade 3- 0.3cmx0.3x0.3 cm along the medial aspect of the left 1st metatarsal- sanguineous drainage       CBC Full  -  ( 30 Oct 2020 08:57 )  WBC Count : 9.61 K/uL  RBC Count : 3.23 M/uL  Hemoglobin : 8.7 g/dL  Hematocrit : 27.4 %  Platelet Count - Automated : 198 K/uL  Mean Cell Volume : 84.8 fl  Mean Cell Hemoglobin : 26.9 pg  Mean Cell Hemoglobin Concentration : 31.8 gm/dL  Auto Neutrophil # : x  Auto Lymphocyte # : x  Auto Monocyte # : x  Auto Eosinophil # : x  Auto Basophil # : x  Auto Neutrophil % : x  Auto Lymphocyte % : x  Auto Monocyte % : x  Auto Eosinophil % : x  Auto Basophil % : x      ----------CHEM PANEL----------    10-30    144  |  112<H>  |  34<H>  ----------------------------<  153<H>  4.1   |  26  |  1.00    Ca    9.3      30 Oct 2020 08:57          Culture - Tissue with Gram Stain (collected 28 Oct 2020 21:04)  Source: .Tissue Other, ist metatarsal head left  Gram Stain (28 Oct 2020 23:46):    No polymorphonuclear cells seen per low power field    No organisms seen  Preliminary Report (29 Oct 2020 17:02):    No growth        Imaging: ----------  Left Foot Xray:  Post surgical changes- s/p Left 1st metatarsal head resection     ACCESSION No:  30 [id number]    NELLIE NICOLE              2        Surgical Final Report          Final Diagnosis  1. Bone, 1st metatarsal head, left:  Mild chronic osteomyelitis.    2. Bone, 1 st metatarsal  proximal margin, left:  Mild chronic osteomyelitis.    3. Bone, sesamoid, left:  Mild chronic osteomyelitis of bone.    4. Bone, base, proximal phalanx, left:  Mild chronic osteomyelitis.    Verified by: Mere Medina M.D.  (Electronic Signature)  Reported on: 10/30/20 11:54 EDT, [facility], [address] [place] [address]  Phone: [phone]   Fax: [phone]  _________________________________________________________________    Clinical History  Osteomyelitis first metatarsal head  Procedure: Resection of 1st metatarsal head left foot    Specimen(s) Submitted  1-Left 1st metatarsal head  2-Left 1st metatarsal clean proximal margin  3-Left sesamoid bone  4-Left base proximal phalanx    Gross Description  1. The specimen is received in formalin and the specimen  container is labeled: Left 1st metatarsal head. It consists of a  piece of tan-yellow bony tissue admixed with pink-tan soft tissue  measuring 2.2 x 2.0 x 1.2 cm. The bone cut surface is yellow and  spongy. Entirely submitted following selective decalcification.  Five cassettes.  A-D - bony tissue  E - soft tissue    2. The specimen is received in formalin and the specimen  container is labeled: Left 1st metatarsal clean proximalmargin.  It consists of an ovoid piece of tan-yellow bony tissue with a  smooth clean surgical cut measuring 1.7 x 1.0 x 0.3 cm. Entirely  submitted following decalcification.    3. The specimen is received in formalin and the specimen  container is labeled:            NELLIE NICOLE              2        Surgical Final Report          Left sesamoid bone. It consists of a piece of tan-yellow bony  tissue measuring 3.0 x 1.7 x 1.0 cm. The bone cut surface is  yellow and spongy. Entirely submitted following decalcification.  Three cassettes.    4. The specimen is received in formalin and the specimen  container is labeled: Left base proximal phalanx. It consists of  a piece of tan-yellow bony tissue with attached white-tan soft  tissue measuring 1.2 x 1.0 x 0.3 cm. The bone cut surface is  yellow and spongy. Entirely submitted following decalcification.  One cassette.    In addition to other data that may appear on the specimen  containers, all labels have been inspected to confirm the  presence of the patient's name and date of birth.  ALEXEI Dacosta ([facility]) 10/30/2020 10/30/2020 7:59 AM    Perioperative Diagnosis  Osteomyelitis left 1st metatarsal head    Postoperative Diagnosis  Same    Disclaimer  Histological processing and microscopic evaluation performed at  [facility], [address] [place] [address]  [id number].      Surgical Consult Report

## 2020-10-31 NOTE — PROGRESS NOTE ADULT - ASSESSMENT
Med rec complete per pt at bedside  Allergies have been verified and updated  Pt Home Pharmacy:[facility]    Pt reports that she started a 7 day course of CIPRO on 03-           85 year old female with PMH of HTN, HLD, DM2 on home insulin, GERD, Dementia, sent by ER from wound care [facility] for evaluation for concern of left metatarsal of foot. Cardiology consultation called for surgical clearance     -ECG NSR @ 60  -TTE Technically difficult study nL LV & RV size and function no significant valvular dysfunction EF 65%    HTN  - Controlled - BP: 127/66 (31 Oct 2020 05:05) (110/60 - 130/58)  - Continue amlodipine 10, carvedilol 6.25, losartan 100  - Monitor routine hemodynamics.     HLD  - Continue Lipitor 20 mg PO QHS      Osteo  - S/p 1st metatarsal head resection w/o cardiac complications  - Care per ortho/primary  - BP well controlled, monitor routine hemodynamic     - Monitor and replete lytes, keep K>4, Mg>2.  - All other medical needs as per primary team.  - Other cardiovascular workup will depend on clinical course.  - Will continue to follow.    Krysten Hernandez, MS ANP, AGACNP  Nurse Practitioner- Cardiology   Spectra #[phone] 85 year old female with PMH of HTN, HLD, DM2 on home insulin, GERD, Dementia, sent by ER from wound care [facility] for evaluation for concern of left metatarsal of foot. Cardiology consultation called for surgical clearance     -ECG NSR @ 60  -TTE Technically difficult study nL LV & RV size and function no significant valvular dysfunction EF 65%    HTN  - Controlled - BP: 127/66 (31 Oct 2020 05:05) (110/60 - 130/58)  - Continue amlodipine 10, carvedilol 6.25, losartan 100  - Monitor routine hemodynamics.     HLD  - Continue Lipitor 20 mg PO QHS      Osteo  - S/p 1st metatarsal head resection w/o cardiac complications  - Care per ortho/primary  - BP well controlled, monitor routine hemodynamic     - Monitor and replete lytes, keep K>4, Mg>2.  - All other medical needs as per primary team.  - Other cardiovascular workup will depend on clinical course.  - Will continue to follow.  - D/C planning as per primary team and CM    Krysten Hernandez, MS ANP, AGACNP  Nurse Practitioner- Cardiology   Spectra #[phone]

## 2020-10-31 NOTE — PROGRESS NOTE ADULT - SUBJECTIVE AND OBJECTIVE BOX
Patient is a 88y old  Female who presents with a chief complaint of Osteomyletis (31 Oct 2020 11:34)      FROM ADMISSION H+P:   HPI:  84 yo F with PMH of HTN, HLD, Diabetes Mellitus Type 2 not on home insulin, GERD, Dementia, sent by ER from wound care [facility] for evaluation for concern of left metatarsal of foot.  As per clinical hx The patient has been getting routine dressing changes but her wound has worsen to the point to probe to bone.     On Patient interview: patient Denies any fever, chills, nausea, vomiting, chest pain, shortness of breath, or calf pain at this time.  ER Course:  vitals stable afebrile,   labs: Hgb 11,CR 1 (increased from ,75)   Imaging CXR: small left effusion + vascular congestion   X ray bilateral foot: negative  Pt received zosyn 3.375 x1, vanc IV x1,    Podiatry called to evaluate; Patient is now pending MRI of foot (24 Oct 2020 14:04)      ----  INTERVAL HPI/OVERNIGHT EVENTS: Pt seen and evaluated at the bedside. No acute overnight events occurred. s/p metatarsal resection. Patient appears clinically stable this AM, more alert than yesterday, Following commands and able to communicate, baseline severe dementia. On Cefepime. Denies ongoing pain, however unable to assess ROS reliably.   ----  PAST MEDICAL & SURGICAL HISTORY:  Dementia    DM (diabetes mellitus)    HLD (hyperlipidemia)    HTN (hypertension)    GERD (gastroesophageal reflux disease)    History of cholecystectomy    Ankle injury  s/p ankle surgery, pt doesn&#x27;t remember which ankle        FAMILY HISTORY:  FH: type 2 diabetes  brother    FH: colon cancer  sister        Allergies    sulfa drugs (Unknown)    Intolerances        ----  REVIEW OF SYSTEMS:  Denies pain, however unable to assess reliably as patient is somewhat less interactive    PHYSICAL EXAM:  GENERAL: patient appears clinically stable, no acute distress, more interactive today  EYES: sclera clear, no exudates  NECK: supple, soft, no thyromegaly noted  LUNGS: clear to auscultation, no wheezing or rhonchi appreciated  HEART: S1/S2, regular rate and rhythm, murmur noted, no edema to b/l LE  GASTROINTESTINAL: abdomen is soft, nontender, nondistended, normoactive bowel sounds  SKIN: DTI noted on Sacrum and R scapula  EXTREMITIES: contracted LLE and LUE, L foot dry clean dressing. no edema, cyanosis, or calf tenderness.  NEUROLOGIC: awake, alert, oriented x2, less interactive  HEME/LYMPH: no obvious ecchymosis, lympadenopathy    T(C): 36.9 (10-31-20 @ 05:05), Max: 37.2 (10-30-20 @ 20:01)  HR: 72 (10-31-20 @ 05:05) (61 - 72)  BP: 127/66 (10-31-20 @ 05:05) (110/60 - 130/58)  RR: 17 (10-31-20 @ 05:05) (16 - 17)  SpO2: 98% (10-31-20 @ 05:05) (92% - 99%)  Wt(kg): --    ----  I&O's Summary    30 Oct 2020 07:01  -  31 Oct 2020 07:00  --------------------------------------------------------  IN: 50 mL / OUT: 1250 mL / NET: -1200 mL    31 Oct 2020 08:01  -  31 Oct 2020 12:43  --------------------------------------------------------  IN: 360 mL / OUT: 0 mL / NET: 360 mL        LABS:                        8.6    7.22  )-----------( 201      ( 31 Oct 2020 09:35 )             26.4     10-31    144  |  111<H>  |  37<H>  ----------------------------<  166<H>  4.1   |  24  |  0.95    Ca    8.8      31 Oct 2020 09:35          CAPILLARY BLOOD GLUCOSE      POCT Blood Glucose.: 143 mg/dL (31 Oct 2020 12:10)  POCT Blood Glucose.: 166 mg/dL (31 Oct 2020 08:00)  POCT Blood Glucose.: 186 mg/dL (30 Oct 2020 21:06)  POCT Blood Glucose.: 119 mg/dL (30 Oct 2020 17:10)      10-28 @ 21:04   No growth  --  --            ----  Personally reviewed:  Vital sign trends: [x  ] yes    [  ] no     [  ] n/a  Laboratory results: [x  ] yes    [  ] no     [  ] n/a  Radiology results: [ x ] yes    [  ] no     [  ] n/a  Culture results: [ x ] yes    [  ] no     [  ] n/a  Consultant recommendations: [x  ] yes    [  ] no     [  ] n/a

## 2020-10-31 NOTE — PROGRESS NOTE ADULT - ASSESSMENT
86 yo F with PMH of HTN, HLD, Diabetes Mellitus Type 2 not on home insulin, GERD, Dementia, sent by ER from wound care [facility] for evaluation, c/w osteomyelitis of left metatarsal of foot, pending OR for resection 10/28      Diabetic foot ulcer, underlying osteomyelitis  s/p L metatarsal resection  Now on cefepime 2gm q12h renally dosed, per ID and pharm  DC Abx plan per ID: "If plan for discharge to a facility that can get IV, prefer Cefepime 2gm q12 otherwise will switch to oral ciprofloxacin 500mg q12 to complete 6 weeks from the day of start of cefepime. While on Cipro needs cQT monitoring. Also will need to check for possible c diff if diarrhea."  Proximal margin of amputation showed OM so will need treatment for 6 weeks.   Tissue Cx 10/26 L med 1st met: pansensitive Pseudomonas, few G+ cocci pairs, G+ jenny rods, PMLs;   Tissue Cx from OR 10/28 No organisms  Xray foot w/o pathology  F/u Blood cx x2 10/24 NGTD  Good vascular flow per L leg ABIs  MRI foot - unable to obtain 2/2 contracture  ID Consulted  Palliative Case Consulted  Podiatry, Dr. Mullins following    Deep Tissue Injury  1) sacral 2) L scapular DTIs, Stage 1 pressure ulcer  3) Skin tear R thigh 2/2 LLE contracture with erythema and edema on medical aspect of L foot  D/w nursing team 10/31 who noted improvement  Wound care RN consulted - DC'd by wound care RN as no longer warranted  Turn in bed, reposition q 4  keep towel between abrasion and LLE     Pulmonary vascular congestion and left pleural effusion r/o occult CHF  CXR showed L infiltrate w/ effusion  Patient appears euvolemic on exam  Trops neg, BMP 1100  TTE EF65% mild valvular disease    DM2, noninsulin dependent.  hold home metformin  start low dose ISS, fingersticks,     Dementia  baseline bedbound, at baseline knows her name, can identify her family, forgets their names, eats well (regular solid food)  cont donepezil   cont paroxetine.    Iron deficiency anemia  Hb stable  hold home iron    HLD  atorvastatin interchange for lovastatin.    HTN  cont carvedilol, amlodipine, and losartan with hold parameters.    GERD  Patient on omeprazole: will use protonix in house    Depression  c/w paroxetine    PPx  heparin subq, resumed post op  bowel regimen    DISPO  DNR DNI, PC consulted  PT consulted: Home w 24 hr care  SW consulted: discussed w/ SW 10/31 re transportation to home, will need to plan for DC on Monday for HHA, f/u COVID test sunday  Wound care clinic f/u within 1 week

## 2020-10-31 NOTE — PROGRESS NOTE ADULT - SUBJECTIVE AND OBJECTIVE BOX
[facility] -- Sarahi Meraz, Norman Barnard Pannella, Patel, Savella  Office # [phone]      Follow Up:  Follow Up: Cardiac optimization pre/post op      Subjective/Observations: Seen and examined.  Lying flat resting comfortably, grimaces, lethargic.  Not able to obtain meaningful history is pt's baseline.  No events overnight as d/w RN.  NAD.        REVIEW OF SYSTEMS: All other review of systems is negative unless indicated above    PAST MEDICAL & SURGICAL HISTORY:  Dementia    DM (diabetes mellitus)    HLD (hyperlipidemia)    HTN (hypertension)    GERD (gastroesophageal reflux disease)    History of cholecystectomy    Ankle injury  s/p ankle surgery, pt doesn&#x27;t remember which ankle        MEDICATIONS  (STANDING):  amLODIPine   Tablet 10 milliGRAM(s) Oral daily  ascorbic acid 500 milliGRAM(s) Oral two times a day  aspirin  chewable 81 milliGRAM(s) Oral daily  atorvastatin 20 milliGRAM(s) Oral at bedtime  carvedilol 6.25 milliGRAM(s) Oral two times a day  cefepime   IVPB 1000 milliGRAM(s) IV Intermittent every 12 hours  dextrose 5%. 1000 milliLiter(s) (50 mL/Hr) IV Continuous <Continuous>  dextrose 50% Injectable 25 Gram(s) IV Push once  dextrose 50% Injectable 25 Gram(s) IV Push once  dextrose 50% Injectable 12.5 Gram(s) IV Push once  donepezil 5 milliGRAM(s) Oral at bedtime  heparin   Injectable 5000 Unit(s) SubCutaneous every 12 hours  insulin lispro (ADMELOG) corrective regimen sliding scale   SubCutaneous three times a day before meals  insulin lispro (ADMELOG) corrective regimen sliding scale   SubCutaneous at bedtime  losartan 100 milliGRAM(s) Oral daily  multivitamin/minerals 1 Tablet(s) Oral daily  PARoxetine 20 milliGRAM(s) Oral daily  senna 2 Tablet(s) Oral at bedtime    MEDICATIONS  (PRN):  acetaminophen   Tablet .. 650 milliGRAM(s) Oral every 6 hours PRN Mild Pain (1 - 3)  dextrose 40% Gel 15 Gram(s) Oral once PRN Blood Glucose LESS THAN 70 milliGRAM(s)/deciliter  glucagon  Injectable 1 milliGRAM(s) IntraMuscular once PRN Glucose LESS THAN 70 milligrams/deciliter  glucagon  Injectable 1 milliGRAM(s) IntraMuscular once PRN Glucose LESS THAN 70 milligrams/deciliter  polyethylene glycol 3350 17 Gram(s) Oral daily PRN Constipation      Allergies    sulfa drugs (Unknown)    Intolerances            Vital Signs Last 24 Hrs  T(C): 36.9 (31 Oct 2020 05:05), Max: 37.2 (30 Oct 2020 20:01)  T(F): 98.5 (31 Oct 2020 05:05), Max: 99 (30 Oct 2020 20:01)  HR: 72 (31 Oct 2020 05:05) (61 - 72)  BP: 127/66 (31 Oct 2020 05:05) (110/60 - 130/58)  BP(mean): --  RR: 17 (31 Oct 2020 05:05) (16 - 17)  SpO2: 98% (31 Oct 2020 05:05) (92% - 99%)    I&O's Summary    30 Oct 2020 07:01  -  31 Oct 2020 07:00  --------------------------------------------------------  IN: 50 mL / OUT: 1250 mL / NET: -1200 mL          PHYSICAL EXAM:  TELE: Not on tele  Constitutional: NAD, grimaces, lethargic, well-developed, frail  HEENT: Moist Mucous Membranes, Anicteric  Pulmonary: Non-labored, breath sounds are clear anteriorly, No wheezing, rales or rhonchi  Cardiovascular: Regular, S1 and S2, No murmurs, rubs, gallops or clicks  Gastrointestinal: Bowel Sounds present, soft, nontender.   Lymph: No peripheral edema. No lymphadenopathy.  Skin: No visible rashes or ulcers.  Left leg contracted with foot dressing intact  Psych:  Mood & affect at baseline    LABS: All Labs Reviewed:                        8.7    9.61  )-----------( 198      ( 30 Oct 2020 08:57 )             27.4                         9.0    11.96 )-----------( 202      ( 29 Oct 2020 08:55 )             27.2                         9.9    6.31  )-----------( 213      ( 28 Oct 2020 09:13 )             30.6     30 Oct 2020 08:57    144    |  112    |  34     ----------------------------<  153    4.1     |  26     |  1.00   29 Oct 2020 08:55    144    |  111    |  32     ----------------------------<  154    3.4     |  26     |  1.00   28 Oct 2020 09:08    144    |  113    |  29     ----------------------------<  118    3.7     |  25     |  1.00     Ca    9.3        30 Oct 2020 08:57  Ca    8.8        29 Oct 2020 08:55  Ca    8.5        28 Oct 2020 09:08    c< from: 12 Lead ECG (10.24.20 @ 11:41) >  Ventricular Rate 60 BPM    Atrial Rate 60 BPM    P-R Interval 172 ms    QRS Duration 78 ms    Q-T Interval 432 ms    QTC Calculation(Bazett) 432 ms    P Axis 63 degrees    R Axis 35 degrees    T Axis 48 degrees    Diagnosis Line Poor data quality  Normal sinus rhythm  Normal ECG  When compared with ECG of 20-SEP-2018 13:45,  No significant change was found    Confirmed by Ralph Austin MD ([id number]) on 10/25/2020 11:39:49 AM    < end of copied text >    < from: TTE Echo Complete w/o Contrast w/ Doppler (10.25.20 @ 11:18) >   EXAM:  ECHO TTE WO CON COMP W DOPP         PROCEDURE DATE:  10/25/2020        INTERPRETATION:  INDICATION: Dyspnea  Sonographer AS    Blood Pressure 156/62    Height 165 cm     Weight 45.4 kg       BSA 1.4 sq cm    Dimensions:  LA 3.4       NormalValues: 2.0 - 4.0 cm  Ao 2.9        Normal Values: 2.0 - 3.8 cm  SEPTUM 1.2       Normal Values: 0.6 - 1.2 cm  PWT 1.1       Normal Values: 0.6 - 1.1 cm  LVIDd 3.1         Normal Values: 3.0 - 5.6 cm  LVIDs 1.7         Normal Values: 1.8 - 4.0 cm      OBSERVATIONS:  Technically difficult study, patient uncooperative  Mitral Valve: Thickened leaflets, mild MR.  Aortic Valve/Aorta: Calcified trileaflet aortic valve with grossly normal opening. Trace AI  Tricuspid Valve: Mild TR.  Pulmonic Valve: Not well-visualized  Left Atrium: normal  Right Atrium: Not well-visualized  Left Ventricle: normal LV size and systolic function, estimated LVEF of 65%.  Right Ventricle: Grossly normal size and systolic function.  Pericardium/Pleura: normal, no significant pericardial effusion.  Pulmonary/RV Pressure: estimated PA systolic pressure of 37 mmHg assuming an RA pressure of 3 mmHg.      Conclusion:  Technically difficult study  Normal left ventricular internal dimensions and systolic function, estimated LVEF of 65%.  Grossly normal RV size and systolic function.  Calcified trileaflet aortic valve with grossly normal opening. Trace AI  Mild MR and TR.  No significant pericardial effusion.              WILEY BROWN MD; Attending Cardiologist  This document has been electronically signed. Oct 26 2020  8:10AM    < end of copied text >    < from: Xray Foot AP + Lateral, Left (10.28.20 @ 13:00) >  EXAM:  FOOT 2VIEWS LT                            PROCEDURE DATE:  10/28/2020          INTERPRETATION:  Left foot    HISTORY: First metatarsal resection    COMPARISON: 8/29/2020     Three views of the left foot show interval resection of the head ofthe first metatarsal. Surgical hardware is again noted in the distal tibia and fibula.    IMPRESSION: Postoperative changes.      Thank you for this referral.            TEMO GUTIERREZ MD; Attending Interventional Radiologist  This document has been electronically signed. Oct 28 2020  2:02PM    < end of copied text >  < from: [facility] Physiol Extremity Lower 3+ Level, LT (10.26.20 @ 13:45) >  EXAM:  PHYSIOL EXTREM LOW 3+ LEV LT                            PROCEDURE DATE:  10/26/2020          INTERPRETATION:  History:80-year-old female with diabetes and left foot ulcer.    Technique: Left lower extremity BRIE/PVR    Comparison: None    Findings:        LEFT  BRIE: 1.4  Flow study: Good flow from the high thigh through the great toe. Pulsatile waveforms      Impression:    Findings compatible calcified lower extremity arteries.                    DIONE AMBROSE MD; Attending Interventional Radiologist  This document has been electronically signed. Oct 26 2020  1:58PM    < end of copied text >

## 2020-10-31 NOTE — PROGRESS NOTE ADULT - ASSESSMENT
S/p Left 1st metatarsal head resection secondary to osteomyelitis, DOS 10/28/20- Dorsal incision dressed with xeroform and DSD and the medial aspect dressed with Silver Alginate, Surgicel Nu-Knit applied to the area to prevent any sanguineous strike through. The left hallux also splinted in the correct rectus position. Pathology report is listed above . Wound Care Instructions listed below

## 2020-11-01 LAB
ANION GAP SERPL CALC-SCNC: 8 MMOL/L — SIGNIFICANT CHANGE UP (ref 5–17)
BUN SERPL-MCNC: 47 MG/DL — HIGH (ref 7–23)
CALCIUM SERPL-MCNC: 9.2 MG/DL — SIGNIFICANT CHANGE UP (ref 8.5–10.1)
CHLORIDE SERPL-SCNC: 112 MMOL/L — HIGH (ref 96–108)
CO2 SERPL-SCNC: 23 MMOL/L — SIGNIFICANT CHANGE UP (ref 22–31)
CREAT SERPL-MCNC: 1 MG/DL — SIGNIFICANT CHANGE UP (ref 0.5–1.3)
GLUCOSE SERPL-MCNC: 136 MG/DL — HIGH (ref 70–99)
HCT VFR BLD CALC: 26.3 % — LOW (ref 34.5–45)
HGB BLD-MCNC: 8.6 G/DL — LOW (ref 11.5–15.5)
MCHC RBC-ENTMCNC: 27.3 PG — SIGNIFICANT CHANGE UP (ref 27–34)
MCHC RBC-ENTMCNC: 32.7 GM/DL — SIGNIFICANT CHANGE UP (ref 32–36)
MCV RBC AUTO: 83.5 FL — SIGNIFICANT CHANGE UP (ref 80–100)
NRBC # BLD: 0 /100 WBCS — SIGNIFICANT CHANGE UP (ref 0–0)
PLATELET # BLD AUTO: 240 K/UL — SIGNIFICANT CHANGE UP (ref 150–400)
POTASSIUM SERPL-MCNC: 4.6 MMOL/L — SIGNIFICANT CHANGE UP (ref 3.5–5.3)
POTASSIUM SERPL-SCNC: 4.6 MMOL/L — SIGNIFICANT CHANGE UP (ref 3.5–5.3)
RBC # BLD: 3.15 M/UL — LOW (ref 3.8–5.2)
RBC # FLD: 15.1 % — HIGH (ref 10.3–14.5)
SARS-COV-2 RNA SPEC QL NAA+PROBE: SIGNIFICANT CHANGE UP
SODIUM SERPL-SCNC: 143 MMOL/L — SIGNIFICANT CHANGE UP (ref 135–145)
WBC # BLD: 7.24 K/UL — SIGNIFICANT CHANGE UP (ref 3.8–10.5)
WBC # FLD AUTO: 7.24 K/UL — SIGNIFICANT CHANGE UP (ref 3.8–10.5)

## 2020-11-01 PROCEDURE — 99233 SBSQ HOSP IP/OBS HIGH 50: CPT | Mod: GC

## 2020-11-01 PROCEDURE — 99232 SBSQ HOSP IP/OBS MODERATE 35: CPT

## 2020-11-01 RX ADMIN — ATORVASTATIN CALCIUM 20 MILLIGRAM(S): 80 TABLET, FILM COATED ORAL at 21:48

## 2020-11-01 RX ADMIN — Medication 20 MILLIGRAM(S): at 11:41

## 2020-11-01 RX ADMIN — AMLODIPINE BESYLATE 10 MILLIGRAM(S): 2.5 TABLET ORAL at 06:32

## 2020-11-01 RX ADMIN — Medication 2: at 12:36

## 2020-11-01 RX ADMIN — Medication 1 TABLET(S): at 11:41

## 2020-11-01 RX ADMIN — LOSARTAN POTASSIUM 100 MILLIGRAM(S): 100 TABLET, FILM COATED ORAL at 06:32

## 2020-11-01 RX ADMIN — Medication 500 MILLIGRAM(S): at 17:12

## 2020-11-01 RX ADMIN — Medication 81 MILLIGRAM(S): at 11:41

## 2020-11-01 RX ADMIN — Medication 1: at 08:42

## 2020-11-01 RX ADMIN — DONEPEZIL HYDROCHLORIDE 5 MILLIGRAM(S): 10 TABLET, FILM COATED ORAL at 21:48

## 2020-11-01 RX ADMIN — CEFEPIME 100 MILLIGRAM(S): 1 INJECTION, POWDER, FOR SOLUTION INTRAMUSCULAR; INTRAVENOUS at 06:36

## 2020-11-01 RX ADMIN — CARVEDILOL PHOSPHATE 6.25 MILLIGRAM(S): 80 CAPSULE, EXTENDED RELEASE ORAL at 17:12

## 2020-11-01 RX ADMIN — SENNA PLUS 2 TABLET(S): 8.6 TABLET ORAL at 21:48

## 2020-11-01 RX ADMIN — CEFEPIME 100 MILLIGRAM(S): 1 INJECTION, POWDER, FOR SOLUTION INTRAMUSCULAR; INTRAVENOUS at 17:52

## 2020-11-01 RX ADMIN — Medication 500 MILLIGRAM(S): at 06:32

## 2020-11-01 RX ADMIN — CARVEDILOL PHOSPHATE 6.25 MILLIGRAM(S): 80 CAPSULE, EXTENDED RELEASE ORAL at 06:32

## 2020-11-01 RX ADMIN — HEPARIN SODIUM 5000 UNIT(S): 5000 INJECTION INTRAVENOUS; SUBCUTANEOUS at 17:12

## 2020-11-01 NOTE — CHART NOTE - NSCHARTNOTEFT_GEN_A_CORE
Assessment: Pt seen for malnutrition follow-up. As per chart pt is a 88 year old female with a PMH of HTN, HLD, Diabetes Mellitus Type 2 not on home insulin, GERD, Dementia, sent by ER from wound care [facility] for evaluation, c/w osteomyelitis of left metatarsal of foot, S/p OR for resection 10/28 POD 4.    Pt seen at bedside, sleeping at time of visit. Pt continues with good appetite and PO intake, per chart noted to be consuming about 100% of meals in house. No GI distress noted at this time, +BM 10/31.     Factors impacting intake: [ x] none [ ] nausea  [ ] vomiting [ ] diarrhea [ ] constipation  [ ]chewing problems [ ] swallowing issues  [ ] other:     Diet Presciption: Diet, Consistent Carbohydrate/No Snacks (10-28-20 @ 13:48)    Intake: good     Current Weight: no updated weight in chart  Previous Weight; (10/28) 100.5lbs  Admission Weight: 100.1lbs   % Weight Change- recommend to obtain pt's updated body weight     Pertinent Medications: MEDICATIONS  (STANDING):  amLODIPine   Tablet 10 milliGRAM(s) Oral daily  ascorbic acid 500 milliGRAM(s) Oral two times a day  aspirin  chewable 81 milliGRAM(s) Oral daily  atorvastatin 20 milliGRAM(s) Oral at bedtime  carvedilol 6.25 milliGRAM(s) Oral two times a day  cefepime   IVPB 1000 milliGRAM(s) IV Intermittent every 12 hours  dextrose 5%. 1000 milliLiter(s) (50 mL/Hr) IV Continuous <Continuous>  dextrose 50% Injectable 25 Gram(s) IV Push once  dextrose 50% Injectable 25 Gram(s) IV Push once  dextrose 50% Injectable 12.5 Gram(s) IV Push once  donepezil 5 milliGRAM(s) Oral at bedtime  heparin   Injectable 5000 Unit(s) SubCutaneous every 12 hours  insulin lispro (ADMELOG) corrective regimen sliding scale   SubCutaneous three times a day before meals  insulin lispro (ADMELOG) corrective regimen sliding scale   SubCutaneous at bedtime  losartan 100 milliGRAM(s) Oral daily  multivitamin/minerals 1 Tablet(s) Oral daily  PARoxetine 20 milliGRAM(s) Oral daily  senna 2 Tablet(s) Oral at bedtime    MEDICATIONS  (PRN):  acetaminophen   Tablet .. 650 milliGRAM(s) Oral every 6 hours PRN Mild Pain (1 - 3)  dextrose 40% Gel 15 Gram(s) Oral once PRN Blood Glucose LESS THAN 70 milliGRAM(s)/deciliter  glucagon  Injectable 1 milliGRAM(s) IntraMuscular once PRN Glucose LESS THAN 70 milligrams/deciliter  glucagon  Injectable 1 milliGRAM(s) IntraMuscular once PRN Glucose LESS THAN 70 milligrams/deciliter  polyethylene glycol 3350 17 Gram(s) Oral daily PRN Constipation    Pertinent Labs: 11-01 Na143 mmol/L Glu 136 mg/dL<H> K+ 4.6 mmol/L Cr  1.00 mg/dL BUN 47 mg/dL<H>, Hgb 8.6, Hct 26.3, Chloride 112, 10-25 PAB 22 mg/dL     CAPILLARY BLOOD GLUCOSE    POCT Blood Glucose.: 153 mg/dL (01 Nov 2020 08:08)  POCT Blood Glucose.: 260 mg/dL (31 Oct 2020 21:02)  POCT Blood Glucose.: 168 mg/dL (31 Oct 2020 17:00)  POCT Blood Glucose.: 143 mg/dL (31 Oct 2020 12:10)    Skin: No edema or pressure injuries noted at this time     Estimated Needs:   [x ] no change since previous assessment  [ ] recalculated:     Previous Nutrition Diagnosis:    [x ] Malnutrition     Nutrition Diagnosis is [ x] ongoing-being addressed with good PO intake     New Nutrition Diagnosis: [ x] not applicable       Interventions: Continue with current Consistent CHO diet   Recommend  [ ] Change Diet To:  [ ] Nutrition Supplement  [ ] Nutrition Support  [ x] Other:   1) Encourage to continue adequate PO intake  2) Continue with Vitamin C and MVI supplementation   3) Monitor pt's PO intake, weight, skin, edema, GI distress     Monitoring and Evaluation:   [x ] PO intake [ x ] Tolerance to diet prescription [ x ] weights [ x ] labs[ x ] follow up per protocol  [x ] other: RD to remain available

## 2020-11-01 NOTE — PROGRESS NOTE ADULT - ASSESSMENT
85 year old female with PMH of HTN, HLD, DM2 on home insulin, GERD, Dementia, sent by ER from wound care [facility] for evaluation for concern of left metatarsal of foot. Cardiology consultation called for surgical clearance     -ECG NSR @ 60  -TTE Technically difficult study nL LV & RV size and function no significant valvular dysfunction EF 65%    HTN  - BP: 117/59 (11-01-20 @ 05:06) (117/59 - 136/64)  - Continue amlodipine 10, carvedilol 6.25, losartan 100  - Monitor routine hemodynamics.     HLD  - Continue Lipitor 20 mg PO QHS      Osteo  - S/p 1st metatarsal head resection w/o cardiac complications  - Care per ortho/primary  - BP well controlled, monitor routine hemodynamic     - Monitor and replete lytes, keep K>4, Mg>2.  - All other medical needs as per primary team.  - Other cardiovascular workup will depend on clinical course.  - Will continue to follow.  - D/C planning as per primary team and CM    Raffy Starr DNP, ANP-c  Cardiology   Spectra #[phone]

## 2020-11-01 NOTE — PROGRESS NOTE ADULT - ATTENDING COMMENTS
Pt seen + examined. Case discussed with resident. Agree with assessment and plan above (edited by me in detail):  Time spent: 40min. More than 50% of the visit was spent counseling the patient / pt's son on medical condition -- OM of 1st metatarsal of left foot, antibiotics, cefepime/ciprofloxacin, disposition.

## 2020-11-01 NOTE — PROGRESS NOTE ADULT - SUBJECTIVE AND OBJECTIVE BOX
Patient is a 88y old  Female who presents with a chief complaint of Osteomyletis (31 Oct 2020 12:43)    86 yo F with PMH of HTN, HLD, Diabetes Mellitus Type 2 not on home insulin, GERD, Dementia, sent by ER from wound care [facility] for evaluation for concern of left metatarsal of foot.  As per clinical hx The patient has been getting routine dressing changes but her wound has worsen to the point to probe to bone.     On Patient interview: patient Denies any fever, chills, nausea, vomiting, chest pain, shortness of breath, or calf pain at this time.  ER Course:  vitals stable afebrile,   labs: Hgb 11,CR 1 (increased from ,75)   Imaging CXR: small left effusion + vascular congestion   X ray bilateral foot: negative  Pt received zosyn 3.375 x1, vanc IV x1,      INTERVAL HPI/OVERNIGHT EVENTS: No acute events     MEDICATIONS  (STANDING):  amLODIPine   Tablet 10 milliGRAM(s) Oral daily  ascorbic acid 500 milliGRAM(s) Oral two times a day  aspirin  chewable 81 milliGRAM(s) Oral daily  atorvastatin 20 milliGRAM(s) Oral at bedtime  carvedilol 6.25 milliGRAM(s) Oral two times a day  cefepime   IVPB 1000 milliGRAM(s) IV Intermittent every 12 hours  dextrose 5%. 1000 milliLiter(s) (50 mL/Hr) IV Continuous <Continuous>  dextrose 50% Injectable 25 Gram(s) IV Push once  dextrose 50% Injectable 25 Gram(s) IV Push once  dextrose 50% Injectable 12.5 Gram(s) IV Push once  donepezil 5 milliGRAM(s) Oral at bedtime  heparin   Injectable 5000 Unit(s) SubCutaneous every 12 hours  insulin lispro (ADMELOG) corrective regimen sliding scale   SubCutaneous three times a day before meals  insulin lispro (ADMELOG) corrective regimen sliding scale   SubCutaneous at bedtime  losartan 100 milliGRAM(s) Oral daily  multivitamin/minerals 1 Tablet(s) Oral daily  PARoxetine 20 milliGRAM(s) Oral daily  senna 2 Tablet(s) Oral at bedtime    MEDICATIONS  (PRN):  acetaminophen   Tablet .. 650 milliGRAM(s) Oral every 6 hours PRN Mild Pain (1 - 3)  dextrose 40% Gel 15 Gram(s) Oral once PRN Blood Glucose LESS THAN 70 milliGRAM(s)/deciliter  glucagon  Injectable 1 milliGRAM(s) IntraMuscular once PRN Glucose LESS THAN 70 milligrams/deciliter  glucagon  Injectable 1 milliGRAM(s) IntraMuscular once PRN Glucose LESS THAN 70 milligrams/deciliter  polyethylene glycol 3350 17 Gram(s) Oral daily PRN Constipation      Allergies    sulfa drugs (Unknown)    Intolerances        REVIEW OF SYSTEMS:  Denies pain, however unable to assess reliably as patient is somewhat less interactive    Vital Signs Last 24 Hrs  T(C): 36.5 (01 Nov 2020 05:06), Max: 36.7 (31 Oct 2020 13:04)  T(F): 97.7 (01 Nov 2020 05:06), Max: 98 (31 Oct 2020 13:04)  HR: 62 (01 Nov 2020 05:06) (62 - 67)  BP: 117/59 (01 Nov 2020 05:06) (117/59 - 136/64)  BP(mean): --  RR: 17 (01 Nov 2020 05:06) (17 - 17)  SpO2: 98% (01 Nov 2020 05:06) (95% - 98%)    PHYSICAL EXAM:  GENERAL: patient appears clinically stable, no acute distress, more interactive today  EYES: sclera clear, no exudates  NECK: supple, soft, no thyromegaly noted  LUNGS: clear to auscultation, no wheezing or rhonchi appreciated  HEART: S1/S2, regular rate and rhythm, murmur noted, no edema to b/l LE  GASTROINTESTINAL: abdomen is soft, nontender, nondistended, normoactive bowel sounds  SKIN: DTI noted on Sacrum and R scapula  EXTREMITIES: contracted LLE and LUE, L foot dry clean dressing. no edema, cyanosis, or calf tenderness.  NEUROLOGIC: awake, alert, oriented x2, less interactive  HEME/LYMPH: no obvious ecchymosis, lympadenopathy    LABS:                        8.6    7.24  )-----------( 240      ( 01 Nov 2020 06:59 )             26.3     CBC Full  -  ( 01 Nov 2020 06:59 )  WBC Count : 7.24 K/uL  Hemoglobin : 8.6 g/dL  Hematocrit : 26.3 %  Platelet Count - Automated : 240 K/uL  Mean Cell Volume : 83.5 fl  Mean Cell Hemoglobin : 27.3 pg  Mean Cell Hemoglobin Concentration : 32.7 gm/dL  Auto Neutrophil # : x  Auto Lymphocyte # : x  Auto Monocyte # : x  Auto Eosinophil # : x  Auto Basophil # : x  Auto Neutrophil % : x  Auto Lymphocyte % : x  Auto Monocyte % : x  Auto Eosinophil % : x  Auto Basophil % : x    01 Nov 2020 06:59    143    |  112    |  47     ----------------------------<  136    4.6     |  23     |  1.00     Ca    9.2        01 Nov 2020 06:59          CAPILLARY BLOOD GLUCOSE      POCT Blood Glucose.: 153 mg/dL (01 Nov 2020 08:08)  POCT Blood Glucose.: 260 mg/dL (31 Oct 2020 21:02)  POCT Blood Glucose.: 168 mg/dL (31 Oct 2020 17:00)  POCT Blood Glucose.: 143 mg/dL (31 Oct 2020 12:10)        Culture - Tissue with Gram Stain (collected 10-28-20 @ 21:04)  Source: .Tissue Other, ist metatarsal head left  Gram Stain (10-28-20 @ 23:46):    No polymorphonuclear cells seen per low power field    No organisms seen  Preliminary Report (10-31-20 @ 22:54):    Rare Coag Negative Staphylococcus  Organism: Coag Negative Staphylococcus (10-31-20 @ 22:54)  Organism: Coag Negative Staphylococcus (10-31-20 @ 22:54)      -  Ampicillin/Sulbactam: R <=8/4      -  Cefazolin: R <=4      -  Clindamycin: R <=0.25 This isolate is presumed to be clindamycin resistant based on detection of inducible resistance. Clindamycin may still be effective in some patients.      -  Erythromycin: R >4      -  Gentamicin: S <=1 Should not be used as monotherapy      -  Oxacillin: R >2      -  Penicillin: R >8      -  RIF- Rifampin: S <=1 Should not be used as monotherapy      -  Tetra/Doxy: S <=1      -  Trimethoprim/Sulfamethoxazole: S <=0.5/9.5      -  Vancomycin: S 0.5      Method Type: MALATHI    Culture - Tissue with Gram Stain (collected 10-26-20 @ 15:17)  Source: .Tissue Other, Left  medial 1st Metatarsal  Gram Stain (10-26-20 @ 19:49):    Rare polymorphonuclear leukocytes per low power field    Few Gram positive cocci in pairs per oil power field    Few Gram Variable Rods per oil power field  Final Report (10-31-20 @ 08:50):    Culture yields >4 types of aerobic and/or anaerobic bacteria    Call client services within 7 days if further workup is clinically    indicated. Culture includes    Numerous Pseudomonas aeruginosa  Organism: Pseudomonas aeruginosa (10-31-20 @ 08:50)  Organism: Pseudomonas aeruginosa (10-31-20 @ 08:50)      -  Amikacin: S <=16      -  Aztreonam: S <=4      -  Cefepime: S <=2      -  Ceftazidime: S 4      -  Ciprofloxacin: S <=0.25      -  Gentamicin: S 4      -  Imipenem: S <=1      -  Levofloxacin: S <=0.5      -  Meropenem: S <=1      -  Piperacillin/Tazobactam: S <=8      -  Tobramycin: S <=2      Method Type: MALATHI        RADIOLOGY & ADDITIONAL TESTS:    Personally reviewed.     Consultant(s) Notes Reviewed:  [x] YES  [ ] NO     Patient is a 88y old  Female who presents with a chief complaint of left foot wound.      INTERVAL HPI/OVERNIGHT EVENTS: No acute events overnight. Pt denies significant pain in her left foot. Denies fever, chills, SOB, CP, diarrhea.    MEDICATIONS  (STANDING):  amLODIPine   Tablet 10 milliGRAM(s) Oral daily  ascorbic acid 500 milliGRAM(s) Oral two times a day  aspirin  chewable 81 milliGRAM(s) Oral daily  atorvastatin 20 milliGRAM(s) Oral at bedtime  carvedilol 6.25 milliGRAM(s) Oral two times a day  cefepime   IVPB 1000 milliGRAM(s) IV Intermittent every 12 hours  dextrose 5%. 1000 milliLiter(s) (50 mL/Hr) IV Continuous <Continuous>  dextrose 50% Injectable 25 Gram(s) IV Push once  dextrose 50% Injectable 25 Gram(s) IV Push once  dextrose 50% Injectable 12.5 Gram(s) IV Push once  donepezil 5 milliGRAM(s) Oral at bedtime  heparin   Injectable 5000 Unit(s) SubCutaneous every 12 hours  insulin lispro (ADMELOG) corrective regimen sliding scale   SubCutaneous three times a day before meals  insulin lispro (ADMELOG) corrective regimen sliding scale   SubCutaneous at bedtime  losartan 100 milliGRAM(s) Oral daily  multivitamin/minerals 1 Tablet(s) Oral daily  PARoxetine 20 milliGRAM(s) Oral daily  senna 2 Tablet(s) Oral at bedtime    MEDICATIONS  (PRN):  acetaminophen   Tablet .. 650 milliGRAM(s) Oral every 6 hours PRN Mild Pain (1 - 3)  dextrose 40% Gel 15 Gram(s) Oral once PRN Blood Glucose LESS THAN 70 milliGRAM(s)/deciliter  glucagon  Injectable 1 milliGRAM(s) IntraMuscular once PRN Glucose LESS THAN 70 milligrams/deciliter  glucagon  Injectable 1 milliGRAM(s) IntraMuscular once PRN Glucose LESS THAN 70 milligrams/deciliter  polyethylene glycol 3350 17 Gram(s) Oral daily PRN Constipation      Allergies    sulfa drugs (Unknown)    Intolerances        REVIEW OF SYSTEMS: limited due to dementia  Denies foot pain, CP, SOB, fever, chills, abd pain, diarrhea.    Vital Signs Last 24 Hrs  T(C): 36.5 (01 Nov 2020 05:06), Max: 36.7 (31 Oct 2020 13:04)  T(F): 97.7 (01 Nov 2020 05:06), Max: 98 (31 Oct 2020 13:04)  HR: 62 (01 Nov 2020 05:06) (62 - 67)  BP: 117/59 (01 Nov 2020 05:06) (117/59 - 136/64)  BP(mean): --  RR: 17 (01 Nov 2020 05:06) (17 - 17)  SpO2: 98% (01 Nov 2020 05:06) (95% - 98%)    PHYSICAL EXAM:  GENERAL: no acute distress  HEENT: anicteric, no pharyngeal exudates  LUNGS: clear to auscultation, no wheezing or rhonchi appreciated  HEART: S1, S2, regular rate and rhythm, murmur noted, no edema to b/l LE  GASTROINTESTINAL: abdomen is soft, nontender, nondistended, normoactive bowel sounds  EXTREMITIES: contracted LLE and LUE, L foot dry clean dressing. no edema, cyanosis, or calf tenderness.  NEUROLOGIC: answering simple questions and following simple commands appropriately     LABS:                        8.6    7.24  )-----------( 240      ( 01 Nov 2020 06:59 )             26.3     CBC Full  -  ( 01 Nov 2020 06:59 )  WBC Count : 7.24 K/uL  Hemoglobin : 8.6 g/dL  Hematocrit : 26.3 %  Platelet Count - Automated : 240 K/uL  Mean Cell Volume : 83.5 fl  Mean Cell Hemoglobin : 27.3 pg  Mean Cell Hemoglobin Concentration : 32.7 gm/dL  Auto Neutrophil # : x  Auto Lymphocyte # : x  Auto Monocyte # : x  Auto Eosinophil # : x  Auto Basophil # : x  Auto Neutrophil % : x  Auto Lymphocyte % : x  Auto Monocyte % : x  Auto Eosinophil % : x  Auto Basophil % : x    01 Nov 2020 06:59    143    |  112    |  47     ----------------------------<  136    4.6     |  23     |  1.00     Ca    9.2        01 Nov 2020 06:59          CAPILLARY BLOOD GLUCOSE      POCT Blood Glucose.: 153 mg/dL (01 Nov 2020 08:08)  POCT Blood Glucose.: 260 mg/dL (31 Oct 2020 21:02)  POCT Blood Glucose.: 168 mg/dL (31 Oct 2020 17:00)  POCT Blood Glucose.: 143 mg/dL (31 Oct 2020 12:10)        Culture - Tissue with Gram Stain (collected 10-28-20 @ 21:04)  Source: .Tissue Other, ist metatarsal head left  Gram Stain (10-28-20 @ 23:46):    No polymorphonuclear cells seen per low power field    No organisms seen  Preliminary Report (10-31-20 @ 22:54):    Rare Coag Negative Staphylococcus  Organism: Coag Negative Staphylococcus (10-31-20 @ 22:54)  Organism: Coag Negative Staphylococcus (10-31-20 @ 22:54)      -  Ampicillin/Sulbactam: R <=8/4      -  Cefazolin: R <=4      -  Clindamycin: R <=0.25 This isolate is presumed to be clindamycin resistant based on detection of inducible resistance. Clindamycin may still be effective in some patients.      -  Erythromycin: R >4      -  Gentamicin: S <=1 Should not be used as monotherapy      -  Oxacillin: R >2      -  Penicillin: R >8      -  RIF- Rifampin: S <=1 Should not be used as monotherapy      -  Tetra/Doxy: S <=1      -  Trimethoprim/Sulfamethoxazole: S <=0.5/9.5      -  Vancomycin: S 0.5      Method Type: MALATHI    Culture - Tissue with Gram Stain (collected 10-26-20 @ 15:17)  Source: .Tissue Other, Left  medial 1st Metatarsal  Gram Stain (10-26-20 @ 19:49):    Rare polymorphonuclear leukocytes per low power field    Few Gram positive cocci in pairs per oil power field    Few Gram Variable Rods per oil power field  Final Report (10-31-20 @ 08:50):    Culture yields >4 types of aerobic and/or anaerobic bacteria    Call client services within 7 days if further workup is clinically    indicated. Culture includes    Numerous Pseudomonas aeruginosa  Organism: Pseudomonas aeruginosa (10-31-20 @ 08:50)  Organism: Pseudomonas aeruginosa (10-31-20 @ 08:50)      -  Amikacin: S <=16      -  Aztreonam: S <=4      -  Cefepime: S <=2      -  Ceftazidime: S 4      -  Ciprofloxacin: S <=0.25      -  Gentamicin: S 4      -  Imipenem: S <=1      -  Levofloxacin: S <=0.5      -  Meropenem: S <=1      -  Piperacillin/Tazobactam: S <=8      -  Tobramycin: S <=2      Method Type: MALATHI        RADIOLOGY & ADDITIONAL TESTS:    Personally reviewed.     Consultant(s) Notes Reviewed:  [x] YES  [ ] NO

## 2020-11-01 NOTE — PROGRESS NOTE ADULT - SUBJECTIVE AND OBJECTIVE BOX
[facility] -- Sarahi Meraz, Norman Barnard, Alejandro Pulliam Savella  Office # [phone]      Follow Up:    Preop eval/Post follow up   Subjective/Observations:   Unable to provide meaningful information. Seen at bedside in NAD,     REVIEW OF SYSTEMS: All other review of systems is negative unless indicated above    PAST MEDICAL & SURGICAL HISTORY:  Dementia    DM (diabetes mellitus)    HLD (hyperlipidemia)    HTN (hypertension)    GERD (gastroesophageal reflux disease)    History of cholecystectomy    Ankle injury  s/p ankle surgery, pt doesn&#x27;t remember which ankle        MEDICATIONS  (STANDING):  amLODIPine   Tablet 10 milliGRAM(s) Oral daily  ascorbic acid 500 milliGRAM(s) Oral two times a day  aspirin  chewable 81 milliGRAM(s) Oral daily  atorvastatin 20 milliGRAM(s) Oral at bedtime  carvedilol 6.25 milliGRAM(s) Oral two times a day  cefepime   IVPB 1000 milliGRAM(s) IV Intermittent every 12 hours  dextrose 5%. 1000 milliLiter(s) (50 mL/Hr) IV Continuous <Continuous>  dextrose 50% Injectable 25 Gram(s) IV Push once  dextrose 50% Injectable 25 Gram(s) IV Push once  dextrose 50% Injectable 12.5 Gram(s) IV Push once  donepezil 5 milliGRAM(s) Oral at bedtime  heparin   Injectable 5000 Unit(s) SubCutaneous every 12 hours  insulin lispro (ADMELOG) corrective regimen sliding scale   SubCutaneous three times a day before meals  insulin lispro (ADMELOG) corrective regimen sliding scale   SubCutaneous at bedtime  losartan 100 milliGRAM(s) Oral daily  multivitamin/minerals 1 Tablet(s) Oral daily  PARoxetine 20 milliGRAM(s) Oral daily  senna 2 Tablet(s) Oral at bedtime    MEDICATIONS  (PRN):  acetaminophen   Tablet .. 650 milliGRAM(s) Oral every 6 hours PRN Mild Pain (1 - 3)  dextrose 40% Gel 15 Gram(s) Oral once PRN Blood Glucose LESS THAN 70 milliGRAM(s)/deciliter  glucagon  Injectable 1 milliGRAM(s) IntraMuscular once PRN Glucose LESS THAN 70 milligrams/deciliter  glucagon  Injectable 1 milliGRAM(s) IntraMuscular once PRN Glucose LESS THAN 70 milligrams/deciliter  polyethylene glycol 3350 17 Gram(s) Oral daily PRN Constipation      Allergies    sulfa drugs (Unknown)    Intolerances        Vital Signs Last 24 Hrs  T(C): 36.5 (01 Nov 2020 05:06), Max: 36.7 (31 Oct 2020 13:04)  T(F): 97.7 (01 Nov 2020 05:06), Max: 98 (31 Oct 2020 13:04)  HR: 62 (01 Nov 2020 05:06) (62 - 67)  BP: 117/59 (01 Nov 2020 05:06) (117/59 - 136/64)  BP(mean): --  RR: 17 (01 Nov 2020 05:06) (17 - 17)  SpO2: 98% (01 Nov 2020 05:06) (95% - 98%)    I&O's Summary    31 Oct 2020 08:01  -  01 Nov 2020 07:00  --------------------------------------------------------  IN: 360 mL / OUT: 1000 mL / NET: -640 mL    01 Nov 2020 07:01  -  01 Nov 2020 11:49  --------------------------------------------------------  IN: 360 mL / OUT: 0 mL / NET: 360 mL          PHYSICAL EXAM:  TELE: Off tele   Constitutional: NAD, awake and alert, well-developed  HEENT: Moist Mucous Membranes, Anicteric  Pulmonary: Non-labored, breath sounds are clear bilaterally, No wheezing, crackles or rhonchi  Cardiovascular: Regular, S1 and S2 nl, No murmurs, rubs, gallops or clicks  Gastrointestinal: Bowel Sounds present, soft, nontender.   Lymph: No lymphadenopathy. No peripheral edema.  Skin: No visible rashes or ulcers.  Psych:  Mood & affect appropriate    LABS: All Labs Reviewed:                        8.6    7.24  )-----------( 240      ( 01 Nov 2020 06:59 )             26.3                         8.6    7.22  )-----------( 201      ( 31 Oct 2020 09:35 )             26.4                         8.7    9.61  )-----------( 198      ( 30 Oct 2020 08:57 )             27.4     01 Nov 2020 06:59    143    |  112    |  47     ----------------------------<  136    4.6     |  23     |  1.00   31 Oct 2020 09:35    144    |  111    |  37     ----------------------------<  166    4.1     |  24     |  0.95   30 Oct 2020 08:57    144    |  112    |  34     ----------------------------<  153    4.1     |  26     |  1.00     Ca    9.2        01 Nov 2020 06:59  Ca    8.8        31 Oct 2020 09:35  Ca    9.3        30 Oct 2020 08:57           from: 12 Lead ECG (10.24.20 @ 11:41) >  Ventricular Rate 60 BPM    Atrial Rate 60 BPM    P-R Interval 172 ms    QRS Duration 78 ms    Q-T Interval 432 ms    QTC Calculation(Bazett) 432 ms    P Axis 63 degrees    R Axis 35 degrees    T Axis 48 degrees    Diagnosis Line Poor data quality  Normal sinus rhythm  Normal ECG  When compared with ECG of 20-SEP-2018 13:45,  No significant change was found    Confirmed by Ralph Austin MD ([id number]) on 10/25/2020 11:39:49 AM    < end of copied text >    < from: TTE Echo Complete w/o Contrast w/ Doppler (10.25.20 @ 11:18) >   EXAM:  ECHO TTE WO CON COMP W DOPP         PROCEDURE DATE:  10/25/2020        INTERPRETATION:  INDICATION: Dyspnea  Sonographer AS    Blood Pressure 156/62    Height 165 cm     Weight 45.4 kg       BSA 1.4 sq cm    Dimensions:  LA 3.4       NormalValues: 2.0 - 4.0 cm  Ao 2.9        Normal Values: 2.0 - 3.8 cm  SEPTUM 1.2       Normal Values: 0.6 - 1.2 cm  PWT 1.1       Normal Values: 0.6 - 1.1 cm  LVIDd 3.1         Normal Values: 3.0 - 5.6 cm  LVIDs 1.7         Normal Values: 1.8 - 4.0 cm      OBSERVATIONS:  Technically difficult study, patient uncooperative  Mitral Valve: Thickened leaflets, mild MR.  Aortic Valve/Aorta: Calcified trileaflet aortic valve with grossly normal opening. Trace AI  Tricuspid Valve: Mild TR.  Pulmonic Valve: Not well-visualized  Left Atrium: normal  Right Atrium: Not well-visualized  Left Ventricle: normal LV size and systolic function, estimated LVEF of 65%.  Right Ventricle: Grossly normal size and systolic function.  Pericardium/Pleura: normal, no significant pericardial effusion.  Pulmonary/RV Pressure: estimated PA systolic pressure of 37 mmHg assuming an RA pressure of 3 mmHg.      Conclusion:  Technically difficult study  Normal left ventricular internal dimensions and systolic function, estimated LVEF of 65%.  Grossly normal RV size and systolic function.  Calcified trileaflet aortic valve with grossly normal opening. Trace AI  Mild MR and TR.  No significant pericardial effusion.              WILEY BROWN MD; Attending Cardiologist  This document has been electronically signed. Oct 26 2020  8:10AM    < end of copied text >    < from: Xray Foot AP + Lateral, Left (10.28.20 @ 13:00) >  EXAM:  FOOT 2VIEWS LT                            PROCEDURE DATE:  10/28/2020          INTERPRETATION:  Left foot    HISTORY: First metatarsal resection    COMPARISON: 8/29/2020     Three views of the left foot show interval resection of the head ofthe first metatarsal. Surgical hardware is again noted in the distal tibia and fibula.    IMPRESSION: Postoperative changes.      Thank you for this referral.            TEMO GUTIERREZ MD; Attending Interventional Radiologist  This document has been electronically signed. Oct 28 2020  2:02PM    < end of copied text >  < from: [facility] Physiol Extremity Lower 3+ Level, LT (10.26.20 @ 13:45) >  EXAM:  PHYSIOL EXTREM LOW 3+ LEV LT                            PROCEDURE DATE:  10/26/2020          INTERPRETATION:  History:80-year-old female with diabetes and left foot ulcer.    Technique: Left lower extremity BRIE/PVR    Comparison: None    Findings:        LEFT  BRIE: 1.4  Flow study: Good flow from the high thigh through the great toe. Pulsatile waveforms      Impression:    Findings compatible calcified lower extremity arteries.                    DIONE AMBROSE MD; Attending Interventional Radiologist  This document has been electronically signed. Oct 26 2020  1:58PM

## 2020-11-01 NOTE — PROGRESS NOTE ADULT - ASSESSMENT
84 yo F with PMH of HTN, HLD, Diabetes Mellitus Type 2 not on home insulin, GERD, Dementia, sent by ER from wound care [facility] for evaluation, c/w osteomyelitis of left metatarsal of foot, S/p OR for resection 10/28 POD 4      Diabetic foot ulcer, underlying osteomyelitis  s/p L metatarsal resection  Now on cefepime 2gm q12h renally dosed, per ID and pharm  DC Abx plan per ID: "If plan for discharge to a facility that can get IV, prefer Cefepime 2gm q12 otherwise will switch to oral ciprofloxacin 500mg q12 to complete 6 weeks from the day of start of cefepime. While on Cipro needs cQT monitoring. Also will need to check for possible c diff if diarrhea."  Proximal margin of amputation showed OM so will need treatment for 6 weeks.   Tissue Cx 10/26 L med 1st met: pansensitive Pseudomonas, few G+ cocci pairs, G+ jenny rods, PMLs;   Tissue Cx from OR 10/28 rate CNS   Xray foot w/o pathology  F/u Blood cx x2 10/24 NGF  Good vascular flow per L leg ABIs  MRI foot - unable to obtain 2/2 contracture  ID Consulted  Palliative Case Consulted  Podiatry, Dr. Mullins following    Deep Tissue Injury  1) sacral 2) L scapular DTIs, Stage 1 pressure ulcer  3) Skin tear R thigh 2/2 LLE contracture with erythema and edema on medical aspect of L foot  D/w nursing team 10/31 who noted improvement  Wound care RN consulted - DC'd by wound care RN as no longer warranted  Turn in bed, reposition q 4  keep towel between abrasion and LLE     Pulmonary vascular congestion and left pleural effusion r/o occult CHF  CXR showed L infiltrate w/ effusion  Patient appears euvolemic on exam  Trops neg, BMP 1100  TTE EF65% mild valvular disease    DM2, noninsulin dependent.  hold home metformin  start low dose ISS, fingersticks,     Dementia  baseline bedbound, at baseline knows her name, can identify her family, forgets their names, eats well (regular solid food)  cont donepezil   cont paroxetine.    Iron deficiency anemia  Hb stable  hold home iron    HLD  atorvastatin interchange for lovastatin.    HTN  cont carvedilol, amlodipine, and losartan with hold parameters.    GERD  Patient on omeprazole: will use protonix in house    Depression  c/w paroxetine    PPx  heparin subq, resumed post op  bowel regimen    DISPO  DNR DNI, PC consulted  PT consulted: Home w 24 hr care  SW consulted: discussed w/ SW 10/31 re transportation to home, will need to plan for DC on Monday for HHA, f/u COVID test sunday  Wound care clinic f/u within 1 week   89yo F with PMH of HTN, HLD, Diabetes Mellitus Type 2 not on home insulin, GERD, Dementia, sent to the ER from wound care center and admitted for suspected osteomyelitis of 1st metatarsal of left foot, s/p resection of head of 1st metatarsal of L LE on 10/28, found to have chronic OM in bone including the proximal margin.       Diabetic foot ulcer, with osteomyelitis of left first metatarsal head  -s/p L 1st metatarsal head resection on 10/28  -Now on cefepime 2gm q12h renally dosed, per ID (Gage), recs appreciated, given wound culture positive for pansensitive pseudomonas aeruginosa.   -Proximal margin of amputation showed OM so will need treatment for 6 weeks.   -DC Abx plan per ID: preferred Cefepime 2gm q12 if possible logistically, otherwise will switch to oral ciprofloxacin 500mg q12 to complete 6 weeks from the day of start of cefepime. While on Cipro needs QTc monitoring. Also will need to monitor for possible c diff, if pt has diarrhea.  -Tissue Cx from OR 10/28 rare CNS   -F/u Blood cx x2 10/24 NGF  -adequate vascular flow per L leg ABIs  -Podiatry, Dr. Mullins following, recs appreciated    Deep Tissue Injury  1) sacral 2) L scapular DTIs, Stage 1 pressure ulcer  3) Skin tear R thigh 2/2 LLE contracture with erythema and edema on medical aspect of L foot  D/w nursing team 10/31 who noted improvement  Wound care RN consulted - DC'd by wound care RN as no longer warranted  Turn in bed, reposition frequently  keep towel between abrasion and LLE     Pulmonary vascular congestion and left pleural effusion r/o occult CHF  -CXR on admission showed L infiltrate w/ effusion  -Patient currently appears euvolemic on exam  -Trops neg, BNP 1100  -TTE EF65% mild valvular disease    DM2, noninsulin dependent.  hold home metformin  start low dose ISS, fingersticks    Dementia  advanced dementia ; baseline bedbound, at baseline knows her name, can identify her family, forgets their names, eats well (regular solid food)  cont donepezil   cont paroxetine.    Iron deficiency anemia  Hgb stable  hold home iron    HLD  atorvastatin interchange for lovastatin.    HTN  cont carvedilol, amlodipine, and losartan with hold parameters.    GERD  Patient on omeprazole: will use protonix in house    Depression  c/w paroxetine    PPx  heparin subq, resumed post op  bowel regimen    DISPO  DNR DNI, PC consulted, recs appreciated  PT consulted: Home w 24 hr care  SW consulted: discussed w/ SW 10/31 re transportation to home, will need to plan for DC on Monday for HHA, f/u COVID test today  Wound care clinic f/u within 1 week

## 2020-11-02 LAB
ANION GAP SERPL CALC-SCNC: 7 MMOL/L — SIGNIFICANT CHANGE UP (ref 5–17)
BUN SERPL-MCNC: 40 MG/DL — HIGH (ref 7–23)
CALCIUM SERPL-MCNC: 9.2 MG/DL — SIGNIFICANT CHANGE UP (ref 8.5–10.1)
CHLORIDE SERPL-SCNC: 110 MMOL/L — HIGH (ref 96–108)
CO2 SERPL-SCNC: 26 MMOL/L — SIGNIFICANT CHANGE UP (ref 22–31)
CREAT SERPL-MCNC: 1.1 MG/DL — SIGNIFICANT CHANGE UP (ref 0.5–1.3)
CULTURE RESULTS: SIGNIFICANT CHANGE UP
GLUCOSE SERPL-MCNC: 144 MG/DL — HIGH (ref 70–99)
HCT VFR BLD CALC: 29 % — LOW (ref 34.5–45)
HGB BLD-MCNC: 9.6 G/DL — LOW (ref 11.5–15.5)
MCHC RBC-ENTMCNC: 27.4 PG — SIGNIFICANT CHANGE UP (ref 27–34)
MCHC RBC-ENTMCNC: 33.1 GM/DL — SIGNIFICANT CHANGE UP (ref 32–36)
MCV RBC AUTO: 82.9 FL — SIGNIFICANT CHANGE UP (ref 80–100)
NRBC # BLD: 0 /100 WBCS — SIGNIFICANT CHANGE UP (ref 0–0)
ORGANISM # SPEC MICROSCOPIC CNT: SIGNIFICANT CHANGE UP
ORGANISM # SPEC MICROSCOPIC CNT: SIGNIFICANT CHANGE UP
PLATELET # BLD AUTO: 278 K/UL — SIGNIFICANT CHANGE UP (ref 150–400)
POTASSIUM SERPL-MCNC: 4.1 MMOL/L — SIGNIFICANT CHANGE UP (ref 3.5–5.3)
POTASSIUM SERPL-SCNC: 4.1 MMOL/L — SIGNIFICANT CHANGE UP (ref 3.5–5.3)
RBC # BLD: 3.5 M/UL — LOW (ref 3.8–5.2)
RBC # FLD: 15.1 % — HIGH (ref 10.3–14.5)
SODIUM SERPL-SCNC: 143 MMOL/L — SIGNIFICANT CHANGE UP (ref 135–145)
SPECIMEN SOURCE: SIGNIFICANT CHANGE UP
WBC # BLD: 6.03 K/UL — SIGNIFICANT CHANGE UP (ref 3.8–10.5)
WBC # FLD AUTO: 6.03 K/UL — SIGNIFICANT CHANGE UP (ref 3.8–10.5)

## 2020-11-02 PROCEDURE — 99233 SBSQ HOSP IP/OBS HIGH 50: CPT | Mod: GC

## 2020-11-02 PROCEDURE — 36573 INSJ PICC RS&I 5 YR+: CPT

## 2020-11-02 PROCEDURE — 99024 POSTOP FOLLOW-UP VISIT: CPT

## 2020-11-02 PROCEDURE — 99232 SBSQ HOSP IP/OBS MODERATE 35: CPT

## 2020-11-02 RX ORDER — SODIUM CHLORIDE 9 MG/ML
10 INJECTION INTRAMUSCULAR; INTRAVENOUS; SUBCUTANEOUS
Refills: 0 | Status: DISCONTINUED | OUTPATIENT
Start: 2020-11-02 | End: 2020-11-04

## 2020-11-02 RX ORDER — CEFEPIME 1 G/1
1 INJECTION, POWDER, FOR SOLUTION INTRAMUSCULAR; INTRAVENOUS
Qty: 0 | Refills: 0 | DISCHARGE
Start: 2020-11-02 | End: 2020-12-08

## 2020-11-02 RX ORDER — CHLORHEXIDINE GLUCONATE 213 G/1000ML
1 SOLUTION TOPICAL
Refills: 0 | Status: DISCONTINUED | OUTPATIENT
Start: 2020-11-02 | End: 2020-11-04

## 2020-11-02 RX ADMIN — Medication 20 MILLIGRAM(S): at 11:35

## 2020-11-02 RX ADMIN — HEPARIN SODIUM 5000 UNIT(S): 5000 INJECTION INTRAVENOUS; SUBCUTANEOUS at 05:11

## 2020-11-02 RX ADMIN — SENNA PLUS 2 TABLET(S): 8.6 TABLET ORAL at 22:08

## 2020-11-02 RX ADMIN — CEFEPIME 100 MILLIGRAM(S): 1 INJECTION, POWDER, FOR SOLUTION INTRAMUSCULAR; INTRAVENOUS at 17:29

## 2020-11-02 RX ADMIN — ATORVASTATIN CALCIUM 20 MILLIGRAM(S): 80 TABLET, FILM COATED ORAL at 22:08

## 2020-11-02 RX ADMIN — Medication 1 TABLET(S): at 11:35

## 2020-11-02 RX ADMIN — Medication 1: at 17:29

## 2020-11-02 RX ADMIN — Medication 500 MILLIGRAM(S): at 17:29

## 2020-11-02 RX ADMIN — Medication 1: at 08:21

## 2020-11-02 RX ADMIN — AMLODIPINE BESYLATE 10 MILLIGRAM(S): 2.5 TABLET ORAL at 05:11

## 2020-11-02 RX ADMIN — CEFEPIME 100 MILLIGRAM(S): 1 INJECTION, POWDER, FOR SOLUTION INTRAMUSCULAR; INTRAVENOUS at 05:11

## 2020-11-02 RX ADMIN — LOSARTAN POTASSIUM 100 MILLIGRAM(S): 100 TABLET, FILM COATED ORAL at 05:11

## 2020-11-02 RX ADMIN — Medication 2: at 12:32

## 2020-11-02 RX ADMIN — DONEPEZIL HYDROCHLORIDE 5 MILLIGRAM(S): 10 TABLET, FILM COATED ORAL at 22:08

## 2020-11-02 RX ADMIN — CARVEDILOL PHOSPHATE 6.25 MILLIGRAM(S): 80 CAPSULE, EXTENDED RELEASE ORAL at 05:12

## 2020-11-02 RX ADMIN — HEPARIN SODIUM 5000 UNIT(S): 5000 INJECTION INTRAVENOUS; SUBCUTANEOUS at 06:32

## 2020-11-02 RX ADMIN — Medication 81 MILLIGRAM(S): at 11:35

## 2020-11-02 RX ADMIN — CARVEDILOL PHOSPHATE 6.25 MILLIGRAM(S): 80 CAPSULE, EXTENDED RELEASE ORAL at 17:29

## 2020-11-02 RX ADMIN — Medication 500 MILLIGRAM(S): at 05:11

## 2020-11-02 RX ADMIN — HEPARIN SODIUM 5000 UNIT(S): 5000 INJECTION INTRAVENOUS; SUBCUTANEOUS at 17:29

## 2020-11-02 NOTE — PROGRESS NOTE ADULT - ASSESSMENT
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       Diabetic foot ulcer, with osteomyelitis of left first metatarsal head  -s/p L 1st metatarsal head resection on 10/28  -Now on cefepime 2gm q12h renally dosed, per ID (Gage), recs appreciated, given wound culture positive for pansensitive pseudomonas aeruginosa.   -Proximal margin of amputation showed OM so will need treatment for 6 weeks.   -DC Abx plan per ID: preferred Cefepime 2gm q12 if possible logistically, otherwise will switch to oral ciprofloxacin 500mg q12 to complete 6 weeks from the day of start of cefepime. While on Cipro needs QTc monitoring. Also will need to monitor for possible c diff, if pt has diarrhea.  -Tissue Cx from OR 10/28 rare CNS   -F/u Blood cx x2 10/24 NGF  -adequate vascular flow per L leg ABIs  -Podiatry, Dr. Mullins following, recs appreciated    Deep Tissue Injury  1) sacral 2) L scapular DTIs, Stage 1 pressure ulcer  3) Skin tear R thigh 2/2 LLE contracture with erythema and edema on medical aspect of L foot  D/w nursing team 10/31 who noted improvement  Wound care RN consulted - DC'd by wound care RN as no longer warranted  Turn in bed, reposition frequently  keep towel between abrasion and LLE     Pulmonary vascular congestion and left pleural effusion r/o occult CHF  -CXR on admission showed L infiltrate w/ effusion  -Patient currently appears euvolemic on exam  -Trops neg, BNP 1100  -TTE EF65% mild valvular disease    DM2, noninsulin dependent.  hold home metformin  start low dose ISS, fingersticks    Dementia  advanced dementia ; baseline bedbound, at baseline knows her name, can identify her family, forgets their names, eats well (regular solid food)  cont donepezil   cont paroxetine.    Iron deficiency anemia  Hgb stable  hold home iron    HLD  atorvastatin interchange for lovastatin.    HTN  cont carvedilol, amlodipine, and losartan with hold parameters.    GERD  Patient on omeprazole: will use protonix in house    Depression  c/w paroxetine    PPx  heparin subq, resumed post op  bowel regimen    DISPO  DNR DNI, PC consulted, recs appreciated  PT consulted: Home w 24 hr care  SW consulted: discussed w/ SW 10/31 re transportation to home, will need to plan for DC on Monday for HHA, f/u COVID test today  Wound care clinic f/u within 1 week   89yo F with PMH of HTN, HLD, Diabetes Mellitus Type 2 not on home insulin, GERD, Dementia, sent to the ER from wound care center and admitted for suspected osteomyelitis of 1st metatarsal of left foot, s/p resection of head of 1st metatarsal of L LE on 10/28, found to have chronic OM in bone including the proximal margin.       Diabetic foot ulcer, with osteomyelitis of left first metatarsal head  -s/p L 1st metatarsal head resection on 10/28  -Now on cefepime 2gm q12h renally dosed, per ID (Gage), recs appreciated, given wound culture positive for pansensitive pseudomonas aeruginosa.   -Proximal margin of amputation showed OM so will need treatment for 6 weeks.   -DC Abx plan per ID: preferred Cefepime 2gm q12 if possible logistically, otherwise will switch to oral ciprofloxacin 500mg q12 to complete 6 weeks from the day of start of cefepime. While on Cipro needs QTc monitoring. Also will need to monitor for possible c diff, if pt has diarrhea.  -discussed with pt's son, Marcio, who agreed to administer Abx for the pt in her home; spoke with CM who will arrange for home cefepime.  -Tissue Cx from OR 10/28 rare CNS   -F/u Blood cx x2 10/24 NGF  -adequate vascular flow per L leg ABIs  -Podiatry, Dr. Mullins group following, recs appreciated -- dressing changes per podiatry    Deep Tissue Injury  1) sacral 2) L scapular DTIs, Stage 1 pressure ulcer  3) Skin tear R thigh 2/2 LLE contracture with erythema and edema on medical aspect of L foot  D/w nursing team 10/31 who noted improvement  Wound care RN consulted - DC'd by wound care RN as no longer warranted  Turn in bed, reposition frequently  keep towel between abrasion and LLE     Pulmonary vascular congestion and left pleural effusion r/o occult CHF  -CXR on admission showed L infiltrate w/ effusion  -Patient currently appears euvolemic on exam  -Trops neg, BNP 1100  -TTE EF65% mild valvular disease    DM2, noninsulin dependent.  hold home metformin  start low dose ISS, fingersticks    Dementia  advanced dementia ; baseline bedbound, at baseline knows her name, can identify her family, forgets their names, eats well (regular solid food)  cont donepezil   cont paroxetine.    Iron deficiency anemia  Hgb stable  hold home iron    HLD  atorvastatin interchange for lovastatin.    HTN  cont carvedilol, amlodipine, and losartan with hold parameters.    GERD  Patient on omeprazole: will use protonix in house    Depression  c/w paroxetine    PPx  heparin subq, resumed post op  bowel regimen    DISPO  DNR DNI, PC consulted, recs appreciated  PT consulted: Home w 24 hr care  Wound care clinic f/u within 1 week   87yo F with PMH of HTN, HLD, Diabetes Mellitus Type 2 not on home insulin, GERD, Dementia, sent to the ER from wound care center and admitted for suspected osteomyelitis of 1st metatarsal of left foot, s/p resection of head of 1st metatarsal of L LE on 10/28, found to have chronic OM in bone including the proximal margin.       Diabetic foot ulcer, with osteomyelitis of left first metatarsal head  -s/p L 1st metatarsal head resection on 10/28  -Now on cefepime 2gm q12h renally dosed, per ID (Gage), recs appreciated, given wound culture positive for pansensitive pseudomonas aeruginosa.   -Proximal margin of amputation showed OM so will need treatment for 6 weeks.   -DC Abx plan per ID: preferred Cefepime 2gm q12 if possible logistically, otherwise will switch to oral ciprofloxacin 500mg q12 to complete 6 weeks from the day of start of cefepime. While on Cipro needs QTc monitoring. Also will need to monitor for possible c diff, if pt has diarrhea.  -discussed with pt's son, Marcio, who agreed to administer Abx for the pt in her home; spoke with CM who will arrange for home cefepime.  -coordinated with IR for PICC line  -Tissue Cx from OR 10/28 rare CNS   -F/u Blood cx x2 10/24 NGF  -adequate vascular flow per L leg ABIs  -Podiatry, Dr. Mullins group following, recs appreciated -- dressing changes per podiatry    Deep Tissue Injury  1) sacral 2) L scapular DTIs, Stage 1 pressure ulcer  3) Skin tear R thigh 2/2 LLE contracture with erythema and edema on medical aspect of L foot  D/w nursing team 10/31 who noted improvement  Wound care RN consulted - DC'd by wound care RN as no longer warranted  Turn in bed, reposition frequently  keep towel between abrasion and LLE     Pulmonary vascular congestion and left pleural effusion r/o occult CHF  -CXR on admission showed L infiltrate w/ effusion  -Patient currently appears euvolemic on exam  -Trops neg, BNP 1100  -TTE EF65% mild valvular disease    DM2, noninsulin dependent.  hold home metformin  start low dose ISS, fingersticks    Dementia  advanced dementia ; baseline bedbound, at baseline knows her name, can identify her family, forgets their names, eats well (regular solid food)  cont donepezil   cont paroxetine.    Iron deficiency anemia  Hgb stable  hold home iron    HLD  atorvastatin interchange for lovastatin.    HTN  cont carvedilol, amlodipine, and losartan with hold parameters.    GERD  Patient on omeprazole: will use protonix in house    Depression  c/w paroxetine    PPx  heparin subq, resumed post op  bowel regimen    DISPO  DNR DNI, PC consulted, recs appreciated  PT consulted: Home w 24 hr care  Wound care clinic f/u within 1 week

## 2020-11-02 NOTE — PROGRESS NOTE ADULT - ATTENDING COMMENTS
Pt seen + examined. Case discussed with resident. Agree with assessment and plan above (edited by me in detail):  Time spent: 40min. More than 50% of the visit was spent counseling the patient / pt's son on medical condition -- OM of 1st metatarsal of left foot, antibiotics, cefepime/ciprofloxacin, disposition, home IV Abx administration. Pt seen + examined. Case discussed with resident. Agree with assessment and plan above (edited by me in detail):  Time spent: 40min. More than 50% of the visit was spent counseling the patient / pt's son on medical condition -- OM of 1st metatarsal of left foot, antibiotics, cefepime/ciprofloxacin, disposition, home IV Abx administration, PICC line.

## 2020-11-02 NOTE — PROGRESS NOTE ADULT - SUBJECTIVE AND OBJECTIVE BOX
[facility]  INFECTIOUS DISEASES   [address] [place] [address]  Tel: [phone]     Fax: [phone]  =======================================================    [id number]  NELLIE NICOLE     Follow up: foot ulcer and OM    Had left 1st metatarsal head amputation, margins not clean, showed OM.   Awake and alert, no complaint. No fever.     PAST MEDICAL & SURGICAL HISTORY:  Dementia  DM (diabetes mellitus)  HLD (hyperlipidemia)  HTN (hypertension)  GERD (gastroesophageal reflux disease)  History of cholecystectomy  Ankle injury  s/p ankle surgery, pt doesn&#x27;t remember which ankle    Social Hx: no smoking or toxic habits     FAMILY HISTORY:  FH: type 2 diabetes  brother    FH: colon cancer  sister    Allergies  sulfa drugs (Unknown)    Antibiotics:  piperacillin/tazobactam IVPB.. 3.375 Gram(s) IV Intermittent every 8 hours  vancomycin  IVPB 1000 milliGRAM(s) IV Intermittent every 24 hours     REVIEW OF SYSTEMS:  Limited due to dementia but states that has left foot pain.     Physical Exam:  Vital Signs Last 24 Hrs  T(C): 36.3 (02 Nov 2020 07:40), Max: 36.9 (01 Nov 2020 21:24)  T(F): 97.4 (02 Nov 2020 07:40), Max: 98.5 (02 Nov 2020 05:06)  HR: 56 (02 Nov 2020 07:40) (56 - 64)  BP: 118/54 (02 Nov 2020 07:40) (115/58 - 131/65)  BP(mean): --  RR: 15 (02 Nov 2020 07:40) (15 - 17)  SpO2: 97% (02 Nov 2020 07:40) (96% - 97%)  GEN: NAD  HEENT: normocephalic and atraumatic. EOMI. PERRL.    NECK: Supple.  No lymphadenopathy   LUNGS: Clear to auscultation.  HEART: Regular rate and rhythm   ABDOMEN: Soft, nontender, and nondistended.  Positive bowel sounds.    : No CVA tenderness  EXTREMITIES: Without edema. left leg in a contracted position, able to extend partially   Now foot is dressed after surgery ( before surgery: Left plantar metatarsal head area with a deep ulcer with mild swelling and erythema in surrounding, no discharge)   NEUROLOGIC: grossly intact.  PSYCHIATRIC: dementia unable to evaluate   SKIN: No rash    Labs:  11-02    143  |  110<H>  |  40<H>  ----------------------------<  144<H>  4.1   |  26  |  1.10    Ca    9.2      02 Nov 2020 09:13                        9.6    6.03  )-----------( 278      ( 02 Nov 2020 09:13 )             29.0     RECENT CULTURES:  10-28 @ 21:04 .Tissue Other, ist metatarsal head left Coag Negative Staphylococcus    Rare Coag Negative Staphylococcus    No polymorphonuclear cells seen per low power field  No organisms seen    10-26 @ 15:17 .Tissue Other, Left  medial 1st Metatarsal Pseudomonas aeruginosa    Culture yields >4 types of aerobic and/or anaerobic bacteria  Call client services within 7 days if further workup is clinically  indicated. Culture includes  Numerous Pseudomonas aeruginosa    Rare polymorphonuclear leukocytes per low power field  Few Gram positive cocci in pairs per oil power field  Few Gram Variable Rods per oil power field    10-24 @ 21:09 .Blood Blood     No Growth Final    WBC Count: 7.22 K/uL (10-31-20 @ 09:35)  WBC Count: 9.61 K/uL (10-30-20 @ 08:57)  WBC Count: 11.96 K/uL (10-29-20 @ 08:55)  WBC Count: 6.31 K/uL (10-28-20 @ 09:13)  WBC Count: 7.47 K/uL (10-27-20 @ 09:20)    Creatinine, Serum: 0.95 mg/dL (10-31-20 @ 09:35)  Creatinine, Serum: 1.00 mg/dL (10-30-20 @ 08:57)  Creatinine, Serum: 1.00 mg/dL (10-29-20 @ 08:55)  Creatinine, Serum: 1.00 mg/dL (10-28-20 @ 09:08)  Creatinine, Serum: 1.10 mg/dL (10-27-20 @ 09:20)    C-Reactive Protein, Serum: <0.10 mg/dL (10-24-20 @ 20:48)  C-Reactive Protein, Serum: <0.10 mg/dL (10-24-20 @ 16:19)    Sedimentation Rate, Erythrocyte: 26 mm/hr (10-24-20 @ 15:06)  Sedimentation Rate, Erythrocyte: 26 mm/hr (10-24-20 @ 12:00)    Procalcitonin, Serum: <0.05 (10-24-20 @ 15:06)     COVID-19 IgG Antibody Index: 0.08 Index (10-24-20 @ 20:52)  COVID-19 IgG Antibody Interpretation: Negative (10-24-20 @ 20:52)  COVID-19 PCR: NotDetec (10-24-20 @ 11:56)    All imaging and other data have been reviewed.  < from: Xray Foot AP + Lateral + Oblique, Bilat (10.24.20 @ 12:17) >  EXAM:  FOOT BILATERAL (MINIMUM 3 V)                        PROCEDURE DATE:  10/24/2020    INTERPRETATION:  Bilateral feet  HISTORY: Osteomyelitis   Three views of the right foot and three views of the left foot show no evidence of fracture nor destructive change. The joint spaces show left hallux valgus deformity. Surgical hardware projects in the lower left tibia and fibula.  IMPRESSION: No evidence of bony destruction.    Assessment and Plan:   84 y/o woman with PMH of HTN, Diabetes Mellitus 2, GERD and Dementia was sent from ER wound center for evaluation of left foot ulcer and possible Osteomyelitis.  Mild cellulitis in area, unclear if there is OM without MRI to plan for treatment.   10/29 low grade fever and leukocytosis possibly post surgical.     Left foot chronic ulcer r/o OM  - Blood culture neg  - Wound culture in the past with strep and this admission pansensitive pseudomonas.    - OR culture with rare CoNS  - Xray negative for OM  - S/p Left first metatarsal amputation by podiatry on 10/28  - Will follow pathology  - Continue cefepime 1gm q12h , adjust for renal function.   - Proximal margin of amputation showed OM so will need treatment for 6 weeks.   - If plan for discharge to a facility that can get IV, prefer Cefepime 1gm q12 otherwise will switch to oral ciprofloxacin   - Last day would be 12/8/20. Will need CBC, BMP and CRP weekly while on Abx, watch for side effects especillay diarrhea.     Will follow PRN.     Mohit Almonte MD  Division of Infectious Diseases   Cell [phone] between 8am and 6pm   After 6pm and weekends please call ID service at [phone].

## 2020-11-02 NOTE — PROCEDURE NOTE - NSPROCDETAILS_GEN_ALL_CORE
sterile technique, catheter placed/ultrasound assessment/sterile dressing applied/ultrasound guidance/location identified, draped/prepped, sterile technique used/supine position

## 2020-11-02 NOTE — PROGRESS NOTE ADULT - SUBJECTIVE AND OBJECTIVE BOX
[facility] -- Sarahi Meraz Grossman, [place], Alejandro Pluliam Savella, [place]  Office # [phone]    Follow Up:  Preop/Postop Optimization    Subjective/Observations: Awake and alert but confused.  Comfortable on RA.  Denies foot pain.  Denies any respiratory or cardiac discomfort    REVIEW OF SYSTEMS: All other review of systems is negative unless indicated above  PAST MEDICAL & SURGICAL HISTORY:  Dementia    DM (diabetes mellitus)    HLD (hyperlipidemia)    HTN (hypertension)    GERD (gastroesophageal reflux disease)    History of cholecystectomy    Ankle injury  s/p ankle surgery, pt doesn&#x27;t remember which ankle    MEDICATIONS  (STANDING):  amLODIPine   Tablet 10 milliGRAM(s) Oral daily  ascorbic acid 500 milliGRAM(s) Oral two times a day  aspirin  chewable 81 milliGRAM(s) Oral daily  atorvastatin 20 milliGRAM(s) Oral at bedtime  carvedilol 6.25 milliGRAM(s) Oral two times a day  cefepime   IVPB 1000 milliGRAM(s) IV Intermittent every 12 hours  dextrose 5%. 1000 milliLiter(s) (50 mL/Hr) IV Continuous <Continuous>  dextrose 50% Injectable 25 Gram(s) IV Push once  dextrose 50% Injectable 25 Gram(s) IV Push once  dextrose 50% Injectable 12.5 Gram(s) IV Push once  donepezil 5 milliGRAM(s) Oral at bedtime  heparin   Injectable 5000 Unit(s) SubCutaneous every 12 hours  insulin lispro (ADMELOG) corrective regimen sliding scale   SubCutaneous three times a day before meals  insulin lispro (ADMELOG) corrective regimen sliding scale   SubCutaneous at bedtime  losartan 100 milliGRAM(s) Oral daily  multivitamin/minerals 1 Tablet(s) Oral daily  PARoxetine 20 milliGRAM(s) Oral daily  senna 2 Tablet(s) Oral at bedtime    MEDICATIONS  (PRN):  acetaminophen   Tablet .. 650 milliGRAM(s) Oral every 6 hours PRN Mild Pain (1 - 3)  dextrose 40% Gel 15 Gram(s) Oral once PRN Blood Glucose LESS THAN 70 milliGRAM(s)/deciliter  glucagon  Injectable 1 milliGRAM(s) IntraMuscular once PRN Glucose LESS THAN 70 milligrams/deciliter  glucagon  Injectable 1 milliGRAM(s) IntraMuscular once PRN Glucose LESS THAN 70 milligrams/deciliter  polyethylene glycol 3350 17 Gram(s) Oral daily PRN Constipation    Allergies    sulfa drugs (Unknown)    Intolerances    Vital Signs Last 24 Hrs  T(C): 36.9 (02 Nov 2020 05:06), Max: 36.9 (01 Nov 2020 21:24)  T(F): 98.5 (02 Nov 2020 05:06), Max: 98.5 (02 Nov 2020 05:06)  HR: 60 (02 Nov 2020 05:06) (59 - 64)  BP: 115/58 (02 Nov 2020 05:06) (115/58 - 131/65)  BP(mean): --  RR: 17 (02 Nov 2020 05:06) (16 - 17)  SpO2: 96% (02 Nov 2020 05:06) (94% - 97%)  I&O's Summary    01 Nov 2020 07:01  -  02 Nov 2020 07:00  --------------------------------------------------------  IN: 770 mL / OUT: 1300 mL / NET: -530 mL      PHYSICAL EXAM:  TELE: Not on tele  Constitutional: NAD, awake and alert, cachectic  HEENT: Moist Mucous Membranes, Anicteric  Pulmonary: Non-labored, breath sounds are clear bilaterally, No wheezing, rales or rhonchi  Cardiovascular: Regular, S1 and S2, No murmurs, rubs, gallops or clicks  Gastrointestinal: Bowel Sounds present, soft, nontender.   Lymph: No peripheral edema. No lymphadenopathy.  Skin: No visible rashes. Left foot dressing dry and intact.  Psych:  Mood & affect: Confused and disoriented  LABS: All Labs Reviewed:                        8.6    7.24  )-----------( 240      ( 01 Nov 2020 06:59 )             26.3                         8.6    7.22  )-----------( 201      ( 31 Oct 2020 09:35 )             26.4                         8.7    9.61  )-----------( 198      ( 30 Oct 2020 08:57 )             27.4     01 Nov 2020 06:59    143    |  112    |  47     ----------------------------<  136    4.6     |  23     |  1.00   31 Oct 2020 09:35    144    |  111    |  37     ----------------------------<  166    4.1     |  24     |  0.95   30 Oct 2020 08:57    144    |  112    |  34     ----------------------------<  153    4.1     |  26     |  1.00     Ca    9.2        01 Nov 2020 06:59  Ca    8.8        31 Oct 2020 09:35  Ca    9.3        30 Oct 2020 08:57       EXAM:  ECHO TTE WO CON COMP W DOPP         PROCEDURE DATE:  10/25/2020        INTERPRETATION:  INDICATION: Dyspnea  Sonographer AS    Blood Pressure 156/62    Height 165 cm     Weight 45.4 kg       BSA 1.4 sq cm    Dimensions:  LA 3.4       NormalValues: 2.0 - 4.0 cm  Ao 2.9        Normal Values: 2.0 - 3.8 cm  SEPTUM 1.2       Normal Values: 0.6 - 1.2 cm  PWT 1.1       Normal Values: 0.6 - 1.1 cm  LVIDd 3.1         Normal Values: 3.0 - 5.6 cm  LVIDs 1.7         Normal Values: 1.8 - 4.0 cm      OBSERVATIONS:  Technically difficult study, patient uncooperative  Mitral Valve: Thickened leaflets, mild MR.  Aortic Valve/Aorta: Calcified trileaflet aortic valve with grossly normal opening. Trace AI  Tricuspid Valve: Mild TR.  Pulmonic Valve: Not well-visualized  Left Atrium: normal  Right Atrium: Not well-visualized  Left Ventricle: normal LV size and systolic function, estimated LVEF of 65%.  Right Ventricle: Grossly normal size and systolic function.  Pericardium/Pleura: normal, no significant pericardial effusion.  Pulmonary/RV Pressure: estimated PA systolic pressure of 37 mmHg assuming an RA pressure of 3 mmHg.    Conclusion:  Technically difficult study  Normal left ventricular internal dimensions and systolic function, estimated LVEF of 65%.  Grossly normal RV size and systolic function.  Calcified trileaflet aortic valve with grossly normal opening. Trace AI  Mild MR and TR.  No significant pericardial effusion.      WILEY BROWN MD; Attending Cardiologist  This document has been electronically signed. Oct 26 2020  8:10AM      EXAM:  XR CHEST PORTABLE URGENT 1V                            PROCEDURE DATE:  10/24/2020     INTERPRETATION:  Chest one view    HISTORY: Preop    COMPARISON STUDY: 8/29/2020    Frontal expiratory view of the chest shows the heart to be borderline in size. The lungs show mild to moderate central congestion with small left infiltrate/effusion and there is no evidence of pneumothorax nor right pleural effusion. Right upper quadrant clips are present.    IMPRESSION:  Left lower chest infiltrate with effusion.    Thank you for the courtesy of this referral.    TEMO GUTIERREZ MD; Attending Interventional Radiologist  This document has been electronically signed. Oct 24 2020  1:18PM      Ventricular Rate 60 BPM    Atrial Rate 60 BPM    P-R Interval 172 ms    QRS Duration 78 ms    Q-T Interval 432 ms    QTC Calculation(Bazett) 432 ms    P Axis 63 degrees    R Axis 35 degrees    T Axis 48 degrees    Diagnosis Line Poor data quality  Normal sinus rhythm  Normal ECG  When compared with ECG of 20-SEP-2018 13:45,  No significant change was found    Confirmed by Ralph Austin MD ([id number]) on 10/25/2020 11:39:49 AM

## 2020-11-02 NOTE — PROGRESS NOTE ADULT - SUBJECTIVE AND OBJECTIVE BOX
88y year old Female seen at [facility] 3WES 362 W1 for s/p Left 1st metatarsal head resection secondary to osteomyelitis with Dr. Mullins, DOS 10/28/20. Denies any fever, chills, nausea, vomiting, chest pain, shortness of breath, or calf pain at this time.    Allergies    sulfa drugs (Unknown)    Intolerances    REVIEW OF SYSTEMS    PAST MEDICAL & SURGICAL HISTORY:  Dementia    DM (diabetes mellitus)    HLD (hyperlipidemia)    HTN (hypertension)    GERD (gastroesophageal reflux disease)    History of cholecystectomy    Ankle injury        MEDICATIONS  (STANDING):  amLODIPine   Tablet 10 milliGRAM(s) Oral daily  ascorbic acid 500 milliGRAM(s) Oral two times a day  aspirin  chewable 81 milliGRAM(s) Oral daily  atorvastatin 20 milliGRAM(s) Oral at bedtime  carvedilol 6.25 milliGRAM(s) Oral two times a day  cefepime   IVPB 1000 milliGRAM(s) IV Intermittent every 12 hours  dextrose 5%. 1000 milliLiter(s) (50 mL/Hr) IV Continuous <Continuous>  dextrose 50% Injectable 25 Gram(s) IV Push once  dextrose 50% Injectable 25 Gram(s) IV Push once  dextrose 50% Injectable 12.5 Gram(s) IV Push once  donepezil 5 milliGRAM(s) Oral at bedtime  heparin   Injectable 5000 Unit(s) SubCutaneous every 12 hours  insulin lispro (ADMELOG) corrective regimen sliding scale   SubCutaneous three times a day before meals  insulin lispro (ADMELOG) corrective regimen sliding scale   SubCutaneous at bedtime  losartan 100 milliGRAM(s) Oral daily  multivitamin/minerals 1 Tablet(s) Oral daily  PARoxetine 20 milliGRAM(s) Oral daily  senna 2 Tablet(s) Oral at bedtime    MEDICATIONS  (PRN):  acetaminophen   Tablet .. 650 milliGRAM(s) Oral every 6 hours PRN Mild Pain (1 - 3)  dextrose 40% Gel 15 Gram(s) Oral once PRN Blood Glucose LESS THAN 70 milliGRAM(s)/deciliter  glucagon  Injectable 1 milliGRAM(s) IntraMuscular once PRN Glucose LESS THAN 70 milligrams/deciliter  glucagon  Injectable 1 milliGRAM(s) IntraMuscular once PRN Glucose LESS THAN 70 milligrams/deciliter  polyethylene glycol 3350 17 Gram(s) Oral daily PRN Constipation      Vital Signs Last 24 Hrs  T(C): 36.3 (02 Nov 2020 07:40), Max: 36.9 (01 Nov 2020 21:24)  T(F): 97.4 (02 Nov 2020 07:40), Max: 98.5 (02 Nov 2020 05:06)  HR: 56 (02 Nov 2020 07:40) (56 - 64)  BP: 118/54 (02 Nov 2020 07:40) (115/58 - 131/65)  BP(mean): --  RR: 15 (02 Nov 2020 07:40) (15 - 17)  SpO2: 97% (02 Nov 2020 07:40) (94% - 97%)      PHYSICAL EXAM:  Vascular: DP & PT nonpalpable bilaterally, Capillary refill 3 seconds, Minimal edema along the left 1st metatarsal   Neurological: Light touch sensation intact bilaterally  Musculoskeletal: Severely contracted left lower extremity, s/p Left 1st metatarsal head resection, left hallux in good alignment and rectus position  Dermatological: -  1. s/p Left 1st metatarsal head resection- sutures intact, skin well approximated, dried sanguineous drainage, minimal edema, no erythema, no mal odor( dorsal incision)  2. Grade 3- 0.3cmx0.3x0.3 cm along the medial aspect of the left 1st metatarsal- sanguineous drainage       CBC Full  -  ( 02 Nov 2020 09:13 )  WBC Count : 6.03 K/uL  RBC Count : 3.50 M/uL  Hemoglobin : 9.6 g/dL  Hematocrit : 29.0 %  Platelet Count - Automated : 278 K/uL  Mean Cell Volume : 82.9 fl  Mean Cell Hemoglobin : 27.4 pg  Mean Cell Hemoglobin Concentration : 33.1 gm/dL  Auto Neutrophil # : x  Auto Lymphocyte # : x  Auto Monocyte # : x  Auto Eosinophil # : x  Auto Basophil # : x  Auto Neutrophil % : x  Auto Lymphocyte % : x  Auto Monocyte % : x  Auto Eosinophil % : x  Auto Basophil % : x        ----------CHEM PANEL----------    11-02    143  |  110<H>  |  40<H>  ----------------------------<  144<H>  4.1   |  26  |  1.10    Ca    9.2      02 Nov 2020 09:13              Culture - Tissue with Gram Stain (collected 28 Oct 2020 21:04)  Source: .Tissue Other, ist metatarsal head left  Gram Stain (28 Oct 2020 23:46):    No polymorphonuclear cells seen per low power field    No organisms seen  Preliminary Report (29 Oct 2020 17:02):    No growth        Imaging: ----------  Left Foot Xray:  Post surgical changes- s/p Left 1st metatarsal head resection     ACCESSION No:  30 [id number]    NELLIE NICOLE              2        Surgical Final Report          Final Diagnosis  1. Bone, 1st metatarsal head, left:  Mild chronic osteomyelitis.    2. Bone, 1 st metatarsal  proximal margin, left:  Mild chronic osteomyelitis.    3. Bone, sesamoid, left:  Mild chronic osteomyelitis of bone.    4. Bone, base, proximal phalanx, left:  Mild chronic osteomyelitis.    Verified by: Mere Medina M.D.  (Electronic Signature)  Reported on: 10/30/20 11:54 EDT, [facility], [address] [place] [address]  Phone: [phone]   Fax: [phone]  _________________________________________________________________    Clinical History  Osteomyelitis first metatarsal head  Procedure: Resection of 1st metatarsal head left foot    Specimen(s) Submitted  1-Left 1st metatarsal head  2-Left 1st metatarsal clean proximal margin  3-Left sesamoid bone  4-Left base proximal phalanx    Gross Description  1. The specimen is received in formalin and the specimen  container is labeled: Left 1st metatarsal head. It consists of a  piece of tan-yellow bony tissue admixed with pink-tan soft tissue  measuring 2.2 x 2.0 x 1.2 cm. The bone cut surface is yellow and  spongy. Entirely submitted following selective decalcification.  Five cassettes.  A-D - bony tissue  E - soft tissue    2. The specimen is received in formalin and the specimen  container is labeled: Left 1st metatarsal clean proximalmargin.  It consists of an ovoid piece of tan-yellow bony tissue with a  smooth clean surgical cut measuring 1.7 x 1.0 x 0.3 cm. Entirely  submitted following decalcification.    3. The specimen is received in formalin and the specimen  container is labeled:            NELLIE NICOLE              2        Surgical Final Report          Left sesamoid bone. It consists of a piece of tan-yellow bony  tissue measuring 3.0 x 1.7 x 1.0 cm. The bone cut surface is  yellow and spongy. Entirely submitted following decalcification.  Three cassettes.    4. The specimen is received in formalin and the specimen  container is labeled: Left base proximal phalanx. It consists of  a piece of tan-yellow bony tissue with attached white-tan soft  tissue measuring 1.2 x 1.0 x 0.3 cm. The bone cut surface is  yellow and spongy. Entirely submitted following decalcification.  One cassette.    In addition to other data that may appear on the specimen  containers, all labels have been inspected to confirm the  presence of the patient's name and date of birth.  ALEXEI Dacosta ([facility]) 10/30/2020 10/30/2020 7:59 AM    Perioperative Diagnosis  Osteomyelitis left 1st metatarsal head    Postoperative Diagnosis  Same    Disclaimer  Histological processing and microscopic evaluation performed at  [facility], [address] [place] [address].      Surgical Consult Report

## 2020-11-02 NOTE — PROGRESS NOTE ADULT - SUBJECTIVE AND OBJECTIVE BOX
Patient is a 88y old  Female who presents with a chief complaint of Osteomyletis (02 Nov 2020 07:21)      INTERVAL HPI/OVERNIGHT EVENTS:     MEDICATIONS  (STANDING):  amLODIPine   Tablet 10 milliGRAM(s) Oral daily  ascorbic acid 500 milliGRAM(s) Oral two times a day  aspirin  chewable 81 milliGRAM(s) Oral daily  atorvastatin 20 milliGRAM(s) Oral at bedtime  carvedilol 6.25 milliGRAM(s) Oral two times a day  cefepime   IVPB 1000 milliGRAM(s) IV Intermittent every 12 hours  dextrose 5%. 1000 milliLiter(s) (50 mL/Hr) IV Continuous <Continuous>  dextrose 50% Injectable 25 Gram(s) IV Push once  dextrose 50% Injectable 25 Gram(s) IV Push once  dextrose 50% Injectable 12.5 Gram(s) IV Push once  donepezil 5 milliGRAM(s) Oral at bedtime  heparin   Injectable 5000 Unit(s) SubCutaneous every 12 hours  insulin lispro (ADMELOG) corrective regimen sliding scale   SubCutaneous three times a day before meals  insulin lispro (ADMELOG) corrective regimen sliding scale   SubCutaneous at bedtime  losartan 100 milliGRAM(s) Oral daily  multivitamin/minerals 1 Tablet(s) Oral daily  PARoxetine 20 milliGRAM(s) Oral daily  senna 2 Tablet(s) Oral at bedtime    MEDICATIONS  (PRN):  acetaminophen   Tablet .. 650 milliGRAM(s) Oral every 6 hours PRN Mild Pain (1 - 3)  dextrose 40% Gel 15 Gram(s) Oral once PRN Blood Glucose LESS THAN 70 milliGRAM(s)/deciliter  glucagon  Injectable 1 milliGRAM(s) IntraMuscular once PRN Glucose LESS THAN 70 milligrams/deciliter  glucagon  Injectable 1 milliGRAM(s) IntraMuscular once PRN Glucose LESS THAN 70 milligrams/deciliter  polyethylene glycol 3350 17 Gram(s) Oral daily PRN Constipation      Allergies    sulfa drugs (Unknown)    Intolerances    REVIEW OF SYSTEMS:  CONSTITUTIONAL: No fever or chills  HEENT:  No headache, no sore throat  RESPIRATORY: No cough, wheezing, or shortness of breath  CARDIOVASCULAR: No chest pain, palpitations  GASTROINTESTINAL: No abd pain, nausea, vomiting, or diarrhea  GENITOURINARY: No dysuria, frequency, or hematuria  NEUROLOGICAL: no focal weakness or dizziness  MUSCULOSKELETAL: no myalgias     Vital Signs Last 24 Hrs  T(C): 36.9 (02 Nov 2020 05:06), Max: 36.9 (01 Nov 2020 21:24)  T(F): 98.5 (02 Nov 2020 05:06), Max: 98.5 (02 Nov 2020 05:06)  HR: 60 (02 Nov 2020 05:06) (59 - 64)  BP: 115/58 (02 Nov 2020 05:06) (115/58 - 131/65)  BP(mean): --  RR: 17 (02 Nov 2020 05:06) (16 - 17)  SpO2: 96% (02 Nov 2020 05:06) (94% - 97%)    PHYSICAL EXAM:  GENERAL: no acute distress  HEENT: anicteric, no pharyngeal exudates  LUNGS: clear to auscultation, no wheezing or rhonchi appreciated  HEART: S1, S2, regular rate and rhythm, murmur noted, no edema to b/l LE  GASTROINTESTINAL: abdomen is soft, nontender, nondistended, normoactive bowel sounds  EXTREMITIES: contracted LLE and LUE, L foot dry clean dressing. no edema, cyanosis, or calf tenderness.  NEUROLOGIC: answering simple questions and following simple commands appropriately     LABS:    CBC Full  -  ( 01 Nov 2020 06:59 )  WBC Count : 7.24 K/uL  Hemoglobin : 8.6 g/dL  Hematocrit : 26.3 %  Platelet Count - Automated : 240 K/uL  Mean Cell Volume : 83.5 fl  Mean Cell Hemoglobin : 27.3 pg  Mean Cell Hemoglobin Concentration : 32.7 gm/dL  Auto Neutrophil # : x  Auto Lymphocyte # : x  Auto Monocyte # : x  Auto Eosinophil # : x  Auto Basophil # : x  Auto Neutrophil % : x  Auto Lymphocyte % : x  Auto Monocyte % : x  Auto Eosinophil % : x  Auto Basophil % : x      Ca    9.2        01 Nov 2020 06:59          CAPILLARY BLOOD GLUCOSE      POCT Blood Glucose.: 202 mg/dL (01 Nov 2020 21:12)  POCT Blood Glucose.: 135 mg/dL (01 Nov 2020 17:12)  POCT Blood Glucose.: 212 mg/dL (01 Nov 2020 12:02)  POCT Blood Glucose.: 153 mg/dL (01 Nov 2020 08:08)        Culture - Tissue with Gram Stain (collected 10-28-20 @ 21:04)  Source: .Tissue Other, ist metatarsal head left  Gram Stain (10-28-20 @ 23:46):    No polymorphonuclear cells seen per low power field    No organisms seen  Preliminary Report (10-31-20 @ 22:54):    Rare Coag Negative Staphylococcus  Organism: Coag Negative Staphylococcus (10-31-20 @ 22:54)  Organism: Coag Negative Staphylococcus (10-31-20 @ 22:54)      -  Ampicillin/Sulbactam: R <=8/4      -  Cefazolin: R <=4      -  Clindamycin: R <=0.25 This isolate is presumed to be clindamycin resistant based on detection of inducible resistance. Clindamycin may still be effective in some patients.      -  Erythromycin: R >4      -  Gentamicin: S <=1 Should not be used as monotherapy      -  Oxacillin: R >2      -  Penicillin: R >8      -  RIF- Rifampin: S <=1 Should not be used as monotherapy      -  Tetra/Doxy: S <=1      -  Trimethoprim/Sulfamethoxazole: S <=0.5/9.5      -  Vancomycin: S 0.5      Method Type: MALATHI    Culture - Tissue with Gram Stain (collected 10-26-20 @ 15:17)  Source: .Tissue Other, Left  medial 1st Metatarsal  Gram Stain (10-26-20 @ 19:49):    Rare polymorphonuclear leukocytes per low power field    Few Gram positive cocci in pairs per oil power field    Few Gram Variable Rods per oil power field  Final Report (10-31-20 @ 08:50):    Culture yields >4 types of aerobic and/or anaerobic bacteria    Call client services within 7 days if further workup is clinically    indicated. Culture includes    Numerous Pseudomonas aeruginosa  Organism: Pseudomonas aeruginosa (10-31-20 @ 08:50)  Organism: Pseudomonas aeruginosa (10-31-20 @ 08:50)      -  Amikacin: S <=16      -  Aztreonam: S <=4      -  Cefepime: S <=2      -  Ceftazidime: S 4      -  Ciprofloxacin: S <=0.25      -  Gentamicin: S 4      -  Imipenem: S <=1      -  Levofloxacin: S <=0.5      -  Meropenem: S <=1      -  Piperacillin/Tazobactam: S <=8      -  Tobramycin: S <=2      Method Type: MALATHI        RADIOLOGY & ADDITIONAL TESTS:    Personally reviewed.     Consultant(s) Notes Reviewed:  [x] YES  [ ] NO     Patient is a 88y old  Female who presents with a chief complaint of left foot wound.      INTERVAL HPI/OVERNIGHT EVENTS: No acute events overnight. Pt denies pain in her left foot. Denies fever, chills, SOB, CP, diarrhea.     MEDICATIONS  (STANDING):  amLODIPine   Tablet 10 milliGRAM(s) Oral daily  ascorbic acid 500 milliGRAM(s) Oral two times a day  aspirin  chewable 81 milliGRAM(s) Oral daily  atorvastatin 20 milliGRAM(s) Oral at bedtime  carvedilol 6.25 milliGRAM(s) Oral two times a day  cefepime   IVPB 1000 milliGRAM(s) IV Intermittent every 12 hours  dextrose 5%. 1000 milliLiter(s) (50 mL/Hr) IV Continuous <Continuous>  dextrose 50% Injectable 25 Gram(s) IV Push once  dextrose 50% Injectable 25 Gram(s) IV Push once  dextrose 50% Injectable 12.5 Gram(s) IV Push once  donepezil 5 milliGRAM(s) Oral at bedtime  heparin   Injectable 5000 Unit(s) SubCutaneous every 12 hours  insulin lispro (ADMELOG) corrective regimen sliding scale   SubCutaneous three times a day before meals  insulin lispro (ADMELOG) corrective regimen sliding scale   SubCutaneous at bedtime  losartan 100 milliGRAM(s) Oral daily  multivitamin/minerals 1 Tablet(s) Oral daily  PARoxetine 20 milliGRAM(s) Oral daily  senna 2 Tablet(s) Oral at bedtime    MEDICATIONS  (PRN):  acetaminophen   Tablet .. 650 milliGRAM(s) Oral every 6 hours PRN Mild Pain (1 - 3)  dextrose 40% Gel 15 Gram(s) Oral once PRN Blood Glucose LESS THAN 70 milliGRAM(s)/deciliter  glucagon  Injectable 1 milliGRAM(s) IntraMuscular once PRN Glucose LESS THAN 70 milligrams/deciliter  glucagon  Injectable 1 milliGRAM(s) IntraMuscular once PRN Glucose LESS THAN 70 milligrams/deciliter  polyethylene glycol 3350 17 Gram(s) Oral daily PRN Constipation      Allergies    sulfa drugs (Unknown)    Intolerances    REVIEW OF SYSTEMS: limited due to dementia  Denies foot pain, CP, SOB, cough, fever, chills, abd pain, diarrhea.    Vital Signs Last 24 Hrs  T(C): 36.9 (02 Nov 2020 05:06), Max: 36.9 (01 Nov 2020 21:24)  T(F): 98.5 (02 Nov 2020 05:06), Max: 98.5 (02 Nov 2020 05:06)  HR: 60 (02 Nov 2020 05:06) (59 - 64)  BP: 115/58 (02 Nov 2020 05:06) (115/58 - 131/65)  BP(mean): --  RR: 17 (02 Nov 2020 05:06) (16 - 17)  SpO2: 96% (02 Nov 2020 05:06) (94% - 97%)    PHYSICAL EXAM:  GENERAL: no acute distress  HEENT: anicteric, no pharyngeal exudates  LUNGS: clear to auscultation, no wheezing or rhonchi appreciated  HEART: S1, S2, regular rate and rhythm, murmur noted, no edema to b/l LE  GASTROINTESTINAL: abdomen is soft, nontender, nondistended, normoactive bowel sounds  EXTREMITIES: contracted LLE and LUE, L foot dry clean dressing. no edema, cyanosis, or calf tenderness.  NEUROLOGIC: answering simple questions and following simple commands appropriately     LABS:                          9.6    6.03  )-----------( 278      ( 02 Nov 2020 09:13 )             29.0     CBC Full  -  ( 02 Nov 2020 09:13 )  WBC Count : 6.03 K/uL  Hemoglobin : 9.6 g/dL  Hematocrit : 29.0 %  Platelet Count - Automated : 278 K/uL  Mean Cell Volume : 82.9 fl  Mean Cell Hemoglobin : 27.4 pg  Mean Cell Hemoglobin Concentration : 33.1 gm/dL  Auto Neutrophil # : x  Auto Lymphocyte # : x  Auto Monocyte # : x  Auto Eosinophil # : x  Auto Basophil # : x  Auto Neutrophil % : x  Auto Lymphocyte % : x  Auto Monocyte % : x  Auto Eosinophil % : x  Auto Basophil % : x    11-02    143  |  110<H>  |  40<H>  ----------------------------<  144<H>  4.1   |  26  |  1.10    Ca    9.2      02 Nov 2020 09:13              CAPILLARY BLOOD GLUCOSE      POCT Blood Glucose.: 202 mg/dL (01 Nov 2020 21:12)  POCT Blood Glucose.: 135 mg/dL (01 Nov 2020 17:12)  POCT Blood Glucose.: 212 mg/dL (01 Nov 2020 12:02)  POCT Blood Glucose.: 153 mg/dL (01 Nov 2020 08:08)        Culture - Tissue with Gram Stain (collected 10-28-20 @ 21:04)  Source: .Tissue Other, ist metatarsal head left  Gram Stain (10-28-20 @ 23:46):    No polymorphonuclear cells seen per low power field    No organisms seen  Preliminary Report (10-31-20 @ 22:54):    Rare Coag Negative Staphylococcus  Organism: Coag Negative Staphylococcus (10-31-20 @ 22:54)  Organism: Coag Negative Staphylococcus (10-31-20 @ 22:54)      -  Ampicillin/Sulbactam: R <=8/4      -  Cefazolin: R <=4      -  Clindamycin: R <=0.25 This isolate is presumed to be clindamycin resistant based on detection of inducible resistance. Clindamycin may still be effective in some patients.      -  Erythromycin: R >4      -  Gentamicin: S <=1 Should not be used as monotherapy      -  Oxacillin: R >2      -  Penicillin: R >8      -  RIF- Rifampin: S <=1 Should not be used as monotherapy      -  Tetra/Doxy: S <=1      -  Trimethoprim/Sulfamethoxazole: S <=0.5/9.5      -  Vancomycin: S 0.5      Method Type: MALATHI    Culture - Tissue with Gram Stain (collected 10-26-20 @ 15:17)  Source: .Tissue Other, Left  medial 1st Metatarsal  Gram Stain (10-26-20 @ 19:49):    Rare polymorphonuclear leukocytes per low power field    Few Gram positive cocci in pairs per oil power field    Few Gram Variable Rods per oil power field  Final Report (10-31-20 @ 08:50):    Culture yields >4 types of aerobic and/or anaerobic bacteria    Call client services within 7 days if further workup is clinically    indicated. Culture includes    Numerous Pseudomonas aeruginosa  Organism: Pseudomonas aeruginosa (10-31-20 @ 08:50)  Organism: Pseudomonas aeruginosa (10-31-20 @ 08:50)      -  Amikacin: S <=16      -  Aztreonam: S <=4      -  Cefepime: S <=2      -  Ceftazidime: S 4      -  Ciprofloxacin: S <=0.25      -  Gentamicin: S 4      -  Imipenem: S <=1      -  Levofloxacin: S <=0.5      -  Meropenem: S <=1      -  Piperacillin/Tazobactam: S <=8      -  Tobramycin: S <=2      Method Type: MALATHI        RADIOLOGY & ADDITIONAL TESTS:    Personally reviewed.     Consultant(s) Notes Reviewed:  [x] YES  [ ] NO

## 2020-11-02 NOTE — PROGRESS NOTE ADULT - ASSESSMENT
85 year old female with PMH of HTN, HLD, DM2 on home insulin, GERD, Dementia, sent by ER from wound care [facility] for evaluation for concern of left metatarsal of foot. Cardiology consultation called for surgical clearance     HTN  - BP stable and  controlled  - Continue amlodipine 10, carvedilol 6.25, losartan 100  - Monitor routine hemodynamics.   - TTE Technically difficult study nL LV & RV size and function no significant valvular dysfunction EF 65%  - Monitor and replete lytes, keep K>4, Mg>2.    HLD  - Continue Lipitor 20 mg PO QHS      Osteo  - S/p 1st metatarsal head resection w/o cardiac complications. Path pending  - ID following; rec 6 weeks of Abx    - All other medical needs as per primary team.  - Stable from cardiac standpoint  - Will continue to follow.  - D/C planning as per primary team and MARCIO Chavez DNP, NP-C  Cardiology   Spectra #[phone]

## 2020-11-03 PROCEDURE — 99233 SBSQ HOSP IP/OBS HIGH 50: CPT | Mod: GC

## 2020-11-03 PROCEDURE — 99232 SBSQ HOSP IP/OBS MODERATE 35: CPT

## 2020-11-03 PROCEDURE — 99024 POSTOP FOLLOW-UP VISIT: CPT

## 2020-11-03 RX ORDER — CEFIDEROCOL SULFATE TOSYLATE 1 G/10ML
1 INJECTION, POWDER, FOR SOLUTION INTRAVENOUS
Qty: 70 | Refills: 0
Start: 2020-11-03 | End: 2020-12-07

## 2020-11-03 RX ORDER — CEFEPIME 1 G/1
1 INJECTION, POWDER, FOR SOLUTION INTRAMUSCULAR; INTRAVENOUS
Qty: 70 | Refills: 0
Start: 2020-11-03

## 2020-11-03 RX ADMIN — DONEPEZIL HYDROCHLORIDE 5 MILLIGRAM(S): 10 TABLET, FILM COATED ORAL at 21:15

## 2020-11-03 RX ADMIN — CARVEDILOL PHOSPHATE 6.25 MILLIGRAM(S): 80 CAPSULE, EXTENDED RELEASE ORAL at 17:16

## 2020-11-03 RX ADMIN — Medication 1 TABLET(S): at 11:27

## 2020-11-03 RX ADMIN — AMLODIPINE BESYLATE 10 MILLIGRAM(S): 2.5 TABLET ORAL at 05:35

## 2020-11-03 RX ADMIN — CEFEPIME 100 MILLIGRAM(S): 1 INJECTION, POWDER, FOR SOLUTION INTRAMUSCULAR; INTRAVENOUS at 17:16

## 2020-11-03 RX ADMIN — HEPARIN SODIUM 5000 UNIT(S): 5000 INJECTION INTRAVENOUS; SUBCUTANEOUS at 05:35

## 2020-11-03 RX ADMIN — CEFEPIME 100 MILLIGRAM(S): 1 INJECTION, POWDER, FOR SOLUTION INTRAMUSCULAR; INTRAVENOUS at 05:36

## 2020-11-03 RX ADMIN — CHLORHEXIDINE GLUCONATE 1 APPLICATION(S): 213 SOLUTION TOPICAL at 05:36

## 2020-11-03 RX ADMIN — Medication 500 MILLIGRAM(S): at 05:35

## 2020-11-03 RX ADMIN — LOSARTAN POTASSIUM 100 MILLIGRAM(S): 100 TABLET, FILM COATED ORAL at 05:35

## 2020-11-03 RX ADMIN — SENNA PLUS 2 TABLET(S): 8.6 TABLET ORAL at 21:15

## 2020-11-03 RX ADMIN — Medication 20 MILLIGRAM(S): at 11:27

## 2020-11-03 RX ADMIN — HEPARIN SODIUM 5000 UNIT(S): 5000 INJECTION INTRAVENOUS; SUBCUTANEOUS at 17:16

## 2020-11-03 RX ADMIN — Medication 500 MILLIGRAM(S): at 17:16

## 2020-11-03 RX ADMIN — Medication 81 MILLIGRAM(S): at 11:27

## 2020-11-03 RX ADMIN — ATORVASTATIN CALCIUM 20 MILLIGRAM(S): 80 TABLET, FILM COATED ORAL at 21:15

## 2020-11-03 NOTE — PROGRESS NOTE ADULT - ASSESSMENT
85 year old female with PMH of HTN, HLD, DM2 on home insulin, GERD, Dementia, sent by ER from wound care [facility] for evaluation for concern of left metatarsal of foot. Cardiology consultation called for surgical clearance     HTN  - BP stable and  controlled  - Continue amlodipine 10, carvedilol 6.25, losartan 100  - Monitor routine hemodynamics.   - TTE Technically difficult study nL LV & RV size and function no significant valvular dysfunction EF 65%  - Monitor and replete lytes, keep K>4, Mg>2.    HLD  - Continue Lipitor 20 mg PO QHS      Osteo  - S/p 1st metatarsal head resection w/o cardiac complications. Path pending  - ID following; rec 6 weeks of Abx    - All other medical needs as per primary team.  - Stable from cardiac standpoint  - Will continue to follow.  - D/C planning as per primary team and CM    Raffy Starr DNP, ANP-c  Cardiology   Spectra #[phone]  [phone]

## 2020-11-03 NOTE — PROGRESS NOTE ADULT - SUBJECTIVE AND OBJECTIVE BOX
[facility] -- Sarahi Meraz, Akil, Norman, Alejandro Pulliam Savella  Office # [phone]      Follow Up:    Preop eval/Post follow up   Subjective/Observations:   No events overnight resting comfortably in bed.  No complaints of chest pain, dyspnea, or palpitations reported. No signs of orthopnea or PND.     REVIEW OF SYSTEMS: All other review of systems is negative unless indicated above    PAST MEDICAL & SURGICAL HISTORY:  Dementia    DM (diabetes mellitus)    HLD (hyperlipidemia)    HTN (hypertension)    GERD (gastroesophageal reflux disease)    History of cholecystectomy    Ankle injury  s/p ankle surgery, pt doesn&#x27;t remember which ankle        MEDICATIONS  (STANDING):  amLODIPine   Tablet 10 milliGRAM(s) Oral daily  ascorbic acid 500 milliGRAM(s) Oral two times a day  aspirin  chewable 81 milliGRAM(s) Oral daily  atorvastatin 20 milliGRAM(s) Oral at bedtime  carvedilol 6.25 milliGRAM(s) Oral two times a day  cefepime   IVPB 1000 milliGRAM(s) IV Intermittent every 12 hours  chlorhexidine 4% Liquid 1 Application(s) Topical <User Schedule>  dextrose 5%. 1000 milliLiter(s) (50 mL/Hr) IV Continuous <Continuous>  dextrose 50% Injectable 25 Gram(s) IV Push once  dextrose 50% Injectable 25 Gram(s) IV Push once  dextrose 50% Injectable 12.5 Gram(s) IV Push once  donepezil 5 milliGRAM(s) Oral at bedtime  heparin   Injectable 5000 Unit(s) SubCutaneous every 12 hours  insulin lispro (ADMELOG) corrective regimen sliding scale   SubCutaneous three times a day before meals  insulin lispro (ADMELOG) corrective regimen sliding scale   SubCutaneous at bedtime  losartan 100 milliGRAM(s) Oral daily  multivitamin/minerals 1 Tablet(s) Oral daily  PARoxetine 20 milliGRAM(s) Oral daily  senna 2 Tablet(s) Oral at bedtime    MEDICATIONS  (PRN):  acetaminophen   Tablet .. 650 milliGRAM(s) Oral every 6 hours PRN Mild Pain (1 - 3)  dextrose 40% Gel 15 Gram(s) Oral once PRN Blood Glucose LESS THAN 70 milliGRAM(s)/deciliter  glucagon  Injectable 1 milliGRAM(s) IntraMuscular once PRN Glucose LESS THAN 70 milligrams/deciliter  glucagon  Injectable 1 milliGRAM(s) IntraMuscular once PRN Glucose LESS THAN 70 milligrams/deciliter  polyethylene glycol 3350 17 Gram(s) Oral daily PRN Constipation  sodium chloride 0.9% lock flush 10 milliLiter(s) IV Push every 1 hour PRN Pre/post blood products, medications, blood draw, and to maintain line patency      Allergies    sulfa drugs (Unknown)    Intolerances        Vital Signs Last 24 Hrs  T(C): 36.7 (03 Nov 2020 05:15), Max: 37.1 (02 Nov 2020 12:57)  T(F): 98.1 (03 Nov 2020 05:15), Max: 98.7 (02 Nov 2020 12:57)  HR: 59 (03 Nov 2020 05:15) (59 - 61)  BP: 130/59 (03 Nov 2020 05:15) (106/53 - 131/57)  BP(mean): --  RR: 17 (03 Nov 2020 05:15) (16 - 18)  SpO2: 100% (03 Nov 2020 05:15) (96% - 100%)    I&O's Summary    02 Nov 2020 07:01  -  03 Nov 2020 07:00  --------------------------------------------------------  IN: 450 mL / OUT: 550 mL / NET: -100 mL          PHYSICAL EXAM:  TELE: Off tele   Constitutional: NAD, awake and alert, well-developed  HEENT: Moist Mucous Membranes, Anicteric  Pulmonary: Non-labored, breath sounds are clear bilaterally, No wheezing, crackles or rhonchi  Cardiovascular: Regular, S1 and S2 nl, + murmur No rubs, gallops or clicks   Gastrointestinal: Bowel Sounds present, soft, nontender.   Lymph: No lymphadenopathy. No peripheral edema.  Skin: No visible rashes or ulcers.  Psych:  Mood & affect appropriate    LABS: All Labs Reviewed:                        9.6    6.03  )-----------( 278      ( 02 Nov 2020 09:13 )             29.0                         8.6    7.24  )-----------( 240      ( 01 Nov 2020 06:59 )             26.3                         8.6    7.22  )-----------( 201      ( 31 Oct 2020 09:35 )             26.4     02 Nov 2020 09:13    143    |  110    |  40     ----------------------------<  144    4.1     |  26     |  1.10   01 Nov 2020 06:59    143    |  112    |  47     ----------------------------<  136    4.6     |  23     |  1.00   31 Oct 2020 09:35    144    |  111    |  37     ----------------------------<  166    4.1     |  24     |  0.95     Ca    9.2        02 Nov 2020 09:13  Ca    9.2        01 Nov 2020 06:59  Ca    8.8        31 Oct 2020 09:35      EXAM:  ECHO TTE WO CON COMP W DOPP         PROCEDURE DATE:  10/25/2020        INTERPRETATION:  INDICATION: Dyspnea  Sonographer AS    Blood Pressure 156/62    Height 165 cm     Weight 45.4 kg       BSA 1.4 sq cm    Dimensions:  LA 3.4       NormalValues: 2.0 - 4.0 cm  Ao 2.9        Normal Values: 2.0 - 3.8 cm  SEPTUM 1.2       Normal Values: 0.6 - 1.2 cm  PWT 1.1       Normal Values: 0.6 - 1.1 cm  LVIDd 3.1         Normal Values: 3.0 - 5.6 cm  LVIDs 1.7         Normal Values: 1.8 - 4.0 cm      OBSERVATIONS:  Technically difficult study, patient uncooperative  Mitral Valve: Thickened leaflets, mild MR.  Aortic Valve/Aorta: Calcified trileaflet aortic valve with grossly normal opening. Trace AI  Tricuspid Valve: Mild TR.  Pulmonic Valve: Not well-visualized  Left Atrium: normal  Right Atrium: Not well-visualized  Left Ventricle: normal LV size and systolic function, estimated LVEF of 65%.  Right Ventricle: Grossly normal size and systolic function.  Pericardium/Pleura: normal, no significant pericardial effusion.  Pulmonary/RV Pressure: estimated PA systolic pressure of 37 mmHg assuming an RA pressure of 3 mmHg.    Conclusion:  Technically difficult study  Normal left ventricular internal dimensions and systolic function, estimated LVEF of 65%.  Grossly normal RV size and systolic function.  Calcified trileaflet aortic valve with grossly normal opening. Trace AI  Mild MR and TR.  No significant pericardial effusion.      WILEY BROWN MD; Attending Cardiologist  This document has been electronically signed. Oct 26 2020  8:10AM      EXAM:  XR CHEST PORTABLE URGENT 1V                            PROCEDURE DATE:  10/24/2020     INTERPRETATION:  Chest one view    HISTORY: Preop    COMPARISON STUDY: 8/29/2020    Frontal expiratory view of the chest shows the heart to be borderline in size. The lungs show mild to moderate central congestion with small left infiltrate/effusion and there is no evidence of pneumothorax nor right pleural effusion. Right upper quadrant clips are present.    IMPRESSION:  Left lower chest infiltrate with effusion.    Thank you for the courtesy of this referral.    TEMO GUTIERREZ MD; Attending Interventional Radiologist  This document has been electronically signed. Oct 24 2020  1:18PM      Ventricular Rate 60 BPM    Atrial Rate 60 BPM    P-R Interval 172 ms    QRS Duration 78 ms    Q-T Interval 432 ms    QTC Calculation(Bazett) 432 ms    P Axis 63 degrees    R Axis 35 degrees    T Axis 48 degrees    Diagnosis Line Poor data quality  Normal sinus rhythm  Normal ECG  When compared with ECG of 20-SEP-2018 13:45,  No significant change was found    Confirmed by Ralph Austin MD ([id number]) on 10/25/2020 11:39:49 AM                       [facility] -- Sarahi Meraz, Akil, Norman, Alejandro Pulliam Savella  Office # [phone]      Follow Up:    Preop eval/Post follow up     Subjective/Observations:   No events overnight resting comfortably in bed.  No complaints of chest pain, dyspnea, or palpitations reported. No signs of orthopnea or PND.     REVIEW OF SYSTEMS: All other review of systems is negative unless indicated above    PAST MEDICAL & SURGICAL HISTORY:  Dementia    DM (diabetes mellitus)    HLD (hyperlipidemia)    HTN (hypertension)    GERD (gastroesophageal reflux disease)    History of cholecystectomy    Ankle injury  s/p ankle surgery, pt doesn&#x27;t remember which ankle        MEDICATIONS  (STANDING):  amLODIPine   Tablet 10 milliGRAM(s) Oral daily  ascorbic acid 500 milliGRAM(s) Oral two times a day  aspirin  chewable 81 milliGRAM(s) Oral daily  atorvastatin 20 milliGRAM(s) Oral at bedtime  carvedilol 6.25 milliGRAM(s) Oral two times a day  cefepime   IVPB 1000 milliGRAM(s) IV Intermittent every 12 hours  chlorhexidine 4% Liquid 1 Application(s) Topical <User Schedule>  dextrose 5%. 1000 milliLiter(s) (50 mL/Hr) IV Continuous <Continuous>  dextrose 50% Injectable 25 Gram(s) IV Push once  dextrose 50% Injectable 25 Gram(s) IV Push once  dextrose 50% Injectable 12.5 Gram(s) IV Push once  donepezil 5 milliGRAM(s) Oral at bedtime  heparin   Injectable 5000 Unit(s) SubCutaneous every 12 hours  insulin lispro (ADMELOG) corrective regimen sliding scale   SubCutaneous three times a day before meals  insulin lispro (ADMELOG) corrective regimen sliding scale   SubCutaneous at bedtime  losartan 100 milliGRAM(s) Oral daily  multivitamin/minerals 1 Tablet(s) Oral daily  PARoxetine 20 milliGRAM(s) Oral daily  senna 2 Tablet(s) Oral at bedtime    MEDICATIONS  (PRN):  acetaminophen   Tablet .. 650 milliGRAM(s) Oral every 6 hours PRN Mild Pain (1 - 3)  dextrose 40% Gel 15 Gram(s) Oral once PRN Blood Glucose LESS THAN 70 milliGRAM(s)/deciliter  glucagon  Injectable 1 milliGRAM(s) IntraMuscular once PRN Glucose LESS THAN 70 milligrams/deciliter  glucagon  Injectable 1 milliGRAM(s) IntraMuscular once PRN Glucose LESS THAN 70 milligrams/deciliter  polyethylene glycol 3350 17 Gram(s) Oral daily PRN Constipation  sodium chloride 0.9% lock flush 10 milliLiter(s) IV Push every 1 hour PRN Pre/post blood products, medications, blood draw, and to maintain line patency      Allergies    sulfa drugs (Unknown)    Intolerances        Vital Signs Last 24 Hrs  T(C): 36.7 (03 Nov 2020 05:15), Max: 37.1 (02 Nov 2020 12:57)  T(F): 98.1 (03 Nov 2020 05:15), Max: 98.7 (02 Nov 2020 12:57)  HR: 59 (03 Nov 2020 05:15) (59 - 61)  BP: 130/59 (03 Nov 2020 05:15) (106/53 - 131/57)  BP(mean): --  RR: 17 (03 Nov 2020 05:15) (16 - 18)  SpO2: 100% (03 Nov 2020 05:15) (96% - 100%)    I&O's Summary    02 Nov 2020 07:01  -  03 Nov 2020 07:00  --------------------------------------------------------  IN: 450 mL / OUT: 550 mL / NET: -100 mL          PHYSICAL EXAM:  TELE: Off tele   Constitutional: NAD, awake and alert, well-developed  HEENT: Moist Mucous Membranes, Anicteric  Pulmonary: Non-labored, breath sounds are clear bilaterally, No wheezing, crackles or rhonchi  Cardiovascular: Regular, S1 and S2 nl, + murmur No rubs, gallops or clicks   Gastrointestinal: Bowel Sounds present, soft, nontender.   Lymph: No lymphadenopathy. No peripheral edema.  Skin: No visible rashes or ulcers.  Psych:  Mood & affect appropriate    LABS: All Labs Reviewed:                        9.6    6.03  )-----------( 278      ( 02 Nov 2020 09:13 )             29.0                         8.6    7.24  )-----------( 240      ( 01 Nov 2020 06:59 )             26.3                         8.6    7.22  )-----------( 201      ( 31 Oct 2020 09:35 )             26.4     02 Nov 2020 09:13    143    |  110    |  40     ----------------------------<  144    4.1     |  26     |  1.10   01 Nov 2020 06:59    143    |  112    |  47     ----------------------------<  136    4.6     |  23     |  1.00   31 Oct 2020 09:35    144    |  111    |  37     ----------------------------<  166    4.1     |  24     |  0.95     Ca    9.2        02 Nov 2020 09:13  Ca    9.2        01 Nov 2020 06:59  Ca    8.8        31 Oct 2020 09:35      EXAM:  ECHO TTE WO CON COMP W DOPP         PROCEDURE DATE:  10/25/2020        INTERPRETATION:  INDICATION: Dyspnea  Sonographer AS    Blood Pressure 156/62    Height 165 cm     Weight 45.4 kg       BSA 1.4 sq cm    Dimensions:  LA 3.4       NormalValues: 2.0 - 4.0 cm  Ao 2.9        Normal Values: 2.0 - 3.8 cm  SEPTUM 1.2       Normal Values: 0.6 - 1.2 cm  PWT 1.1       Normal Values: 0.6 - 1.1 cm  LVIDd 3.1         Normal Values: 3.0 - 5.6 cm  LVIDs 1.7         Normal Values: 1.8 - 4.0 cm      OBSERVATIONS:  Technically difficult study, patient uncooperative  Mitral Valve: Thickened leaflets, mild MR.  Aortic Valve/Aorta: Calcified trileaflet aortic valve with grossly normal opening. Trace AI  Tricuspid Valve: Mild TR.  Pulmonic Valve: Not well-visualized  Left Atrium: normal  Right Atrium: Not well-visualized  Left Ventricle: normal LV size and systolic function, estimated LVEF of 65%.  Right Ventricle: Grossly normal size and systolic function.  Pericardium/Pleura: normal, no significant pericardial effusion.  Pulmonary/RV Pressure: estimated PA systolic pressure of 37 mmHg assuming an RA pressure of 3 mmHg.    Conclusion:  Technically difficult study  Normal left ventricular internal dimensions and systolic function, estimated LVEF of 65%.  Grossly normal RV size and systolic function.  Calcified trileaflet aortic valve with grossly normal opening. Trace AI  Mild MR and TR.  No significant pericardial effusion.      IWLEY BROWN MD; Attending Cardiologist  This document has been electronically signed. Oct 26 2020  8:10AM      EXAM:  XR CHEST PORTABLE URGENT 1V                            PROCEDURE DATE:  10/24/2020     INTERPRETATION:  Chest one view    HISTORY: Preop    COMPARISON STUDY: 8/29/2020    Frontal expiratory view of the chest shows the heart to be borderline in size. The lungs show mild to moderate central congestion with small left infiltrate/effusion and there is no evidence of pneumothorax nor right pleural effusion. Right upper quadrant clips are present.    IMPRESSION:  Left lower chest infiltrate with effusion.    Thank you for the courtesy of this referral.    TEMO GUTIERREZ MD; Attending Interventional Radiologist  This document has been electronically signed. Oct 24 2020  1:18PM      Ventricular Rate 60 BPM    Atrial Rate 60 BPM    P-R Interval 172 ms    QRS Duration 78 ms    Q-T Interval 432 ms    QTC Calculation(Bazett) 432 ms    P Axis 63 degrees    R Axis 35 degrees    T Axis 48 degrees    Diagnosis Line Poor data quality  Normal sinus rhythm  Normal ECG  When compared with ECG of 20-SEP-2018 13:45,  No significant change was found    Confirmed by Ralph Austin MD ([id number]) on 10/25/2020 11:39:49 AM

## 2020-11-03 NOTE — PROGRESS NOTE ADULT - SUBJECTIVE AND OBJECTIVE BOX
[facility]  INFECTIOUS DISEASES   [address] [place] [address]  Tel: [phone]     Fax: [phone]  =======================================================    [id number]  NELLIE NICOLE     Follow up: foot ulcer and OM    Had left 1st metatarsal head amputation, margins not clean, showed OM.   Awake and alert, no complaint. No fever.     PAST MEDICAL & SURGICAL HISTORY:  Dementia  DM (diabetes mellitus)  HLD (hyperlipidemia)  HTN (hypertension)  GERD (gastroesophageal reflux disease)  History of cholecystectomy  Ankle injury  s/p ankle surgery, pt doesn&#x27;t remember which ankle    Social Hx: no smoking or toxic habits     FAMILY HISTORY:  FH: type 2 diabetes  brother    FH: colon cancer  sister    Allergies  sulfa drugs (Unknown)    Antibiotics:  Cefepime      REVIEW OF SYSTEMS:  Limited due to dementia but states that has left foot pain.     Physical Exam:  Vital Signs Last 24 Hrs  T(C): 36.3 (03 Nov 2020 13:02), Max: 36.8 (02 Nov 2020 20:05)  T(F): 97.4 (03 Nov 2020 13:02), Max: 98.2 (02 Nov 2020 20:05)  HR: 59 (03 Nov 2020 13:02) (59 - 61)  BP: 128/55 (03 Nov 2020 13:02) (106/53 - 130/59)  BP(mean): --  RR: 17 (03 Nov 2020 13:02) (17 - 18)  SpO2: 97% (03 Nov 2020 13:02) (96% - 100%)  GEN: NAD  HEENT: normocephalic and atraumatic. EOMI. PERRL.    NECK: Supple.  No lymphadenopathy   LUNGS: Clear to auscultation.  HEART: Regular rate and rhythm   ABDOMEN: Soft, nontender, and nondistended.  Positive bowel sounds.    : No CVA tenderness  EXTREMITIES: Without edema. left leg in a contracted position, able to extend partially   Now foot is dressed after surgery ( before surgery: Left plantar metatarsal head area with a deep ulcer with mild swelling and erythema in surrounding, no discharge)   NEUROLOGIC: grossly intact.  PSYCHIATRIC: dementia unable to evaluate   SKIN: No rash      Labs:                        9.6    6.03  )-----------( 278      ( 02 Nov 2020 09:13 )             29.0      11-02    143  |  110<H>  |  40<H>  ----------------------------<  144<H>  4.1   |  26  |  1.10    Ca    9.2      02 Nov 2020 09:13      Culture - Tissue with Gram Stain (collected 10-28-20 @ 21:04)  Source: .Tissue Other, ist metatarsal head left  Gram Stain (10-28-20 @ 23:46):    No polymorphonuclear cells seen per low power field    No organisms seen  Final Report (11-02-20 @ 17:40):    Rare Coag Negative Staphylococcus  Organism: Coag Negative Staphylococcus (11-02-20 @ 17:40)  Organism: Coag Negative Staphylococcus (11-02-20 @ 17:40)    Sensitivities:      -  Ampicillin/Sulbactam: R <=8/4      -  Cefazolin: R <=4      -  Clindamycin: R <=0.25 This isolate is presumed to be clindamycin resistant based on detection of inducible resistance. Clindamycin may still be effective in some patients.      -  Erythromycin: R >4      -  Gentamicin: S <=1 Should not be used as monotherapy      -  Oxacillin: R >2      -  Penicillin: R >8      -  RIF- Rifampin: S <=1 Should not be used as monotherapy      -  Tetra/Doxy: S <=1      -  Trimethoprim/Sulfamethoxazole: S <=0.5/9.5      -  Vancomycin: S 0.5      Method Type: MALATHI    Culture - Tissue with Gram Stain (collected 10-26-20 @ 15:17)  Source: .Tissue Other, Left  medial 1st Metatarsal  Gram Stain (10-26-20 @ 19:49):    Rare polymorphonuclear leukocytes per low power field    Few Gram positive cocci in pairs per oil power field    Few Gram Variable Rods per oil power field  Final Report (10-31-20 @ 08:50):    Culture yields >4 types of aerobic and/or anaerobic bacteria    Call client services within 7 days if further workup is clinically    indicated. Culture includes    Numerous Pseudomonas aeruginosa  Organism: Pseudomonas aeruginosa (10-31-20 @ 08:50)  Organism: Pseudomonas aeruginosa (10-31-20 @ 08:50)    Sensitivities:      -  Amikacin: S <=16      -  Aztreonam: S <=4      -  Cefepime: S <=2      -  Ceftazidime: S 4      -  Ciprofloxacin: S <=0.25      -  Gentamicin: S 4      -  Imipenem: S <=1      -  Levofloxacin: S <=0.5      -  Meropenem: S <=1      -  Piperacillin/Tazobactam: S <=8      -  Tobramycin: S <=2      Method Type: MALATHI    Culture - Blood (collected 10-24-20 @ 21:09)  Source: .Blood Blood  Final Report (10-29-20 @ 22:01):    No Growth Final    WBC Count: 6.03 K/uL (11-02-20 @ 09:13)  WBC Count: 7.24 K/uL (11-01-20 @ 06:59)  WBC Count: 7.22 K/uL (10-31-20 @ 09:35)  WBC Count: 9.61 K/uL (10-30-20 @ 08:57)    Creatinine, Serum: 1.10 mg/dL (11-02-20 @ 09:13)  Creatinine, Serum: 1.00 mg/dL (11-01-20 @ 06:59)  Creatinine, Serum: 0.95 mg/dL (10-31-20 @ 09:35)  Creatinine, Serum: 1.00 mg/dL (10-30-20 @ 08:57)    C-Reactive Protein, Serum: <0.10 mg/dL (10-24-20 @ 20:48)  C-Reactive Protein, Serum: <0.10 mg/dL (10-24-20 @ 16:19)    Procalcitonin, Serum: <0.05 (10-24-20 @ 15:06)     COVID-19 PCR: NotDetec (11-01-20 @ 07:34)  COVID-19 IgG Antibody Index: 0.08 Index (10-24-20 @ 20:52)  COVID-19 IgG Antibody Interpretation: Negative (10-24-20 @ 20:52)  COVID-19 PCR: NotDetec (10-24-20 @ 11:56)    All imaging and other data have been reviewed.  < from: Xray Foot AP + Lateral + Oblique, Bilat (10.24.20 @ 12:17) >  EXAM:  FOOT BILATERAL (MINIMUM 3 V)                        PROCEDURE DATE:  10/24/2020    INTERPRETATION:  Bilateral feet  HISTORY: Osteomyelitis   Three views of the right foot and three views of the left foot show no evidence of fracture nor destructive change. The joint spaces show left hallux valgus deformity. Surgical hardware projects in the lower left tibia and fibula.  IMPRESSION: No evidence of bony destruction.    Assessment and Plan:   84 y/o woman with PMH of HTN, Diabetes Mellitus 2, GERD and Dementia was sent from ER wound center for evaluation of left foot ulcer and possible Osteomyelitis.  Mild cellulitis in area, unclear if there is OM without MRI to plan for treatment.   10/29 low grade fever and leukocytosis possibly post surgical.     Left foot chronic ulcer r/o OM  - Blood culture neg  - Wound culture in the past with strep and this admission pansensitive pseudomonas.    - OR culture with rare CoNS most likely not real infection (was on ABx when went to OR)  - Xray negative for OM  - S/p Left first metatarsal amputation by podiatry on 10/28  - Continue cefepime 1gm q12h , adjusted for renal function as per pharmacy protocol.   - Proximal margin of amputation showed OM so will need treatment for 6 weeks.   - Last day would be 12/8/20. Will need CBC, BMP and CRP weekly while on Abx, watch for side effects especially diarrhea/cdiff.     Will follow PRN.     Mohit Almonte MD  Division of Infectious Diseases   Cell [phone] between 8am and 6pm   After 6pm and weekends please call ID service at [phone].

## 2020-11-03 NOTE — PROGRESS NOTE ADULT - ASSESSMENT
87yo F with PMH of HTN, HLD, Diabetes Mellitus Type 2 not on home insulin, GERD, Dementia, sent to the ER from wound care center and admitted for suspected osteomyelitis of 1st metatarsal of left foot, s/p resection of head of 1st metatarsal of L LE on 10/28, found to have chronic OM in bone including the proximal margin.       Diabetic foot ulcer, with osteomyelitis of left first metatarsal head  -s/p L 1st metatarsal head resection on 10/28  -Now on cefepime 2gm q12h renally dosed, per ID (Gage), recs appreciated, given wound culture positive for pansensitive pseudomonas aeruginosa.   -Proximal margin of amputation showed OM so will need treatment for 6 weeks.   -DC Abx plan per ID: preferred Cefepime 2gm q12 if possible logistically, otherwise will switch to oral ciprofloxacin 500mg q12 to complete 6 weeks from the day of start of cefepime. While on Cipro needs QTc monitoring. Also will need to monitor for possible c diff, if pt has diarrhea.  -discussed with pt's son, Marcio, who agreed to administer Abx for the pt in her home; spoke with CM who will arrange for home cefepime.  -coordinated with IR for PICC line which was placed yesterday 11/2  -Tissue Cx from OR 10/28 rare CNS   -F/u Blood cx x2 10/24 NGF  -adequate vascular flow per L leg ABIs  -Podiatry, Dr. Mullins group following, recs appreciated -- dressing changes per podiatry    Deep Tissue Injury  1) sacral 2) L scapular DTIs, Stage 1 pressure ulcer  3) Skin tear R thigh 2/2 LLE contracture with erythema and edema on medical aspect of L foot  D/w nursing team 10/31 who noted improvement  Wound care RN consulted - DC'd by wound care RN as no longer warranted  Turn in bed, reposition frequently  keep towel between abrasion and LLE     Pulmonary vascular congestion and left pleural effusion r/o occult CHF  -CXR on admission showed L infiltrate w/ effusion  -Patient currently appears euvolemic on exam  -Trops neg, BNP 1100  -TTE EF65% mild valvular disease    DM2, noninsulin dependent.  hold home metformin  start low dose ISS, fingersticks    Dementia  advanced dementia ; baseline bedbound, at baseline knows her name, can identify her family, forgets their names, eats well (regular solid food)  cont donepezil   cont paroxetine.    Iron deficiency anemia  Hgb stable  hold home iron    HLD  atorvastatin interchange for lovastatin.    HTN  cont carvedilol, amlodipine, and losartan with hold parameters.    GERD  Patient on omeprazole: will use protonix in house    Depression  c/w paroxetine    PPx  heparin subq, resumed post op  bowel regimen    DISPO  DNR DNI, PC consulted, recs appreciated  PT consulted: Home w 24 hr care  Wound care clinic f/u within 1 week

## 2020-11-03 NOTE — PROGRESS NOTE ADULT - SUBJECTIVE AND OBJECTIVE BOX
Patient is a 88y old  Female who presents with a chief complaint of Osteomyletis (02 Nov 2020 12:15)      INTERVAL HPI/OVERNIGHT EVENTS: No acute events overnight     MEDICATIONS  (STANDING):  amLODIPine   Tablet 10 milliGRAM(s) Oral daily  ascorbic acid 500 milliGRAM(s) Oral two times a day  aspirin  chewable 81 milliGRAM(s) Oral daily  atorvastatin 20 milliGRAM(s) Oral at bedtime  carvedilol 6.25 milliGRAM(s) Oral two times a day  cefepime   IVPB 1000 milliGRAM(s) IV Intermittent every 12 hours  chlorhexidine 4% Liquid 1 Application(s) Topical <User Schedule>  dextrose 5%. 1000 milliLiter(s) (50 mL/Hr) IV Continuous <Continuous>  dextrose 50% Injectable 25 Gram(s) IV Push once  dextrose 50% Injectable 25 Gram(s) IV Push once  dextrose 50% Injectable 12.5 Gram(s) IV Push once  donepezil 5 milliGRAM(s) Oral at bedtime  heparin   Injectable 5000 Unit(s) SubCutaneous every 12 hours  insulin lispro (ADMELOG) corrective regimen sliding scale   SubCutaneous three times a day before meals  insulin lispro (ADMELOG) corrective regimen sliding scale   SubCutaneous at bedtime  losartan 100 milliGRAM(s) Oral daily  multivitamin/minerals 1 Tablet(s) Oral daily  PARoxetine 20 milliGRAM(s) Oral daily  senna 2 Tablet(s) Oral at bedtime    MEDICATIONS  (PRN):  acetaminophen   Tablet .. 650 milliGRAM(s) Oral every 6 hours PRN Mild Pain (1 - 3)  dextrose 40% Gel 15 Gram(s) Oral once PRN Blood Glucose LESS THAN 70 milliGRAM(s)/deciliter  glucagon  Injectable 1 milliGRAM(s) IntraMuscular once PRN Glucose LESS THAN 70 milligrams/deciliter  glucagon  Injectable 1 milliGRAM(s) IntraMuscular once PRN Glucose LESS THAN 70 milligrams/deciliter  polyethylene glycol 3350 17 Gram(s) Oral daily PRN Constipation  sodium chloride 0.9% lock flush 10 milliLiter(s) IV Push every 1 hour PRN Pre/post blood products, medications, blood draw, and to maintain line patency      Allergies    sulfa drugs (Unknown)    Intolerances        REVIEW OF SYSTEMS:  Denies foot pain, CP, SOB, cough, fever, chills, abd pain, diarrhea.    Vital Signs Last 24 Hrs  T(C): 36.7 (03 Nov 2020 05:15), Max: 37.1 (02 Nov 2020 12:57)  T(F): 98.1 (03 Nov 2020 05:15), Max: 98.7 (02 Nov 2020 12:57)  HR: 59 (03 Nov 2020 05:15) (59 - 61)  BP: 130/59 (03 Nov 2020 05:15) (106/53 - 131/57)  BP(mean): --  RR: 17 (03 Nov 2020 05:15) (16 - 18)  SpO2: 100% (03 Nov 2020 05:15) (96% - 100%)    PHYSICAL EXAM:  GENERAL: no acute distress  HEENT: anicteric, no pharyngeal exudates  LUNGS: clear to auscultation, no wheezing or rhonchi appreciated  HEART: S1, S2, regular rate and rhythm, murmur noted, no edema to b/l LE  GASTROINTESTINAL: abdomen is soft, nontender, nondistended, normoactive bowel sounds  EXTREMITIES: contracted LLE and LUE, L foot dry clean dressing. no edema, cyanosis, or calf tenderness.  NEUROLOGIC: answering simple questions and following simple commands appropriately       LABS:                        9.6    6.03  )-----------( 278      ( 02 Nov 2020 09:13 )             29.0     CBC Full  -  ( 02 Nov 2020 09:13 )  WBC Count : 6.03 K/uL  Hemoglobin : 9.6 g/dL  Hematocrit : 29.0 %  Platelet Count - Automated : 278 K/uL  Mean Cell Volume : 82.9 fl  Mean Cell Hemoglobin : 27.4 pg  Mean Cell Hemoglobin Concentration : 33.1 gm/dL  Auto Neutrophil # : x  Auto Lymphocyte # : x  Auto Monocyte # : x  Auto Eosinophil # : x  Auto Basophil # : x  Auto Neutrophil % : x  Auto Lymphocyte % : x  Auto Monocyte % : x  Auto Eosinophil % : x  Auto Basophil % : x    02 Nov 2020 09:13    143    |  110    |  40     ----------------------------<  144    4.1     |  26     |  1.10     Ca    9.2        02 Nov 2020 09:13          CAPILLARY BLOOD GLUCOSE      POCT Blood Glucose.: 141 mg/dL (03 Nov 2020 08:07)  POCT Blood Glucose.: 162 mg/dL (02 Nov 2020 22:02)  POCT Blood Glucose.: 165 mg/dL (02 Nov 2020 17:00)  POCT Blood Glucose.: 220 mg/dL (02 Nov 2020 12:17)        Culture - Tissue with Gram Stain (collected 10-28-20 @ 21:04)  Source: .Tissue Other, ist metatarsal head left  Gram Stain (10-28-20 @ 23:46):    No polymorphonuclear cells seen per low power field    No organisms seen  Final Report (11-02-20 @ 17:40):    Rare Coag Negative Staphylococcus  Organism: Coag Negative Staphylococcus (11-02-20 @ 17:40)  Organism: Coag Negative Staphylococcus (11-02-20 @ 17:40)      -  Ampicillin/Sulbactam: R <=8/4      -  Cefazolin: R <=4      -  Clindamycin: R <=0.25 This isolate is presumed to be clindamycin resistant based on detection of inducible resistance. Clindamycin may still be effective in some patients.      -  Erythromycin: R >4      -  Gentamicin: S <=1 Should not be used as monotherapy      -  Oxacillin: R >2      -  Penicillin: R >8      -  RIF- Rifampin: S <=1 Should not be used as monotherapy      -  Tetra/Doxy: S <=1      -  Trimethoprim/Sulfamethoxazole: S <=0.5/9.5      -  Vancomycin: S 0.5      Method Type: MALATHI        RADIOLOGY & ADDITIONAL TESTS:    Personally reviewed.     Consultant(s) Notes Reviewed:  [x] YES  [ ] NO     Patient is a 88y old  Female who presents with a chief complaint of left foot wound.      INTERVAL HPI/OVERNIGHT EVENTS: No acute events overnight. Pt states she feels "fine." Pt denies pain in her left foot or elsewhere. Denies fever, chills, SOB, CP, diarrhea.     MEDICATIONS  (STANDING):  amLODIPine   Tablet 10 milliGRAM(s) Oral daily  ascorbic acid 500 milliGRAM(s) Oral two times a day  aspirin  chewable 81 milliGRAM(s) Oral daily  atorvastatin 20 milliGRAM(s) Oral at bedtime  carvedilol 6.25 milliGRAM(s) Oral two times a day  cefepime   IVPB 1000 milliGRAM(s) IV Intermittent every 12 hours  chlorhexidine 4% Liquid 1 Application(s) Topical <User Schedule>  dextrose 5%. 1000 milliLiter(s) (50 mL/Hr) IV Continuous <Continuous>  dextrose 50% Injectable 25 Gram(s) IV Push once  dextrose 50% Injectable 25 Gram(s) IV Push once  dextrose 50% Injectable 12.5 Gram(s) IV Push once  donepezil 5 milliGRAM(s) Oral at bedtime  heparin   Injectable 5000 Unit(s) SubCutaneous every 12 hours  insulin lispro (ADMELOG) corrective regimen sliding scale   SubCutaneous three times a day before meals  insulin lispro (ADMELOG) corrective regimen sliding scale   SubCutaneous at bedtime  losartan 100 milliGRAM(s) Oral daily  multivitamin/minerals 1 Tablet(s) Oral daily  PARoxetine 20 milliGRAM(s) Oral daily  senna 2 Tablet(s) Oral at bedtime    MEDICATIONS  (PRN):  acetaminophen   Tablet .. 650 milliGRAM(s) Oral every 6 hours PRN Mild Pain (1 - 3)  dextrose 40% Gel 15 Gram(s) Oral once PRN Blood Glucose LESS THAN 70 milliGRAM(s)/deciliter  glucagon  Injectable 1 milliGRAM(s) IntraMuscular once PRN Glucose LESS THAN 70 milligrams/deciliter  glucagon  Injectable 1 milliGRAM(s) IntraMuscular once PRN Glucose LESS THAN 70 milligrams/deciliter  polyethylene glycol 3350 17 Gram(s) Oral daily PRN Constipation  sodium chloride 0.9% lock flush 10 milliLiter(s) IV Push every 1 hour PRN Pre/post blood products, medications, blood draw, and to maintain line patency      Allergies    sulfa drugs (Unknown)    Intolerances        REVIEW OF SYSTEMS: Limited due to advanced dementia.   Denies foot pain, CP, SOB, cough, fever, chills, abd pain, diarrhea.    Vital Signs Last 24 Hrs  T(C): 36.7 (03 Nov 2020 05:15), Max: 37.1 (02 Nov 2020 12:57)  T(F): 98.1 (03 Nov 2020 05:15), Max: 98.7 (02 Nov 2020 12:57)  HR: 59 (03 Nov 2020 05:15) (59 - 61)  BP: 130/59 (03 Nov 2020 05:15) (106/53 - 131/57)  BP(mean): --  RR: 17 (03 Nov 2020 05:15) (16 - 18)  SpO2: 100% (03 Nov 2020 05:15) (96% - 100%)    PHYSICAL EXAM:  GENERAL: no acute distress  HEENT: anicteric, no pharyngeal exudates  LUNGS: clear to auscultation, no wheezing or rhonchi appreciated  HEART: S1, S2, regular rate and rhythm, murmur noted, no edema to b/l LE  GASTROINTESTINAL: abdomen is soft, nontender, nondistended, normoactive bowel sounds  EXTREMITIES: contracted LLE and LUE, L foot dry clean dressing. no edema, cyanosis, or calf tenderness.  NEUROLOGIC: answering simple questions and following simple commands appropriately       LABS:                        9.6    6.03  )-----------( 278      ( 02 Nov 2020 09:13 )             29.0     CBC Full  -  ( 02 Nov 2020 09:13 )  WBC Count : 6.03 K/uL  Hemoglobin : 9.6 g/dL  Hematocrit : 29.0 %  Platelet Count - Automated : 278 K/uL  Mean Cell Volume : 82.9 fl  Mean Cell Hemoglobin : 27.4 pg  Mean Cell Hemoglobin Concentration : 33.1 gm/dL  Auto Neutrophil # : x  Auto Lymphocyte # : x  Auto Monocyte # : x  Auto Eosinophil # : x  Auto Basophil # : x  Auto Neutrophil % : x  Auto Lymphocyte % : x  Auto Monocyte % : x  Auto Eosinophil % : x  Auto Basophil % : x    02 Nov 2020 09:13    143    |  110    |  40     ----------------------------<  144    4.1     |  26     |  1.10     Ca    9.2        02 Nov 2020 09:13          CAPILLARY BLOOD GLUCOSE      POCT Blood Glucose.: 141 mg/dL (03 Nov 2020 08:07)  POCT Blood Glucose.: 162 mg/dL (02 Nov 2020 22:02)  POCT Blood Glucose.: 165 mg/dL (02 Nov 2020 17:00)  POCT Blood Glucose.: 220 mg/dL (02 Nov 2020 12:17)        Culture - Tissue with Gram Stain (collected 10-28-20 @ 21:04)  Source: .Tissue Other, ist metatarsal head left  Gram Stain (10-28-20 @ 23:46):    No polymorphonuclear cells seen per low power field    No organisms seen  Final Report (11-02-20 @ 17:40):    Rare Coag Negative Staphylococcus  Organism: Coag Negative Staphylococcus (11-02-20 @ 17:40)  Organism: Coag Negative Staphylococcus (11-02-20 @ 17:40)      -  Ampicillin/Sulbactam: R <=8/4      -  Cefazolin: R <=4      -  Clindamycin: R <=0.25 This isolate is presumed to be clindamycin resistant based on detection of inducible resistance. Clindamycin may still be effective in some patients.      -  Erythromycin: R >4      -  Gentamicin: S <=1 Should not be used as monotherapy      -  Oxacillin: R >2      -  Penicillin: R >8      -  RIF- Rifampin: S <=1 Should not be used as monotherapy      -  Tetra/Doxy: S <=1      -  Trimethoprim/Sulfamethoxazole: S <=0.5/9.5      -  Vancomycin: S 0.5      Method Type: MALATHI        RADIOLOGY & ADDITIONAL TESTS:    Personally reviewed.     Consultant(s) Notes Reviewed:  [x] YES  [ ] NO

## 2020-11-03 NOTE — PROGRESS NOTE ADULT - ATTENDING COMMENTS
Pt seen + examined. Case discussed with resident. Agree with assessment and plan above (edited by me in detail):  Time spent: 37min. More than 50% of the visit was spent counseling the patient / pt's son on medical condition -- OM of 1st metatarsal of left foot, antibiotics, cefepime/ciprofloxacin, disposition, home IV Abx administration, PICC line.

## 2020-11-03 NOTE — PROGRESS NOTE ADULT - SUBJECTIVE AND OBJECTIVE BOX
88y year old Female seen at [facility] 3WES 362 W1 for s/p Left 1st metatarsal head resection secondary to osteomyelitis with Dr. Mullins, DOS 10/28/20. Denies any fever, chills, nausea, vomiting, chest pain, shortness of breath, or calf pain at this time.    Allergies    sulfa drugs (Unknown)    Intolerances    REVIEW OF SYSTEMS    PAST MEDICAL & SURGICAL HISTORY:  Dementia    DM (diabetes mellitus)    HLD (hyperlipidemia)    HTN (hypertension)    GERD (gastroesophageal reflux disease)    History of cholecystectomy    Ankle injury        MEDICATIONS  (STANDING):  amLODIPine   Tablet 10 milliGRAM(s) Oral daily  ascorbic acid 500 milliGRAM(s) Oral two times a day  aspirin  chewable 81 milliGRAM(s) Oral daily  atorvastatin 20 milliGRAM(s) Oral at bedtime  carvedilol 6.25 milliGRAM(s) Oral two times a day  cefepime   IVPB 1000 milliGRAM(s) IV Intermittent every 12 hours  dextrose 5%. 1000 milliLiter(s) (50 mL/Hr) IV Continuous <Continuous>  dextrose 50% Injectable 25 Gram(s) IV Push once  dextrose 50% Injectable 25 Gram(s) IV Push once  dextrose 50% Injectable 12.5 Gram(s) IV Push once  donepezil 5 milliGRAM(s) Oral at bedtime  heparin   Injectable 5000 Unit(s) SubCutaneous every 12 hours  insulin lispro (ADMELOG) corrective regimen sliding scale   SubCutaneous three times a day before meals  insulin lispro (ADMELOG) corrective regimen sliding scale   SubCutaneous at bedtime  losartan 100 milliGRAM(s) Oral daily  multivitamin/minerals 1 Tablet(s) Oral daily  PARoxetine 20 milliGRAM(s) Oral daily  senna 2 Tablet(s) Oral at bedtime    MEDICATIONS  (PRN):  acetaminophen   Tablet .. 650 milliGRAM(s) Oral every 6 hours PRN Mild Pain (1 - 3)  dextrose 40% Gel 15 Gram(s) Oral once PRN Blood Glucose LESS THAN 70 milliGRAM(s)/deciliter  glucagon  Injectable 1 milliGRAM(s) IntraMuscular once PRN Glucose LESS THAN 70 milligrams/deciliter  glucagon  Injectable 1 milliGRAM(s) IntraMuscular once PRN Glucose LESS THAN 70 milligrams/deciliter  polyethylene glycol 3350 17 Gram(s) Oral daily PRN Constipation      Vital Signs Last 24 Hrs  T(C): 36.3 (02 Nov 2020 07:40), Max: 36.9 (01 Nov 2020 21:24)  T(F): 97.4 (02 Nov 2020 07:40), Max: 98.5 (02 Nov 2020 05:06)  HR: 56 (02 Nov 2020 07:40) (56 - 64)  BP: 118/54 (02 Nov 2020 07:40) (115/58 - 131/65)  BP(mean): --  RR: 15 (02 Nov 2020 07:40) (15 - 17)  SpO2: 97% (02 Nov 2020 07:40) (94% - 97%)      PHYSICAL EXAM:  Vascular: DP & PT nonpalpable bilaterally, Capillary refill 3 seconds, Minimal edema along the left 1st metatarsal   Neurological: Light touch sensation intact bilaterally  Musculoskeletal: Severely contracted left lower extremity, s/p Left 1st metatarsal head resection, left hallux in good alignment and rectus position  Dermatological: -  1. s/p Left 1st metatarsal head resection- sutures intact, skin well approximated, dried sanguineous drainage, minimal edema, no erythema, no mal odor( dorsal incision)  2. Grade 3- 0.3cmx0.3x0.3 cm along the medial aspect of the left 1st metatarsal- sanguineous drainage       CBC Full  -  ( 02 Nov 2020 09:13 )  WBC Count : 6.03 K/uL  RBC Count : 3.50 M/uL  Hemoglobin : 9.6 g/dL  Hematocrit : 29.0 %  Platelet Count - Automated : 278 K/uL  Mean Cell Volume : 82.9 fl  Mean Cell Hemoglobin : 27.4 pg  Mean Cell Hemoglobin Concentration : 33.1 gm/dL  Auto Neutrophil # : x  Auto Lymphocyte # : x  Auto Monocyte # : x  Auto Eosinophil # : x  Auto Basophil # : x  Auto Neutrophil % : x  Auto Lymphocyte % : x  Auto Monocyte % : x  Auto Eosinophil % : x  Auto Basophil % : x        ----------CHEM PANEL----------    11-02    143  |  110<H>  |  40<H>  ----------------------------<  144<H>  4.1   |  26  |  1.10    Ca    9.2      02 Nov 2020 09:13          Culture - Tissue with Gram Stain (collected 28 Oct 2020 21:04)  Source: .Tissue Other, ist metatarsal head left  Gram Stain (28 Oct 2020 23:46):    No polymorphonuclear cells seen per low power field    No organisms seen  Preliminary Report (29 Oct 2020 17:02):    No growth        Imaging: ----------  Left Foot Xray:  Post surgical changes- s/p Left 1st metatarsal head resection     ACCESSION No:  30 [id number]    NELLIE NICOLE              2        Surgical Final Report          Final Diagnosis  1. Bone, 1st metatarsal head, left:  Mild chronic osteomyelitis.    2. Bone, 1 st metatarsal  proximal margin, left:  Mild chronic osteomyelitis.    3. Bone, sesamoid, left:  Mild chronic osteomyelitis of bone.    4. Bone, base, proximal phalanx, left:  Mild chronic osteomyelitis.    Verified by: Mere Medina M.D.  (Electronic Signature)  Reported on: 10/30/20 11:54 EDT, [facility], [address] [place] [address]  Phone: [phone]   Fax: [phone]  _________________________________________________________________    Clinical History  Osteomyelitis first metatarsal head  Procedure: Resection of 1st metatarsal head left foot    Specimen(s) Submitted  1-Left 1st metatarsal head  2-Left 1st metatarsal clean proximal margin  3-Left sesamoid bone  4-Left base proximal phalanx    Gross Description  1. The specimen is received in formalin and the specimen  container is labeled: Left 1st metatarsal head. It consists of a  piece of tan-yellow bony tissue admixed with pink-tan soft tissue  measuring 2.2 x 2.0 x 1.2 cm. The bone cut surface is yellow and  spongy. Entirely submitted following selective decalcification.  Five cassettes.  A-D - bony tissue  E - soft tissue    2. The specimen is received in formalin and the specimen  container is labeled: Left 1st metatarsal clean proximalmargin.  It consists of an ovoid piece of tan-yellow bony tissue with a  smooth clean surgical cut measuring 1.7 x 1.0 x 0.3 cm. Entirely  submitted following decalcification.    3. The specimen is received in formalin and the specimen  container is labeled:            NELLIE NICOLE              2        Surgical Final Report          Left sesamoid bone. It consists of a piece of tan-yellow bony  tissue measuring 3.0 x 1.7 x 1.0 cm. The bone cut surface is  yellow and spongy. Entirely submitted following decalcification.  Three cassettes.    4. The specimen is received in formalin and the specimen  container is labeled: Left base proximal phalanx. It consists of  a piece of tan-yellow bony tissue with attached white-tan soft  tissue measuring 1.2 x 1.0 x 0.3 cm. The bone cut surface is  yellow and spongy. Entirely submitted following decalcification.  One cassette.    In addition to other data that may appear on the specimen  containers, all labels have been inspected to confirm the  presence of the patient's name and date of birth.  ALEXEI Dacosta ([facility]) 10/30/2020 10/30/2020 7:59 AM    Perioperative Diagnosis  Osteomyelitis left 1st metatarsal head    Postoperative Diagnosis  Same    Disclaimer  Histological processing and microscopic evaluation performed at  [facility], [address] [place] [address].      Surgical Consult Report

## 2020-11-04 ENCOUNTER — TRANSCRIPTION ENCOUNTER (OUTPATIENT)
Age: 85
End: 2020-11-04

## 2020-11-04 VITALS
SYSTOLIC BLOOD PRESSURE: 128 MMHG | HEART RATE: 66 BPM | RESPIRATION RATE: 16 BRPM | OXYGEN SATURATION: 97 % | DIASTOLIC BLOOD PRESSURE: 67 MMHG | TEMPERATURE: 98 F

## 2020-11-04 LAB
ANION GAP SERPL CALC-SCNC: 6 MMOL/L — SIGNIFICANT CHANGE UP (ref 5–17)
BUN SERPL-MCNC: 39 MG/DL — HIGH (ref 7–23)
CALCIUM SERPL-MCNC: 9.4 MG/DL — SIGNIFICANT CHANGE UP (ref 8.5–10.1)
CHLORIDE SERPL-SCNC: 112 MMOL/L — HIGH (ref 96–108)
CO2 SERPL-SCNC: 27 MMOL/L — SIGNIFICANT CHANGE UP (ref 22–31)
CREAT SERPL-MCNC: 1 MG/DL — SIGNIFICANT CHANGE UP (ref 0.5–1.3)
GLUCOSE SERPL-MCNC: 159 MG/DL — HIGH (ref 70–99)
HCT VFR BLD CALC: 29.2 % — LOW (ref 34.5–45)
HGB BLD-MCNC: 9.4 G/DL — LOW (ref 11.5–15.5)
MCHC RBC-ENTMCNC: 27.3 PG — SIGNIFICANT CHANGE UP (ref 27–34)
MCHC RBC-ENTMCNC: 32.2 GM/DL — SIGNIFICANT CHANGE UP (ref 32–36)
MCV RBC AUTO: 84.9 FL — SIGNIFICANT CHANGE UP (ref 80–100)
NRBC # BLD: 0 /100 WBCS — SIGNIFICANT CHANGE UP (ref 0–0)
PLATELET # BLD AUTO: 299 K/UL — SIGNIFICANT CHANGE UP (ref 150–400)
POTASSIUM SERPL-MCNC: 4.2 MMOL/L — SIGNIFICANT CHANGE UP (ref 3.5–5.3)
POTASSIUM SERPL-SCNC: 4.2 MMOL/L — SIGNIFICANT CHANGE UP (ref 3.5–5.3)
RBC # BLD: 3.44 M/UL — LOW (ref 3.8–5.2)
RBC # FLD: 15.2 % — HIGH (ref 10.3–14.5)
SODIUM SERPL-SCNC: 145 MMOL/L — SIGNIFICANT CHANGE UP (ref 135–145)
WBC # BLD: 6.5 K/UL — SIGNIFICANT CHANGE UP (ref 3.8–10.5)
WBC # FLD AUTO: 6.5 K/UL — SIGNIFICANT CHANGE UP (ref 3.8–10.5)

## 2020-11-04 PROCEDURE — 87186 SC STD MICRODIL/AGAR DIL: CPT

## 2020-11-04 PROCEDURE — 87040 BLOOD CULTURE FOR BACTERIA: CPT

## 2020-11-04 PROCEDURE — 96365 THER/PROPH/DIAG IV INF INIT: CPT

## 2020-11-04 PROCEDURE — 36415 COLL VENOUS BLD VENIPUNCTURE: CPT

## 2020-11-04 PROCEDURE — 88311 DECALCIFY TISSUE: CPT

## 2020-11-04 PROCEDURE — 99285 EMERGENCY DEPT VISIT HI MDM: CPT | Mod: 25

## 2020-11-04 PROCEDURE — 87075 CULTR BACTERIA EXCEPT BLOOD: CPT

## 2020-11-04 PROCEDURE — 85652 RBC SED RATE AUTOMATED: CPT

## 2020-11-04 PROCEDURE — 80048 BASIC METABOLIC PNL TOTAL CA: CPT

## 2020-11-04 PROCEDURE — 82962 GLUCOSE BLOOD TEST: CPT

## 2020-11-04 PROCEDURE — U0003: CPT

## 2020-11-04 PROCEDURE — 92610 EVALUATE SWALLOWING FUNCTION: CPT

## 2020-11-04 PROCEDURE — 97163 PT EVAL HIGH COMPLEX 45 MIN: CPT

## 2020-11-04 PROCEDURE — 84134 ASSAY OF PREALBUMIN: CPT

## 2020-11-04 PROCEDURE — 87070 CULTURE OTHR SPECIMN AEROBIC: CPT

## 2020-11-04 PROCEDURE — 88304 TISSUE EXAM BY PATHOLOGIST: CPT

## 2020-11-04 PROCEDURE — 93306 TTE W/DOPPLER COMPLETE: CPT

## 2020-11-04 PROCEDURE — 86769 SARS-COV-2 COVID-19 ANTIBODY: CPT

## 2020-11-04 PROCEDURE — 77001 FLUOROGUIDE FOR VEIN DEVICE: CPT

## 2020-11-04 PROCEDURE — 80202 ASSAY OF VANCOMYCIN: CPT

## 2020-11-04 PROCEDURE — G0463: CPT

## 2020-11-04 PROCEDURE — 93005 ELECTROCARDIOGRAM TRACING: CPT

## 2020-11-04 PROCEDURE — 84145 PROCALCITONIN (PCT): CPT

## 2020-11-04 PROCEDURE — 76937 US GUIDE VASCULAR ACCESS: CPT

## 2020-11-04 PROCEDURE — 86140 C-REACTIVE PROTEIN: CPT

## 2020-11-04 PROCEDURE — 96367 TX/PROPH/DG ADDL SEQ IV INF: CPT

## 2020-11-04 PROCEDURE — 99239 HOSP IP/OBS DSCHRG MGMT >30: CPT

## 2020-11-04 PROCEDURE — 73620 X-RAY EXAM OF FOOT: CPT

## 2020-11-04 PROCEDURE — 71045 X-RAY EXAM CHEST 1 VIEW: CPT

## 2020-11-04 PROCEDURE — 83735 ASSAY OF MAGNESIUM: CPT

## 2020-11-04 PROCEDURE — 73630 X-RAY EXAM OF FOOT: CPT

## 2020-11-04 PROCEDURE — 85730 THROMBOPLASTIN TIME PARTIAL: CPT

## 2020-11-04 PROCEDURE — 99024 POSTOP FOLLOW-UP VISIT: CPT

## 2020-11-04 PROCEDURE — C8929: CPT

## 2020-11-04 PROCEDURE — 85610 PROTHROMBIN TIME: CPT

## 2020-11-04 PROCEDURE — 85027 COMPLETE CBC AUTOMATED: CPT

## 2020-11-04 PROCEDURE — 80053 COMPREHEN METABOLIC PANEL: CPT

## 2020-11-04 PROCEDURE — 99232 SBSQ HOSP IP/OBS MODERATE 35: CPT

## 2020-11-04 PROCEDURE — C1889: CPT

## 2020-11-04 PROCEDURE — 36573 INSJ PICC RS&I 5 YR+: CPT

## 2020-11-04 PROCEDURE — 83880 ASSAY OF NATRIURETIC PEPTIDE: CPT

## 2020-11-04 PROCEDURE — C1751: CPT

## 2020-11-04 PROCEDURE — 73718 MRI LOWER EXTREMITY W/O DYE: CPT

## 2020-11-04 PROCEDURE — 84484 ASSAY OF TROPONIN QUANT: CPT

## 2020-11-04 PROCEDURE — 85025 COMPLETE CBC W/AUTO DIFF WBC: CPT

## 2020-11-04 PROCEDURE — 93922 UPR/L XTREMITY ART 2 LEVELS: CPT

## 2020-11-04 PROCEDURE — 83036 HEMOGLOBIN GLYCOSYLATED A1C: CPT

## 2020-11-04 RX ADMIN — Medication 1 TABLET(S): at 12:13

## 2020-11-04 RX ADMIN — LOSARTAN POTASSIUM 100 MILLIGRAM(S): 100 TABLET, FILM COATED ORAL at 06:06

## 2020-11-04 RX ADMIN — CEFEPIME 100 MILLIGRAM(S): 1 INJECTION, POWDER, FOR SOLUTION INTRAMUSCULAR; INTRAVENOUS at 06:05

## 2020-11-04 RX ADMIN — Medication 3: at 13:10

## 2020-11-04 RX ADMIN — CARVEDILOL PHOSPHATE 6.25 MILLIGRAM(S): 80 CAPSULE, EXTENDED RELEASE ORAL at 06:06

## 2020-11-04 RX ADMIN — CHLORHEXIDINE GLUCONATE 1 APPLICATION(S): 213 SOLUTION TOPICAL at 06:11

## 2020-11-04 RX ADMIN — AMLODIPINE BESYLATE 10 MILLIGRAM(S): 2.5 TABLET ORAL at 06:06

## 2020-11-04 RX ADMIN — Medication 1: at 08:31

## 2020-11-04 RX ADMIN — Medication 81 MILLIGRAM(S): at 12:13

## 2020-11-04 RX ADMIN — Medication 500 MILLIGRAM(S): at 06:06

## 2020-11-04 RX ADMIN — HEPARIN SODIUM 5000 UNIT(S): 5000 INJECTION INTRAVENOUS; SUBCUTANEOUS at 06:05

## 2020-11-04 RX ADMIN — Medication 20 MILLIGRAM(S): at 12:13

## 2020-11-04 NOTE — PROGRESS NOTE ADULT - SUBJECTIVE AND OBJECTIVE BOX
[facility]  INFECTIOUS DISEASES   [address] [place] [address]  Tel: [phone]     Fax: [phone]  =======================================================    [id number]  NELLIE NICOLE     Follow up: foot ulcer and OM    Had left 1st metatarsal head amputation, margins not clean, showed OM.   Awake and alert, no complaint. No fever. No pain. PICC in place.     PAST MEDICAL & SURGICAL HISTORY:  Dementia  DM (diabetes mellitus)  HLD (hyperlipidemia)  HTN (hypertension)  GERD (gastroesophageal reflux disease)  History of cholecystectomy  Ankle injury  s/p ankle surgery, pt doesn&#x27;t remember which ankle    Social Hx: no smoking or toxic habits     FAMILY HISTORY:  FH: type 2 diabetes  brother    FH: colon cancer  sister    Allergies  sulfa drugs (Unknown)    Antibiotics:  Cefepime      REVIEW OF SYSTEMS:  Limited due to dementia but states that has left foot pain.     Physical Exam:  Vital Signs Last 24 Hrs  T(C): 36.9 (04 Nov 2020 05:13), Max: 36.9 (04 Nov 2020 05:13)  T(F): 98.5 (04 Nov 2020 05:13), Max: 98.5 (04 Nov 2020 05:13)  HR: 62 (04 Nov 2020 05:13) (59 - 63)  BP: 133/58 (04 Nov 2020 05:13) (128/55 - 147/69)  BP(mean): --  RR: 17 (04 Nov 2020 05:13) (17 - 18)  SpO2: 96% (04 Nov 2020 05:13) (96% - 97%)  GEN: NAD  HEENT: normocephalic and atraumatic. EOMI. PERRL.    NECK: Supple.  No lymphadenopathy   LUNGS: Clear to auscultation.  HEART: Regular rate and rhythm   ABDOMEN: Soft, nontender, and nondistended.  Positive bowel sounds.    : No CVA tenderness  EXTREMITIES: Without edema. left leg in a contracted position, able to extend partially   Now foot is dressed after surgery ( before surgery: Left plantar metatarsal head area with a deep ulcer with mild swelling and erythema in surrounding, no discharge)   NEUROLOGIC: grossly intact.  PSYCHIATRIC: dementia unable to evaluate   SKIN: No rash    Labs:                        9.4    6.50  )-----------( 299      ( 04 Nov 2020 09:33 )             29.2      11-04    145  |  112<H>  |  39<H>  ----------------------------<  159<H>  4.2   |  27  |  1.00    Ca    9.4      04 Nov 2020 09:33    Culture - Tissue with Gram Stain (collected 10-28-20 @ 21:04)  Source: .Tissue Other, ist metatarsal head left  Gram Stain (10-28-20 @ 23:46):    No polymorphonuclear cells seen per low power field    No organisms seen  Final Report (11-02-20 @ 17:40):    Rare Coag Negative Staphylococcus  Organism: Coag Negative Staphylococcus (11-02-20 @ 17:40)  Organism: Coag Negative Staphylococcus (11-02-20 @ 17:40)    Sensitivities:      -  Ampicillin/Sulbactam: R <=8/4      -  Cefazolin: R <=4      -  Clindamycin: R <=0.25 This isolate is presumed to be clindamycin resistant based on detection of inducible resistance. Clindamycin may still be effective in some patients.      -  Erythromycin: R >4      -  Gentamicin: S <=1 Should not be used as monotherapy      -  Oxacillin: R >2      -  Penicillin: R >8      -  RIF- Rifampin: S <=1 Should not be used as monotherapy      -  Tetra/Doxy: S <=1      -  Trimethoprim/Sulfamethoxazole: S <=0.5/9.5      -  Vancomycin: S 0.5      Method Type: MALATHI    Culture - Tissue with Gram Stain (collected 10-26-20 @ 15:17)  Source: .Tissue Other, Left  medial 1st Metatarsal  Gram Stain (10-26-20 @ 19:49):    Rare polymorphonuclear leukocytes per low power field    Few Gram positive cocci in pairs per oil power field    Few Gram Variable Rods per oil power field  Final Report (10-31-20 @ 08:50):    Culture yields >4 types of aerobic and/or anaerobic bacteria    Call client services within 7 days if further workup is clinically    indicated. Culture includes    Numerous Pseudomonas aeruginosa  Organism: Pseudomonas aeruginosa (10-31-20 @ 08:50)  Organism: Pseudomonas aeruginosa (10-31-20 @ 08:50)    Sensitivities:      -  Amikacin: S <=16      -  Aztreonam: S <=4      -  Cefepime: S <=2      -  Ceftazidime: S 4      -  Ciprofloxacin: S <=0.25      -  Gentamicin: S 4      -  Imipenem: S <=1      -  Levofloxacin: S <=0.5      -  Meropenem: S <=1      -  Piperacillin/Tazobactam: S <=8      -  Tobramycin: S <=2      Method Type: MALATHI    Culture - Blood (collected 10-24-20 @ 21:09)  Source: .Blood Blood  Final Report (10-29-20 @ 22:01):    No Growth Final    WBC Count: 6.50 K/uL (11-04-20 @ 09:33)  WBC Count: 6.03 K/uL (11-02-20 @ 09:13)  WBC Count: 7.24 K/uL (11-01-20 @ 06:59)  WBC Count: 7.22 K/uL (10-31-20 @ 09:35)    Creatinine, Serum: 1.00 mg/dL (11-04-20 @ 09:33)  Creatinine, Serum: 1.10 mg/dL (11-02-20 @ 09:13)  Creatinine, Serum: 1.00 mg/dL (11-01-20 @ 06:59)  Creatinine, Serum: 0.95 mg/dL (10-31-20 @ 09:35)    C-Reactive Protein, Serum: <0.10 mg/dL (10-24-20 @ 20:48)  C-Reactive Protein, Serum: <0.10 mg/dL (10-24-20 @ 16:19)    Procalcitonin, Serum: <0.05 (10-24-20 @ 15:06)     COVID-19 PCR: NotDetec (11-01-20 @ 07:34)  COVID-19 IgG Antibody Index: 0.08 Index (10-24-20 @ 20:52)  COVID-19 IgG Antibody Interpretation: Negative (10-24-20 @ 20:52)  COVID-19 PCR: NotDetec (10-24-20 @ 11:56)    All imaging and other data have been reviewed.  < from: Xray Foot AP + Lateral + Oblique, Bilat (10.24.20 @ 12:17) >  EXAM:  FOOT BILATERAL (MINIMUM 3 V)                        PROCEDURE DATE:  10/24/2020    INTERPRETATION:  Bilateral feet  HISTORY: Osteomyelitis   Three views of the right foot and three views of the left foot show no evidence of fracture nor destructive change. The joint spaces show left hallux valgus deformity. Surgical hardware projects in the lower left tibia and fibula.  IMPRESSION: No evidence of bony destruction.    Assessment and Plan:   84 y/o woman with PMH of HTN, Diabetes Mellitus 2, GERD and Dementia was sent from ER wound center for evaluation of left foot ulcer and possible Osteomyelitis.  Mild cellulitis in area, unclear if there is OM without MRI to plan for treatment.   10/29 low grade fever and leukocytosis possibly post surgical.     Left foot chronic ulcer r/o OM  - Blood culture neg  - Wound culture in the past with strep and this admission pansensitive pseudomonas.    - OR culture with rare CoNS most likely not real infection (was on ABx when went to OR)  - Xray negative for OM  - S/p Left first metatarsal amputation by podiatry on 10/28  - Continue cefepime 1gm q12h , adjusted for renal function as per pharmacy protocol.   - Proximal margin of amputation showed OM so will need treatment for 6 weeks.   - Last day would be 12/8/20. Will need CBC, BMP and CRP weekly while on Abx, watch for side effects especially diarrhea/cdiff.  Fax: [phone]  - PICC in R arm.     Will sign off please call with any question.     Mohit Almonte MD  Division of Infectious Diseases   Cell [phone] between 8am and 6pm   After 6pm and weekends please call ID service at [phone].

## 2020-11-04 NOTE — PROGRESS NOTE ADULT - SUBJECTIVE AND OBJECTIVE BOX
88y year old Female seen at [facility] 3WES 362 W1 for s/p Left 1st metatarsal head resection secondary to osteomyelitis with Dr. Mullins, DOS 10/28/20. Dressing is clean, dry and intact, no strike through. Patient denies any pain to the left foot.  Denies any fever, chills, nausea, vomiting, chest pain, shortness of breath, or calf pain at this time.    Allergies    sulfa drugs (Unknown)    Intolerances    REVIEW OF SYSTEMS    PAST MEDICAL & SURGICAL HISTORY:  Dementia    DM (diabetes mellitus)    HLD (hyperlipidemia)    HTN (hypertension)    GERD (gastroesophageal reflux disease)    History of cholecystectomy    Ankle injury        MEDICATIONS  (STANDING):  amLODIPine   Tablet 10 milliGRAM(s) Oral daily  ascorbic acid 500 milliGRAM(s) Oral two times a day  aspirin  chewable 81 milliGRAM(s) Oral daily  atorvastatin 20 milliGRAM(s) Oral at bedtime  carvedilol 6.25 milliGRAM(s) Oral two times a day  cefepime   IVPB 1000 milliGRAM(s) IV Intermittent every 12 hours  dextrose 5%. 1000 milliLiter(s) (50 mL/Hr) IV Continuous <Continuous>  dextrose 50% Injectable 25 Gram(s) IV Push once  dextrose 50% Injectable 25 Gram(s) IV Push once  dextrose 50% Injectable 12.5 Gram(s) IV Push once  donepezil 5 milliGRAM(s) Oral at bedtime  heparin   Injectable 5000 Unit(s) SubCutaneous every 12 hours  insulin lispro (ADMELOG) corrective regimen sliding scale   SubCutaneous three times a day before meals  insulin lispro (ADMELOG) corrective regimen sliding scale   SubCutaneous at bedtime  losartan 100 milliGRAM(s) Oral daily  multivitamin/minerals 1 Tablet(s) Oral daily  PARoxetine 20 milliGRAM(s) Oral daily  senna 2 Tablet(s) Oral at bedtime    MEDICATIONS  (PRN):  acetaminophen   Tablet .. 650 milliGRAM(s) Oral every 6 hours PRN Mild Pain (1 - 3)  dextrose 40% Gel 15 Gram(s) Oral once PRN Blood Glucose LESS THAN 70 milliGRAM(s)/deciliter  glucagon  Injectable 1 milliGRAM(s) IntraMuscular once PRN Glucose LESS THAN 70 milligrams/deciliter  glucagon  Injectable 1 milliGRAM(s) IntraMuscular once PRN Glucose LESS THAN 70 milligrams/deciliter  polyethylene glycol 3350 17 Gram(s) Oral daily PRN Constipation      Vital Signs Last 24 Hrs  T(C): 36.9 (04 Nov 2020 05:13), Max: 36.9 (04 Nov 2020 05:13)  T(F): 98.5 (04 Nov 2020 05:13), Max: 98.5 (04 Nov 2020 05:13)  HR: 62 (04 Nov 2020 05:13) (62 - 63)  BP: 133/58 (04 Nov 2020 05:13) (133/58 - 147/69)  BP(mean): --  RR: 17 (04 Nov 2020 05:13) (17 - 18)  SpO2: 96% (04 Nov 2020 05:13) (96% - 97%)      PHYSICAL EXAM:  Vascular: DP & PT nonpalpable bilaterally, Capillary refill 3 seconds, Minimal edema along the left 1st metatarsal   Neurological: Light touch sensation intact bilaterally  Musculoskeletal: Severely contracted left lower extremity, s/p Left 1st metatarsal head resection, left hallux in good alignment and rectus position  Dermatological: -  1. s/p Left 1st metatarsal head resection- sutures intact, skin well approximated, dried sanguineous drainage, minimal edema, no erythema, no mal odor( dorsal incision)  2. Grade 3- 0.3cmx0.3x0.3 cm along the medial aspect of the left 1st metatarsal- sanguineous drainage     CBC Full  -  ( 04 Nov 2020 09:33 )  WBC Count : 6.50 K/uL  RBC Count : 3.44 M/uL  Hemoglobin : 9.4 g/dL  Hematocrit : 29.2 %  Platelet Count - Automated : 299 K/uL  Mean Cell Volume : 84.9 fl  Mean Cell Hemoglobin : 27.3 pg  Mean Cell Hemoglobin Concentration : 32.2 gm/dL  Auto Neutrophil # : x  Auto Lymphocyte # : x  Auto Monocyte # : x  Auto Eosinophil # : x  Auto Basophil # : x  Auto Neutrophil % : x  Auto Lymphocyte % : x  Auto Monocyte % : x  Auto Eosinophil % : x  Auto Basophil % : x          ----------CHEM PANEL----------    11-04    145  |  112<H>  |  39<H>  ----------------------------<  159<H>  4.2   |  27  |  1.00    Ca    9.4      04 Nov 2020 09:33      Culture - Tissue with Gram Stain (collected 28 Oct 2020 21:04)  Source: .Tissue Other, ist metatarsal head left  Gram Stain (28 Oct 2020 23:46):    No polymorphonuclear cells seen per low power field    No organisms seen  Preliminary Report (29 Oct 2020 17:02):    No growth        Imaging: ----------  Left Foot Xray:  Post surgical changes- s/p Left 1st metatarsal head resection     ACCESSION No:  30 [id number]    NELLIE NICOLE              2        Surgical Final Report          Final Diagnosis  1. Bone, 1st metatarsal head, left:  Mild chronic osteomyelitis.    2. Bone, 1 st metatarsal  proximal margin, left:  Mild chronic osteomyelitis.    3. Bone, sesamoid, left:  Mild chronic osteomyelitis of bone.    4. Bone, base, proximal phalanx, left:  Mild chronic osteomyelitis.    Verified by: Mere Medina M.D.  (Electronic Signature)  Reported on: 10/30/20 11:54 EDT, [facility], [address] [place] [address]  Phone: [phone]   Fax: [phone]  _________________________________________________________________    Clinical History  Osteomyelitis first metatarsal head  Procedure: Resection of 1st metatarsal head left foot    Specimen(s) Submitted  1-Left 1st metatarsal head  2-Left 1st metatarsal clean proximal margin  3-Left sesamoid bone  4-Left base proximal phalanx    Gross Description  1. The specimen is received in formalin and the specimen  container is labeled: Left 1st metatarsal head. It consists of a  piece of tan-yellow bony tissue admixed with pink-tan soft tissue  measuring 2.2 x 2.0 x 1.2 cm. The bone cut surface is yellow and  spongy. Entirely submitted following selective decalcification.  Five cassettes.  A-D - bony tissue  E - soft tissue    2. The specimen is received in formalin and the specimen  container is labeled: Left 1st metatarsal clean proximalmargin.  It consists of an ovoid piece of tan-yellow bony tissue with a  smooth clean surgical cut measuring 1.7 x 1.0 x 0.3 cm. Entirely  submitted following decalcification.    3. The specimen is received in formalin and the specimen  container is labeled:            ZHANNAWANDANELLIE              2        Surgical Final Report          Left sesamoid bone. It consists of a piece of tan-yellow bony  tissue measuring 3.0 x 1.7 x 1.0 cm. The bone cut surface is  yellow and spongy. Entirely submitted following decalcification.  Three cassettes.    4. The specimen is received in formalin and the specimen  container is labeled: Left base proximal phalanx. It consists of  a piece of tan-yellow bony tissue with attached white-tan soft  tissue measuring 1.2 x 1.0 x 0.3 cm. The bone cut surface is  yellow and spongy. Entirely submitted following decalcification.  One cassette.    In addition to other data that may appear on the specimen  containers, all labels have been inspected to confirm the  presence of the patient's name and date of birth.  ALEXEI Dacosta ([facility]) 10/30/2020 10/30/2020 7:59 AM    Perioperative Diagnosis  Osteomyelitis left 1st metatarsal head    Postoperative Diagnosis  Same    Disclaimer  Histological processing and microscopic evaluation performed at  [facility], [address] [place] [address]  [id number].      Surgical Consult Report

## 2020-11-04 NOTE — PROGRESS NOTE ADULT - NUTRITIONAL ASSESSMENT
This patient has been assessed with a concern for Malnutrition and has been determined to have a diagnosis/diagnoses of Mild protein-calorie malnutrition and Underweight/BMI < 19.    This patient is being managed with:   Diet Consistent Carbohydrate/No Snacks-  Entered: Oct 28 2020  1:48PM    
This patient has been assessed with a concern for Malnutrition and has been determined to have a diagnosis/diagnoses of Mild protein-calorie malnutrition and Underweight/BMI < 19.    This patient is being managed with:   Diet NPO after Midnight-     NPO Start Date: 27-Oct-2020   NPO Start Time: 23:59  Entered: Oct 27 2020  8:43AM    Diet Regular-  Entered: Oct 24 2020  9:08PM    
This patient has been assessed with a concern for Malnutrition and has been determined to have a diagnosis/diagnoses of Mild protein-calorie malnutrition and Underweight/BMI < 19.    This patient is being managed with:   Diet NPO after Midnight-     NPO Start Date: 27-Oct-2020   NPO Start Time: 23:59  Except Medications  Entered: Oct 27 2020  6:12PM    Diet Regular-  Entered: Oct 24 2020  9:08PM    
This patient has been assessed with a concern for Malnutrition and has been determined to have a diagnosis/diagnoses of Mild protein-calorie malnutrition and Underweight/BMI < 19.    This patient is being managed with:   Diet NPO after Midnight-     NPO Start Date: 27-Oct-2020   NPO Start Time: 23:59  Except Medications  Entered: Oct 27 2020  6:12PM    Diet Regular-  Entered: Oct 24 2020  9:08PM    
This patient has been assessed with a concern for Malnutrition and has been determined to have a diagnosis/diagnoses of Mild protein-calorie malnutrition and Underweight/BMI < 19.    This patient is being managed with:   Diet Regular-  Entered: Oct 24 2020  9:08PM    
This patient has been assessed with a concern for Malnutrition and has been determined to have a diagnosis/diagnoses of Mild protein-calorie malnutrition and Underweight/BMI < 19.    This patient is being managed with:   Diet Consistent Carbohydrate/No Snacks-  Entered: Oct 28 2020  1:48PM    
This patient has been assessed with a concern for Malnutrition and has been determined to have a diagnosis/diagnoses of Mild protein-calorie malnutrition and Underweight/BMI < 19.    This patient is being managed with:   Diet NPO after Midnight-     NPO Start Date: 27-Oct-2020   NPO Start Time: 23:59  Entered: Oct 27 2020  8:43AM    Diet Regular-  Entered: Oct 24 2020  9:08PM    
This patient has been assessed with a concern for Malnutrition and has been determined to have a diagnosis/diagnoses of Mild protein-calorie malnutrition and Underweight/BMI < 19.    This patient is being managed with:   Diet NPO after Midnight-     NPO Start Date: 27-Oct-2020   NPO Start Time: 23:59  Except Medications  Entered: Oct 27 2020  6:12PM    Diet Regular-  Entered: Oct 24 2020  9:08PM    
This patient has been assessed with a concern for Malnutrition and has been determined to have a diagnosis/diagnoses of Mild protein-calorie malnutrition and Underweight/BMI < 19.    This patient is being managed with:   Diet NPO after Midnight-     NPO Start Date: 26-Oct-2020   NPO Start Time: 23:59  Except Medications  Entered: Oct 26 2020  3:49PM    Diet Regular-  Entered: Oct 24 2020  9:08PM

## 2020-11-04 NOTE — PROGRESS NOTE ADULT - SUBJECTIVE AND OBJECTIVE BOX
Patient is a 88y old  Female who presents with a chief complaint of Osteomyelitis (03 Nov 2020 16:59)   SOB    INTERVAL HPI/OVERNIGHT EVENTS: seen at bedside this AM with PICC in place. Patient doing well. no acute events overnight. Plan to DC home with home care today     MEDICATIONS  (STANDING):  amLODIPine   Tablet 10 milliGRAM(s) Oral daily  ascorbic acid 500 milliGRAM(s) Oral two times a day  aspirin  chewable 81 milliGRAM(s) Oral daily  atorvastatin 20 milliGRAM(s) Oral at bedtime  carvedilol 6.25 milliGRAM(s) Oral two times a day  cefepime   IVPB 1000 milliGRAM(s) IV Intermittent every 12 hours  chlorhexidine 4% Liquid 1 Application(s) Topical <User Schedule>  dextrose 5%. 1000 milliLiter(s) (50 mL/Hr) IV Continuous <Continuous>  dextrose 50% Injectable 25 Gram(s) IV Push once  dextrose 50% Injectable 25 Gram(s) IV Push once  dextrose 50% Injectable 12.5 Gram(s) IV Push once  donepezil 5 milliGRAM(s) Oral at bedtime  heparin   Injectable 5000 Unit(s) SubCutaneous every 12 hours  insulin lispro (ADMELOG) corrective regimen sliding scale   SubCutaneous three times a day before meals  insulin lispro (ADMELOG) corrective regimen sliding scale   SubCutaneous at bedtime  losartan 100 milliGRAM(s) Oral daily  multivitamin/minerals 1 Tablet(s) Oral daily  PARoxetine 20 milliGRAM(s) Oral daily  senna 2 Tablet(s) Oral at bedtime    MEDICATIONS  (PRN):  acetaminophen   Tablet .. 650 milliGRAM(s) Oral every 6 hours PRN Mild Pain (1 - 3)  dextrose 40% Gel 15 Gram(s) Oral once PRN Blood Glucose LESS THAN 70 milliGRAM(s)/deciliter  glucagon  Injectable 1 milliGRAM(s) IntraMuscular once PRN Glucose LESS THAN 70 milligrams/deciliter  glucagon  Injectable 1 milliGRAM(s) IntraMuscular once PRN Glucose LESS THAN 70 milligrams/deciliter  polyethylene glycol 3350 17 Gram(s) Oral daily PRN Constipation  sodium chloride 0.9% lock flush 10 milliLiter(s) IV Push every 1 hour PRN Pre/post blood products, medications, blood draw, and to maintain line patency      Allergies    sulfa drugs (Unknown)    Intolerances        REVIEW OF SYSTEMS:  CONSTITUTIONAL: No fever, weight loss, or fatigue  EYES: No eye pain, visual disturbances, or discharge  ENMT:  No difficulty hearing, tinnitus, vertigo; No sinus or throat pain  NECK: No pain or stiffness  BREASTS: No pain, masses, or nipple discharge  RESPIRATORY: No cough, wheezing, chills or hemoptysis; No shortness of breath  CARDIOVASCULAR: No chest pain, palpitations, dizziness, or leg swelling  GASTROINTESTINAL: No abdominal or epigastric pain. No nausea, vomiting, or hematemesis; No diarrhea or constipation. No melena or hematochezia.  GENITOURINARY: No dysuria, frequency, hematuria, or incontinence  NEUROLOGICAL: No headaches, memory loss, loss of strength, numbness, or tremors  SKIN: No itching, burning, rashes, or lesions   LYMPH NODES: No enlarged glands  ENDOCRINE: No heat or cold intolerance; No hair loss; No polydipsia or polyuria  MUSCULOSKELETAL: No joint pain or swelling; No muscle, back, or extremity pain  PSYCHIATRIC: No depression, anxiety, mood swings, or difficulty sleeping  HEME/LYMPH: No easy bruising, or bleeding gums  ALLERGY AND IMMUNOLOGIC: No hives or eczema    Vital Signs Last 24 Hrs  T(C): 36.9 (04 Nov 2020 05:13), Max: 36.9 (04 Nov 2020 05:13)  T(F): 98.5 (04 Nov 2020 05:13), Max: 98.5 (04 Nov 2020 05:13)  HR: 62 (04 Nov 2020 05:13) (59 - 63)  BP: 133/58 (04 Nov 2020 05:13) (128/55 - 147/69)  BP(mean): --  RR: 17 (04 Nov 2020 05:13) (17 - 18)  SpO2: 96% (04 Nov 2020 05:13) (96% - 97%)    PHYSICAL EXAM:  GENERAL: no acute distress  HEENT: anicteric, no pharyngeal exudates  LUNGS: clear to auscultation, no wheezing or rhonchi appreciated  HEART: S1, S2, regular rate and rhythm, murmur noted, no edema to b/l LE  GASTROINTESTINAL: abdomen is soft, nontender, nondistended, normoactive bowel sounds  EXTREMITIES: PICC line in place RUE contracted LLE and LUE, L foot dry clean dressing. no edema, cyanosis, or calf tenderness.  NEUROLOGIC: answering simple questions and following simple commands appropriately   LABS:                        9.4    6.50  )-----------( 299      ( 04 Nov 2020 09:33 )             29.2     04 Nov 2020 09:33    145    |  112    |  39     ----------------------------<  159    4.2     |  27     |  1.00     Ca    9.4        04 Nov 2020 09:33        CAPILLARY BLOOD GLUCOSE      POCT Blood Glucose.: 175 mg/dL (04 Nov 2020 08:08)  POCT Blood Glucose.: 195 mg/dL (03 Nov 2020 21:08)  POCT Blood Glucose.: 124 mg/dL (03 Nov 2020 17:00)  POCT Blood Glucose.: 144 mg/dL (03 Nov 2020 11:33)    BLOOD CULTURE    RADIOLOGY & ADDITIONAL TESTS:    Imaging Personally Reviewed:  [ ] YES     Consultant(s) Notes Reviewed:      Care Discussed with Consultants/Other Providers:

## 2020-11-04 NOTE — PROGRESS NOTE ADULT - NSHPATTENDINGPLANDISCUSS_GEN_ALL_CORE
Dr. Frey.
Dr. Machado.
Dr. Wang.
House staff.
Dr Danielle podiatry, palliative care team
Dr. Mullins
Dr Almonte ID, Dr Danielle podiatry, 3W IDR team
pt, pt's son, consultants, nursing, resident

## 2020-11-04 NOTE — CHART NOTE - NSCHARTNOTEFT_GEN_A_CORE
Assessment: 89 y/o female adm with Type 2 DM with foot ulcer. PMH dementia, DM, HTN, HLD, GERD. 10/28 OR resection of head of 1st left metatarsal bone. Pt sleeping at time of visit this am. Pt noted to be confused, as per EMR. Last BM 10/31. D/c plan is for pt to go home with home care.     Factors impacting intake: [ x] none [ ] nausea  [ ] vomiting [ ] diarrhea [ ] constipation  [ ]chewing problems [ ] swallowing issues  [ ] other:     Diet Presciption: Diet, Consistent Carbohydrate/No Snacks (10-28-20 @ 13:48)    Intake: total feed; 100% consumed     Current Weight:   % Weight Change    Pertinent Medications: MEDICATIONS  (STANDING):  amLODIPine   Tablet 10 milliGRAM(s) Oral daily  ascorbic acid 500 milliGRAM(s) Oral two times a day  aspirin  chewable 81 milliGRAM(s) Oral daily  atorvastatin 20 milliGRAM(s) Oral at bedtime  carvedilol 6.25 milliGRAM(s) Oral two times a day  cefepime   IVPB 1000 milliGRAM(s) IV Intermittent every 12 hours  chlorhexidine 4% Liquid 1 Application(s) Topical <User Schedule>  dextrose 5%. 1000 milliLiter(s) (50 mL/Hr) IV Continuous <Continuous>  dextrose 50% Injectable 25 Gram(s) IV Push once  dextrose 50% Injectable 25 Gram(s) IV Push once  dextrose 50% Injectable 12.5 Gram(s) IV Push once  donepezil 5 milliGRAM(s) Oral at bedtime  heparin   Injectable 5000 Unit(s) SubCutaneous every 12 hours  insulin lispro (ADMELOG) corrective regimen sliding scale   SubCutaneous three times a day before meals  insulin lispro (ADMELOG) corrective regimen sliding scale   SubCutaneous at bedtime  losartan 100 milliGRAM(s) Oral daily  multivitamin/minerals 1 Tablet(s) Oral daily  PARoxetine 20 milliGRAM(s) Oral daily  senna 2 Tablet(s) Oral at bedtime    MEDICATIONS  (PRN):  acetaminophen   Tablet .. 650 milliGRAM(s) Oral every 6 hours PRN Mild Pain (1 - 3)  dextrose 40% Gel 15 Gram(s) Oral once PRN Blood Glucose LESS THAN 70 milliGRAM(s)/deciliter  glucagon  Injectable 1 milliGRAM(s) IntraMuscular once PRN Glucose LESS THAN 70 milligrams/deciliter  glucagon  Injectable 1 milliGRAM(s) IntraMuscular once PRN Glucose LESS THAN 70 milligrams/deciliter  polyethylene glycol 3350 17 Gram(s) Oral daily PRN Constipation  sodium chloride 0.9% lock flush 10 milliLiter(s) IV Push every 1 hour PRN Pre/post blood products, medications, blood draw, and to maintain line patency    Pertinent Labs: 11-04 Na145 mmol/L Glu 159 mg/dL<H> K+ 4.2 mmol/L Cr  1.00 mg/dL BUN 39 mg/dL<H> 10-25 PAB 22 mg/dL     CAPILLARY BLOOD GLUCOSE      POCT Blood Glucose.: 175 mg/dL (04 Nov 2020 08:08)  POCT Blood Glucose.: 195 mg/dL (03 Nov 2020 21:08)  POCT Blood Glucose.: 124 mg/dL (03 Nov 2020 17:00)    Skin: no pressure ulcers noted.     Estimated Needs:   [x ] no change since previous assessment  [ ] recalculated:     Previous Nutrition Diagnosis:   [ ] Inadequate Energy Intake [ ]Inadequate Oral Intake [ ] Excessive Energy Intake   [ ] Underweight [ ] Increased Nutrient Needs [ ] Overweight/Obesity   [ ] Altered GI Function [ ] Unintended Weight Loss [ ] Food & Nutrition Related Knowledge Deficit [x ] Malnutrition     Nutrition Diagnosis is [x ] ongoing  [ ] resolved [ ] not applicable     New Nutrition Diagnosis: [ x] not applicable       Interventions:   Recommend  [ ] Change Diet To:  [ ] Nutrition Supplement  [ ] Nutrition Support  [x ] Other: continue current diet order; provide total assistance at meal times.     Monitoring and Evaluation:   [x ] PO intake [ x ] Tolerance to diet prescription [ x ] weights [ x ] labs[ x ] follow up per protocol  [ ] other:

## 2020-11-04 NOTE — PROGRESS NOTE ADULT - ASSESSMENT
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 Plan to DC home with home care for ABx management through PICC line      Diabetic foot ulcer, with osteomyelitis of left first metatarsal head  -s/p L 1st metatarsal head resection on 10/28  -Now on cefepime 2gm q12h renally dosed, per ID (Gage), recs appreciated, given wound culture positive for pansensitive pseudomonas aeruginosa.   -Proximal margin of amputation showed OM so will need treatment for 6 weeks.   -DC Abx plan per ID: preferred Cefepime 2gm q12 if possible logistically, otherwise will switch to oral ciprofloxacin 500mg q12 to complete 6 weeks from the day of start of cefepime. While on Cipro needs QTc monitoring. Also will need to monitor for possible c diff, if pt has diarrhea.  -discussed with pt's son, Marcio, who agreed to administer Abx for the pt in her home; spoke with CM who will arrange for home cefepime.  -coordinated with IR for PICC line which was placed yesterday 11/2  -Tissue Cx from OR 10/28 rare CNS   -F/u Blood cx x2 10/24 NGF  -adequate vascular flow per L leg ABIs  -Podiatry, Dr. Mullins group following, recs appreciated -- dressing changes per podiatry    Deep Tissue Injury  1) sacral 2) L scapular DTIs, Stage 1 pressure ulcer  3) Skin tear R thigh 2/2 LLE contracture with erythema and edema on medical aspect of L foot  D/w nursing team 10/31 who noted improvement  Wound care RN consulted - DC'd by wound care RN as no longer warranted  Turn in bed, reposition frequently  keep towel between abrasion and LLE     Pulmonary vascular congestion and left pleural effusion r/o occult CHF  -CXR on admission showed L infiltrate w/ effusion  -Patient currently appears euvolemic on exam  -Trops neg, BNP 1100  -TTE EF65% mild valvular disease    DM2, noninsulin dependent.  hold home metformin  start low dose ISS, fingersticks    Dementia  advanced dementia ; baseline bedbound, at baseline knows her name, can identify her family, forgets their names, eats well (regular solid food)  cont donepezil   cont paroxetine.    Iron deficiency anemia  Hgb stable  hold home iron    HLD  atorvastatin interchange for lovastatin.    HTN  cont carvedilol, amlodipine, and losartan with hold parameters.    GERD  Patient on omeprazole: will use protonix in house    Depression  c/w paroxetine    PPx  heparin subq, resumed post op  bowel regimen    DISPO  DNR DNI, PC consulted, recs appreciated  PT consulted: Home w 24 hr care  Wound care clinic f/u within 1 week

## 2020-11-04 NOTE — PROGRESS NOTE ADULT - PROBLEM SELECTOR PLAN 1
- Patient seen and evaluated  -Charts, labs, imagining studies reviewed  - Procedure discussed with patient's NOK along with risks, benefits and alternatives with risks inlcuding but not limited to pain, swelling, infection, seroma, hematoma, loss of limb and loss of life  All questions and concerns were answered to the fullest extent with the patient  Consent will be obtained by NOK and placed in chart for surgical management of left 1st metatarsal head osteomyelitis via left 1st metatarsal head resection with Dr. Mullins with cutting and removal of skin, soft tissue and bone as necessary  No guarantees were given or promises were made at this time
- Patient seen and evaluated at bedside  - Wound dressing removed with care and to tolerance  - Iodoform packing removed, patient tolerated well  - Area gently cleansed with normal saline and area pat dry with gauze  - Dressed with xeroform and DSD and Surgicel Nu-Knit placed on top  - Podiatry team will continue to follow patient while in house    Wound Care Instructions   1. Keep dressing clean, dry and intact until first follow up appointment with Wound Care Clinic   2. Monitor for any signs of infection.  3. Patient is to follow up [facility] with Dr. Mullins or Dr. Danielle within 1 week upon discharge.
- Patient will be scheduled for 1st metatarsal head resection with Dr. Mullins for 10/26/20 secondary to OM of the left 1st metatarsal head, procedure already discussed with the patient's son with attending  - Patient will require medical clearance for OR procedure with podiatry  - Podiatry team will continue to follow patient while in house
-Patient seen and evaluated at bedside  - Wound dressing removed with care and to tolerance  - Area gently cleansed with normal saline and area pat dry with gauze  - Dorsal incision Dressed with xeroform and DSD on top and medial aspect dressed with Silver Alginate   - Left hallux splinted in the rectus position- left hallux in good alignment   - Podiatry team will continue to follow patient while in house    Wound Care Instructions   1. Keep dressing clean, dry and intact until first follow up appointment with Wound Care Clinic   2. Monitor for any signs of infection.  3. Patient is to follow up in [facility] with Dr. Mullins or Dr. Danielle within 1 week upon discharge.
-Patient seen and evaluated at bedside  - Wound dressing removed with care and to tolerance  - Area gently cleansed with normal saline and area pat dry with gauze  - Dorsal incision Dressed with xeroform and DSD on top and medial aspect dressed with Silver Alginate   - Left hallux splinted in the rectus position- left hallux in good alignment   - Podiatry team will continue to follow patient while in house    Wound Care Instructions   1. Keep dressing clean, dry and intact until first follow up appointment with Wound Care Clinic   2. Monitor for any signs of infection.  3. Patient is to follow up in the [facility] with Dr. Mullins or Dr. Danielle within 1 week upon discharge.
Patient seen and evaluated at bedside  - Wound dressing removed with care and to tolerance  - Area gently cleansed with normal saline and area pat dry with gauze  - Dorsal incision Dressed with xeroform and DSD and Surgicel Nu-Knit placed on top and medial aspect dressed with Silver Alginate   - Podiatry team will continue to follow patient while in house    Wound Care Instructions   1. Keep dressing clean, dry and intact until first follow up appointment with Wound Care Clinic   2. Monitor for any signs of infection.  3. Patient is to follow up in the [facility] with Dr. Mullins or Dr. Danielle within 1 week upon discharge.

## 2020-11-04 NOTE — DISCHARGE NOTE NURSING/CASE MANAGEMENT/SOCIAL WORK - PATIENT PORTAL LINK FT
You can access the FollowMyHealth Patient Portal offered by [facility] by registering at the following website: [email or web address]. By joining [employer]’s FollowMyHealth portal, you will also be able to view your health information using other applications (apps) compatible with our system.

## 2020-11-04 NOTE — PROGRESS NOTE ADULT - PROBLEM SELECTOR PROBLEM 1
Diabetic foot ulcer
Other osteomyelitis of left foot

## 2020-11-11 ENCOUNTER — OUTPATIENT (OUTPATIENT)
Dept: OUTPATIENT SERVICES | Facility: HOSPITAL | Age: 85
LOS: 1 days | Discharge: HOME | End: 2020-11-11
Payer: MEDICARE

## 2020-11-11 ENCOUNTER — APPOINTMENT (OUTPATIENT)
Dept: SURGERY | Facility: HOSPITAL | Age: 85
End: 2020-11-11
Payer: MEDICARE

## 2020-11-11 VITALS
BODY MASS INDEX: 22.18 KG/M2 | RESPIRATION RATE: 20 BRPM | WEIGHT: 110 LBS | DIASTOLIC BLOOD PRESSURE: 54 MMHG | HEIGHT: 59 IN | HEART RATE: 65 BPM | OXYGEN SATURATION: 98 % | SYSTOLIC BLOOD PRESSURE: 113 MMHG | TEMPERATURE: 98.1 F

## 2020-11-11 DIAGNOSIS — E11.621 TYPE 2 DIABETES MELLITUS WITH FOOT ULCER: ICD-10-CM

## 2020-11-11 DIAGNOSIS — S99.919A UNSPECIFIED INJURY OF UNSPECIFIED ANKLE, INITIAL ENCOUNTER: Chronic | ICD-10-CM

## 2020-11-11 DIAGNOSIS — Z90.49 ACQUIRED ABSENCE OF OTHER SPECIFIED PARTS OF DIGESTIVE TRACT: Chronic | ICD-10-CM

## 2020-11-11 PROCEDURE — G0463: CPT

## 2020-11-11 PROCEDURE — 99213 OFFICE O/P EST LOW 20 MIN: CPT

## 2020-11-12 DIAGNOSIS — Z79.4 LONG TERM (CURRENT) USE OF INSULIN: ICD-10-CM

## 2020-11-12 DIAGNOSIS — Z96.653 PRESENCE OF ARTIFICIAL KNEE JOINT, BILATERAL: ICD-10-CM

## 2020-11-12 DIAGNOSIS — K21.9 GASTRO-ESOPHAGEAL REFLUX DISEASE WITHOUT ESOPHAGITIS: ICD-10-CM

## 2020-11-12 DIAGNOSIS — F03.90 UNSPECIFIED DEMENTIA WITHOUT BEHAVIORAL DISTURBANCE: ICD-10-CM

## 2020-11-12 DIAGNOSIS — Z87.891 PERSONAL HISTORY OF NICOTINE DEPENDENCE: ICD-10-CM

## 2020-11-12 DIAGNOSIS — Z80.0 FAMILY HISTORY OF MALIGNANT NEOPLASM OF DIGESTIVE ORGANS: ICD-10-CM

## 2020-11-12 DIAGNOSIS — Z88.2 ALLERGY STATUS TO SULFONAMIDES: ICD-10-CM

## 2020-11-12 DIAGNOSIS — E78.5 HYPERLIPIDEMIA, UNSPECIFIED: ICD-10-CM

## 2020-11-12 DIAGNOSIS — Z79.82 LONG TERM (CURRENT) USE OF ASPIRIN: ICD-10-CM

## 2020-11-12 DIAGNOSIS — Z90.49 ACQUIRED ABSENCE OF OTHER SPECIFIED PARTS OF DIGESTIVE TRACT: ICD-10-CM

## 2020-11-12 DIAGNOSIS — L97.426 NON-PRESSURE CHRONIC ULCER OF LEFT HEEL AND MIDFOOT WITH BONE INVOLVEMENT WITHOUT EVIDENCE OF NECROSIS: ICD-10-CM

## 2020-11-12 DIAGNOSIS — I10 ESSENTIAL (PRIMARY) HYPERTENSION: ICD-10-CM

## 2020-11-12 DIAGNOSIS — E11.621 TYPE 2 DIABETES MELLITUS WITH FOOT ULCER: ICD-10-CM

## 2020-11-12 DIAGNOSIS — Z79.899 OTHER LONG TERM (CURRENT) DRUG THERAPY: ICD-10-CM

## 2020-11-12 NOTE — ASSESSMENT
[Verbal] : Verbal [Written] : Written [Demo] : Demo [Patient] : Patient [Family member] : Family member [Caregiver] : Caregiver [Fair - mild discomfort, physical impairment, low acceptance] : Fair - mild discomfort, physical impairment, low acceptance [Needs reinforcement] : needs reinforcement [Dressing changes] : dressing changes [Foot Care] : foot care [Skin Care] : skin care [Pressure relief] : pressure relief [Signs and symptoms of infection] : sign and symptoms of infection [Nutrition] : nutrition [How and When to Call] : how and when to call [Labs and Tests] : labs and tests [Off-loading] : off-loading [Home Health] : home health [Patient responsibility to plan of care] : patient responsibility to plan of care [Glycemic Control] : glycemic control [] : Yes [Stable] : stable [Stretcher] : Stretcher [Home] : Home [Faxed - Long Term Care/Home Health Agency] : Long Term Care/Home Health Agency: Faxed [FreeTextEntry2] : Infection prevention\par Localized wound care \par Offloading/Elevation\par Promote Skin Integrity [FreeTextEntry4] : F/U 1 week for suture removal\par \par  [FreeTextEntry3] : [phone] [FreeTextEntry1] : S/P first metatarsal head resection, healing well, no acute infection\par

## 2020-11-12 NOTE — PHYSICAL EXAM
[0] : left 0 [1+] : left 1+ [Skin Ulcer] : ulcer [Calm] : calm [Normal Breath Sounds] : Normal breath sounds [Normal Heart Sounds] : normal heart sounds [4 x 4] : 4 x 4  [Abdominal Pad] : Abdominal Pad [JVD] : no jugular venous distention  [Purpura] : no purpura  [Petechiae] : no petechiae [Alert] : not alert [Oriented to Person] : disoriented to person [Oriented to Place] : disoriented to place [de-identified] : WD/WN in no acute distress. [de-identified] : WNL [de-identified] : RONAKL [de-identified] : HTN [de-identified] : WNL [de-identified] : Left foot suture ilne is clean, intact, no drainage, no infection, DFU is closed.  [de-identified] : Dementia [FreeTextEntry1] : Left medial 1st metatarsal head resection-Suture CDI [FreeTextEntry2] : 5.0 [FreeTextEntry3] : 0.3 [FreeTextEntry4] : 0.1 [de-identified] : serosanguineous  [de-identified] : Xeroform [de-identified] : Cleansed with NS\par  [TWNoteComboBox4] : Small [TWNoteComboBox5] : No [TWNoteComboBox6] : Surgical [de-identified] : No [de-identified] : Normal [de-identified] : None [de-identified] : None [de-identified] : >75% [de-identified] : No [de-identified] : 3x Weekly [de-identified] : Primary Dressing

## 2020-11-20 ENCOUNTER — APPOINTMENT (OUTPATIENT)
Dept: WOUND CARE | Facility: HOSPITAL | Age: 85
End: 2020-11-20
Payer: MEDICARE

## 2020-11-20 ENCOUNTER — OUTPATIENT (OUTPATIENT)
Dept: OUTPATIENT SERVICES | Facility: HOSPITAL | Age: 85
LOS: 1 days | Discharge: HOME | End: 2020-11-20
Payer: MEDICARE

## 2020-11-20 VITALS
TEMPERATURE: 97.4 F | SYSTOLIC BLOOD PRESSURE: 140 MMHG | HEART RATE: 65 BPM | OXYGEN SATURATION: 98 % | RESPIRATION RATE: 20 BRPM | DIASTOLIC BLOOD PRESSURE: 55 MMHG | WEIGHT: 110 LBS | HEIGHT: 59 IN | BODY MASS INDEX: 22.18 KG/M2

## 2020-11-20 DIAGNOSIS — S99.919A UNSPECIFIED INJURY OF UNSPECIFIED ANKLE, INITIAL ENCOUNTER: Chronic | ICD-10-CM

## 2020-11-20 DIAGNOSIS — Z90.49 ACQUIRED ABSENCE OF OTHER SPECIFIED PARTS OF DIGESTIVE TRACT: Chronic | ICD-10-CM

## 2020-11-20 DIAGNOSIS — E11.621 TYPE 2 DIABETES MELLITUS WITH FOOT ULCER: ICD-10-CM

## 2020-11-20 PROCEDURE — 99024 POSTOP FOLLOW-UP VISIT: CPT

## 2020-11-20 PROCEDURE — G0463: CPT

## 2020-11-20 NOTE — PHYSICAL EXAM
[4 x 4] : 4 x 4  [0] : left 0 [1+] : left 1+ [Purpura] : no purpura  [Petechiae] : no petechiae [Skin Ulcer] : ulcer [Alert] : not alert [Oriented to Person] : disoriented to person [Oriented to Place] : disoriented to place [Calm] : calm [de-identified] : comfortable  [de-identified] : HTN [de-identified] : non ambulatory , forefoot deformities with bunions, s/p left 1st metatarsal head resection [de-identified] : left foot incision site with sutures itnact, no erythema, no purulence, no malodor, no proximal streaking [de-identified] : Dementia [de-identified] : DPM removed sutures. Steri strips applied [FreeTextEntry1] : Left medial 1st metatarsal head resection - Suture line - Sutures removed this visit by SUKUMAR [FreeTextEntry2] : 5.0 [FreeTextEntry3] : 0.1 [FreeTextEntry4] : 0.1 [de-identified] : Calcium Alginate [de-identified] : Cleansed with NS\par Cloth Tape\par  [TWNoteComboBox4] : None [TWNoteComboBox5] : No [TWNoteComboBox6] : Surgical [de-identified] : No [de-identified] : Normal [de-identified] : None [de-identified] : None [de-identified] : 100% [de-identified] : No [de-identified] : 3x Weekly [de-identified] : Primary Dressing

## 2020-11-20 NOTE — REVIEW OF SYSTEMS
[Fever] : no fever [Chills] : no chills [Eye Pain] : no eye pain [Shortness Of Breath] : no shortness of breath [Abdominal Pain] : no abdominal pain [Skin Wound] : skin wound [Confused] : confusion [Limb Weakness] : limb weakness [Difficulty Walking] : difficulty walking [Anxiety] : no anxiety [Easy Bleeding] : no tendency for easy bleeding [FreeTextEntry4] : accompanied by son and aid  [FreeTextEntry5] : HTN [FreeTextEntry9] : bilateral forefoot deformity if toes with bunions , contracture of limbs  [de-identified] : DFU 3 medial left 1st metatarsal head left foot , skin ,subcutaneous , fat fascia to bone  [de-identified] : Dementia  [de-identified] : NIDDM

## 2020-11-20 NOTE — ASSESSMENT
[Verbal] : Verbal [Written] : Written [Demo] : Demo [Patient] : Patient [Family member] : Family member [Caregiver] : Caregiver [Fair - mild discomfort, physical impairment, low acceptance] : Fair - mild discomfort, physical impairment, low acceptance [Needs reinforcement] : needs reinforcement [Dressing changes] : dressing changes [Foot Care] : foot care [Skin Care] : skin care [Pressure relief] : pressure relief [Signs and symptoms of infection] : sign and symptoms of infection [Nutrition] : nutrition [How and When to Call] : how and when to call [Labs and Tests] : labs and tests [Off-loading] : off-loading [Home Health] : home health [Patient responsibility to plan of care] : patient responsibility to plan of care [Glycemic Control] : glycemic control [Stable] : stable [Home] : Home [Stretcher] : Stretcher [Faxed - Long Term Care/Home Health Agency] : Long Term Care/Home Health Agency: Faxed [] : No [FreeTextEntry2] : Infection prevention\par Localized wound care \par Offloading/Elevation\par Promote Skin Integrity [FreeTextEntry4] : F/U to [facility] in 1 week\par \par  [FreeTextEntry1] : [facility] [FreeTextEntry3] : [phone]

## 2020-11-20 NOTE — PLAN
[FreeTextEntry1] : Patient examined and evaluated at this time.\par Continue local wound care and offloading.\par Sutures removed at this time and steri strips applied.\par Pt to follow up in 1 week.

## 2020-11-20 NOTE — HISTORY OF PRESENT ILLNESS
[FreeTextEntry1] : patient seen s/p left 1st metatarsal head resection for dfu grade 3. Pt presents with intact sutures, no complaints of pain.

## 2020-11-21 DIAGNOSIS — Z79.4 LONG TERM (CURRENT) USE OF INSULIN: ICD-10-CM

## 2020-11-21 DIAGNOSIS — F03.90 UNSPECIFIED DEMENTIA, UNSPECIFIED SEVERITY, WITHOUT BEHAVIORAL DISTURBANCE, PSYCHOTIC DISTURBANCE, MOOD DISTURBANCE, AND ANXIETY: ICD-10-CM

## 2020-11-21 DIAGNOSIS — Z88.2 ALLERGY STATUS TO SULFONAMIDES: ICD-10-CM

## 2020-11-21 DIAGNOSIS — E78.5 HYPERLIPIDEMIA, UNSPECIFIED: ICD-10-CM

## 2020-11-21 DIAGNOSIS — Z79.82 LONG TERM (CURRENT) USE OF ASPIRIN: ICD-10-CM

## 2020-11-21 DIAGNOSIS — Z80.0 FAMILY HISTORY OF MALIGNANT NEOPLASM OF DIGESTIVE ORGANS: ICD-10-CM

## 2020-11-21 DIAGNOSIS — I10 ESSENTIAL (PRIMARY) HYPERTENSION: ICD-10-CM

## 2020-11-21 DIAGNOSIS — Z90.49 ACQUIRED ABSENCE OF OTHER SPECIFIED PARTS OF DIGESTIVE TRACT: ICD-10-CM

## 2020-11-21 DIAGNOSIS — Z96.653 PRESENCE OF ARTIFICIAL KNEE JOINT, BILATERAL: ICD-10-CM

## 2020-11-21 DIAGNOSIS — L97.426 NON-PRESSURE CHRONIC ULCER OF LEFT HEEL AND MIDFOOT WITH BONE INVOLVEMENT WITHOUT EVIDENCE OF NECROSIS: ICD-10-CM

## 2020-11-21 DIAGNOSIS — Z79.899 OTHER LONG TERM (CURRENT) DRUG THERAPY: ICD-10-CM

## 2020-11-21 DIAGNOSIS — E11.621 TYPE 2 DIABETES MELLITUS WITH FOOT ULCER: ICD-10-CM

## 2020-11-21 DIAGNOSIS — K21.9 GASTRO-ESOPHAGEAL REFLUX DISEASE WITHOUT ESOPHAGITIS: ICD-10-CM

## 2020-11-21 DIAGNOSIS — Z87.891 PERSONAL HISTORY OF NICOTINE DEPENDENCE: ICD-10-CM

## 2020-11-28 ENCOUNTER — APPOINTMENT (OUTPATIENT)
Dept: WOUND CARE | Facility: HOSPITAL | Age: 85
End: 2020-11-28
Payer: MEDICARE

## 2020-11-28 ENCOUNTER — OUTPATIENT (OUTPATIENT)
Dept: OUTPATIENT SERVICES | Facility: HOSPITAL | Age: 85
LOS: 1 days | Discharge: HOME | End: 2020-11-28
Payer: MEDICARE

## 2020-11-28 VITALS
DIASTOLIC BLOOD PRESSURE: 63 MMHG | WEIGHT: 110 LBS | BODY MASS INDEX: 22.18 KG/M2 | OXYGEN SATURATION: 100 % | HEART RATE: 59 BPM | RESPIRATION RATE: 16 BRPM | HEIGHT: 59 IN | SYSTOLIC BLOOD PRESSURE: 115 MMHG

## 2020-11-28 VITALS — TEMPERATURE: 97.1 F

## 2020-11-28 DIAGNOSIS — Z90.49 ACQUIRED ABSENCE OF OTHER SPECIFIED PARTS OF DIGESTIVE TRACT: ICD-10-CM

## 2020-11-28 DIAGNOSIS — Z79.4 LONG TERM (CURRENT) USE OF INSULIN: ICD-10-CM

## 2020-11-28 DIAGNOSIS — E78.5 HYPERLIPIDEMIA, UNSPECIFIED: ICD-10-CM

## 2020-11-28 DIAGNOSIS — F03.90 UNSPECIFIED DEMENTIA, UNSPECIFIED SEVERITY, WITHOUT BEHAVIORAL DISTURBANCE, PSYCHOTIC DISTURBANCE, MOOD DISTURBANCE, AND ANXIETY: ICD-10-CM

## 2020-11-28 DIAGNOSIS — Z80.0 FAMILY HISTORY OF MALIGNANT NEOPLASM OF DIGESTIVE ORGANS: ICD-10-CM

## 2020-11-28 DIAGNOSIS — E11.621 TYPE 2 DIABETES MELLITUS WITH FOOT ULCER: ICD-10-CM

## 2020-11-28 DIAGNOSIS — Z79.899 OTHER LONG TERM (CURRENT) DRUG THERAPY: ICD-10-CM

## 2020-11-28 DIAGNOSIS — Z96.653 PRESENCE OF ARTIFICIAL KNEE JOINT, BILATERAL: ICD-10-CM

## 2020-11-28 DIAGNOSIS — Z90.49 ACQUIRED ABSENCE OF OTHER SPECIFIED PARTS OF DIGESTIVE TRACT: Chronic | ICD-10-CM

## 2020-11-28 DIAGNOSIS — Z87.891 PERSONAL HISTORY OF NICOTINE DEPENDENCE: ICD-10-CM

## 2020-11-28 DIAGNOSIS — K21.9 GASTRO-ESOPHAGEAL REFLUX DISEASE WITHOUT ESOPHAGITIS: ICD-10-CM

## 2020-11-28 DIAGNOSIS — L97.426 NON-PRESSURE CHRONIC ULCER OF LEFT HEEL AND MIDFOOT WITH BONE INVOLVEMENT WITHOUT EVIDENCE OF NECROSIS: ICD-10-CM

## 2020-11-28 DIAGNOSIS — Z88.2 ALLERGY STATUS TO SULFONAMIDES: ICD-10-CM

## 2020-11-28 DIAGNOSIS — I10 ESSENTIAL (PRIMARY) HYPERTENSION: ICD-10-CM

## 2020-11-28 DIAGNOSIS — Z79.82 LONG TERM (CURRENT) USE OF ASPIRIN: ICD-10-CM

## 2020-11-28 DIAGNOSIS — S99.919A UNSPECIFIED INJURY OF UNSPECIFIED ANKLE, INITIAL ENCOUNTER: Chronic | ICD-10-CM

## 2020-11-28 PROCEDURE — 99024 POSTOP FOLLOW-UP VISIT: CPT

## 2020-11-28 PROCEDURE — G0463: CPT

## 2020-11-28 NOTE — ASSESSMENT
[Verbal] : Verbal [Written] : Written [Demo] : Demo [Patient] : Patient [Family member] : Family member [Caregiver] : Caregiver [Fair - mild discomfort, physical impairment, low acceptance] : Fair - mild discomfort, physical impairment, low acceptance [Needs reinforcement] : needs reinforcement [Dressing changes] : dressing changes [Foot Care] : foot care [Skin Care] : skin care [Pressure relief] : pressure relief [Signs and symptoms of infection] : sign and symptoms of infection [Nutrition] : nutrition [How and When to Call] : how and when to call [Off-loading] : off-loading [Home Health] : home health [Patient responsibility to plan of care] : patient responsibility to plan of care [Glycemic Control] : glycemic control [Stable] : stable [Home] : Home [Stretcher] : Stretcher [Faxed - Long Term Care/Home Health Agency] : Long Term Care/Home Health Agency: Faxed [] : No [FreeTextEntry2] : Infection prevention\par Localized wound care \par Offloading/Elevation\par Promote Skin Integrity\par Encourage Glycemic Control\par Maintain Pressure relief\par \par  [FreeTextEntry4] : F/U to [facility] in 1 month\par \par  [FreeTextEntry1] : [facility] [FreeTextEntry3] : [phone]

## 2020-11-28 NOTE — PLAN
[FreeTextEntry1] : Patient examined and evaluated at this time.\par Patient's family happy with outcome.\par Continue local wound care and offloading.\par Patient to follow up in 4 weeks.

## 2020-11-28 NOTE — PHYSICAL EXAM
[0] : left 0 [1+] : left 1+ [Skin Ulcer] : ulcer [Calm] : calm [Purpura] : no purpura  [Petechiae] : no petechiae [Alert] : not alert [Oriented to Person] : disoriented to person [Oriented to Place] : disoriented to place [de-identified] : comfortable  [de-identified] : HTN [de-identified] : non ambulatory , forefoot deformities with bunions, s/p left 1st metatarsal head resection [de-identified] : left foot incision site intact, no erythema, no purulence, no malodor, no proximal streaking [de-identified] : Dementia [FreeTextEntry1] : Left medial 1st metatarsal head resection -Closed [de-identified] : intact [de-identified] : None [de-identified] : Cleansed with NS\par \par  [TWNoteComboBox4] : None [TWNoteComboBox5] : No [TWNoteComboBox6] : Surgical [de-identified] : No [de-identified] : Normal [de-identified] : None [de-identified] : None [de-identified] : 100% [de-identified] : No [de-identified] : 3x Weekly

## 2020-11-28 NOTE — REVIEW OF SYSTEMS
[Fever] : no fever [Chills] : no chills [Eye Pain] : no eye pain [Shortness Of Breath] : no shortness of breath [Abdominal Pain] : no abdominal pain [Skin Wound] : skin wound [Confused] : confusion [Limb Weakness] : limb weakness [Difficulty Walking] : difficulty walking [Anxiety] : no anxiety [Easy Bleeding] : no tendency for easy bleeding [FreeTextEntry4] : accompanied by son and aid  [FreeTextEntry5] : HTN [FreeTextEntry9] : bilateral forefoot deformity if toes with bunions , contracture of limbs  [de-identified] : DFU 3 medial left 1st metatarsal head left foot , skin ,subcutaneous , fat fascia to bone  [de-identified] : Dementia  [de-identified] : NIDDM

## 2020-11-28 NOTE — HISTORY OF PRESENT ILLNESS
[FreeTextEntry1] : patient seen s/p left 1st metatarsal head resection for dfu grade 3. Pt presents with intact incision, no complaints of pain.

## 2020-12-05 ENCOUNTER — APPOINTMENT (OUTPATIENT)
Dept: PLASTIC SURGERY | Facility: HOSPITAL | Age: 85
End: 2020-12-05

## 2020-12-26 ENCOUNTER — OUTPATIENT (OUTPATIENT)
Dept: OUTPATIENT SERVICES | Facility: HOSPITAL | Age: 85
LOS: 1 days | Discharge: HOME | End: 2020-12-26
Payer: MEDICARE

## 2020-12-26 ENCOUNTER — APPOINTMENT (OUTPATIENT)
Dept: WOUND CARE | Facility: HOSPITAL | Age: 85
End: 2020-12-26
Payer: MEDICARE

## 2020-12-26 VITALS
DIASTOLIC BLOOD PRESSURE: 86 MMHG | HEIGHT: 59 IN | RESPIRATION RATE: 18 BRPM | BODY MASS INDEX: 22.18 KG/M2 | WEIGHT: 110 LBS | OXYGEN SATURATION: 99 % | HEART RATE: 75 BPM | SYSTOLIC BLOOD PRESSURE: 93 MMHG | TEMPERATURE: 97.6 F

## 2020-12-26 DIAGNOSIS — F03.90 UNSPECIFIED DEMENTIA, UNSPECIFIED SEVERITY, WITHOUT BEHAVIORAL DISTURBANCE, PSYCHOTIC DISTURBANCE, MOOD DISTURBANCE, AND ANXIETY: ICD-10-CM

## 2020-12-26 DIAGNOSIS — Z90.49 ACQUIRED ABSENCE OF OTHER SPECIFIED PARTS OF DIGESTIVE TRACT: Chronic | ICD-10-CM

## 2020-12-26 DIAGNOSIS — Z96.653 PRESENCE OF ARTIFICIAL KNEE JOINT, BILATERAL: ICD-10-CM

## 2020-12-26 DIAGNOSIS — Z88.2 ALLERGY STATUS TO SULFONAMIDES: ICD-10-CM

## 2020-12-26 DIAGNOSIS — Z79.899 OTHER LONG TERM (CURRENT) DRUG THERAPY: ICD-10-CM

## 2020-12-26 DIAGNOSIS — Z87.891 PERSONAL HISTORY OF NICOTINE DEPENDENCE: ICD-10-CM

## 2020-12-26 DIAGNOSIS — L97.426 NON-PRESSURE CHRONIC ULCER OF LEFT HEEL AND MIDFOOT WITH BONE INVOLVEMENT WITHOUT EVIDENCE OF NECROSIS: ICD-10-CM

## 2020-12-26 DIAGNOSIS — I10 ESSENTIAL (PRIMARY) HYPERTENSION: ICD-10-CM

## 2020-12-26 DIAGNOSIS — E11.621 TYPE 2 DIABETES MELLITUS WITH FOOT ULCER: ICD-10-CM

## 2020-12-26 DIAGNOSIS — Z90.49 ACQUIRED ABSENCE OF OTHER SPECIFIED PARTS OF DIGESTIVE TRACT: ICD-10-CM

## 2020-12-26 DIAGNOSIS — Z79.82 LONG TERM (CURRENT) USE OF ASPIRIN: ICD-10-CM

## 2020-12-26 DIAGNOSIS — S99.919A UNSPECIFIED INJURY OF UNSPECIFIED ANKLE, INITIAL ENCOUNTER: Chronic | ICD-10-CM

## 2020-12-26 DIAGNOSIS — K21.9 GASTRO-ESOPHAGEAL REFLUX DISEASE WITHOUT ESOPHAGITIS: ICD-10-CM

## 2020-12-26 DIAGNOSIS — L97.509 TYPE 2 DIABETES MELLITUS WITH FOOT ULCER: ICD-10-CM

## 2020-12-26 DIAGNOSIS — Z79.4 LONG TERM (CURRENT) USE OF INSULIN: ICD-10-CM

## 2020-12-26 DIAGNOSIS — Z80.0 FAMILY HISTORY OF MALIGNANT NEOPLASM OF DIGESTIVE ORGANS: ICD-10-CM

## 2020-12-26 PROCEDURE — 99024 POSTOP FOLLOW-UP VISIT: CPT

## 2020-12-26 PROCEDURE — G0463: CPT

## 2020-12-26 NOTE — VITALS
[] : No [de-identified] : Pain scale:  0/10 - Patient reports no c/o pains or discomforts at present.

## 2020-12-26 NOTE — REVIEW OF SYSTEMS
[Fever] : no fever [Chills] : no chills [Eye Pain] : no eye pain [Shortness Of Breath] : no shortness of breath [Abdominal Pain] : no abdominal pain [Skin Wound] : skin wound [Confused] : confusion [Limb Weakness] : limb weakness [Difficulty Walking] : difficulty walking [Anxiety] : no anxiety [Easy Bleeding] : no tendency for easy bleeding [FreeTextEntry4] : accompanied by son and aid  [FreeTextEntry5] : HTN [FreeTextEntry9] : bilateral forefoot deformity if toes with bunions , contracture of limbs  [de-identified] : left foot incision site healed, no erythema, no purulence, no malodor, no proximal streaking [de-identified] : Dementia  [de-identified] : NIDDM

## 2020-12-26 NOTE — ASSESSMENT
[Verbal] : Verbal [Written] : Written [Patient] : Patient [Family member] : Family member [Home Health Provider] : Home Health Provider [Good - alert, interested, motivated] : Good - alert, interested, motivated [Foot Care] : foot care [Signs and symptoms of infection] : sign and symptoms of infection [How and When to Call] : how and when to call [Discharge Planning] : discharge planning [Patient responsibility to plan of care] : patient responsibility to plan of care [] : Yes [Stable] : stable [Home] : Home [Stretcher] : Stretcher [Not Applicable - Long Term Care/Home Health Agency] : Long Term Care/Home Health Agency: Not Applicable [FreeTextEntry2] : Maintain optimal skin integrity, discharge planning [FreeTextEntry4] : Wound closed\par Patient discharged

## 2021-02-26 NOTE — PROGRESS NOTE ADULT - NSREFPHYEXINPTDOCREFER_GEN_ALL_CORE
reference above
see reference
reference above
reference above
stretcher

## 2021-04-19 NOTE — PATIENT PROFILE ADULT. - AS SC BRADEN [facility]RICTION
----- Message from Gifty Gallegos RN sent at 4/19/2021  8:55 AM CDT -----    ----- Message -----  From: Eduardo Taylor DO  Sent: 4/19/2021   8:04 AM CDT  To: [facility] Msg Pool    Vitamin D was low 13.2-take high dose vitamin D 50,000 units weekly  for 12 weeks, after 12 weeks can start taking OTC vitamin D 2000 IU daily.  I also ordered echo in addition to holter monitor because of her symptoms     (3) no apparent problem

## 2021-04-21 NOTE — H&P ADULT - PROBLEM SELECTOR PROBLEM 5
HR=88 bpm, YSWN=100/75 mmhg, SpO2=94.0 %, Resp=11 B/min, EtCO2=32 mmHg, Apnea=1 Seconds, Pain=0, Lozano=2, Comment=NSR HLD (hyperlipidemia)

## 2021-11-16 NOTE — ED PROVIDER NOTE - SEVERITY
Procedure(s):  SEPTOPLASTY  BILATERAL TURBINATE REDUCTION / RADIO FREQUENCY ABLATION OF SEPTAL SWELLS / Jaleel Briones.     general    Anesthesia Post Evaluation      Multimodal analgesia: multimodal analgesia used between 6 hours prior to anesthesia start to PACU discharge  Patient location during evaluation: PACU  Patient participation: complete - patient participated  Level of consciousness: awake and alert  Pain management: adequate (Has required addition of ketamine to hydromorphone but now appears more comfortable)  Airway patency: patent  Anesthetic complications: no  Cardiovascular status: acceptable  Respiratory status: acceptable  Hydration status: acceptable  Post anesthesia nausea and vomiting:  none  Final Post Anesthesia Temperature Assessment:  Normothermia (36.0-37.5 degrees C)      INITIAL Post-op Vital signs:   Vitals Value Taken Time   /81 11/15/21 1810   Temp 36.6 °C (97.8 °F) 11/15/21 1717   Pulse 70 11/15/21 1810   Resp 18 11/15/21 1810   SpO2 97 % 11/15/21 1810
MILD

## 2022-01-01 NOTE — PATIENT PROFILE ADULT. - PATIENT REPRESENTATIVE PHONE
DOCUMENT CREATED: 2022  1208h  NAME: Jonnathan Velez (Jonnathan)  CLINIC NUMBER: [id number]  ADMITTED: 2022  HOSPITAL NUMBER: [id number]  BIRTH WEIGHT: 2.155 kg (89.6 percentile)  GESTATIONAL AGE AT BIRTH: 31 5 days  DATE OF SERVICE: 2022     AGE: 1 days. POSTMENSTRUAL AGE: 31 weeks 6 days. CURRENT WEIGHT: 2.220 kg on   2022 (4 lb 14 oz) (92.8 percentile).        VITAL SIGNS & PHYSICAL EXAM  OVERALL STATUS: Critical - stable. BED: [facility]. TEMP: 97.7-98.8. HR: .   RR: 8-81. URINE OUTPUT: 25mL. STOOL: X1.  HEENT: CPAP hat and mask in place, anterior fontanelle open soft and flat, ears   normally placed, nares patent, moist mucous membranes, OG in place secured to   chin.  RESPIRATORY: Breathing comfortably on Bubble CPAP+5 without retractions. Breath   sounds diminished, but equal bilaterally.  CARDIAC: Normal rate and rhythm. No murmur. Cap refill 2-3 seconds.  ABDOMEN: Soft, non-distended, non-tender. Normoactive bowel sounds present.   Umbilical cord and clamp in place.  : Normal  male external genitalia. Right testicle in scrotum, left   testicle high in scrotum.  NEUROLOGIC: Normal tone and movement for gestational age. Appropriately   responsive to exam.  EXTREMITIES: Moves all extremities spontaneously. PIV in right lower extremity.  SKIN: Pink, warm, well perfused. ID band in place.     LABORATORY STUDIES  2022  22:56h: WBC:9.4X10*3  Hgb:17.3  Hct:50.9  Plt:253X10*3 S:38 B:17 L:24   Eo:0 Ba:17 Met:1 NRBC:19  I:T 0.32  2022: blood - peripheral culture:  (collected at referral after initial   does of antibiotics administered)  2022: urine CMV culture:      NEW FLUID INTAKE  Based on 2.220kg. All IV constituents in mEq/kg unless otherwise specified.  TPN-PIV: B (D10W) standard solution  PIV: Lipid:2.01 gm/kg  FEEDS: Human Milk -  20 kcal/oz 5ml OG q3h  TOLERATING FEEDS: NPO. COMMENTS: Is currently NPO on starter D10 TPN. PLANS:   Will transition to TPN B  and begin small volume enteral feeds and intralipids.     CURRENT MEDICATIONS  Ampicillin 222mg (100mg/kg) IV every 8 hours started on 2022 (completed 1   days)  Gentamicin 10mg (4.5mg/kg) IV every 36 hours started on 2022 (completed 1   days)     RESPIRATORY SUPPORT  SUPPORT: Bubble CPAP since 2022  FiO2: 0.21-0.21  PEEP: 5 cmH2O  O2 SATS:   CBG 2022  22:57h: pH:7.28  pCO2:50  pO2:56  Bicarb:23.6  BE:-3.0  CBG 2022  04:53h: pH:7.30  pCO2:48  pO2:42  Bicarb:23.3     CURRENT PROBLEMS & DIAGNOSES  PREMATURITY - 28-37 WEEKS  ONSET: 2022  STATUS: Active  COMMENTS: 1 day, now 31 6/7wk old male infant. Euthermic in isolette. No new   weight since admission. Voiding and stooling spontaneously.  PLANS: Provide developmentally supportive care as tolerated. Follow-up urine CMV   result. Plan for cranial US on .  RESPIRATORY DISTRESS  ONSET: 2022  STATUS: Active  COMMENTS: Remains on Bubble CPAP+5 without supplemental oxygen requirement and   appropriate ventilation on blood gas this morning.  PLANS: Continue current support. Will wean as clinically able. Follow blood   gases q24hr.  POSSIBLE SEPSIS  ONSET: 2022  STATUS: Active  COMMENTS: Maternal labs negative. GBS not done, ROM x1 hour prior to delivery   and fluid clear. Sepsis evaluation initiated at OSH. First doses of ampicillin   and gentamicin given prior to blood culture collection, however there is a blood   culture pending at referral. Initial CBC with I:T 0.38, repeat CBC on admission   with I:T 0.32. I:T this morning improved to 0.08.  PLANS: Continue antibiotics for a 48 hour minimum. Follow blood culture results   from referral facility. Follow clinically.     TRACKING  FURTHER SCREENING: Car seat screen indicated, hearing screen indicated,   intracranial screen indicated (ordered for ),  screen indicated   (ordered for ) and ROP screen indicated.  SOCIAL COMMENTS: : Mom updated over phone by  transport RN after arrival to   [facility] N[facility].     NOTE CREATORS  DAILY ATTENDING: Mikayla Johnson DO  PREPARED BY: Mikayla Johnson DO                 Electronically Signed by Mikayla Johnson DO on 2022 1209.            [phone]

## 2022-07-28 NOTE — ED PROVIDER NOTE - MEDICAL DECISION MAKING DETAILS
moderate assist (50% patients effort)
malaise and fatigue, fs by , will obtain labs, ua, uc, ct head, ekg

## 2022-12-31 NOTE — ED ADULT TRIAGE NOTE - NS ED NOTE AC HIGH RISK COUNTRIES
Group Topic: BH Activity Group    Date: 12/31/2022  Start Time: 1000  End Time: 1058  Facilitators: Ruma Monroy OT    Focus: Therapeutic Activity   Number in attendance: 4    Method: Group   Attendance: Called out for:Psychiatrist. Independently returned to session after ~5 minutes.   Participation: Active  Patient Response: Attentive and Interactive  Mood: Normal  Affect: Type: Euthymic (normal mood)   Range: Full (normal)   Congruency: Congruent   Stability: Stable  Behavior/Socialization: Appropriate to group, Engaged and Required moderate verbal redirection regarding wanting to play a song during session. Was receptive to verbal redirection.  Thought Process: [place] thinking and Tracking  Task Performance: Follows directions and Requires redirection  Patient Evaluation: Independent - full participation       No

## 2023-05-11 NOTE — PROGRESS NOTE ADULT - REASON FOR ADMISSION
Liliam Zoryve Counseling:  I discussed with the patient that Zoryve is not for use in the eyes, mouth or vagina. The most commonly reported side effects include diarrhea, headache, insomnia, application site pain, upper respiratory tract infections, and urinary tract infections.  All of the patient's questions and concerns were addressed.

## 2023-08-06 NOTE — PROGRESS NOTE ADULT - REASON FOR ADMISSION
--------------------------------------------------------------------------------------------------------------    [facility]    Monday - Friday 8:00 a.m. - 8:00 p.m.  Saturday - Sunday 8:00 a.m. - 4:00 p.m.    -*-*-*-*-*-*-*-  The [facility] is now OPEN Monday to Saturday  [address] [address] [place] [id number]  -*-*-*-*-*-*-*-    [email or web address]  See current wait times for [facility] in real-time  Reserve your waiting-room spot in line     [email or web address]  [place] for the patient portal  See test results and make virtual visits with your primary care provider      -*-*-*-*-*-*-*-  ----------------  Thank you for choosing [facility] today.  We hope you had a pleasant experience and we look forward to serving your future needs.  If you receive a survey in the mail about today's services, we hope that you will take a few minutes to let us know about your experience.    If you have any questions about your VISIT, please call [phone]    If you have any questions about your BILL, please call [phone]    If you need a copy of your MEDICAL RECORD, please call [phone] or email [email or web address] or use the [email or web address] elsa->My Record->Document Center->Download Medical Records->Send Medical Record Request.    --------------------------------------------------------------------------------------------------------------  UNLESS OTHERWISE INSTRUCTED BY YOUR URGENT CARE PROVIDER TODAY, all follow-up for your medical issues should be managed by your primary care provider.  The Urgent Care does not manage chronic medical issues or refill medications.  You are responsible for scheduling and keeping any necessary follow-up visits with your primary care provider after this visit today.   --------------------------------------------------------------------------------------------------------------  IF YOU WERE PRESCRIBED AN  ANTIBIOTIC TODAY:  We recommend taking an over-the-counter probiotic (Such as Florajen 3 for adults, or Teeprivy0Wtmw for children -- AVAILABLE IN THE [facility] and other local pharmacies too) once a day for the entire duration of your antibiotics, and continuing it for 2 weeks after the antibiotics are finished.  This will help reduce your chance of developing antibiotic-related diarrhea and/or yeast infections.  --------------------------------------------------------------------------------------------------------------  IF YOU HAVE LAB RESULTS or X-RAY REPORTS STILL PENDING:  We typically call patients back only if (1) the results are \"significantly abnormal\", (2) we need to change your treatment plan based on the results, or (3) the report is different than what we told you during your visit.  If we have not called you about your results 48 hours after your visit, you can call us at [phone] to check on the status.  --------------------------------------------------------------------------------------------------------------       CONFIRMED Covid-19 infection -- ADULT information    How long do I have to isolate because of my POSITIVE Covid test?     As of 12/27/21, if your test is POSITIVE (regardless of your vaccination status), then you need to isolate:  1) Stay home for first 5 days of symptoms (counting day 1 as first full day after symptoms began)  2) After day 5, if symptoms are gone or resolving, and fevers >100 degrees are gone, you can leave your house  3) BUT continue to wear a mask around others for days 5 to 10.    You can call your [facility] department to discuss any questions about isolation/quarantine time, family member management, etc:    [facility]: [phone]  [facility]: [phone]  [facility]: [phone]  [facility]: [phone]    Some employers/school are asking that patients be \"seen and re-tested\" to return to activities  after Covid illness or exposure. As long as you have met the CDC requirements listed above, you do not need a retesting or medical clearance to return to work.  ----------------------------    How do I protect the other household members during my infection?  Everyone wash their hands often.  Make sure the sick person wears a mask. If they can't wear a mask, don't stay in the same room with the person. If you must be in the same room, wear a face mask. Minimize time in the same room. Do not be around the sick person when they are eating.  Keep track of the sick person’s symptoms.  Clean home surfaces often with disinfectant. This includes phones, kitchen counters, fridge door handle, bathroom surfaces, and others.  Don’t let anyone share household items with the sick person. This includes eating and drinking tools, towels, sheets, or blankets.  Clean fabrics and laundry thoroughly.  Keep other people and pets away from the sick person.    If you or a loved one experiences any of the following emergency warning signs seek care at your nearest emergency department.  Difficulty breathing or shortness of breath that doesn't improve  Persistent pain or pressure in the chest  New confusion or inability to wake up  Blue lips or face  ----------------------------    What is the treatment for Covid-19?  As of 1/2022, there are limited prescription options which have shown benefit for Covid-19 in patients with mild-to-moderate symptoms; your urgent care doctor will discuss those with you if you meet the specific high-risk criteria for those options. The Covid vaccine is highly effective at preventing Covid infection and decreasing symptoms if you do get infected after being vaccinated. Unless you have any of the rare contraindications for getting the Covid vaccine, I highly recommend that you get the Covid vaccine and booster doses, starting 3 months after this current case of Covid.    Supportive treatments include:   Normal  Liliam Saline Nasal Sprays/Sinus Rinse/Neti Pot: Use 1 to 2 times per day to help with nasal congestion, dryness, postnasal drip, and runny nose. (See more details below)   Humidifiers/Vaporizers: Adds moisture to the air, which helps ease coughing and congestion. Mucus tends to pull in the extra moisture, which thins it out and makes it easier to expel from the body.  Adding essential oils to a diffuser is often helpful, try Eucalyptus and Tea Tree Oil.  Vicks VapoRub, Mentholatum: Contain a mixture of camphor, menthol, and eucalyptus for cough and congestion. When these products are inhaled, they create a local anesthetic sensation and a sense of improved airflow. (See below for more details on \"chest compress\" steam wraps for your chest.)  Warm Steam Showers/Baths: The warm mist from the steamy bathroom relaxes the airways, loosens mucus, and allows for easier breathing. A nice big pot of soup on the stove helps too!   Honey: Helps to relieve cough and relieves throat irritation. Use 1/2 to 2 teaspoons (tsp).  It's great to add to hot (decaffeinated) tea=more humidity! Warning:  Do not give honey to children under one year old.   Gargle with warm salt water: Helps relieve sore throat.  Mix 1/4 to 1/2 teaspoon (tsp) salt with 1 cup (8 ounces) of warm water. (Use SEA salt if possible, it stings less. See below for details on \"throat compress\".)  Vitamin D3: The vitamin is needed to boost our immune system and help our bodies fight off infection.  Extra supplements during the winter and times of illness are recommended.  For adults, try 5,000-10,000 international units daily.   Use Gravity: Elevating the head above the heart helps decrease congestion, which is primarily caused by swollen blood vessels in the lining of the nose.  Lay on your chest: If you have cough, chest congestion or shortness of breath, laying on your chest (\"face-down\") can improve the oxygenation of your lungs.  Get a home pulse oximeter (found at  pharmacies or through [employer].com) to track your oxygen levels. If your oxygen is running less than 95% repeatedly, notify your doctor.  Consider Other Supplements (typical adult doses provided):   Vitamin C: 500-1000mg daily  NAC (N-Acetyl Cysteine): 1200mg daily - helps decrease lung inflammation, helpful in pneumonia after viral infections - found at [employer] stores or online  Probiotics: 30-50 Billion live organisms daily with food  Zinc: 50-75mg two times daily  Oscillococcinum®: Take as package recommends   Umcka ColdCare (Pelargonium): Take as package recommends (more details below)    Chest Compress - Good for ages 3 months old and up.   -A 3-step steam-based therapy to break coughing fits and loosen up coughs.   -Great for relief of cough at night or during naptimes.  1) Smear a thin layer of Vicks Vaporub (kids older than 2 years) or Vicks Babyrub (kids ages 3 months to 2 years) on the chest; do not get Vicks in eyes or mouth!  2) Soak a thin cloth (such as, bandana or handkerchief) in cold water, wring it out, and lay it over the Vaporub area.  3) Wrap the entire torso in a dry towel.  When the thin cloth is dry, do steps 1-3 again.  In small children/infants, remove the entire compress after the first 30 minutes of sleep, do not have them wear it all night.    Sinus Saline Rinsing  I recommend the Neilmed Sinus Rinse Squirt Bottle, available at all pharmacies. (It's easier to use than the \"Neti-Pot\").  Do not use a sinus rinse if you are currently having ear pain/pressure.  Rinse twice a day while having sinus pressure or pain.  Also do it daily during your allergy season to prevent congestion in the nose and sinuses!  If rinsing in the evening, do it at least 30 minutes before lying down (to minimize post-nasal drip).    If you are anxious about trying sinus rinsing, here's a few tips to overcome your fear:  The first time you use it, try it while you are taking a shower -- the sensation of  water all over your body helps distract you from the sensation of water in your nose.  You can try a \"beginner's method\" of rinsing -- instead of inserting the nozzle into your nostril, leave the nozzle slightly outside of your nostril and let the water drain back out through the same nostril (instead of trying to make the water flush all the way through the other side of your nose).  During the rinse, if you have draining into your throat -- focus on breathing slowly through the mouth and also speak the sound of the letter \"K\" repeatedly while rinsing.    Throat Compress - to soothe sore throat and decrease throat/gland swelling   1) Fold a thin cloth (such as a bandana or handkerchief) into a long strip, soak it in cold water, wring it out and wrap it around the neck.  2) Cover the cloth with a dry cloth (I find that a tube-sock works well for this). Fasten it with a safety pin. When the cloth gets dry, repeat steps 1 and 2 again.  ---In small children/infants, remove the entire compress after the first 30 minutes of sleep, do not have them wear it all night.---    Umcka ColdCare products (by [employer]) -- Do not take if you have liver issues.  Herbal/homeopathic remedy. Made from a [place] Geranium root (Pelargonium sidoides).  Clinically proven to reduce duration and severity of common-cold head/throat/chest virus symptoms, up to 3 days FASTER than Vitamin C, Zinc or Echinacea! Works best if started within first 3 days of symptoms.  Available over-the-counter without prescription at [facility] in [place] and [facility]; often available at [employer]/natural food/health stores, and through Amazon.com.  For Kids age 6 and older and Adults:  A good-tasting melting/chewable lozenge  DIRECTIONS:  Start taking at first sign of a cold, 3 lozenges/day, until symptoms are gone.  For Kids age 2 and older:  A yummy syrup in various fruit flavors  DIRECTIONS:  Start taking at first sign of a cold,  dose as instructed on bottle 2 times/day, until symptoms are gone.  If the bottle says \"For ages under 6, ask a doctor\" -- Dose for ages 2 to 5 years old:  2.5 mL taken 2 times per day.    -------------------------

## 2023-08-16 NOTE — ED ADULT TRIAGE NOTE - TEMPERATURE IN FAHRENHEIT (DEGREES F)
97.6 Slit Excision Additional Text (Leave Blank If You Do Not Want): A linear line was drawn on the skin overlying the lesion. An incision was made slowly until the lesion was visualized.  Once visualized, the lesion was removed with blunt dissection.

## 2024-05-24 NOTE — ED ADULT TRIAGE NOTE - RESPIRATORY RATE (BREATHS/MIN)
14 normal sinus rhythm, Normal axis, Normal MN interval and QRS complex. There are no acute ischemic ST or T-wave changes.

## 2024-07-17 NOTE — PROGRESS NOTE ADULT - PROBLEM SELECTOR PLAN 1
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   - on presentation, elevated lactate of 3.4 down to 2.2 after some IVF in a few hours, xray L foot neg for overt osteolysis or fx  - initially treated with zosyn   - switched to Ancef as only GBS growing in wound culture -- will complete course today  - minimally elevated ESR, CRP 0.2   - blood cx NGTD x2, urine culture: klebsiella pneumoniae; sensitive to cefazolin -- pt may also have UTI (though unclear as she could not reliably report ROS on admission) -- bacteria is covered by the [facility] anyway  - podiatry Shashi following, recs appreciated  - patient planned to be discharged but awaiting 24 hour aid to be reinstated PAST SURGICAL HISTORY:  AICD (automatic cardioverter/defibrillator) present 4 years ago    Stented coronary artery x4     PAST SURGICAL HISTORY:  AICD (automatic cardioverter/defibrillator) present 4 years ago    H/O radical nephrectomy     Stented coronary artery x4

## 2025-07-02 NOTE — PATIENT PROFILE ADULT. - NS PRO PT REFERRAL QUES 2 YN
Patient's daughter attempted to schedule MRI- first available with sedation in Septemeber. She is asking if Dr. Torres can change the order for just pelvis and prescribe pre procedural medication. If okay please order.    no